# Patient Record
Sex: FEMALE | Race: BLACK OR AFRICAN AMERICAN | NOT HISPANIC OR LATINO | Employment: OTHER | URBAN - METROPOLITAN AREA
[De-identification: names, ages, dates, MRNs, and addresses within clinical notes are randomized per-mention and may not be internally consistent; named-entity substitution may affect disease eponyms.]

---

## 2017-02-23 DIAGNOSIS — M81.0 OSTEOPOROSIS: Primary | ICD-10-CM

## 2017-03-16 ENCOUNTER — HOSPITAL ENCOUNTER (OUTPATIENT)
Dept: RADIOLOGY | Facility: CLINIC | Age: 67
Discharge: HOME | End: 2017-03-16
Attending: FAMILY MEDICINE
Payer: COMMERCIAL

## 2017-03-16 ENCOUNTER — INITIAL CONSULT (OUTPATIENT)
Dept: OPHTHALMOLOGY | Facility: CLINIC | Age: 67
End: 2017-03-16
Payer: COMMERCIAL

## 2017-03-16 DIAGNOSIS — H00.16 CHALAZION, LEFT: Primary | ICD-10-CM

## 2017-03-16 DIAGNOSIS — M81.0 OSTEOPOROSIS: ICD-10-CM

## 2017-03-16 PROCEDURE — 92285 EXTERNAL OCULAR PHOTOGRAPHY: CPT | Mod: S$GLB,,, | Performed by: OPHTHALMOLOGY

## 2017-03-16 PROCEDURE — 92002 INTRM OPH EXAM NEW PATIENT: CPT | Mod: S$GLB,,, | Performed by: OPHTHALMOLOGY

## 2017-03-16 PROCEDURE — 99999 PR PBB SHADOW E&M-EST. PATIENT-LVL II: CPT | Mod: PBBFAC,,, | Performed by: OPHTHALMOLOGY

## 2017-03-16 PROCEDURE — 77080 DXA BONE DENSITY AXIAL: CPT | Mod: 26,,, | Performed by: INTERNAL MEDICINE

## 2017-03-16 NOTE — PROGRESS NOTES
HPI     66 y.o. Male with 3 month history of growing left upper eyelid cyst.  No   pain. Fluctuating in size.      Previous I and D of eyelid cyst.         Last edited by Kristel Rizo MD on 3/16/2017  2:01 PM.         Assessment /Plan     For exam results, see Encounter Report.    Chalazion, left      Plan for I and D of left upper eyelid chalazion.  Obtain consent on day of procedure along with checking IOP and dilation.        Continue warm compresses, lid hygiene       Return for surgery

## 2018-07-24 ENCOUNTER — HOSPITAL ENCOUNTER (EMERGENCY)
Facility: HOSPITAL | Age: 68
Discharge: PSYCHIATRIC OR SUBSTANCE USE FACILITY | End: 2018-07-25
Attending: EMERGENCY MEDICINE
Payer: COMMERCIAL

## 2018-07-24 DIAGNOSIS — R06.02 SOB (SHORTNESS OF BREATH): ICD-10-CM

## 2018-07-24 DIAGNOSIS — R45.851 SUICIDAL IDEATION: Primary | ICD-10-CM

## 2018-07-24 PROBLEM — F32.A DEPRESSION: Status: ACTIVE | Noted: 2018-07-24

## 2018-07-24 PROBLEM — F14.10 COCAINE ABUSE: Status: ACTIVE | Noted: 2018-07-24

## 2018-07-24 LAB
ALBUMIN SERPL BCP-MCNC: 3.5 G/DL
ALP SERPL-CCNC: 64 U/L
ALT SERPL W/O P-5'-P-CCNC: 20 U/L
AMPHET+METHAMPHET UR QL: NEGATIVE
ANION GAP SERPL CALC-SCNC: 8 MMOL/L
APAP SERPL-MCNC: <3 UG/ML
APAP SERPL-MCNC: <3 UG/ML
AST SERPL-CCNC: 26 U/L
BARBITURATES UR QL SCN>200 NG/ML: NEGATIVE
BASOPHILS # BLD AUTO: 0.02 K/UL
BASOPHILS NFR BLD: 0.3 %
BENZODIAZ UR QL SCN>200 NG/ML: NEGATIVE
BILIRUB SERPL-MCNC: 0.6 MG/DL
BILIRUB UR QL STRIP: NEGATIVE
BNP SERPL-MCNC: 14 PG/ML
BUN SERPL-MCNC: 11 MG/DL
BZE UR QL SCN: NORMAL
CALCIUM SERPL-MCNC: 9.2 MG/DL
CANNABINOIDS UR QL SCN: NEGATIVE
CHLORIDE SERPL-SCNC: 111 MMOL/L
CLARITY UR REFRACT.AUTO: ABNORMAL
CO2 SERPL-SCNC: 23 MMOL/L
COLOR UR AUTO: YELLOW
CREAT SERPL-MCNC: 1 MG/DL
CREAT UR-MCNC: 122 MG/DL
DIFFERENTIAL METHOD: ABNORMAL
EOSINOPHIL # BLD AUTO: 0.2 K/UL
EOSINOPHIL NFR BLD: 3.4 %
ERYTHROCYTE [DISTWIDTH] IN BLOOD BY AUTOMATED COUNT: 14.8 %
EST. GFR  (AFRICAN AMERICAN): >60 ML/MIN/1.73 M^2
EST. GFR  (NON AFRICAN AMERICAN): 58.4 ML/MIN/1.73 M^2
ETHANOL SERPL-MCNC: <10 MG/DL
GLUCOSE SERPL-MCNC: 95 MG/DL
GLUCOSE UR QL STRIP: NEGATIVE
HCT VFR BLD AUTO: 36 %
HGB BLD-MCNC: 11.9 G/DL
HGB UR QL STRIP: NEGATIVE
IMM GRANULOCYTES # BLD AUTO: 0.03 K/UL
IMM GRANULOCYTES NFR BLD AUTO: 0.4 %
KETONES UR QL STRIP: NEGATIVE
LEUKOCYTE ESTERASE UR QL STRIP: NEGATIVE
LYMPHOCYTES # BLD AUTO: 1.7 K/UL
LYMPHOCYTES NFR BLD: 25.8 %
MCH RBC QN AUTO: 27.9 PG
MCHC RBC AUTO-ENTMCNC: 33.1 G/DL
MCV RBC AUTO: 85 FL
METHADONE UR QL SCN>300 NG/ML: NEGATIVE
MONOCYTES # BLD AUTO: 0.6 K/UL
MONOCYTES NFR BLD: 9.4 %
NEUTROPHILS # BLD AUTO: 4.1 K/UL
NEUTROPHILS NFR BLD: 60.7 %
NITRITE UR QL STRIP: NEGATIVE
NRBC BLD-RTO: 0 /100 WBC
OPIATES UR QL SCN: NEGATIVE
PCP UR QL SCN>25 NG/ML: NEGATIVE
PH UR STRIP: 5 [PH] (ref 5–8)
PLATELET # BLD AUTO: 257 K/UL
PMV BLD AUTO: 10.5 FL
POTASSIUM SERPL-SCNC: 3.6 MMOL/L
PROT SERPL-MCNC: 7.4 G/DL
PROT UR QL STRIP: NEGATIVE
RBC # BLD AUTO: 4.26 M/UL
SODIUM SERPL-SCNC: 142 MMOL/L
SP GR UR STRIP: 1.01 (ref 1–1.03)
T4 FREE SERPL-MCNC: 0.7 NG/DL
TOXICOLOGY INFORMATION: NORMAL
TROPONIN I SERPL DL<=0.01 NG/ML-MCNC: 0.01 NG/ML
TSH SERPL DL<=0.005 MIU/L-ACNC: 7.66 UIU/ML
URN SPEC COLLECT METH UR: ABNORMAL
UROBILINOGEN UR STRIP-ACNC: NEGATIVE EU/DL
WBC # BLD AUTO: 6.71 K/UL

## 2018-07-24 PROCEDURE — 80053 COMPREHEN METABOLIC PANEL: CPT

## 2018-07-24 PROCEDURE — 25000003 PHARM REV CODE 250: Performed by: STUDENT IN AN ORGANIZED HEALTH CARE EDUCATION/TRAINING PROGRAM

## 2018-07-24 PROCEDURE — 81003 URINALYSIS AUTO W/O SCOPE: CPT | Mod: 59

## 2018-07-24 PROCEDURE — 84443 ASSAY THYROID STIM HORMONE: CPT

## 2018-07-24 PROCEDURE — 80307 DRUG TEST PRSMV CHEM ANLYZR: CPT

## 2018-07-24 PROCEDURE — 83880 ASSAY OF NATRIURETIC PEPTIDE: CPT

## 2018-07-24 PROCEDURE — 84439 ASSAY OF FREE THYROXINE: CPT

## 2018-07-24 PROCEDURE — 80320 DRUG SCREEN QUANTALCOHOLS: CPT

## 2018-07-24 PROCEDURE — 85025 COMPLETE CBC W/AUTO DIFF WBC: CPT

## 2018-07-24 PROCEDURE — 99285 EMERGENCY DEPT VISIT HI MDM: CPT

## 2018-07-24 PROCEDURE — 99285 EMERGENCY DEPT VISIT HI MDM: CPT | Mod: ,,, | Performed by: EMERGENCY MEDICINE

## 2018-07-24 PROCEDURE — 80329 ANALGESICS NON-OPIOID 1 OR 2: CPT | Mod: 59

## 2018-07-24 PROCEDURE — 84484 ASSAY OF TROPONIN QUANT: CPT

## 2018-07-24 RX ORDER — LEVOTHYROXINE SODIUM 50 UG/1
100 TABLET ORAL ONCE
Status: COMPLETED | OUTPATIENT
Start: 2018-07-24 | End: 2018-07-24

## 2018-07-24 RX ORDER — LEVOTHYROXINE SODIUM 50 UG/1
100 TABLET ORAL
Status: DISCONTINUED | OUTPATIENT
Start: 2018-07-25 | End: 2018-07-24

## 2018-07-24 RX ADMIN — LEVOTHYROXINE SODIUM 100 MCG: 50 TABLET ORAL at 02:07

## 2018-07-24 NOTE — ED NOTES
Pt arrived to unit with RN and two security guards. Pt was checked by RN to make sure nothing was on person. Pt documentation, PEC and pt belongings given to RN. Pt belongings are in BH3 bin. Pt is calm and cooperative.  called. Will continue to monitor.

## 2018-07-24 NOTE — HPI
"  Micheline Raza is a 67 y.o. female with a history of depression and medication non-compliance [place] [facility] [place] Psychiatry was consulted to address the patient's symptoms of suicidal ideation.     Per ER MD  Patient is pleasant 67 year old female with history of depression noncompliant with medications presents with suicidal ideations with plan "to get a gun from somewhere" and some reproducible sternal cp worse with inspiration.  Patient reports she just recently became homeless and slept outside yesterday as she could not afford the fees at her hotel. She has not been compliant with her medications in months including her synthroid, klonopin, and paroxetine. She doesn't remember who prescribed these medications.  Recently dc from [place] for similar scenario of suicidal ideations.   She says she has a son in the area but he's staying with friends and she has no other family members or friends she can stay with.  She says that since becoming homeless yesterday, she's been feeling more down and depressed. Hasn't eaten anything but a nutrigrain bar the last 2 days and has had little to drink.  She also reports some sternal cp that's worse with deep breath and reproducible with palpation. She also smoked cocaine yesterday morning and the day before.     Psychiatric Evaluation  Upon entering room patient is laying in bed with her eyes close rocking back and forth. When asked what brought her into the hospital she states "I already told you. I feel like dying, I can't talk right now". When asked if she has a plan she shrugged her shoulders. When asked about HI she nodded yes. Attempted to assess for AVH and psych ROS but patient would not provide answers when asked, often just stating "I don't know" to questions or requesting to be left alone- "just leave me alone please ma'am". Interview and exam are extremely limited 2/2 patient cooperation and was terminated early as patient was " non-participatory. UDS+cocaine    Collateral:   Patient unable/unwilling to provide with information    Psychiatric Review of Systems-is patient experiencing or having changes in  sleep: RUDOLPH  appetite: RUDOLPH- but per ED note she has only eaten a nutrigrain bar the last 2 days  weight: RUDOLPH  energy/anergy: RUDOLPH  interest/pleasure/anhedonia: RUDOLPH  somatic symptoms: RUDOLPH  libido: RUDOLPH  anxiety/panic: RUDOLPH  guilty/hopelessness: RUDOLPH  concentration: RUDOLPH  S.I.B.s/risky behavior: RUDOLPH  any drugs: RUDOLPH- UDS+ cocaine  alcohol: RUDOLPH -Etoh neg     Allergies:  Codeine    Past Medical/Surgical History:  Past Medical History:   Diagnosis Date    Depression     Hypercholesteremia     Pneumonia     Thyroid disease      Past Surgical History:   Procedure Laterality Date     SECTION         Past Psychiatric History:  Previous Medication Trials: per chart review klonopin, and paroxetine   Previous Psychiatric Hospitalizations: [facility]   Previous Suicide Attempts: unknown   History of Violence: unknown  Outpatient Psychiatrist: unknown    Social History:  Marital Status: [facility]  Children: [facility]   Employment Status/Info: [facility]  Education: [facility]  Special Ed: unknown  Housing Status: homeless  History of phys/sexual abuse: unknown  Access to gun: unknown    Substance Abuse History:  Recreational Drugs: cocaine  Use of Alcohol: [facility]. etoh neg  Rehab History:unknown   Tobacco Use:unknown  Use of OTC: unk    Legal History:  Past Charges/Incarcerations:unknown,    Pending charges:unknown     Family Psychiatric History:   unknown

## 2018-07-24 NOTE — ED PROVIDER NOTES
"Encounter Date: 2018    SCRIBE #1 NOTE: I, Jen Wolff, am scribing for, and in the presence of,  Dr. White. I have scribed the following portions of the note - the Resident attestation and the EKG reading.       History     Chief Complaint   Patient presents with    Suicidal     with a plan, also c/o CP when she touches her chest     Patient is pleasant 67 year old female with history of depression noncompliant with medications presents with suicidal ideations with plan "to get a gun from somewhere" and some reproducible sternal cp worse with inspiration.  Patient reports she just recently became homeless and slept outside yesterday as she could not afford the fees at her hotel. She has not been compliant with her medications in months including her synthroid, klonopin, and paroxetine. She doesn't remember who prescribed these medications.  Recently dc from [place] for similar scenario of suicidal ideations.   She says she has a son in the area but he's staying with friends and she has no other family members or friends she can stay with.  She says that since becoming homeless yesterday, she's been feeling more down and depressed. Hasn't eaten anything but a nutrigrain bar the last 2 days and has had little to drink.  She also reports some sternal cp that's worse with deep breath and reproducible with palpation. She also smoked cocaine yesterday morning and the day before.               Review of patient's allergies indicates:   Allergen Reactions    Codeine Nausea And Vomiting     Past Medical History:   Diagnosis Date    Depression     Hypercholesteremia     Pneumonia     Thyroid disease      Past Surgical History:   Procedure Laterality Date     SECTION       Family History   Problem Relation Age of Onset    Heart attack Mother     Lung cancer Mother     Liver cancer Father     Miscarriages / Stillbirths Neg Hx     Diabetes Neg Hx      Social History   Substance Use Topics    " Smoking status: Former Smoker     Packs/day: 1.00     Years: 40.00    Smokeless tobacco: Never Used    Alcohol use Yes      Comment: social     Review of Systems   Constitutional: Negative for fever.   HENT: Negative for sore throat.    Eyes: Positive for visual disturbance.   Respiratory: Positive for shortness of breath.    Cardiovascular: Positive for chest pain. Negative for leg swelling.   Gastrointestinal: Negative for abdominal pain, nausea and vomiting.   Endocrine: Negative for polyuria.   Genitourinary: Negative for difficulty urinating.   Skin: Negative for rash.   Neurological: Positive for weakness.   Hematological: Does not bruise/bleed easily.   Psychiatric/Behavioral: Positive for decreased concentration, sleep disturbance and suicidal ideas. The patient is nervous/anxious.        Physical Exam     Initial Vitals [07/24/18 1100]   BP Pulse Resp Temp SpO2   124/67 66 16 97.8 °F (36.6 °C) 97 %      MAP       --         Physical Exam    Constitutional: She appears well-developed and well-nourished.   HENT:   Head: Normocephalic and atraumatic.   Right Ear: External ear normal.   Left Ear: External ear normal.   Eyes: Conjunctivae are normal.   Neck: Neck supple.   Cardiovascular: Normal rate, regular rhythm and normal heart sounds.   Pulmonary/Chest: Breath sounds normal.   Abdominal: Soft. Bowel sounds are normal. She exhibits no mass. There is no tenderness.   Musculoskeletal: She exhibits no edema.   Chest pain with palpation over sternum and left sterno costal margins.    Lymphadenopathy:     She has no cervical adenopathy.   Neurological: She is alert and oriented to person, place, and time.   Skin: Skin is warm and dry.         ED Course   Procedures  Labs Reviewed   CBC W/ AUTO DIFFERENTIAL - Abnormal; Notable for the following:        Result Value    Hemoglobin 11.9 (*)     Hematocrit 36.0 (*)     RDW 14.8 (*)     All other components within normal limits   ACETAMINOPHEN LEVEL    ALCOHOL,MEDICAL (ETHANOL)   COMPREHENSIVE METABOLIC PANEL   TSH   TROPONIN I   TROPONIN I   B-TYPE NATRIURETIC PEPTIDE   URINALYSIS   DRUG SCREEN PANEL, URINE EMERGENCY     EKG Readings: (Independently Interpreted)   Initial Reading: No STEMI.       Imaging Results    None          Medical Decision Making:   History:   Old Medical Records: I decided to obtain old medical records.  Initial Assessment:   Patient is 67 year old female with history of anxiety and depression noncompliant with medciations presents with suicidal ideations with plan and reproducible sternal cp    Differential Diagnosis:   1. Rule out organic cause of depression. TSH as she's not been compliant on medications.  2. Patient likely here as she became homeless yesterday as her plan does not appear well founded upon explanation. Will update.   Independently Interpreted Test(s):   I have ordered and independently interpreted EKG Reading(s) - see prior notes  Clinical Tests:   Lab Tests: Ordered and Reviewed  Radiological Study: Ordered and Reviewed  Medical Tests: Ordered and Reviewed            Scribe Attestation:   Scribe #1: I performed the above scribed service and the documentation accurately describes the services I performed. I attest to the accuracy of the note.    Attending Attestation:   Physician Attestation Statement for Resident:  As the supervising MD   Physician Attestation Statement: I have personally seen and examined this patient.   I agree with the above history. -: I agree with assessment in plan by provider.   67 y.o. Female patient presents today with suicidal ideations. Patient is still actively suicidal, she is homeless. She recently did some cocaine. Recently was admitted to psychiatric facility 2 weeks ago. Patient has an act of plan to shoot herself. She also complains of pleuritic chest pain.     As the supervising MD I agree with the above PE.   -: Patient is medically  Cleared for [facility] admit.  She did have recent  use of cocaine and some vague pleuritic chest discomfort that had resolved..  CXR, EKG and Trop normal   As the supervising MD I agree with the above treatment, course, plan, and disposition.   -: EKG is nonstemi. Will do troponin.                        Clinical Impression:   The primary encounter diagnosis was Suicidal ideation. A diagnosis of SOB (shortness of breath) was also pertinent to this visit.      Disposition:   Disposition: Admitted  Condition: Stable                        Miguel White MD  07/24/18 1507       Freddie Mckeon MD  Resident  07/24/18 1532       Miguel White MD  07/24/18 1461

## 2018-07-25 VITALS
BODY MASS INDEX: 24.99 KG/M2 | WEIGHT: 150 LBS | RESPIRATION RATE: 16 BRPM | HEART RATE: 68 BPM | SYSTOLIC BLOOD PRESSURE: 116 MMHG | OXYGEN SATURATION: 97 % | HEIGHT: 65 IN | TEMPERATURE: 98 F | DIASTOLIC BLOOD PRESSURE: 71 MMHG

## 2018-07-25 NOTE — ED NOTES
Patient transferred to [facility] with cane and 1 bag of belongings by [facility]. Report was called to nurse Aragon with [facility]. Vital stable. Patient did not want contact person called and informed of transfer to [facility]. SVC maintained.

## 2018-07-25 NOTE — ASSESSMENT & PLAN NOTE
-patient presented to the ED with a h/o depression and SI. Pt minimally cooperative with interview and exam  -UDS +cocaine  -r/o SIMD

## 2018-07-25 NOTE — SUBJECTIVE & OBJECTIVE
Patient History           Medical as of 2018     Past Medical History     Diagnosis Date Comments Source    Depression -- -- Provider    Hypercholesteremia -- -- Provider    Pneumonia -- -- Provider    Thyroid disease -- -- Provider                  Surgical as of 2018     Past Surgical History     Procedure Laterality Date Comments Source     SECTION -- -- -- Provider                  Family as of 2018     Problem Relation Name Age of Onset Comments Source    Heart attack Mother -- -- -- Provider    Lung cancer Mother -- -- -- Provider    Liver cancer Father -- -- -- Provider    Miscarriages / Stillbirths Neg Hx -- -- -- Provider    Diabetes Neg Hx -- -- -- Provider            Tobacco Use as of 2018     Smoking Status Smoking Start Date Smoking Quit Date Packs/day Years Used    Former Smoker -- -- 1.00 40.00    Types Comments Smokeless Tobacco Status Smokeless Tobacco Quit Date Source    -- -- Never Used -- Provider            Alcohol Use as of 2018     Alcohol Use Drinks/Week Alcohol/Week Comments Source    Yes -- -- social Provider            Drug Use as of 2018     Drug Use Types Frequency Comments Source    No -- -- -- Provider            Sexual Activity as of 2018     Sexually Active Birth Control Partners Comments Source    Not Currently None Male -- Provider            Activities of Daily Living as of 2018    **None**           Social Documentation as of 2018    Lives with son. Retired. No longer drives.   Source: Provider           Occupational as of 2018    **None**           Socioeconomic as of 2018     Marital Status Spouse Name Number of Children Years Education Preferred Language Ethnicity Race Source    Single -- -- -- English /Black Black or  --         Pertinent History Q A Comments    as of 2018 Lives with      Place in Birth Order      Lives in      Number of Siblings      Raised by      Legal  Involvement      Childhood Trauma      Criminal History of      Financial Status      Highest Level of Education      Does patient have access to a firearm?       Service      Primary Leisure Activity      Spirituality       Past Medical History:   Diagnosis Date    Depression     Hypercholesteremia     Pneumonia     Thyroid disease      Past Surgical History:   Procedure Laterality Date     SECTION       Family History     Problem Relation (Age of Onset)    Heart attack Mother    Liver cancer Father    Lung cancer Mother        Social History Main Topics    Smoking status: Former Smoker     Packs/day: 1.00     Years: 40.00    Smokeless tobacco: Never Used    Alcohol use Yes      Comment: social    Drug use: No    Sexual activity: Not Currently     Partners: Male     Birth control/ protection: None     Review of patient's allergies indicates:   Allergen Reactions    Codeine Nausea And Vomiting       No current facility-administered medications on file prior to encounter.      Current Outpatient Prescriptions on File Prior to Encounter   Medication Sig    clonazePAM (KLONOPIN) 1 MG tablet Take 1 mg by mouth 2 (two) times daily.    levothyroxine (SYNTHROID) 100 MCG tablet Take 100 mcg by mouth once daily.    multivitamin capsule Take 1 capsule by mouth once daily.    paroxetine (PAXIL) 10 MG tablet Take 10 mg by mouth every morning. Pt unsure dose    pravastatin (PRAVACHOL) 40 MG tablet Take 1 tablet (40 mg total) by mouth once daily.    sodium,potassium,mag sulfates (SUPREP BOWEL PREP KIT) 17.5-3.13-1.6 gram SolR Take 1 each by mouth as directed.    vitamin D 1000 units Tab Take 185 mg by mouth once daily.     Psychotherapeutics     None        Review of Systems   Unable to perform ROS: Other (Patient would not provide answers when asked)     Strengths and Liabilities: unable to assess at this time as patient is minimally cooperative    Objective:     Vital Signs (Most  "Recent):  Temp: 98.6 °F (37 °C) (07/24/18 1545)  Pulse: 68 (07/24/18 1545)  Resp: 18 (07/24/18 1545)  BP: (!) 104/54 (07/24/18 1545)  SpO2: 97 % (07/24/18 1100) Vital Signs (24h Range):  Temp:  [97.8 °F (36.6 °C)-98.6 °F (37 °C)] 98.6 °F (37 °C)  Pulse:  [66-68] 68  Resp:  [16-18] 18  SpO2:  [97 %] 97 %  BP: (104-124)/(54-67) 104/54     Height: 5' 5" (165.1 cm)  Weight: 68 kg (150 lb)  Body mass index is 24.96 kg/m².    No intake or output data in the 24 hours ending 07/24/18 1905    Physical Exam   HENT:   Head: Normocephalic and atraumatic.   Eyes:   Patient would not open eyes for exam     Neck: Normal range of motion.   Cardiovascular:   Patient would not cooperate with cardiovascular exam   Pulmonary/Chest:   Patient would not cooperate with pulmonary exam   Abdominal:   Patient would not cooperate with abdominal exam     Mental Status Exam:  Appearance: unremarkable, age appropriate, well nourished, nervous appearing, rocking back in forth in bed laying down  Behavior/Cooperation: reluctant to participate, restless and fidgety , eye contact minimal  Speech: normal tone, normal rate, normal pitch, normal volume  Mood: "leave me alone"  Affect: irritable  Thought Process: normal and logical  Thought Content: +SI, +HI   Perception: unable to assess AVH. No objective evidence of hallucinations.   Orientation: unable to assess 2/2 cooperation  Memory: Unable to assess 2/2 cooperation  Attention Span/Concentration: Grossly intact  Insight: poor  Judgment: poor       Significant Labs:   Last 24 Hours:   Recent Lab Results       07/24/18  1250 07/24/18  1204 07/24/18  1153      Benzodiazepines  Negative      Methadone metabolites  Negative      Phencyclidine  Negative      Immature Granulocytes   0.4     Immature Grans (Abs)   0.03  Comment:  Mild elevation in immature granulocytes is non specific and   can be seen in a variety of conditions including stress response,   acute inflammation, trauma and pregnancy. " Correlation with other   laboratory and clinical findings is essential.       Acetaminophen (Tylenol), Serum <3.0  Comment:  Toxic Levels:  Adults (4 hr post-ingestion).........>150 ug/mL  Adults (12 hr post-ingestion)........>40 ug/mL  Peds (2 hr post-ingestion, liquid)...>225 ug/mL  (L)  <3.0  Comment:  Toxic Levels:  Adults (4 hr post-ingestion).........>150 ug/mL  Adults (12 hr post-ingestion)........>40 ug/mL  Peds (2 hr post-ingestion, liquid)...>225 ug/mL  (L)     Albumin   3.5     Alcohol, Medical, Serum <10       Alkaline Phosphatase   64     ALT   20     Amphetamine Screen, Ur  Negative      Anion Gap   8     Appearance, UA  Hazy(A)      AST   26     Barbiturate Screen, Ur  Negative      Baso #   0.02     Basophil%   0.3     Bilirubin (UA)  Negative      Total Bilirubin   0.6  Comment:  For infants and newborns, interpretation of results should be based  on gestational age, weight and in agreement with clinical  observations.  Premature Infant recommended reference ranges:  Up to 24 hours.............<8.0 mg/dL  Up to 48 hours............<12.0 mg/dL  3-5 days..................<15.0 mg/dL  6-29 days.................<15.0 mg/dL       BNP   14  Comment:  Values of less than 100 pg/ml are consistent with non-CHF populations.     BUN, Bld   11     Calcium   9.2     Chloride   111(H)     CO2   23     Cocaine (Metab.)  Presumptive Positive      Color, UA  Yellow      Creatinine   1.0     Creatinine, Random Ur  122.0  Comment:  The random urine reference ranges provided were established   for 24 hour urine collections.  No reference ranges exist for  random urine specimens.  Correlate clinically.        Differential Method   Automated     eGFR if    >60.0     eGFR if non    58.4  Comment:  Calculation used to obtain the estimated glomerular filtration  rate (eGFR) is the CKD-EPI equation.   (A)     Eos #   0.2     Eosinophil%   3.4     Free T4   0.70(L)     Glucose   95     Glucose,  UA  Negative      Gran # (ANC)   4.1     Gran%   60.7     Hematocrit   36.0(L)     Hemoglobin   11.9(L)     Ketones, UA  Negative      Leukocytes, UA  Negative      Lymph #   1.7     Lymph%   25.8     MCH   27.9     MCHC   33.1     MCV   85     Mono #   0.6     Mono%   9.4     MPV   10.5     Nitrite, UA  Negative      nRBC   0     Occult Blood UA  Negative      Opiate Scrn, Ur  Negative      pH, UA  5.0      Platelets   257     Potassium   3.6     Total Protein   7.4     Protein, UA  Negative  Comment:  Recommend a 24 hour urine protein or a urine   protein/creatinine ratio if globulin induced proteinuria is  clinically suspected.        RBC   4.26     RDW   14.8(H)     Sodium   142     Specific Gravity, UA  1.015      Specimen UA  Urine, Clean Catch      Marijuana (THC) Metabolite  Negative      Toxicology Information  SEE COMMENT  Comment:  This screen includes the following classes of drugs at the   listed cut-off:  Benzodiazepines                  200 ng/ml  Methadone                        300 ng/ml  Cocaine metabolite               300 ng/ml  Opiates                          300 ng/ml  Barbiturates                     200 ng/ml  Amphetamines                    1000 ng/ml  Marijuana metabs (THC)            50 ng/ml  Phencyclidine (PCP)               25 ng/ml  High concentrations of Diphenhydramine may cross-react with  Phencyclidine PCP screening immunoassay giving a false   positive result.  High concentrations of Methylenedioxymethamphetamine (MDMA aka  Ectasy) and other structurally similar compounds may cross-   react with the Amphetamine/Methamphetamine screening   immunoassay giving a false positive result.  A metabolite of the anti-HIV drug Sustiva () may cause  false positive results in the Marijuana metabolite (THC)   screening assay.  Note: This exception list includes only more common   interferants in toxicology screen testing.  Because of many   cross-reactantspositive results on toxicology  drug screens   should be confirmed whenever results do not correlate with   clinical presentation.  This report is intended for use in clinical monitoring and  management of patients. It is not intended for use in   employment related drug testing.  Because of any cross-reactants, positive results on toxicology  drug screens should be confirmed whenever results do not  correlate with clinical presentation.  Presumptive positive results are unconfirmed and may be used   only for medical purposes.        Troponin I   0.012  Comment:  The reference interval for Troponin I represents the 99th percentile   cutoff   for our facility and is consistent with 3rd generation assay   performance.       TSH   7.656(H)     Urobilinogen, UA  Negative      WBC   6.71           Significant Imaging: I have reviewed all pertinent imaging results/findings within the past 24 hours.

## 2018-07-25 NOTE — CONSULTS
"[facility]  Psychiatry  Consult Note    Patient Name: Micheline Raza  MRN: [id number]   Code Status: Prior  Admission Date: 7/24/2018  Hospital Length of Stay: 0 days  Attending Physician: Miguel White MD  Primary Care Provider: Tripp Jasso MD    Current Legal Status: [facility]    Patient information was obtained from patient and ER records.   Inpatient consult to Psychiatry  Consult performed by: MAYELIN OLIVEROS  Consult ordered by: IRMA DECKER        Subjective:     Principal Problem:<principal problem not specified>    Chief Complaint:  SI     HPI:     Micheline Raza is a 67 y.o. female with a history of depression and medication non-compliance who [place] [facility] [place] Psychiatry was consulted to address the patient's symptoms of suicidal ideation.     Per ER MD  Patient is pleasant 67 year old female with history of depression noncompliant with medications presents with suicidal ideations with plan "to get a gun from somewhere" and some reproducible sternal cp worse with inspiration.  Patient reports she just recently became homeless and slept outside yesterday as she could not afford the fees at her hotel. She has not been compliant with her medications in months including her synthroid, klonopin, and paroxetine. She doesn't remember who prescribed these medications.  Recently dc from [place] for similar scenario of suicidal ideations.   She says she has a son in the area but he's staying with friends and she has no other family members or friends she can stay with.  She says that since becoming homeless yesterday, she's been feeling more down and depressed. Hasn't eaten anything but a nutrigrain bar the last 2 days and has had little to drink.  She also reports some sternal cp that's worse with deep breath and reproducible with palpation. She also smoked cocaine yesterday morning and the day before.     Psychiatric Evaluation  Upon entering room patient is laying " "in bed with her eyes close rocking back and forth. When asked what brought her into the hospital she states "I already told you. I feel like dying, I can't talk right now". When asked if she has a plan she shrugged her shoulders. When asked about HI she nodded yes. Attempted to assess for AVH and psych ROS but patient would not provide answers when asked, often just stating "I don't know" to questions or requesting to be left alone- "just leave me alone please ma'am". Interview and exam are extremely limited 2/2 patient cooperation and was terminated early as patient was non-participatory. UDS+cocaine    Collateral:   Patient unable/unwilling to provide with information    Psychiatric Review of Systems-is patient experiencing or having changes in  sleep: RUDOLPH  appetite: RUDOLPH- but per ED note she has only eaten a nutrigrain bar the last 2 days  weight: RUDOLPH  energy/anergy: RUDOLPH  interest/pleasure/anhedonia: RUDOLPH  somatic symptoms: RUDOLPH  libido: RUDOLPH  anxiety/panic: RUDOLPH  guilty/hopelessness: RUDOLPH  concentration: RUDOLPH  S.I.B.s/risky behavior: RUDOLPH  any drugs: RUDOLPH- UDS+ cocaine  alcohol: RUDOLPH -Etoh neg     Allergies:  Codeine    Past Medical/Surgical History:  Past Medical History:   Diagnosis Date    Depression     Hypercholesteremia     Pneumonia     Thyroid disease      Past Surgical History:   Procedure Laterality Date     SECTION         Past Psychiatric History:  Previous Medication Trials: per chart review klonopin, and paroxetine   Previous Psychiatric Hospitalizations: [facility]   Previous Suicide Attempts: unknown   History of Violence: unknown  Outpatient Psychiatrist: unknown    Social History:  Marital Status: [facility]olph  Children: rudolph   Employment Status/Info: [facility]olph  Education: [facility]  Special Ed: unknown  Housing Status: homeless  History of phys/sexual abuse: unknown  Access to gun: unknown    Substance Abuse History:  Recreational Drugs: cocaine  Use of Alcohol: [facility]olph. etoh neg  Rehab History:unknown   Tobacco " Use:unknown  Use of OTC: unk    Legal History:  Past Charges/Incarcerations:unknown,    Pending charges:unknown     Family Psychiatric History:   unknown      Hospital Course: No notes on file         Patient History           Medical as of 2018     Past Medical History     Diagnosis Date Comments Source    Depression -- -- Provider    Hypercholesteremia -- -- Provider    Pneumonia -- -- Provider    Thyroid disease -- -- Provider                  Surgical as of 2018     Past Surgical History     Procedure Laterality Date Comments Source     SECTION -- -- -- Provider                  Family as of 2018     Problem Relation Name Age of Onset Comments Source    Heart attack Mother -- -- -- Provider    Lung cancer Mother -- -- -- Provider    Liver cancer Father -- -- -- Provider    Miscarriages / Stillbirths Neg Hx -- -- -- Provider    Diabetes Neg Hx -- -- -- Provider            Tobacco Use as of 2018     Smoking Status Smoking Start Date Smoking Quit Date Packs/day Years Used    Former Smoker -- -- 1.00 40.00    Types Comments Smokeless Tobacco Status Smokeless Tobacco Quit Date Source    -- -- Never Used -- Provider            Alcohol Use as of 2018     Alcohol Use Drinks/Week Alcohol/Week Comments Source    Yes -- -- social Provider            Drug Use as of 2018     Drug Use Types Frequency Comments Source    No -- -- -- Provider            Sexual Activity as of 2018     Sexually Active Birth Control Partners Comments Source    Not Currently None Male -- Provider            Activities of Daily Living as of 2018    **None**           Social Documentation as of 2018    Lives with son. Retired. No longer drives.   Source: Provider           Occupational as of 2018    **None**           Socioeconomic as of 2018     Marital Status Spouse Name Number of Children Years Education Preferred Language Ethnicity Race Source    Single -- -- -- English   American/Black Black or  --         Pertinent History Q A Comments    as of 2018 Lives with      Place in Birth Order      Lives in      Number of Siblings      Raised by      Legal Involvement      Childhood Trauma      Criminal History of      Financial Status      Highest Level of Education      Does patient have access to a firearm?       Service      Primary Leisure Activity      Spirituality       Past Medical History:   Diagnosis Date    Depression     Hypercholesteremia     Pneumonia     Thyroid disease      Past Surgical History:   Procedure Laterality Date     SECTION       Family History     Problem Relation (Age of Onset)    Heart attack Mother    Liver cancer Father    Lung cancer Mother        Social History Main Topics    Smoking status: Former Smoker     Packs/day: 1.00     Years: 40.00    Smokeless tobacco: Never Used    Alcohol use Yes      Comment: social    Drug use: No    Sexual activity: Not Currently     Partners: Male     Birth control/ protection: None     Review of patient's allergies indicates:   Allergen Reactions    Codeine Nausea And Vomiting       No current facility-administered medications on file prior to encounter.      Current Outpatient Prescriptions on File Prior to Encounter   Medication Sig    clonazePAM (KLONOPIN) 1 MG tablet Take 1 mg by mouth 2 (two) times daily.    levothyroxine (SYNTHROID) 100 MCG tablet Take 100 mcg by mouth once daily.    multivitamin capsule Take 1 capsule by mouth once daily.    paroxetine (PAXIL) 10 MG tablet Take 10 mg by mouth every morning. Pt unsure dose    pravastatin (PRAVACHOL) 40 MG tablet Take 1 tablet (40 mg total) by mouth once daily.    sodium,potassium,mag sulfates (SUPREP BOWEL PREP KIT) 17.5-3.13-1.6 gram SolR Take 1 each by mouth as directed.    vitamin D 1000 units Tab Take 185 mg by mouth once daily.     Psychotherapeutics     None        Review of Systems   Unable to perform  "ROS: Other (Patient would not provide answers when asked)     Strengths and Liabilities: unable to assess at this time as patient is minimally cooperative    Objective:     Vital Signs (Most Recent):  Temp: 98.6 °F (37 °C) (07/24/18 1545)  Pulse: 68 (07/24/18 1545)  Resp: 18 (07/24/18 1545)  BP: (!) 104/54 (07/24/18 1545)  SpO2: 97 % (07/24/18 1100) Vital Signs (24h Range):  Temp:  [97.8 °F (36.6 °C)-98.6 °F (37 °C)] 98.6 °F (37 °C)  Pulse:  [66-68] 68  Resp:  [16-18] 18  SpO2:  [97 %] 97 %  BP: (104-124)/(54-67) 104/54     Height: 5' 5" (165.1 cm)  Weight: 68 kg (150 lb)  Body mass index is 24.96 kg/m².    No intake or output data in the 24 hours ending 07/24/18 1905    Physical Exam   HENT:   Head: Normocephalic and atraumatic.   Eyes:   Patient would not open eyes for exam     Neck: Normal range of motion.   Cardiovascular:   Patient would not cooperate with cardiovascular exam   Pulmonary/Chest:   Patient would not cooperate with pulmonary exam   Abdominal:   Patient would not cooperate with abdominal exam     Mental Status Exam:  Appearance: unremarkable, age appropriate, well nourished, nervous appearing, rocking back in forth in bed laying down  Behavior/Cooperation: reluctant to participate, restless and fidgety , eye contact minimal  Speech: normal tone, normal rate, normal pitch, normal volume  Mood: "leave me alone"  Affect: irritable  Thought Process: normal and logical  Thought Content: +SI, +HI   Perception: unable to assess AVH. No objective evidence of hallucinations.   Orientation: unable to assess 2/2 cooperation  Memory: Unable to assess 2/2 cooperation  Attention Span/Concentration: Grossly intact  Insight: poor  Judgment: poor       Significant Labs:   Last 24 Hours:   Recent Lab Results       07/24/18  1250 07/24/18  1204 07/24/18  1153      Benzodiazepines  Negative      Methadone metabolites  Negative      Phencyclidine  Negative      Immature Granulocytes   0.4     Immature Grans (Abs)   " 0.03  Comment:  Mild elevation in immature granulocytes is non specific and   can be seen in a variety of conditions including stress response,   acute inflammation, trauma and pregnancy. Correlation with other   laboratory and clinical findings is essential.       Acetaminophen (Tylenol), Serum <3.0  Comment:  Toxic Levels:  Adults (4 hr post-ingestion).........>150 ug/mL  Adults (12 hr post-ingestion)........>40 ug/mL  Peds (2 hr post-ingestion, liquid)...>225 ug/mL  (L)  <3.0  Comment:  Toxic Levels:  Adults (4 hr post-ingestion).........>150 ug/mL  Adults (12 hr post-ingestion)........>40 ug/mL  Peds (2 hr post-ingestion, liquid)...>225 ug/mL  (L)     Albumin   3.5     Alcohol, Medical, Serum <10       Alkaline Phosphatase   64     ALT   20     Amphetamine Screen, Ur  Negative      Anion Gap   8     Appearance, UA  Hazy(A)      AST   26     Barbiturate Screen, Ur  Negative      Baso #   0.02     Basophil%   0.3     Bilirubin (UA)  Negative      Total Bilirubin   0.6  Comment:  For infants and newborns, interpretation of results should be based  on gestational age, weight and in agreement with clinical  observations.  Premature Infant recommended reference ranges:  Up to 24 hours.............<8.0 mg/dL  Up to 48 hours............<12.0 mg/dL  3-5 days..................<15.0 mg/dL  6-29 days.................<15.0 mg/dL       BNP   14  Comment:  Values of less than 100 pg/ml are consistent with non-CHF populations.     BUN, Bld   11     Calcium   9.2     Chloride   111(H)     CO2   23     Cocaine (Metab.)  Presumptive Positive      Color, UA  Yellow      Creatinine   1.0     Creatinine, Random Ur  122.0  Comment:  The random urine reference ranges provided were established   for 24 hour urine collections.  No reference ranges exist for  random urine specimens.  Correlate clinically.        Differential Method   Automated     eGFR if    >60.0     eGFR if non African American    58.4  Comment:  Calculation used to obtain the estimated glomerular filtration  rate (eGFR) is the CKD-EPI equation.   (A)     Eos #   0.2     Eosinophil%   3.4     Free T4   0.70(L)     Glucose   95     Glucose, UA  Negative      Gran # (ANC)   4.1     Gran%   60.7     Hematocrit   36.0(L)     Hemoglobin   11.9(L)     Ketones, UA  Negative      Leukocytes, UA  Negative      Lymph #   1.7     Lymph%   25.8     MCH   27.9     MCHC   33.1     MCV   85     Mono #   0.6     Mono%   9.4     MPV   10.5     Nitrite, UA  Negative      nRBC   0     Occult Blood UA  Negative      Opiate Scrn, Ur  Negative      pH, UA  5.0      Platelets   257     Potassium   3.6     Total Protein   7.4     Protein, UA  Negative  Comment:  Recommend a 24 hour urine protein or a urine   protein/creatinine ratio if globulin induced proteinuria is  clinically suspected.        RBC   4.26     RDW   14.8(H)     Sodium   142     Specific Gravity, UA  1.015      Specimen UA  Urine, Clean Catch      Marijuana (THC) Metabolite  Negative      Toxicology Information  SEE COMMENT  Comment:  This screen includes the following classes of drugs at the   listed cut-off:  Benzodiazepines                  200 ng/ml  Methadone                        300 ng/ml  Cocaine metabolite               300 ng/ml  Opiates                          300 ng/ml  Barbiturates                     200 ng/ml  Amphetamines                    1000 ng/ml  Marijuana metabs (THC)            50 ng/ml  Phencyclidine (PCP)               25 ng/ml  High concentrations of Diphenhydramine may cross-react with  Phencyclidine PCP screening immunoassay giving a false   positive result.  High concentrations of Methylenedioxymethamphetamine (MDMA aka  Ectasy) and other structurally similar compounds may cross-   react with the Amphetamine/Methamphetamine screening   immunoassay giving a false positive result.  A metabolite of the anti-HIV drug Sustiva () may cause  false positive results in  the Marijuana metabolite (THC)   screening assay.  Note: This exception list includes only more common   interferants in toxicology screen testing.  Because of many   cross-reactantspositive results on toxicology drug screens   should be confirmed whenever results do not correlate with   clinical presentation.  This report is intended for use in clinical monitoring and  management of patients. It is not intended for use in   employment related drug testing.  Because of any cross-reactants, positive results on toxicology  drug screens should be confirmed whenever results do not  correlate with clinical presentation.  Presumptive positive results are unconfirmed and may be used   only for medical purposes.        Troponin I   0.012  Comment:  The reference interval for Troponin I represents the 99th percentile   cutoff   for our facility and is consistent with 3rd generation assay   performance.       TSH   7.656(H)     Urobilinogen, UA  Negative      WBC   6.71           Significant Imaging: I have reviewed all pertinent imaging results/findings within the past 24 hours.    Assessment/Plan:     Cocaine abuse    -patient presented to the ED with a h/o depression and SI. Pt minimally cooperative with interview and exam  -UDS +cocaine  -r/o SIMD        Depression    -patient was h/o depression who presented to the ED with SI that was previously taking paroxetine per ED assessment.  -patient minimally cooperative on interview and able to provide little information at this time               1. Dispo/Legal Status:  Cont PEC at this time as the pt is currently danger to her self. Seek inpt bed for pt safety and stabilization when/if medically cleared by the ER MD.  2. Scheduled Medications: Defer psychiatric medications to providers at final destination.. Defer any non-psych meds to the ER MD. No free beds in the [facility]. Please fax out information to aid with transfer  3. PRN Medications: 10 mg zyprexa IM/PO  prn  non-redirectable agitation associated with breakthrough psychosis or derek if needed to help the pt more effectively interact with environment.   5. Case Discussed with:  Dr. Christopher        Total Time:  45 minutes     Ashley Doyle MD   Psychiatry  [facility]

## 2018-07-25 NOTE — ASSESSMENT & PLAN NOTE
-patient was h/o depression who presented to the ED with SI that was previously taking paroxetine per ED assessment.  -patient minimally cooperative on interview and able to provide little information at this time

## 2018-12-17 NOTE — ED NOTES
Patient reports she is still feeling suicidal. He plan is to use a gun. Denies HI, V/A hallucinations. SVC maintained.    Sepsis Week 1 Survey      Responses   Facility patient discharged from?  [facility]   Does the patient have one of the following disease processes/diagnoses(primary or secondary)?  Sepsis   Is there a successful TCM telephone encounter documented?  No   Week 1 attempt successful?  No   Unsuccessful attempts  Attempt 1          Radha Manjarrez RN

## 2019-12-05 ENCOUNTER — HOSPITAL ENCOUNTER (EMERGENCY)
Facility: HOSPITAL | Age: 69
Discharge: PSYCHIATRIC OR SUBSTANCE USE FACILITY | End: 2019-12-05
Attending: EMERGENCY MEDICINE
Payer: COMMERCIAL

## 2019-12-05 VITALS
HEART RATE: 62 BPM | HEIGHT: 66 IN | SYSTOLIC BLOOD PRESSURE: 128 MMHG | BODY MASS INDEX: 23.46 KG/M2 | OXYGEN SATURATION: 98 % | TEMPERATURE: 99 F | WEIGHT: 146 LBS | RESPIRATION RATE: 17 BRPM | DIASTOLIC BLOOD PRESSURE: 59 MMHG

## 2019-12-05 DIAGNOSIS — R45.851 SUICIDAL IDEATION: Primary | ICD-10-CM

## 2019-12-05 DIAGNOSIS — R45.851 DEPRESSION WITH SUICIDAL IDEATION: ICD-10-CM

## 2019-12-05 DIAGNOSIS — J98.59 MEDIASTINAL MASS: ICD-10-CM

## 2019-12-05 DIAGNOSIS — K59.00 CONSTIPATION, UNSPECIFIED CONSTIPATION TYPE: ICD-10-CM

## 2019-12-05 DIAGNOSIS — F32.A DEPRESSION WITH SUICIDAL IDEATION: ICD-10-CM

## 2019-12-05 LAB
ALBUMIN SERPL BCP-MCNC: 3.7 G/DL (ref 3.5–5.2)
ALP SERPL-CCNC: 81 U/L (ref 55–135)
ALT SERPL W/O P-5'-P-CCNC: 15 U/L (ref 10–44)
AMPHET+METHAMPHET UR QL: NEGATIVE
ANION GAP SERPL CALC-SCNC: 10 MMOL/L (ref 8–16)
AST SERPL-CCNC: 23 U/L (ref 10–40)
BACTERIA #/AREA URNS AUTO: NORMAL /HPF
BARBITURATES UR QL SCN>200 NG/ML: NEGATIVE
BASOPHILS # BLD AUTO: 0.05 K/UL (ref 0–0.2)
BASOPHILS NFR BLD: 0.5 % (ref 0–1.9)
BENZODIAZ UR QL SCN>200 NG/ML: NEGATIVE
BILIRUB SERPL-MCNC: 0.4 MG/DL (ref 0.1–1)
BILIRUB UR QL STRIP: NEGATIVE
BUN SERPL-MCNC: 18 MG/DL (ref 8–23)
BZE UR QL SCN: NORMAL
CALCIUM SERPL-MCNC: 10.1 MG/DL (ref 8.7–10.5)
CANNABINOIDS UR QL SCN: NEGATIVE
CHLORIDE SERPL-SCNC: 103 MMOL/L (ref 95–110)
CLARITY UR REFRACT.AUTO: CLEAR
CO2 SERPL-SCNC: 27 MMOL/L (ref 23–29)
COLOR UR AUTO: ABNORMAL
CREAT SERPL-MCNC: 1.2 MG/DL (ref 0.5–1.4)
CREAT UR-MCNC: 61 MG/DL (ref 15–325)
DIFFERENTIAL METHOD: NORMAL
EOSINOPHIL # BLD AUTO: 0.1 K/UL (ref 0–0.5)
EOSINOPHIL NFR BLD: 1.2 % (ref 0–8)
ERYTHROCYTE [DISTWIDTH] IN BLOOD BY AUTOMATED COUNT: 14.1 % (ref 11.5–14.5)
EST. GFR  (AFRICAN AMERICAN): 53.3 ML/MIN/1.73 M^2
EST. GFR  (NON AFRICAN AMERICAN): 46.2 ML/MIN/1.73 M^2
GLUCOSE SERPL-MCNC: 99 MG/DL (ref 70–110)
GLUCOSE UR QL STRIP: NEGATIVE
HCT VFR BLD AUTO: 43.9 % (ref 37–48.5)
HGB BLD-MCNC: 14.4 G/DL (ref 12–16)
HGB UR QL STRIP: NEGATIVE
IMM GRANULOCYTES # BLD AUTO: 0.02 K/UL (ref 0–0.04)
IMM GRANULOCYTES NFR BLD AUTO: 0.2 % (ref 0–0.5)
KETONES UR QL STRIP: NEGATIVE
LACTATE SERPL-SCNC: 1.7 MMOL/L (ref 0.5–2.2)
LEUKOCYTE ESTERASE UR QL STRIP: NEGATIVE
LIPASE SERPL-CCNC: 24 U/L (ref 4–60)
LYMPHOCYTES # BLD AUTO: 2.3 K/UL (ref 1–4.8)
LYMPHOCYTES NFR BLD: 21 % (ref 18–48)
MCH RBC QN AUTO: 28.7 PG (ref 27–31)
MCHC RBC AUTO-ENTMCNC: 32.8 G/DL (ref 32–36)
MCV RBC AUTO: 88 FL (ref 82–98)
METHADONE UR QL SCN>300 NG/ML: NEGATIVE
MICROSCOPIC COMMENT: NORMAL
MONOCYTES # BLD AUTO: 0.9 K/UL (ref 0.3–1)
MONOCYTES NFR BLD: 8.6 % (ref 4–15)
NEUTROPHILS # BLD AUTO: 7.3 K/UL (ref 1.8–7.7)
NEUTROPHILS NFR BLD: 68.5 % (ref 38–73)
NITRITE UR QL STRIP: NEGATIVE
NRBC BLD-RTO: 0 /100 WBC
OPIATES UR QL SCN: NEGATIVE
PCP UR QL SCN>25 NG/ML: NEGATIVE
PH UR STRIP: 6 [PH] (ref 5–8)
PLATELET # BLD AUTO: 260 K/UL (ref 150–350)
PMV BLD AUTO: 11.6 FL (ref 9.2–12.9)
POTASSIUM SERPL-SCNC: 3.6 MMOL/L (ref 3.5–5.1)
PROT SERPL-MCNC: 8.8 G/DL (ref 6–8.4)
PROT UR QL STRIP: NEGATIVE
RBC # BLD AUTO: 5.01 M/UL (ref 4–5.4)
RBC #/AREA URNS AUTO: 4 /HPF (ref 0–4)
SODIUM SERPL-SCNC: 140 MMOL/L (ref 136–145)
SP GR UR STRIP: >=1.03 (ref 1–1.03)
SQUAMOUS #/AREA URNS AUTO: 2 /HPF
T4 FREE SERPL-MCNC: 0.79 NG/DL (ref 0.71–1.51)
TOXICOLOGY INFORMATION: NORMAL
TSH SERPL DL<=0.005 MIU/L-ACNC: 37.73 UIU/ML (ref 0.4–4)
URN SPEC COLLECT METH UR: ABNORMAL
WBC # BLD AUTO: 10.69 K/UL (ref 3.9–12.7)
WBC #/AREA URNS AUTO: 1 /HPF (ref 0–5)

## 2019-12-05 PROCEDURE — 84443 ASSAY THYROID STIM HORMONE: CPT

## 2019-12-05 PROCEDURE — 85025 COMPLETE CBC W/AUTO DIFF WBC: CPT

## 2019-12-05 PROCEDURE — 90791 PR PSYCHIATRIC DIAGNOSTIC EVALUATION: ICD-10-PCS | Mod: ,,, | Performed by: NURSE PRACTITIONER

## 2019-12-05 PROCEDURE — 84439 ASSAY OF FREE THYROXINE: CPT

## 2019-12-05 PROCEDURE — 25500020 PHARM REV CODE 255: Performed by: EMERGENCY MEDICINE

## 2019-12-05 PROCEDURE — 81001 URINALYSIS AUTO W/SCOPE: CPT

## 2019-12-05 PROCEDURE — 80307 DRUG TEST PRSMV CHEM ANLYZR: CPT

## 2019-12-05 PROCEDURE — 83605 ASSAY OF LACTIC ACID: CPT

## 2019-12-05 PROCEDURE — 83690 ASSAY OF LIPASE: CPT

## 2019-12-05 PROCEDURE — 90791 PSYCH DIAGNOSTIC EVALUATION: CPT | Mod: ,,, | Performed by: NURSE PRACTITIONER

## 2019-12-05 PROCEDURE — 63600175 PHARM REV CODE 636 W HCPCS: Performed by: STUDENT IN AN ORGANIZED HEALTH CARE EDUCATION/TRAINING PROGRAM

## 2019-12-05 PROCEDURE — 80053 COMPREHEN METABOLIC PANEL: CPT

## 2019-12-05 PROCEDURE — 99285 EMERGENCY DEPT VISIT HI MDM: CPT | Mod: ,,, | Performed by: EMERGENCY MEDICINE

## 2019-12-05 PROCEDURE — 96360 HYDRATION IV INFUSION INIT: CPT | Mod: 59

## 2019-12-05 PROCEDURE — 63600175 PHARM REV CODE 636 W HCPCS: Performed by: EMERGENCY MEDICINE

## 2019-12-05 PROCEDURE — 99285 EMERGENCY DEPT VISIT HI MDM: CPT | Mod: 25

## 2019-12-05 PROCEDURE — 99285 PR EMERGENCY DEPT VISIT,LEVEL V: ICD-10-PCS | Mod: ,,, | Performed by: EMERGENCY MEDICINE

## 2019-12-05 PROCEDURE — 96361 HYDRATE IV INFUSION ADD-ON: CPT

## 2019-12-05 RX ORDER — ESCITALOPRAM OXALATE 20 MG/1
20 TABLET ORAL DAILY
COMMUNITY
End: 2020-05-05

## 2019-12-05 RX ORDER — PSEUDOEPHEDRINE/ACETAMINOPHEN 30MG-500MG
100 TABLET ORAL
Status: DISCONTINUED | OUTPATIENT
Start: 2019-12-05 | End: 2019-12-05 | Stop reason: HOSPADM

## 2019-12-05 RX ORDER — SYRING-NEEDL,DISP,INSUL,0.3 ML 29 G X1/2"
296 SYRINGE, EMPTY DISPOSABLE MISCELLANEOUS
Status: DISCONTINUED | OUTPATIENT
Start: 2019-12-05 | End: 2019-12-05 | Stop reason: HOSPADM

## 2019-12-05 RX ORDER — ASPIRIN 81 MG/1
81 TABLET ORAL DAILY
Status: ON HOLD | COMMUNITY
End: 2022-02-15 | Stop reason: HOSPADM

## 2019-12-05 RX ORDER — LEVOCETIRIZINE DIHYDROCHLORIDE 5 MG/1
5 TABLET, FILM COATED ORAL NIGHTLY
Status: ON HOLD | COMMUNITY
End: 2022-02-15 | Stop reason: HOSPADM

## 2019-12-05 RX ORDER — DIVALPROEX SODIUM 250 MG/1
250 TABLET, DELAYED RELEASE ORAL 3 TIMES DAILY
COMMUNITY

## 2019-12-05 RX ADMIN — SODIUM CHLORIDE 500 ML: 0.9 INJECTION, SOLUTION INTRAVENOUS at 10:12

## 2019-12-05 RX ADMIN — IOHEXOL 75 ML: 350 INJECTION, SOLUTION INTRAVENOUS at 10:12

## 2019-12-05 RX ADMIN — SODIUM CHLORIDE 1000 ML: 0.9 INJECTION, SOLUTION INTRAVENOUS at 01:12

## 2019-12-05 NOTE — ED NOTES
"Patient is tearful and states "I just want to go home to my lord" "im tired of living, I don't want to live anymore"  "

## 2019-12-05 NOTE — ED NOTES
Patient on continous BP cuff, refuses pulse oximeter and cardiac monitoring. Side rails up and bed in lowest position. Call light in reach.

## 2019-12-05 NOTE — CONSULTS
"[facility]  Psychiatry  Consult Note    Patient Name: Micheline Raza  MRN: [id number]   Code Status: Prior  Admission Date: 12/5/2019  Hospital Length of Stay: 0 days  Attending Physician: Kelley Owusu MD  Primary Care Provider: Tripp Jasso MD (Inactive)    Current Legal Status: [facility]    Patient information was obtained from patient and ER records.   Inpatient consult to Psychiatry  Consult performed by: Rosangela Baldwin NP  Consult ordered by: Darius Olsen MD  Reason for consult: suicidal ideation        Subjective:     Principal Problem:  Pt c/o generalized weakness, vomiting & constipation.     Chief Complaint:    "I am tired of being asked the same questions all the time. I am feeling sick. I have no appetite, nausea and constipation. My face is gone. I am losing weight."     HPI:   Per MD Note:  Weakness        Pt c/o generalized weakness, vomiting & constipation.  Onset several days.      Patient is 70 yo F presenting to the ED with constipation and diarrhea with N/V from her nursing home. She also has Thyroid disease, hypercholesterolemia, and depression (previously on klonopin). She states that she has been constipated to the point that she is "having diarrhea around a stool ball". Diarrhea is brown in appearance and patient denies blood but is unsure. She has cold sweats when having BMs. She is having lower abdominal cramping feeling "like period pain". Cramping comes and goes and is more noticeable with BMs. Emesis is nonbloody and to the point she is unable to keep any food or pills down. She is not sure the last time she took her pills but she has not taken them for at least a couple days. She has had a 30lb weight loss over the past 2 months. She is also very depressed and states that she "wants to go home to my lord" but does not have a plan and is not having SI. There are no guns in the home to her knowledge. Denies dysuria or any changes in urination.         On My " "Interview: 2019    Met with the patient while she was lying in her ED bed. She presented as irritable, dysphoric, and seemed uninterested in the interview process. When she was asked about the reason for ED visit, she replied angrily, "I am tired of being asked the same questions all the time. I am feeling sick. I have no appetite, nausea and constipation. My face is gone. I am losing weight." When she was asked if she was having suicidal thoughts she replied, "yes. I just want to die. There is no hope for me. I want to go home and want go to the lord" and she asked if she has a suicide plan she responded, "whatever it takes, swallow pills or anything else. I can have access to guns." She refused to let us know if she had prior suicide attempts and responded by saying, "no comments." She endorsed feeling depressed and hopeless. She reported feeling very tired and refused to answer the rest of the interview questions.       Psychiatric Review Of Systems - Is patient experiencing or having changes in:  sleep: yes. No sleep  Appetite:yes, no appetite  weight: yes, weight loss  energy/anergy: decreased   interest/pleasure/anhedonia: yes. decreased  somatic symptoms: no  anxiety/panic: unknown  Guilty/hopelessness: yes "there in no hope for me"  concentration: yes  S.I.B.s/risky behavior: unknown  Irritability: yes  Racing thoughts: yes  Impulsive behaviors: unknown  Paranoia: unknown  AVH:no  Suicidal thoughts/plan/intent: yes    Past Medical/Surgical History  Past Medical History:   Diagnosis Date    Depression     Hypercholesteremia     Pneumonia     Thyroid disease      Past Surgical History:   Procedure Laterality Date     SECTION         Past Psychiatric History:  Previous Medication Trials: yes but she was not able to name them  Previous Psychiatric Hospitalizations: yes   Previous Suicide Attempts: She stated, "no comments"   History of Violence: unknown  Outpatient Psychiatrist: no    Social " "History:  Marital Status: single  Children: 1   Employment Status/Info: retired  Education: unknown as she refused to answer this question  Special Ed: no  Housing Status:"I don't want to talk anymore"  History of phys/sexual abuse: unknown  Access to gun: yes    Substance Abuse History:  Recreational Drugs: cocaine  Use of Alcohol: unknown as she refused to answer  Tobacco Use: unknown  Rehab History: unknown     Legal History:  Past Charges/Incarcerations: unknown,    Pending charges: unknown     Family Psychiatric History:  Unable to assess as she refused to answer the interview questions        Mental Status Exam  Appearance: age appropriate, lying in bed  Behavior/Cooperation: reluctant to participate, uncooperative, eye contact minimal  Speech: normal tone, normal rate, normal pitch, normal volume  Mood:  depressed, dysphoric and irritable  Affect: normal, blunted, constricted, dysphoric and irritable  Thought Process: goal-directed, logical  Thought Content: suicidal thoughts: (active-yes) see HPI for more details  Orientation:  grossly intact, person, place, situation, time/date, day of week, month of year  Memory: Unable to assess as she refused to answer the interview questions  Attention/Concentration:  Decreased  Cognition: fund of knowledge She was able to name the current president  Insight: fair per patient understanding of illness  Judgement: poor per patient's current behavior        Hospital Course: No notes on file         Patient History           Medical as of 2019     Past Medical History     Diagnosis Date Comments Source    Depression -- -- Provider    Hypercholesteremia -- -- Provider    Pneumonia -- -- Provider    Thyroid disease -- -- Provider                  Surgical as of 2019     Past Surgical History     Procedure Laterality Date Comments Source     SECTION -- -- -- Provider                  Family as of 2019     Problem Relation Name Age of Onset Comments " Source    Heart attack Mother -- -- -- Provider    Lung cancer Mother -- -- -- Provider    Liver cancer Father -- -- -- Provider    Miscarriages / Stillbirths Neg Hx -- -- -- Provider    Diabetes Neg Hx -- -- -- Provider            Tobacco Use as of 12/5/2019     Smoking Status Smoking Start Date Smoking Quit Date Packs/Day Years Used    Former Smoker -- -- 1.00 40.00    Types Comments Smokeless Tobacco Status Smokeless Tobacco Quit Date Source    -- -- Never Used -- Provider            Alcohol Use as of 12/5/2019     Alcohol Use Drinks/Week Alcohol/Week Comments Source    Yes -- -- social Provider    Frequency Standard Drinks Binge Drinking        -- -- --              Drug Use as of 12/5/2019     Drug Use Types Frequency Comments Source    No -- -- -- Provider            Sexual Activity as of 12/5/2019     Sexually Active Birth Control Partners Comments Source    Not Currently None Male -- Provider            Activities of Daily Living as of 12/5/2019    None           Social Documentation as of 12/5/2019    Lives with son. Retired. No longer drives.   Source: Provider           Occupational as of 12/5/2019    None           Socioeconomic as of 12/5/2019     Marital Status Spouse Name Number of Children Years Education Education Level Preferred Language Ethnicity Race Source    Single -- -- -- -- English /Black Black or  --    Financial Resource Strain Food Insecurity: Worry Food Insecurity: Inability Transportation Needs: Medical Transportation Needs: Non-medical    -- -- -- -- --            Pertinent History     Question Response Comments    Lives with -- --    Place in Birth Order -- --    Lives in -- --    Number of Siblings -- --    Raised by -- --    Legal Involvement -- --    Childhood Trauma -- --    Criminal History of -- --    Financial Status -- --    Highest Level of Education -- --    Does patient have access to a firearm? -- --     Service -- --    Primary  Leisure Activity -- --    Spirituality -- --        Past Medical History:   Diagnosis Date    Depression     Hypercholesteremia     Pneumonia     Thyroid disease      Past Surgical History:   Procedure Laterality Date     SECTION       Family History     Problem Relation (Age of Onset)    Heart attack Mother    Liver cancer Father    Lung cancer Mother        Tobacco Use    Smoking status: Former Smoker     Packs/day: 1.00     Years: 40.00     Pack years: 40.00    Smokeless tobacco: Never Used   Substance and Sexual Activity    Alcohol use: Yes     Comment: social    Drug use: No    Sexual activity: Not Currently     Partners: Male     Birth control/protection: None     Review of patient's allergies indicates:   Allergen Reactions    Codeine Nausea And Vomiting       No current facility-administered medications on file prior to encounter.      Current Outpatient Medications on File Prior to Encounter   Medication Sig    levothyroxine (SYNTHROID) 100 MCG tablet Take 100 mcg by mouth once daily.    aspirin (ECOTRIN) 81 MG EC tablet Take 81 mg by mouth once daily.    clonazePAM (KLONOPIN) 1 MG tablet Take 1 mg by mouth 2 (two) times daily.    divalproex (DEPAKOTE) 250 MG EC tablet Take 250 mg by mouth 3 (three) times daily.    escitalopram oxalate (LEXAPRO) 20 MG tablet Take 20 mg by mouth once daily.    levocetirizine (XYZAL) 5 MG tablet Take 5 mg by mouth every evening.    multivitamin capsule Take 1 capsule by mouth once daily.    paroxetine (PAXIL) 10 MG tablet Take 10 mg by mouth every morning. Pt unsure dose    pravastatin (PRAVACHOL) 40 MG tablet Take 1 tablet (40 mg total) by mouth once daily.    sodium,potassium,mag sulfates (SUPREP BOWEL PREP KIT) 17.5-3.13-1.6 gram SolR Take 1 each by mouth as directed.    vitamin D 1000 units Tab Take 185 mg by mouth once daily.     Psychotherapeutics (From admission, onward)    None        Review of Systems   Psychiatric/Behavioral: Positive  "for decreased concentration, dysphoric mood, sleep disturbance and suicidal ideas.     Strengths and Liabilities: Strength: Patient is expressive/articulate., Liability: Patient has poor judgment, Liability: Patient lacks coping skills., expressed her needs    Objective:     Vital Signs (Most Recent):  Temp: 98.5 °F (36.9 °C) (12/05/19 0846)  Pulse: 62 (12/05/19 1031)  Resp: 17 (12/05/19 0915)  BP: (!) 107/59 (12/05/19 1531)  SpO2: 98 % (12/05/19 0915) Vital Signs (24h Range):  Temp:  [98.5 °F (36.9 °C)] 98.5 °F (36.9 °C)  Pulse:  [62-82] 62  Resp:  [17-20] 17  SpO2:  [98 %-100 %] 98 %  BP: ()/(51-74) 107/59     Height: 5' 6" (167.6 cm)  Weight: 66.2 kg (146 lb)  Body mass index is 23.57 kg/m².    No intake or output data in the 24 hours ending 12/05/19 1557    Physical Exam     Significant Labs: All pertinent labs within the past 24 hours have been reviewed.    Significant Imaging: I have reviewed all pertinent imaging results/findings within the past 24 hours.    Assessment/Plan:     Depression with suicidal ideation  Diagnosis:  Major depression disorder, recurrent severe  Differential diagnosis  Substance mood induced mood disorder  1. Dispo/Legal Status: Patient meets the criteria for PEC at this time as the she is currently a danger to self. She reported suicidal ideation with a plan. Seek inpt bed for patient's safety and stabilization when/if medically cleared by the ER MD.,  2. Scheduled Medications:  Defer any non-psych meds to the ER MD.  3. PRN Medications: zyprexa 5mg po/im prn non-redirectable severe agitation or violent behavior  4. Precautions/Nursing: Suicide precaution  5. To-Do: Continue to observe pt's behavior while in the ER and will reassess the pt daily until placement is found.               Total Time:  60 minutes      Rosangela Baldwin NP   Psychiatry  [facility]  "

## 2019-12-05 NOTE — ED PROVIDER NOTES
"Encounter Date: 2019       History     Chief Complaint   Patient presents with    Weakness     Pt c/o generalized weakness, vomiting & constipation.  Onset several days.     Patient is 70 yo F presenting to the ED with constipation and diarrhea with N/V from her nursing home. She also has Thyroid disease, hypercholesterolemia, and depression (previously on klonopin). She states that she has been constipated to the point that she is "having diarrhea around a stool ball". Diarrhea is brown in appearance and patient denies blood but is unsure. She has cold sweats when having BMs. She is having lower abdominal cramping feeling "like period pain". Cramping comes and goes and is more noticeable with BMs. Emesis is nonbloody and to the point she is unable to keep any food or pills down. She is not sure the last time she took her pills but she has not taken them for at least a couple days. She has had a 30lb weight loss over the past 2 months. She is also very depressed and states that she "wants to go home to my lord" but does not have a plan and is not having SI. There are no guns in the home to her knowledge. Denies dysuria or any changes in urination.     Reported to Psychiatry team that she has SI and has guns in the house and will "return to the lord if she is sent home"         Review of patient's allergies indicates:   Allergen Reactions    Codeine Nausea And Vomiting     Past Medical History:   Diagnosis Date    Depression     Hypercholesteremia     Pneumonia     Thyroid disease      Past Surgical History:   Procedure Laterality Date     SECTION       Family History   Problem Relation Age of Onset    Heart attack Mother     Lung cancer Mother     Liver cancer Father     Miscarriages / Stillbirths Neg Hx     Diabetes Neg Hx      Social History     Tobacco Use    Smoking status: Former Smoker     Packs/day: 1.00     Years: 40.00     Pack years: 40.00    Smokeless tobacco: Never Used "   Substance Use Topics    Alcohol use: Yes     Comment: social    Drug use: No     Review of Systems   Constitutional: Positive for activity change, appetite change, chills and diaphoresis. Negative for fever.   HENT: Negative for congestion and mouth sores.    Eyes: Negative for photophobia and visual disturbance.   Respiratory: Positive for cough. Negative for apnea, chest tightness and shortness of breath.    Cardiovascular: Negative for chest pain, palpitations and leg swelling.   Gastrointestinal: Positive for constipation, diarrhea, nausea and vomiting. Negative for abdominal distention and abdominal pain.   Endocrine: Negative for cold intolerance and heat intolerance.   Genitourinary: Negative for difficulty urinating, dysuria, enuresis and urgency.   Musculoskeletal: Positive for neck pain. Negative for back pain.   Skin: Negative for rash and wound.   Neurological: Positive for headaches. Negative for dizziness, facial asymmetry and weakness.   Hematological: Negative for adenopathy. Does not bruise/bleed easily.   Psychiatric/Behavioral: Positive for agitation and decreased concentration. Negative for confusion.       Physical Exam     Initial Vitals [12/05/19 0846]   BP Pulse Resp Temp SpO2   119/74 82 20 98.5 °F (36.9 °C) 100 %      MAP       --         Physical Exam    Constitutional: She appears well-developed and well-nourished. She is not diaphoretic. She appears distressed.   HENT:   Head: Normocephalic and atraumatic.   Mouth/Throat: No oropharyngeal exudate.   Eyes: EOM are normal. Right eye exhibits no discharge. Left eye exhibits no discharge. No scleral icterus.   Neck: Normal range of motion. Neck supple.   Cardiovascular: Normal rate, regular rhythm, normal heart sounds and intact distal pulses.   No murmur heard.  Pulmonary/Chest: Breath sounds normal. No respiratory distress. She has no wheezes.   Abdominal: Soft. Bowel sounds are normal. She exhibits no distension. There is tenderness  "(lower quadrant ).   Musculoskeletal: Normal range of motion. She exhibits no edema or tenderness.   Neurological: She is alert and oriented to person, place, and time.   Skin: Skin is warm and dry. No erythema.   Psychiatric:   Admits to "wanting to go home to her lord" but she denies SI or any plan of suicide. No gun in home per patient         ED Course   Procedures  Labs Reviewed   TSH - Abnormal; Notable for the following components:       Result Value    TSH 37.726 (*)     All other components within normal limits   URINALYSIS, REFLEX TO URINE CULTURE - Abnormal; Notable for the following components:    Specific Gravity, UA >=1.030 (*)     All other components within normal limits    Narrative:     Preferred Collection Type->Urine, Clean Catch   COMPREHENSIVE METABOLIC PANEL - Abnormal; Notable for the following components:    Total Protein 8.8 (*)     eGFR if  53.3 (*)     eGFR if non  46.2 (*)     All other components within normal limits   LIPASE   LACTIC ACID, PLASMA   CBC W/ AUTO DIFFERENTIAL   T4, FREE   URINALYSIS MICROSCOPIC    Narrative:     Preferred Collection Type->Urine, Clean Catch   DRUG SCREEN PANEL, URINE EMERGENCY          Imaging Results           CT Abdomen Pelvis With Contrast (Final result)  Result time 12/05/19 11:43:42    Final result by Juan Metzger MD (12/05/19 11:43:42)                 Impression:      No evidence to suggest bowel inflammation, infection, or obstruction.    New 2.8 cm soft tissue mass versus enlarged lymph node in the right anterior mediastinum.  Recommend further nonemergent evaluation with dedicated chest CT.    Scarring or subsegmental atelectasis at the lung bases with interval resolution of previously visualized pleural effusions.    Other findings as above.    This report was flagged in Epic as abnormal.    Electronically signed by resident: Saul Cloud  Date:    12/05/2019  Time:    11:06    Electronically signed " by: Juan Metzger MD  Date:    12/05/2019  Time:    11:43             Narrative:    EXAMINATION:  CT ABDOMEN PELVIS WITH CONTRAST    CLINICAL HISTORY:  Nausea, vomiting, diarrhea;    TECHNIQUE:  Low dose axial images, sagittal and coronal reformations were obtained from the lung bases to the pubic symphysis following the IV administration of 75 mL of Omnipaque 350 .  Oral contrast was not administered.    COMPARISON:  CT abdomen pelvis without contrast 06/09/2016    FINDINGS:  Abdomen:    -Lung bases: Interval resolution of effusions.  Lungs now better expanded.  Scattered bandlike opacities from scarring or subsegmental atelectasis in the lung bases.  There is a new 2.8 cm focus of tissue density in the right anterior mediastinum at the right cardiophrenic angle which may represent a mass or enlarged lymph node (axial series 2, image 2); this is partially demonstrated on abdomen CT.    - Liver: No focal mass.    - Gallbladder: No calcified gallstones.    - Bile Ducts: No evidence of intra or extra hepatic biliary ductal dilation.    - Spleen: Previously visualized area of the spleen concerning for infarct has undergone resorption with parenchymal defect in its place.    - Kidneys: No mass or hydronephrosis.    - Adrenals: Unremarkable.    - Pancreas: No mass or peripancreatic fat stranding.    - Retroperitoneum:  No significant adenopathy.    - Vascular: Calcific atherosclerosis of the abdominal aorta and extending into the iliacs.  No aneurysm.    - Abdominal wall:  Small fat containing umbilical hernia.    Pelvis:    No pelvic mass, adenopathy, or free fluid.    Bowel/Mesentery:    Small amount of stool in the right colon and the left colon and rectum are mostly collapsed without evidence of impaction or obstruction.  Few scattered colonic diverticuli.  No evidence of bowel inflammation or wall thickening.  Normal appendix visualized.  Stomach filled with fluid and ingested material.    Bones:  Age-expected  degenerative changes.  Grade 1 anterolisthesis L5 on S1.  No acute osseous abnormality and no suspicious lytic or blastic lesion.                                 Medical Decision Making:   History:   Old Medical Records: I decided to obtain old medical records.  Old Records Summarized: other records.       <> Summary of Records: Previous ED visit for depression and medication noncompliance   Initial Assessment:   70 yo F presenting with N/V and constipation with 30lb weight loss who has depression/anxiety but unable to take her medications due to emesis.   Differential Diagnosis:   Constipation   Hypothyroidism   Hyperthyroidism   UTI   Depression/Anxiety   Viral/bacterial gastritis   Colon or small bowel cancer   Lung Cancer  Mesenteric Ischemia     Clinical Tests:   Lab Tests: Ordered and Reviewed  Radiological Study: Ordered and Reviewed  Medical Tests: Ordered and Reviewed  ED Management:  TSH for thyroid function   CBC for infection   CMP for electrolyte change  CT abdomen/pelvis for intraabdominal process (cancer) concerning for mediastinal mass -> Order CT Chest   Lipase for pancreatitis  UA for urinary eval   UDS for past cocaine use   Lactate for intravascular volume depletion with end organ damage   NS bolus    3:36 PM  Discussed with psychiatry team who wants patient PEC. Admitted to team that she has guns at home and has SI     7:28 PM  Patient stable, discussed with staff physician and patient is medically cleared for transfer  Patient requesting diet, agreed to full liquid diet and to advance as she tolerates              Attending Attestation:   Physician Attestation Statement for Resident:  As the supervising MD   Physician Attestation Statement: I have personally seen and examined this patient.   I agree with the above history. -: 69-year-old female past medical history of hypothyroid, pneumonia, hyperlipidemia, remote cocaine abuse, presenting with several days of generalized weakness, vomiting,  "and constipation.  Patient has flat and sad affect, intermittently teary, stating that she is "just tired of everything "but not endorsing SI at this time.  She states that she is poorly compliant with her Lexapro and Depakote, stating that "it does not work doctor at the now the Klonopin, that worked, but they took me off that." She states that she has had 30 lb weight loss over 2 months due to poor appetite, and also notes that she is typically stooling every other day but no last several days she has not been able to stool around a large "ball "in her rectum but is having leakage of stool surrounding.  Endorses occasional cough but no chest pain or shortness of breath.   As the supervising MD I agree with the above PE.   -: NAD, NCAT, flat and sad affect, no thyromegaly, A&Ox3, PERRL and EOMI, neck supple/normal ROM, CTAB, RRR no mrg, normal extremity ROM/m/s, abd s/nd/mild TTP lower quadrants w/o rebound, no LE edema, skin dry and warm   As the supervising MD I agree with the above treatment, course, plan, and disposition.   -: Plan CT abdomen/pelvis, if no obstruction will give manual disimpaction and brown bomb enema.  Patient denies active SI at this time, primarily appears discouraged by her poor health.     1:05 PM  CT shows no abdominal pathology, however does note a "new 2.8 cm soft tissue mass versus enlarged lymph node in the right anterior mediastinum.  Recommend further nonemergent evaluation with dedicated chest CT", will obtain dedicated chest CT for further evaluation given reported weight loss.    1:37 PM  Discussed with Dr. Dietweiler (radiology attending) who reviewed CT and believes that mass is likely a lymph node, however she notes that IV contrast would be required to further differentiate mass and delay of 24 hr is recommended to avoid distortion of tissue from contrast load.  Will recommend patient follow up with her PCP for outpatient evaluation.  CT shows no significant stool burden " however she is amenable to an enema for constipation symptoms.     1:46 PM  Discussed findings with patient, she states that she does have PCP and was supposed to follow up with Gastroenterology, and can follow up with the chest CT.  However she now states that she does want to die, although she has no planned method.  Patient states she is poorly compliant with her antidepressants, no active SI and no indication for PEC at this time, however will consult with Psychiatry to evaluate severity.     Psychiatry recommends placing patient on PEC as she now endorses having guns and consistent SI.  Patient be placed on PEC, medically clear for psychiatric placement.  I have reviewed and agree with the residents interpretation of the following: lab data and CT scans.  I have reviewed the following: old records at this facility and records from a referring facility.                    ED Course as of Dec 05 1338   Thu Dec 05, 2019   1110 TSH(!): 37.726 [JR]   1110 TSH(!): 37.726 [JR]      ED Course User Index  [JR] Kelley Owusu MD                Clinical Impression:       ICD-10-CM ICD-9-CM   1. Suicidal ideation R45.851 V62.84   2. Constipation, unspecified constipation type K59.00 564.00   3. Depression with suicidal ideation F32.9 311    R45.851 V62.84   4. Mediastinal mass J98.59 786.6                             Manuela Millard MD  Resident  12/05/19 1929       Kelley Owusu MD  12/07/19 [id number]

## 2019-12-05 NOTE — SUBJECTIVE & OBJECTIVE
Patient History           Medical as of 2019     Past Medical History     Diagnosis Date Comments Source    Depression -- -- Provider    Hypercholesteremia -- -- Provider    Pneumonia -- -- Provider    Thyroid disease -- -- Provider                  Surgical as of 2019     Past Surgical History     Procedure Laterality Date Comments Source     SECTION -- -- -- Provider                  Family as of 2019     Problem Relation Name Age of Onset Comments Source    Heart attack Mother -- -- -- Provider    Lung cancer Mother -- -- -- Provider    Liver cancer Father -- -- -- Provider    Miscarriages / Stillbirths Neg Hx -- -- -- Provider    Diabetes Neg Hx -- -- -- Provider            Tobacco Use as of 2019     Smoking Status Smoking Start Date Smoking Quit Date Packs/Day Years Used    Former Smoker -- -- 1.00 40.00    Types Comments Smokeless Tobacco Status Smokeless Tobacco Quit Date Source    -- -- Never Used -- Provider            Alcohol Use as of 2019     Alcohol Use Drinks/Week Alcohol/Week Comments Source    Yes -- -- social Provider    Frequency Standard Drinks Binge Drinking        -- -- --              Drug Use as of 2019     Drug Use Types Frequency Comments Source    No -- -- -- Provider            Sexual Activity as of 2019     Sexually Active Birth Control Partners Comments Source    Not Currently None Male -- Provider            Activities of Daily Living as of 2019    None           Social Documentation as of 2019    Lives with son. Retired. No longer drives.   Source: Provider           Occupational as of 2019    None           Socioeconomic as of 2019     Marital Status Spouse Name Number of Children Years Education Education Level Preferred Language Ethnicity Race Source    Single -- -- -- -- English /Black Black or  --    Financial Resource Strain Food Insecurity: Worry Food Insecurity: Inability  Transportation Needs: Medical Transportation Needs: Non-medical    -- -- -- -- --            Pertinent History     Question Response Comments    Lives with -- --    Place in Birth Order -- --    Lives in -- --    Number of Siblings -- --    Raised by -- --    Legal Involvement -- --    Childhood Trauma -- --    Criminal History of -- --    Financial Status -- --    Highest Level of Education -- --    Does patient have access to a firearm? -- --     Service -- --    Primary Leisure Activity -- --    Spirituality -- --        Past Medical History:   Diagnosis Date    Depression     Hypercholesteremia     Pneumonia     Thyroid disease      Past Surgical History:   Procedure Laterality Date     SECTION       Family History     Problem Relation (Age of Onset)    Heart attack Mother    Liver cancer Father    Lung cancer Mother        Tobacco Use    Smoking status: Former Smoker     Packs/day: 1.00     Years: 40.00     Pack years: 40.00    Smokeless tobacco: Never Used   Substance and Sexual Activity    Alcohol use: Yes     Comment: social    Drug use: No    Sexual activity: Not Currently     Partners: Male     Birth control/protection: None     Review of patient's allergies indicates:   Allergen Reactions    Codeine Nausea And Vomiting       No current facility-administered medications on file prior to encounter.      Current Outpatient Medications on File Prior to Encounter   Medication Sig    levothyroxine (SYNTHROID) 100 MCG tablet Take 100 mcg by mouth once daily.    aspirin (ECOTRIN) 81 MG EC tablet Take 81 mg by mouth once daily.    clonazePAM (KLONOPIN) 1 MG tablet Take 1 mg by mouth 2 (two) times daily.    divalproex (DEPAKOTE) 250 MG EC tablet Take 250 mg by mouth 3 (three) times daily.    escitalopram oxalate (LEXAPRO) 20 MG tablet Take 20 mg by mouth once daily.    levocetirizine (XYZAL) 5 MG tablet Take 5 mg by mouth every evening.    multivitamin capsule Take 1 capsule by  "mouth once daily.    paroxetine (PAXIL) 10 MG tablet Take 10 mg by mouth every morning. Pt unsure dose    pravastatin (PRAVACHOL) 40 MG tablet Take 1 tablet (40 mg total) by mouth once daily.    sodium,potassium,mag sulfates (SUPREP BOWEL PREP KIT) 17.5-3.13-1.6 gram SolR Take 1 each by mouth as directed.    vitamin D 1000 units Tab Take 185 mg by mouth once daily.     Psychotherapeutics (From admission, onward)    None        Review of Systems   Psychiatric/Behavioral: Positive for decreased concentration, dysphoric mood, sleep disturbance and suicidal ideas.     Strengths and Liabilities: Strength: Patient is expressive/articulate., Liability: Patient has poor judgment, Liability: Patient lacks coping skills., expressed her needs    Objective:     Vital Signs (Most Recent):  Temp: 98.5 °F (36.9 °C) (12/05/19 0846)  Pulse: 62 (12/05/19 1031)  Resp: 17 (12/05/19 0915)  BP: (!) 107/59 (12/05/19 1531)  SpO2: 98 % (12/05/19 0915) Vital Signs (24h Range):  Temp:  [98.5 °F (36.9 °C)] 98.5 °F (36.9 °C)  Pulse:  [62-82] 62  Resp:  [17-20] 17  SpO2:  [98 %-100 %] 98 %  BP: ()/(51-74) 107/59     Height: 5' 6" (167.6 cm)  Weight: 66.2 kg (146 lb)  Body mass index is 23.57 kg/m².    No intake or output data in the 24 hours ending 12/05/19 1557    Physical Exam     Significant Labs: All pertinent labs within the past 24 hours have been reviewed.    Significant Imaging: I have reviewed all pertinent imaging results/findings within the past 24 hours.  "

## 2019-12-06 NOTE — ED NOTES
Assumed care of this patient. Patient resting comfortably in stretcher with bed locked in lowest position with side rails up x2. Respirations even and unlabored with visible chest rise noted. Pt offered bathroom assistance and denies at this time. Pt updated on POC, verbalizes understanding. Pt instructed to call for assistance, call bell within reach. Pt on continuous cardiac monitoring, BP and O2 monitoring. Will continue to monitor.    Patient Identifiers for Micheline Raza checked and correct  LOC: The patient is awake, alert and aware of environment with an appropriate affect, the patient is oriented x 3 and speaking appropriate.  APPEARANCE: Patient resting comfortably and in no acute distress, patient is clean and well groomed, patient's clothing is properly fastened.  SKIN: The skin is warm and dry, patient has normal skin turgor and moist mucus membranes,no rashes or lesions.Skin Intact , No Breakdown Noted  Musculoskeletal :  Normal range of motion noted. Moves all extremeties well, No swelling or tenderness noted  RESPIRATORY: Airway is open and patent, respirations are spontaneous, patient has a normal effort and rate.  CARDIAC: Patient has a normal rate and rhythm, no periphreal edema noted, capillary refill < 3 seconds.   ABDOMEN: Soft and non tender to palpation, no distention noted. Pt complaining of nausea and diarrhea  PULSES: 2+  And symmetrical in all extremeties  NEUROLOGIC: PERRL. facial expression is symmetrical, patient moving all extremities, normal sensation in all extremities when touched with a finger.The patient is awake, alert and cooperative with a calm affect, patient is aware of environment.    Will continue to monitor

## 2019-12-06 NOTE — ED NOTES
Pt remains in paper scrubs, resting comfortably in stretcher. No signs of distress noted, visible chest rise and fall noted. Sitter remains at bedside in direct visual contact of patient, documenting q15 min per PEC protocol. No equipment or belongings are in the room with the patient. Pt updated on POC. Will continue to monitor

## 2020-02-07 ENCOUNTER — OFFICE VISIT (OUTPATIENT)
Dept: GASTROENTEROLOGY | Facility: CLINIC | Age: 70
End: 2020-02-07
Payer: COMMERCIAL

## 2020-02-07 VITALS — BODY MASS INDEX: 24.86 KG/M2 | WEIGHT: 154 LBS

## 2020-02-07 DIAGNOSIS — R07.9 CHEST PAIN, UNSPECIFIED TYPE: Primary | ICD-10-CM

## 2020-02-07 DIAGNOSIS — K59.09 OTHER CONSTIPATION: ICD-10-CM

## 2020-02-07 DIAGNOSIS — K21.9 GASTROESOPHAGEAL REFLUX DISEASE, ESOPHAGITIS PRESENCE NOT SPECIFIED: ICD-10-CM

## 2020-02-07 DIAGNOSIS — Z12.11 SCREEN FOR COLON CANCER: ICD-10-CM

## 2020-02-07 DIAGNOSIS — E03.9 ACQUIRED HYPOTHYROIDISM: ICD-10-CM

## 2020-02-07 DIAGNOSIS — R59.0 MEDIASTINAL ADENOPATHY: ICD-10-CM

## 2020-02-07 PROCEDURE — 99999 PR PBB SHADOW E&M-EST. PATIENT-LVL III: ICD-10-PCS | Mod: PBBFAC,,, | Performed by: INTERNAL MEDICINE

## 2020-02-07 PROCEDURE — 99999 PR PBB SHADOW E&M-EST. PATIENT-LVL III: CPT | Mod: PBBFAC,,, | Performed by: INTERNAL MEDICINE

## 2020-02-07 PROCEDURE — 99204 PR OFFICE/OUTPT VISIT, NEW, LEVL IV, 45-59 MIN: ICD-10-PCS | Mod: S$GLB,,, | Performed by: INTERNAL MEDICINE

## 2020-02-07 PROCEDURE — 99204 OFFICE O/P NEW MOD 45 MIN: CPT | Mod: S$GLB,,, | Performed by: INTERNAL MEDICINE

## 2020-02-07 NOTE — PATIENT INSTRUCTIONS
Use Senna  - S once a day for regulation of bowel movements    We will get labs and your CT of the chest    Schedule endoscopy tests down the road.    Need to follow up with your primary care doctor.

## 2020-02-07 NOTE — PROGRESS NOTES
GASTROENTEROLOGY CLINIC NOTE    Reason for visit: The primary encounter diagnosis was Chest pain, unspecified type. Diagnoses of Mediastinal adenopathy, Gastroesophageal reflux disease, esophagitis presence not specified, Screen for colon cancer, Acquired hypothyroidism, and Other constipation were also pertinent to this visit.  Referring provider/PCP: Tripp Jasso MD (Inactive)    HPI:  Micheline Raza is a 69 y.o. female here today for evaluation of gerd and constipation and screen for crc. New patient. Referred by [facility].  Previously ssen at [facility] 2016 and was supposed to have EGD / colon after cardiac eval.  Seems to have had some chest pains during that time.    Patient is a little unsure as to why she is here for this visit today.  She has intermittent fluctuations in bowel habits but no longstanding constipation. She will not have a bowel movement for a couple days with than she typically has some routine bowel movements.  She does take a stool softener which overall helps her bowel movements but she is not reliable with taking this.  She does feel like she has some chest burning and epigastric discomfort but she also has chest pain which is her most troublesome symptom to her today.  She has had months of chest pain that does radiate at times to the back.  She states it is worse with deep breathing.  She has no hypoxia.  She states she was diagnosed with pneumonia couple months back but her pain still persist.  She has no fevers or chills. She has not seen a cardiologist.  She is only taking Tylenol for the pain. This is a constant type pain.    She was seen in ER early December and I have reviewed that course.    Prior Endoscopy:  EGD: / Colon: none    (Portions of this note were dictated using voice recognition software and may contain dictation related errors in spelling/grammar/syntax not found on text review)    Review of Systems   Constitutional: Negative for fever and weight loss.    HENT: Negative for nosebleeds and sore throat.    Eyes: Negative for double vision and photophobia.   Respiratory: Positive for cough. Negative for shortness of breath.    Cardiovascular: Positive for chest pain. Negative for leg swelling.   Gastrointestinal: Positive for abdominal pain and nausea. Negative for blood in stool.   Genitourinary: Negative for dysuria and hematuria.   Musculoskeletal: Negative for joint pain and neck pain.   Skin: Negative for itching and rash.   Neurological: Negative for dizziness and headaches.   Psychiatric/Behavioral: Negative for hallucinations. The patient does not have insomnia.        Past Medical History: has a past medical history of Depression, Hypercholesteremia, Pneumonia, and Thyroid disease.    Past Surgical History: has a past surgical history that includes  section.    Family History:family history includes Heart attack in her mother; Liver cancer in her father; Lung cancer in her mother.    Allergies:   Review of patient's allergies indicates:   Allergen Reactions    Codeine Nausea And Vomiting       Social History: reports that she has been smoking cigarettes. She has a 80.00 pack-year smoking history. She has never used smokeless tobacco. She reports that she drinks alcohol. She reports that she does not use drugs.    Home medications:   Current Outpatient Medications on File Prior to Visit   Medication Sig Dispense Refill    levothyroxine (SYNTHROID) 100 MCG tablet Take 100 mcg by mouth once daily.      aspirin (ECOTRIN) 81 MG EC tablet Take 81 mg by mouth once daily.      clonazePAM (KLONOPIN) 1 MG tablet Take 1 mg by mouth 2 (two) times daily.      divalproex (DEPAKOTE) 250 MG EC tablet Take 250 mg by mouth 3 (three) times daily.      escitalopram oxalate (LEXAPRO) 20 MG tablet Take 20 mg by mouth once daily.      levocetirizine (XYZAL) 5 MG tablet Take 5 mg by mouth every evening.      multivitamin capsule Take 1 capsule by mouth once daily.       paroxetine (PAXIL) 10 MG tablet Take 10 mg by mouth every morning. Pt unsure dose      pravastatin (PRAVACHOL) 40 MG tablet Take 1 tablet (40 mg total) by mouth once daily. 90 tablet 3    sodium,potassium,mag sulfates (SUPREP BOWEL PREP KIT) 17.5-3.13-1.6 gram SolR Take 1 each by mouth as directed. (Patient not taking: Reported on 2/7/2020) 1 Bottle 0    vitamin D 1000 units Tab Take 185 mg by mouth once daily.       No current facility-administered medications on file prior to visit.        Vital signs:  Wt 69.9 kg (154 lb)   BMI 24.86 kg/m²     Physical Exam   Constitutional: She is oriented to person, place, and time. No distress.   HENT:   Head: Normocephalic and atraumatic.   Eyes: Conjunctivae are normal. No scleral icterus.   Neck: Neck supple. No thyromegaly present.   Cardiovascular: Normal rate, normal heart sounds and intact distal pulses.   Pulmonary/Chest: Effort normal and breath sounds normal. No stridor. No respiratory distress.   Abdominal: Soft. She exhibits no distension and no mass. There is no tenderness. There is no rebound and no guarding.   Musculoskeletal: She exhibits no tenderness or deformity.   Neurological: She is alert and oriented to person, place, and time. Gait normal.   Skin: Skin is warm and dry. No rash noted. She is not diaphoretic.   Psychiatric: She has a normal mood and affect. Her behavior is normal.   Vitals reviewed.      Routine labs:  Lab Results   Component Value Date    WBC 10.69 12/05/2019    HGB 14.4 12/05/2019    HCT 43.9 12/05/2019    MCV 88 12/05/2019     12/05/2019     Lab Results   Component Value Date    INR 1.1 06/09/2016     No results found for: IRON, FERRITIN, TIBC, FESATURATED  Lab Results   Component Value Date     12/05/2019    K 3.6 12/05/2019     12/05/2019    CO2 27 12/05/2019    BUN 18 12/05/2019    CREATININE 1.2 12/05/2019     Lab Results   Component Value Date    ALBUMIN 3.7 12/05/2019    ALT 15 12/05/2019    AST 23  12/05/2019    ALKPHOS 81 12/05/2019    BILITOT 0.4 12/05/2019     No results found for: GLUCOSE    I have reviewed prior labs, imaging, notes from last 2 months    12/2019 ct abd / pelv  No evidence to suggest bowel inflammation, infection, or obstruction.  New 2.8 cm soft tissue mass versus enlarged lymph node in the right anterior mediastinum.  Recommend further nonemergent evaluation with dedicated chest CT.  Scarring or subsegmental atelectasis at the lung bases with interval resolution of previously visualized pleural effusions.  Other findings as above.  This report was flagged in Epic as abnormal.    reviwed prior labs, TSH was very high.    Assessment:  1. Chest pain, unspecified type    2. Mediastinal adenopathy    3. Gastroesophageal reflux disease, esophagitis presence not specified    4. Screen for colon cancer    5. Acquired hypothyroidism    6. Other constipation        Plan:  Orders Placed This Encounter    CT Chest With Contrast    TSH    Creatinine, serum    T4, free    CBC auto differential    Case request GI: EGD (ESOPHAGOGASTRODUODENOSCOPY), COLONOSCOPY     Abnormal CT of abd with mediastinal LN or mass, needs dedicated CT chest for further eval (if not already done)  Ordered pre- scan Cr   CBC given hx of anemia  TSH and T4 given significant elevation in TSH last check    Use Senna - S daily for constipation    Pending CT of chest results, will proceed with EGD / colon    - EGD for gerd, dyspepsia, vomiting; Colon for screening.      RTC pending studies.      Wolf Dumont MD  [facility] [place]

## 2020-02-07 NOTE — LETTER
February 7, 2020      Araseli Guzman MD  [address] [facility] [address]           [place] [facility]  [address] [address]  Phone: [phone]          Patient: Micheline Raza   MR Number: [id number]   YOB: 1950   Date of Visit: 2/7/2020       Dear Dr. Araseli Guzman:    Thank you for referring Micheline Raza to me for evaluation. Attached you will find relevant portions of my assessment and plan of care.    If you have questions, please do not hesitate to call me. I look forward to following Micheline Raza along with you.    Sincerely,    Wolf Dumont MD    Enclosure  CC:  No Recipients    If you would like to receive this communication electronically, please contact [email or web address] or [phone] to request more information on [employer] access.    For providers and/or their staff who would like to refer a patient to [facility], please contact us through our one-stop-shop provider referral line, [facility] [place], at [phone].    If you feel you have received this communication in error or would no longer like to receive these types of communications, please e-mail [email or web address]

## 2020-02-18 DIAGNOSIS — Z13.83 ENCOUNTER FOR SCREENING FOR PNEUMONIA: Primary | ICD-10-CM

## 2020-02-20 DIAGNOSIS — Z12.31 VISIT FOR SCREENING MAMMOGRAM: Primary | ICD-10-CM

## 2020-03-03 ENCOUNTER — HOSPITAL ENCOUNTER (OUTPATIENT)
Dept: RADIOLOGY | Facility: HOSPITAL | Age: 70
Discharge: HOME | End: 2020-03-03
Attending: NURSE PRACTITIONER
Payer: COMMERCIAL

## 2020-03-03 DIAGNOSIS — Z13.83 ENCOUNTER FOR SCREENING FOR PNEUMONIA: ICD-10-CM

## 2020-03-03 PROCEDURE — 71046 X-RAY EXAM CHEST 2 VIEWS: CPT | Mod: TC

## 2020-03-03 PROCEDURE — 71046 X-RAY EXAM CHEST 2 VIEWS: CPT | Mod: 26,,, | Performed by: RADIOLOGY

## 2020-03-03 PROCEDURE — 71046 XR CHEST PA AND LATERAL: ICD-10-PCS | Mod: 26,,, | Performed by: RADIOLOGY

## 2020-03-06 DIAGNOSIS — R93.89 ABNORMAL X-RAY: Primary | ICD-10-CM

## 2020-03-09 ENCOUNTER — TELEPHONE (OUTPATIENT)
Dept: GASTROENTEROLOGY | Facility: CLINIC | Age: 70
End: 2020-03-09

## 2020-03-09 NOTE — TELEPHONE ENCOUNTER
Referral was sent from Dr. Dumont to get pt scheduled for a Screening Colonoscopy. Unable to leave a message on VM.

## 2020-03-13 ENCOUNTER — TELEPHONE (OUTPATIENT)
Dept: GASTROENTEROLOGY | Facility: CLINIC | Age: 70
End: 2020-03-13

## 2020-03-13 NOTE — TELEPHONE ENCOUNTER
Spoke with Rica at [facility]. Patient is currently at [facility]. This is why we can not contact her. Rica stated that the PCP has gotten in contact with the patient regarding the abnormal CT scan. They have referred her to a specialist and are taking care of this.

## 2020-03-13 NOTE — TELEPHONE ENCOUNTER
----- Message from Wolf Dumont MD sent at 3/12/2020  1:06 PM CDT -----  callled and left VM for patient to call us back.. Left VM to ask patient to contact us.  Reviewed labs.  Most concerning is CT done by PCP, which shows concern for lung malignancy.    Romina, can we contact either patient or [facility], as this CT report is very important and we need to be sure she is aware. May need to contact [facility] clinic if we cant get patient.

## 2020-03-16 ENCOUNTER — TELEPHONE (OUTPATIENT)
Dept: RADIOLOGY | Facility: HOSPITAL | Age: 70
End: 2020-03-16

## 2020-03-23 ENCOUNTER — TELEPHONE (OUTPATIENT)
Dept: RADIOLOGY | Facility: HOSPITAL | Age: 70
End: 2020-03-23

## 2020-03-24 ENCOUNTER — TELEPHONE (OUTPATIENT)
Dept: PULMONOLOGY | Facility: HOSPITAL | Age: 70
End: 2020-03-24

## 2020-04-06 ENCOUNTER — TELEPHONE (OUTPATIENT)
Dept: PULMONOLOGY | Facility: CLINIC | Age: 70
End: 2020-04-06

## 2020-04-06 NOTE — TELEPHONE ENCOUNTER
Spoke with patient regarding Bronchoscopy procedure. I advised  Bronchospy appointments and also cases are being delayed for another week.Patient states she's unaware of that but she's been feeling bad and having pain since December.I advised patient she will received a call about scheduling her Bronch procedure hopefully next week if not contact our office. I also advised patient this is [facility] current policy during this time.  [facility] cares about patient and staff safety.  Patient verbalzied she understands.

## 2020-04-16 ENCOUNTER — TELEPHONE (OUTPATIENT)
Dept: PULMONOLOGY | Facility: CLINIC | Age: 70
End: 2020-04-16

## 2020-04-16 NOTE — TELEPHONE ENCOUNTER
----- Message from Allie Ledesma MA sent at 4/16/2020  3:46 PM CDT -----  Contact: Rica lance/MICHELA Mills,  This patient does not have a patient portal and is with [facility]. I know you wanted to put on EBUS schedule with covid protocol. Is this still what you want to do Dr Mills? I will call Rica with michela.   Thank you, Allie  ----- Message -----  From: Mikhail Reynolds  Sent: 4/16/2020   3:43 PM CDT  To: Lina DURAN Staff    Rica is requesting a call back at [phone] to discuss when pt can be seen in office

## 2020-04-17 ENCOUNTER — TELEPHONE (OUTPATIENT)
Dept: PULMONOLOGY | Facility: CLINIC | Age: 70
End: 2020-04-17

## 2020-04-17 DIAGNOSIS — R91.8 LUNG MASS: Primary | ICD-10-CM

## 2020-04-22 ENCOUNTER — OFFICE VISIT (OUTPATIENT)
Dept: PULMONOLOGY | Facility: CLINIC | Age: 70
End: 2020-04-22
Payer: COMMERCIAL

## 2020-04-22 VITALS
HEIGHT: 66 IN | OXYGEN SATURATION: 95 % | WEIGHT: 152.56 LBS | HEART RATE: 76 BPM | BODY MASS INDEX: 24.52 KG/M2 | DIASTOLIC BLOOD PRESSURE: 78 MMHG | SYSTOLIC BLOOD PRESSURE: 148 MMHG

## 2020-04-22 DIAGNOSIS — R91.8 LUNG MASS: Primary | ICD-10-CM

## 2020-04-22 PROCEDURE — 99999 PR PBB SHADOW E&M-EST. PATIENT-LVL III: ICD-10-PCS | Mod: PBBFAC,,, | Performed by: INTERNAL MEDICINE

## 2020-04-22 PROCEDURE — 99204 PR OFFICE/OUTPT VISIT, NEW, LEVL IV, 45-59 MIN: ICD-10-PCS | Mod: S$GLB,,, | Performed by: INTERNAL MEDICINE

## 2020-04-22 PROCEDURE — 99999 PR PBB SHADOW E&M-EST. PATIENT-LVL III: CPT | Mod: PBBFAC,,, | Performed by: INTERNAL MEDICINE

## 2020-04-22 PROCEDURE — 99204 OFFICE O/P NEW MOD 45 MIN: CPT | Mod: S$GLB,,, | Performed by: INTERNAL MEDICINE

## 2020-04-22 RX ORDER — TRAMADOL HYDROCHLORIDE 50 MG/1
50 TABLET ORAL EVERY 8 HOURS PRN
COMMUNITY
End: 2020-05-05 | Stop reason: SDUPTHER

## 2020-04-22 NOTE — PROGRESS NOTES
"Subjective:       Patient ID: Micheline Raza is a 69 y.o. female.    Chief Complaint: Ebus consult    70 yo current smoker who was treated in November/December for pneumonia.  Patient is complaining of constant chest pain on the right side and back.  Loss of appetite and weight loss.  No fevers, chills or night sweats.  History of latent TB for which did not complete prophylaxis but feels she may have taken.      Review of Systems   Constitutional: Negative for fever and night sweats.   HENT: Negative for trouble swallowing.    Eyes: Negative for itching.   Respiratory: Positive for dyspnea on extertion. Negative for cough.    Cardiovascular: Negative for leg swelling.   Genitourinary: Negative for difficulty urinating.   Endocrine: Negative for cold intolerance and heat intolerance.    Musculoskeletal: Negative for arthralgias.   Gastrointestinal: Negative for acid reflux.   Neurological: Negative for headaches.   Hematological: Negative for adenopathy.   Psychiatric/Behavioral: Negative for confusion.       Past medical and surgical history reviewed.  Social and family history reviewed.  Allergies and medications reviewed.  Exercise intolerance due to pain.  No personal or family history of anesthesia complications.  Objective:       Vitals:    04/22/20 1349   BP: (!) 148/78   BP Location: Right arm   Patient Position: Sitting   Pulse: 76   SpO2: 95%   Weight: 69.2 kg (152 lb 8.9 oz)   Height: 5' 6" (1.676 m)     Physical Exam   Constitutional: She is oriented to person, place, and time. She appears well-developed and well-nourished.   HENT:   Head: Normocephalic.   Nose: Nose normal.   Neck: Normal range of motion. Neck supple. No tracheal deviation present.   Cardiovascular: Normal rate, regular rhythm, normal heart sounds and intact distal pulses.   Pulmonary/Chest: Normal expansion, symmetric chest wall expansion, effort normal and breath sounds normal.   Abdominal: Soft. Bowel sounds are normal. There is " no hepatosplenomegaly.   Musculoskeletal: Normal range of motion. She exhibits no edema.   Lymphadenopathy: No supraclavicular adenopathy is present.     She has no cervical adenopathy.   Neurological: She is alert and oriented to person, place, and time. No cranial nerve deficit.   Skin: Skin is warm and dry.   Psychiatric: She has a normal mood and affect.   Vitals reviewed.       Personal Diagnostic Review  CT of chest performed on 3/9/2020 with contrast revealed right hilar mass with pleural effusion additional pleural based..  No flowsheet data found.      Assessment:       1. Lung mass        Outpatient Encounter Medications as of 4/22/2020   Medication Sig Dispense Refill    aspirin (ECOTRIN) 81 MG EC tablet Take 81 mg by mouth once daily.      divalproex (DEPAKOTE) 250 MG EC tablet Take 250 mg by mouth 3 (three) times daily.      escitalopram oxalate (LEXAPRO) 20 MG tablet Take 20 mg by mouth once daily.      levocetirizine (XYZAL) 5 MG tablet Take 5 mg by mouth every evening.      levothyroxine (SYNTHROID) 100 MCG tablet Take 100 mcg by mouth once daily.      multivitamin capsule Take 1 capsule by mouth once daily.      paroxetine (PAXIL) 10 MG tablet Take 10 mg by mouth every morning. Pt unsure dose      pravastatin (PRAVACHOL) 40 MG tablet Take 1 tablet (40 mg total) by mouth once daily. 90 tablet 3    traMADoL (ULTRAM) 50 mg tablet Take 50 mg by mouth every 8 (eight) hours as needed for Pain.      vitamin D 1000 units Tab Take 185 mg by mouth once daily.      clonazePAM (KLONOPIN) 1 MG tablet Take 1 mg by mouth 2 (two) times daily.      sodium,potassium,mag sulfates (SUPREP BOWEL PREP KIT) 17.5-3.13-1.6 gram SolR Take 1 each by mouth as directed. (Patient not taking: Reported on 4/22/2020) 1 Bottle 0     No facility-administered encounter medications on file as of 4/22/2020.      Orders Placed This Encounter   Procedures    COVID-19 Routine Screening     Standing Status:   Future      Standing Expiration Date:   6/21/2021     Order Specific Question:   Is the patient symptomatic?     Answer:   Yes     Order Specific Question:   What symptom criteria does the patient meet?     Answer:   Other (specify)     Order Specific Question:   Please specify:     Answer:   for a bronchoscopy scheduled 4/27/2020     Plan:     Problem List Items Addressed This Visit     Lung mass - Primary    Overview     Patient and [facility] are aware that patient is scheduled for bronchoscopy with EBUS on 4/27/2020 and will need a covid test performed 4/22/2020 prior to procedure.    Instructions marlinvien    Diagnostic and staging EBUS planned.    I have explained the risks, benefits and alternatives of the procedure in detail.  The patient voices understanding and all questions have been answered.  The patient agrees to proceed as planned.         Relevant Orders    COVID-19 Routine Screening

## 2020-04-22 NOTE — PATIENT INSTRUCTIONS
Covid test must be performed Moses morning 4/26 for bronchoscopy on Monday 4/27.  Arrive at [facility] center at 5:30.  Procedure is scheduled for 7am.    Flexible Bronchoscopy  A flexible bronchoscopy is an exam of the airways of your lungs. A thin, flexible tube called a bronchoscope is used. It has a light and small camera that allow the healthcare provider to view your airways.    Before your test  · Follow your healthcare provider's instructions carefully. If you dont, the exam may be canceled. Or you may need to take it again.  · If you are taking blood-thinning medicine, ask your healthcare provider if you should stop taking the medicine before this test.  · Have no food or drink for at least 8 hours before the test. Also, avoid smoking for 24 hours before the test.  · You will need to remove any dentures or removable devices from your mouth.  · Right before the test, you will be given sedating medicines to help you relax. The medicine may be given by an IV (intravenously) into one of your veins. In addition, your nose and throat may be numbed with a special spray to help prevent gagging and coughing.  · If you are having this test as an outpatient, make sure you have an adult friend or family member to drive you home.  During your test  Bronchoscopy takes 45 to 60 minutes and includes the following steps:  · You may be given medicine (anesthesia) so that you are unconscious or asleep during the procedure.  · The healthcare provider inserts the tube into your nose or mouth.  · If you have not been given anesthesia, you might feel a gagging sensation. To help ease this feeling, you will be told to swallow or take deep breaths. Your airway will remain open even with the tube in place. But you wont be able to talk.  · The provider checks your breathing passage. He or she may also remove tiny tissue samples for biopsy.  After your test  · You may have a mild sore throat or cough. Your voice may also be  hoarse.  · Don't eat or drink until the anesthesia wears off.  · If you had a biopsy, you might see traces of blood being coughed up.  When to call your healthcare provider  Call your healthcare provider right away if you have any of the following:  · Shortness of breath  · Chest pain  · Bleeding from your nose or throat  · Coughing up a large amount of blood  · A fever above 100.4°F (38°C) for more than 24 hours  Call 911  Call 911 if you have:  · Chest pain  · Severe shortness of breath   Date Last Reviewed: 12/1/2016  © 5429-2700 [employer]. [address] [place] [address], [place] [address]. All rights reserved. This information is not intended as a substitute for professional medical care. Always follow your healthcare professional's instructions.

## 2020-04-22 NOTE — H&P (VIEW-ONLY)
"Subjective:       Patient ID: Micheline Raza is a 69 y.o. female.    Chief Complaint: Ebus consult    68 yo current smoker who was treated in November/December for pneumonia.  Patient is complaining of constant chest pain on the right side and back.  Loss of appetite and weight loss.  No fevers, chills or night sweats.  History of latent TB for which did not complete prophylaxis but feels she may have taken.      Review of Systems   Constitutional: Negative for fever and night sweats.   HENT: Negative for trouble swallowing.    Eyes: Negative for itching.   Respiratory: Positive for dyspnea on extertion. Negative for cough.    Cardiovascular: Negative for leg swelling.   Genitourinary: Negative for difficulty urinating.   Endocrine: Negative for cold intolerance and heat intolerance.    Musculoskeletal: Negative for arthralgias.   Gastrointestinal: Negative for acid reflux.   Neurological: Negative for headaches.   Hematological: Negative for adenopathy.   Psychiatric/Behavioral: Negative for confusion.       Past medical and surgical history reviewed.  Social and family history reviewed.  Allergies and medications reviewed.  Exercise intolerance due to pain.  No personal or family history of anesthesia complications.  Objective:       Vitals:    04/22/20 1349   BP: (!) 148/78   BP Location: Right arm   Patient Position: Sitting   Pulse: 76   SpO2: 95%   Weight: 69.2 kg (152 lb 8.9 oz)   Height: 5' 6" (1.676 m)     Physical Exam   Constitutional: She is oriented to person, place, and time. She appears well-developed and well-nourished.   HENT:   Head: Normocephalic.   Nose: Nose normal.   Neck: Normal range of motion. Neck supple. No tracheal deviation present.   Cardiovascular: Normal rate, regular rhythm, normal heart sounds and intact distal pulses.   Pulmonary/Chest: Normal expansion, symmetric chest wall expansion, effort normal and breath sounds normal.   Abdominal: Soft. Bowel sounds are normal. There is " no hepatosplenomegaly.   Musculoskeletal: Normal range of motion. She exhibits no edema.   Lymphadenopathy: No supraclavicular adenopathy is present.     She has no cervical adenopathy.   Neurological: She is alert and oriented to person, place, and time. No cranial nerve deficit.   Skin: Skin is warm and dry.   Psychiatric: She has a normal mood and affect.   Vitals reviewed.       Personal Diagnostic Review  CT of chest performed on 3/9/2020 with contrast revealed right hilar mass with pleural effusion additional pleural based..  No flowsheet data found.      Assessment:       1. Lung mass        Outpatient Encounter Medications as of 4/22/2020   Medication Sig Dispense Refill    aspirin (ECOTRIN) 81 MG EC tablet Take 81 mg by mouth once daily.      divalproex (DEPAKOTE) 250 MG EC tablet Take 250 mg by mouth 3 (three) times daily.      escitalopram oxalate (LEXAPRO) 20 MG tablet Take 20 mg by mouth once daily.      levocetirizine (XYZAL) 5 MG tablet Take 5 mg by mouth every evening.      levothyroxine (SYNTHROID) 100 MCG tablet Take 100 mcg by mouth once daily.      multivitamin capsule Take 1 capsule by mouth once daily.      paroxetine (PAXIL) 10 MG tablet Take 10 mg by mouth every morning. Pt unsure dose      pravastatin (PRAVACHOL) 40 MG tablet Take 1 tablet (40 mg total) by mouth once daily. 90 tablet 3    traMADoL (ULTRAM) 50 mg tablet Take 50 mg by mouth every 8 (eight) hours as needed for Pain.      vitamin D 1000 units Tab Take 185 mg by mouth once daily.      clonazePAM (KLONOPIN) 1 MG tablet Take 1 mg by mouth 2 (two) times daily.      sodium,potassium,mag sulfates (SUPREP BOWEL PREP KIT) 17.5-3.13-1.6 gram SolR Take 1 each by mouth as directed. (Patient not taking: Reported on 4/22/2020) 1 Bottle 0     No facility-administered encounter medications on file as of 4/22/2020.      Orders Placed This Encounter   Procedures    COVID-19 Routine Screening     Standing Status:   Future      Standing Expiration Date:   6/21/2021     Order Specific Question:   Is the patient symptomatic?     Answer:   Yes     Order Specific Question:   What symptom criteria does the patient meet?     Answer:   Other (specify)     Order Specific Question:   Please specify:     Answer:   for a bronchoscopy scheduled 4/27/2020     Plan:     Problem List Items Addressed This Visit     Lung mass - Primary    Overview     Patient and [facility] are aware that patient is scheduled for bronchoscopy with EBUS on 4/27/2020 and will need a covid test performed 4/22/2020 prior to procedure.    Instructions marlinvien    Diagnostic and staging EBUS planned.    I have explained the risks, benefits and alternatives of the procedure in detail.  The patient voices understanding and all questions have been answered.  The patient agrees to proceed as planned.         Relevant Orders    COVID-19 Routine Screening

## 2020-04-22 NOTE — LETTER
April 22, 2020      DAMION Darling  [address] [place] [address]           [facility] - Pulmonary Services  [address]  [facility] [address]  Phone: [phone]          Patient: Micheline Raza   MR Number: [id number]   YOB: 1950   Date of Visit: 4/22/2020       Dear Damion Lisset:    Thank you for referring Micheline Raza to me for evaluation. Attached you will find relevant portions of my assessment and plan of care.    If you have questions, please do not hesitate to call me. I look forward to following Micheline Raza along with you.    Sincerely,    Karen Mills MD    Enclosure  CC:  No Recipients    If you would like to receive this communication electronically, please contact [email or web address] or [phone] to request more information on [email or web address] Link access.    For providers and/or their staff who would like to refer a patient to [facility], please contact us through our one-stop-shop provider referral line, [facility], at [phone].    If you feel you have received this communication in error or would no longer like to receive these types of communications, please e-mail [email or web address]

## 2020-04-26 ENCOUNTER — LAB VISIT (OUTPATIENT)
Dept: INTERNAL MEDICINE | Facility: CLINIC | Age: 70
End: 2020-04-26
Payer: COMMERCIAL

## 2020-04-26 DIAGNOSIS — R91.8 LUNG MASS: ICD-10-CM

## 2020-04-26 LAB — SARS-COV-2 RNA RESP QL NAA+PROBE: NOT DETECTED

## 2020-04-26 PROCEDURE — U0002 COVID-19 LAB TEST NON-CDC: HCPCS

## 2020-04-27 ENCOUNTER — ANESTHESIA (OUTPATIENT)
Dept: SURGERY | Facility: HOSPITAL | Age: 70
End: 2020-04-27
Payer: COMMERCIAL

## 2020-04-27 ENCOUNTER — HOSPITAL ENCOUNTER (OUTPATIENT)
Facility: HOSPITAL | Age: 70
Discharge: HOME | End: 2020-04-27
Attending: INTERNAL MEDICINE | Admitting: INTERNAL MEDICINE
Payer: COMMERCIAL

## 2020-04-27 ENCOUNTER — ANESTHESIA EVENT (OUTPATIENT)
Dept: SURGERY | Facility: HOSPITAL | Age: 70
End: 2020-04-27
Payer: COMMERCIAL

## 2020-04-27 VITALS
WEIGHT: 150 LBS | BODY MASS INDEX: 24.11 KG/M2 | HEART RATE: 66 BPM | DIASTOLIC BLOOD PRESSURE: 71 MMHG | TEMPERATURE: 98 F | OXYGEN SATURATION: 90 % | RESPIRATION RATE: 19 BRPM | HEIGHT: 66 IN | SYSTOLIC BLOOD PRESSURE: 150 MMHG

## 2020-04-27 DIAGNOSIS — R91.8 LUNG MASS: Primary | ICD-10-CM

## 2020-04-27 PROCEDURE — 88305 TISSUE EXAM BY PATHOLOGIST: ICD-10-PCS | Mod: 26,,, | Performed by: PATHOLOGY

## 2020-04-27 PROCEDURE — 63600175 PHARM REV CODE 636 W HCPCS: Performed by: NURSE ANESTHETIST, CERTIFIED REGISTERED

## 2020-04-27 PROCEDURE — D9220A PRA ANESTHESIA: Mod: ANES,,, | Performed by: ANESTHESIOLOGY

## 2020-04-27 PROCEDURE — 88341 PR IHC OR ICC EACH ADD'L SINGLE ANTIBODY  STAINPR: ICD-10-PCS | Mod: 26,,, | Performed by: PATHOLOGY

## 2020-04-27 PROCEDURE — 88360 TUMOR IMMUNOHISTOCHEM/MANUAL: CPT | Performed by: PATHOLOGY

## 2020-04-27 PROCEDURE — 88271 CYTOGENETICS DNA PROBE: CPT | Mod: 59 | Performed by: PATHOLOGY

## 2020-04-27 PROCEDURE — 37000008 HC ANESTHESIA 1ST 15 MINUTES: Performed by: INTERNAL MEDICINE

## 2020-04-27 PROCEDURE — 88172 CYTP DX EVAL FNA 1ST EA SITE: CPT | Mod: 26,,, | Performed by: PATHOLOGY

## 2020-04-27 PROCEDURE — 88173 PR  INTERPRETATION OF FNA SMEAR: ICD-10-PCS | Mod: 26,,, | Performed by: PATHOLOGY

## 2020-04-27 PROCEDURE — 94761 N-INVAS EAR/PLS OXIMETRY MLT: CPT

## 2020-04-27 PROCEDURE — 88381 MICRODISSECTION MANUAL: CPT | Performed by: PATHOLOGY

## 2020-04-27 PROCEDURE — 36000704 HC OR TIME LEV I 1ST 15 MIN: Performed by: INTERNAL MEDICINE

## 2020-04-27 PROCEDURE — 27201423 OPTIME MED/SURG SUP & DEVICES STERILE SUPPLY: Performed by: INTERNAL MEDICINE

## 2020-04-27 PROCEDURE — D9220A PRA ANESTHESIA: ICD-10-PCS | Mod: ANES,,, | Performed by: ANESTHESIOLOGY

## 2020-04-27 PROCEDURE — C1726 CATH, BAL DIL, NON-VASCULAR: HCPCS | Performed by: INTERNAL MEDICINE

## 2020-04-27 PROCEDURE — 88177 PR  EVALUATION OF FNA SMEAR TO DETERMINE ADEQUACY, EA ADD EVAL: ICD-10-PCS | Mod: 26,,, | Performed by: PATHOLOGY

## 2020-04-27 PROCEDURE — 88177 CYTP FNA EVAL EA ADDL: CPT | Performed by: PATHOLOGY

## 2020-04-27 PROCEDURE — 63600175 PHARM REV CODE 636 W HCPCS: Performed by: ANESTHESIOLOGY

## 2020-04-27 PROCEDURE — 31653 BRONCH EBUS SAMPLNG 3/> NODE: CPT | Mod: ,,, | Performed by: INTERNAL MEDICINE

## 2020-04-27 PROCEDURE — 36000705 HC OR TIME LEV I EA ADD 15 MIN: Performed by: INTERNAL MEDICINE

## 2020-04-27 PROCEDURE — 88305 TISSUE EXAM BY PATHOLOGIST: CPT | Mod: 59 | Performed by: PATHOLOGY

## 2020-04-27 PROCEDURE — 88341 IMHCHEM/IMCYTCHM EA ADD ANTB: CPT | Mod: 26,,, | Performed by: PATHOLOGY

## 2020-04-27 PROCEDURE — 25000003 PHARM REV CODE 250: Performed by: NURSE ANESTHETIST, CERTIFIED REGISTERED

## 2020-04-27 PROCEDURE — 88172 CYTP DX EVAL FNA 1ST EA SITE: CPT | Mod: 59 | Performed by: PATHOLOGY

## 2020-04-27 PROCEDURE — 88274 CYTOGENETICS 25-99: CPT | Mod: 59 | Performed by: PATHOLOGY

## 2020-04-27 PROCEDURE — 88342 IMHCHEM/IMCYTCHM 1ST ANTB: CPT | Performed by: PATHOLOGY

## 2020-04-27 PROCEDURE — D9220A PRA ANESTHESIA: Mod: CRNA,,, | Performed by: NURSE ANESTHETIST, CERTIFIED REGISTERED

## 2020-04-27 PROCEDURE — 88342 CHG IMMUNOCYTOCHEMISTRY: ICD-10-PCS | Mod: 26,,, | Performed by: PATHOLOGY

## 2020-04-27 PROCEDURE — 71000044 HC DOSC ROUTINE RECOVERY FIRST HOUR: Performed by: INTERNAL MEDICINE

## 2020-04-27 PROCEDURE — 88341 IMHCHEM/IMCYTCHM EA ADD ANTB: CPT | Performed by: PATHOLOGY

## 2020-04-27 PROCEDURE — D9220A PRA ANESTHESIA: ICD-10-PCS | Mod: CRNA,,, | Performed by: NURSE ANESTHETIST, CERTIFIED REGISTERED

## 2020-04-27 PROCEDURE — 81235 EGFR GENE COM VARIANTS: CPT | Performed by: PATHOLOGY

## 2020-04-27 PROCEDURE — 88177 CYTP FNA EVAL EA ADDL: CPT | Mod: 26,,, | Performed by: PATHOLOGY

## 2020-04-27 PROCEDURE — 88172 PR  EVALUATION OF FNA SMEAR TO DETERMINE ADEQUACY, FIRST EVAL: ICD-10-PCS | Mod: 26,,, | Performed by: PATHOLOGY

## 2020-04-27 PROCEDURE — 37000009 HC ANESTHESIA EA ADD 15 MINS: Performed by: INTERNAL MEDICINE

## 2020-04-27 PROCEDURE — 88173 CYTOPATH EVAL FNA REPORT: CPT | Performed by: PATHOLOGY

## 2020-04-27 PROCEDURE — 25000003 PHARM REV CODE 250: Performed by: INTERNAL MEDICINE

## 2020-04-27 PROCEDURE — 71000045 HC DOSC ROUTINE RECOVERY EA ADD'L HR: Performed by: INTERNAL MEDICINE

## 2020-04-27 PROCEDURE — 31653 PR BRONCH W/ EBUS, SAMPLING 3 OR MORE NODES, INCL GUIDE: ICD-10-PCS | Mod: ,,, | Performed by: INTERNAL MEDICINE

## 2020-04-27 PROCEDURE — 88342 IMHCHEM/IMCYTCHM 1ST ANTB: CPT | Mod: 26,,, | Performed by: PATHOLOGY

## 2020-04-27 PROCEDURE — 88173 CYTOPATH EVAL FNA REPORT: CPT | Mod: 26,,, | Performed by: PATHOLOGY

## 2020-04-27 PROCEDURE — 71000015 HC POSTOP RECOV 1ST HR: Performed by: INTERNAL MEDICINE

## 2020-04-27 PROCEDURE — 88305 TISSUE EXAM BY PATHOLOGIST: CPT | Mod: 26,,, | Performed by: PATHOLOGY

## 2020-04-27 RX ORDER — FENTANYL CITRATE 50 UG/ML
INJECTION, SOLUTION INTRAMUSCULAR; INTRAVENOUS
Status: DISCONTINUED | OUTPATIENT
Start: 2020-04-27 | End: 2020-04-27

## 2020-04-27 RX ORDER — SODIUM CHLORIDE 0.9 % (FLUSH) 0.9 %
3 SYRINGE (ML) INJECTION
Status: DISCONTINUED | OUTPATIENT
Start: 2020-04-27 | End: 2020-04-27 | Stop reason: HOSPADM

## 2020-04-27 RX ORDER — LIDOCAINE HYDROCHLORIDE 10 MG/ML
INJECTION, SOLUTION EPIDURAL; INFILTRATION; INTRACAUDAL; PERINEURAL
Status: DISCONTINUED | OUTPATIENT
Start: 2020-04-27 | End: 2020-04-27 | Stop reason: HOSPADM

## 2020-04-27 RX ORDER — DEXAMETHASONE SODIUM PHOSPHATE 4 MG/ML
INJECTION, SOLUTION INTRA-ARTICULAR; INTRALESIONAL; INTRAMUSCULAR; INTRAVENOUS; SOFT TISSUE
Status: DISCONTINUED | OUTPATIENT
Start: 2020-04-27 | End: 2020-04-27

## 2020-04-27 RX ORDER — CHOLECALCIFEROL (VITAMIN D3) 25 MCG
1000 TABLET ORAL DAILY
Qty: 90 TABLET | Refills: 11 | Status: ON HOLD | OUTPATIENT
Start: 2020-04-27 | End: 2022-02-09

## 2020-04-27 RX ORDER — OXYCODONE AND ACETAMINOPHEN 5; 325 MG/1; MG/1
1 TABLET ORAL ONCE AS NEEDED
Status: COMPLETED | OUTPATIENT
Start: 2020-04-27 | End: 2020-04-27

## 2020-04-27 RX ORDER — ONDANSETRON HYDROCHLORIDE 2 MG/ML
INJECTION, SOLUTION INTRAMUSCULAR; INTRAVENOUS
Status: DISCONTINUED | OUTPATIENT
Start: 2020-04-27 | End: 2020-04-27

## 2020-04-27 RX ORDER — PROPOFOL 10 MG/ML
VIAL (ML) INTRAVENOUS CONTINUOUS PRN
Status: DISCONTINUED | OUTPATIENT
Start: 2020-04-27 | End: 2020-04-27

## 2020-04-27 RX ORDER — MIDAZOLAM HYDROCHLORIDE 1 MG/ML
INJECTION INTRAMUSCULAR; INTRAVENOUS
Status: DISCONTINUED | OUTPATIENT
Start: 2020-04-27 | End: 2020-04-27

## 2020-04-27 RX ORDER — PROPOFOL 10 MG/ML
VIAL (ML) INTRAVENOUS
Status: DISCONTINUED | OUTPATIENT
Start: 2020-04-27 | End: 2020-04-27

## 2020-04-27 RX ORDER — HYDROMORPHONE HYDROCHLORIDE 1 MG/ML
0.2 INJECTION, SOLUTION INTRAMUSCULAR; INTRAVENOUS; SUBCUTANEOUS EVERY 5 MIN PRN
Status: DISCONTINUED | OUTPATIENT
Start: 2020-04-27 | End: 2020-04-27 | Stop reason: HOSPADM

## 2020-04-27 RX ORDER — SODIUM CHLORIDE 9 MG/ML
INJECTION, SOLUTION INTRAVENOUS CONTINUOUS PRN
Status: DISCONTINUED | OUTPATIENT
Start: 2020-04-27 | End: 2020-04-27

## 2020-04-27 RX ORDER — LIDOCAINE HCL/PF 100 MG/5ML
SYRINGE (ML) INTRAVENOUS
Status: DISCONTINUED | OUTPATIENT
Start: 2020-04-27 | End: 2020-04-27

## 2020-04-27 RX ORDER — OXYCODONE AND ACETAMINOPHEN 5; 325 MG/1; MG/1
TABLET ORAL
Status: DISCONTINUED
Start: 2020-04-27 | End: 2020-04-27 | Stop reason: HOSPADM

## 2020-04-27 RX ADMIN — PROPOFOL 150 MCG/KG/MIN: 10 INJECTION, EMULSION INTRAVENOUS at 07:04

## 2020-04-27 RX ADMIN — MIDAZOLAM HYDROCHLORIDE 2 MG: 1 INJECTION, SOLUTION INTRAMUSCULAR; INTRAVENOUS at 06:04

## 2020-04-27 RX ADMIN — SODIUM CHLORIDE: 0.9 INJECTION, SOLUTION INTRAVENOUS at 06:04

## 2020-04-27 RX ADMIN — PROPOFOL 50 MG: 10 INJECTION, EMULSION INTRAVENOUS at 07:04

## 2020-04-27 RX ADMIN — HYDROMORPHONE HYDROCHLORIDE 0.2 MG: 1 INJECTION, SOLUTION INTRAMUSCULAR; INTRAVENOUS; SUBCUTANEOUS at 09:04

## 2020-04-27 RX ADMIN — LIDOCAINE HYDROCHLORIDE 100 MG: 20 INJECTION, SOLUTION INTRAVENOUS at 07:04

## 2020-04-27 RX ADMIN — FENTANYL CITRATE 25 MCG: 50 INJECTION, SOLUTION INTRAMUSCULAR; INTRAVENOUS at 07:04

## 2020-04-27 RX ADMIN — PROPOFOL 150 MG: 10 INJECTION, EMULSION INTRAVENOUS at 07:04

## 2020-04-27 RX ADMIN — HYDROMORPHONE HYDROCHLORIDE 0.2 MG: 1 INJECTION, SOLUTION INTRAMUSCULAR; INTRAVENOUS; SUBCUTANEOUS at 08:04

## 2020-04-27 RX ADMIN — DEXAMETHASONE SODIUM PHOSPHATE 4 MG: 4 INJECTION, SOLUTION INTRAMUSCULAR; INTRAVENOUS at 07:04

## 2020-04-27 RX ADMIN — OXYCODONE HYDROCHLORIDE AND ACETAMINOPHEN 1 TABLET: 5; 325 TABLET ORAL at 08:04

## 2020-04-27 RX ADMIN — ONDANSETRON 4 MG: 2 INJECTION, SOLUTION INTRAMUSCULAR; INTRAVENOUS at 07:04

## 2020-04-27 NOTE — ANESTHESIA RELEASE NOTE
"Anesthesia Release from PACU Note    Patient: Micheline Raza    Procedure(s) Performed: Procedure(s) (LRB):  ENDOBRONCHIAL ULTRASOUND (EBUS) (N/A)    Anesthesia type: GEN    Post pain: Adequate analgesia reported    Post assessment: no apparent anesthetic complications, tolerated procedure well and no evidence of recall    Post vital signs: BP (!) 147/66   Pulse 69   Temp 36.5 °C (97.7 °F) (Temporal)   Resp (!) 24   Ht 5' 6" (1.676 m)   Wt 68 kg (150 lb)   SpO2 95%   Breastfeeding? No   BMI 24.21 kg/m²     Level of consciousness: awake, alert and oriented    Nausea/Vomiting: no nausea/no vomiting    Complications: none    Airway Patency: patent    Respiratory: unassisted, spontaneous ventilation, face mask    Cardiovascular: stable and blood pressure at baseline    Hydration: euvolemic    "

## 2020-04-27 NOTE — INTERVAL H&P NOTE
The patient has been examined and the H&P has been reviewed:    I concur with the findings and no changes have occurred since H&P was written.    Anesthesia/Surgery risks, benefits and alternative options discussed and understood by patient/family.          Active Hospital Problems    Diagnosis  POA    Lung mass [R91.8]  Yes     Patient and [facility] are aware that patient is scheduled for bronchoscopy with EBUS on 4/27/2020 and will need a covid test performed 4/22/2020 prior to procedure.    Instructions givien    Diagnostic and staging EBUS planned.    I have explained the risks, benefits and alternatives of the procedure in detail.  The patient voices understanding and all questions have been answered.  The patient agrees to proceed as planned.        Resolved Hospital Problems   No resolved problems to display.     This is a necessary and time sensitive procedure as this lung mass is most likely cancer.  Evaluation and treatment has been delayed and patient is not exhibiting any symptoms of Covid 19.  Patient's Covid 19 is negative less than 24 hours ago.

## 2020-04-27 NOTE — DISCHARGE SUMMARY
[facility]  Pulmonology  Discharge Summary      Patient Name: Micheline Raza  MRN: [id number]  Admission Date: 4/27/2020  Hospital Length of Stay: 0 days  Discharge Date and Time:  04/27/2020 9:12 AM  Attending Physician: Karen Mills MD   Discharging Provider: Karen Mills MD  Primary Care Provider: SARAH BETH Evangelista    HPI: 69 year old with chest pain and found to have a right upper lobe mass abutting both the chest wall and tracking to mediastinum.  Current smoker.  Concern for malignancy making this a time sensitive manner and could not be delayed further    Procedure(s) (LRB):  ENDOBRONCHIAL ULTRASOUND (EBUS) (N/A)    Indwelling Lines/Drains at Time of Discharge:   Lines/Drains/Airways     None                 Hospital Course: Bronchoscopy complete, tolerated well.  Notified [facility] provider that she may need pain medications as requested pre and post procedure but will defer to [facility] providers.  Rica at [facility] was notified.  Being discharged to home in good condition         Significant Labs:  All pertinent labs within the past 24 hours have been reviewed.    Significant Imaging:  I have reviewed all pertinent imaging results/findings within the past 24 hours.    Pending Diagnostic Studies:     Procedure Component Value Units Date/Time    Cytology- FNA Radiology Guided, Bronch/EBUS, EUS/GI [[id number]] Collected:  04/27/20 0754    Order Status:  Sent Lab Status:  In process Updated:  04/27/20 0754        Final Active Diagnoses:    Diagnosis Date Noted POA    PRINCIPAL PROBLEM:  Lung mass [R91.8] 04/22/2020 Yes      Problems Resolved During this Admission:       Discharged Condition: stable    Disposition: Home or Self Care    Follow Up:  Follow-up Information     Call in 1 week to follow up.               Patient Instructions:      Diet general     Medications:  Reconciled Home Medications:      Medication List      CHANGE how you take these medications    vitamin D 1000 units Tab  Commonly  known as:  VITAMIN D3  Take 1 tablet (1,000 Units total) by mouth once daily.  What changed:  how much to take        CONTINUE taking these medications    aspirin 81 MG EC tablet  Commonly known as:  ECOTRIN  Take 81 mg by mouth once daily.     clonazePAM 1 MG tablet  Commonly known as:  KLONOPIN  Take 1 mg by mouth 2 (two) times daily.     divalproex 250 MG EC tablet  Commonly known as:  DEPAKOTE  Take 250 mg by mouth 3 (three) times daily.     escitalopram oxalate 20 MG tablet  Commonly known as:  LEXAPRO  Take 20 mg by mouth once daily.     levocetirizine 5 MG tablet  Commonly known as:  XYZAL  Take 5 mg by mouth every evening.     levothyroxine 100 MCG tablet  Commonly known as:  SYNTHROID  Take 100 mcg by mouth once daily.     multivitamin capsule  Take 1 capsule by mouth once daily.     paroxetine 10 MG tablet  Commonly known as:  PAXIL  Take 10 mg by mouth every morning. Pt unsure dose     pravastatin 40 MG tablet  Commonly known as:  PRAVACHOL  Take 1 tablet (40 mg total) by mouth once daily.     sodium,potassium,mag sulfates 17.5-3.13-1.6 gram Solr  Commonly known as:  SUPREP BOWEL PREP KIT  Take 1 each by mouth as directed.     traMADoL 50 mg tablet  Commonly known as:  ULTRAM  Take 50 mg by mouth every 8 (eight) hours as needed for Pain.            Karen Mills MD  Pulmonology  [facility]

## 2020-04-27 NOTE — TRANSFER OF CARE
"Anesthesia Transfer of Care Note    Patient: Micheline Raza    Procedure(s) Performed: Procedure(s) (LRB):  ENDOBRONCHIAL ULTRASOUND (EBUS) (N/A)    Patient location: PACU    Anesthesia Type: general    Transport from OR: Transported from OR on 6-10 L/min O2 by face mask with adequate spontaneous ventilation    Post pain: adequate analgesia    Post assessment: no apparent anesthetic complications and tolerated procedure well    Post vital signs: stable    Level of consciousness: responds to stimulation and sedated    Nausea/Vomiting: no nausea/vomiting    Complications: none    Transfer of care protocol was followed      Last vitals:   Visit Vitals  BP (!) 177/81 (BP Location: Right arm, Patient Position: Lying)   Pulse 69   Temp 37.1 °C (98.8 °F) (Oral)   Resp 18   Ht 5' 6" (1.676 m)   Wt 68 kg (150 lb)   SpO2 99%   Breastfeeding? No   BMI 24.21 kg/m²     "

## 2020-04-27 NOTE — ANESTHESIA POSTPROCEDURE EVALUATION
Anesthesia Post Evaluation    Patient: Micheline Raza    Procedure(s) Performed: Procedure(s) (LRB):  ENDOBRONCHIAL ULTRASOUND (EBUS) (N/A)    Final Anesthesia Type: general    Patient location during evaluation: PACU  Patient participation: Yes- Able to Participate  Level of consciousness: awake and alert and oriented  Post-procedure vital signs: reviewed and stable  Pain management: adequate  Airway patency: patent    PONV status at discharge: No PONV  Anesthetic complications: no      Cardiovascular status: stable  Respiratory status: unassisted, spontaneous ventilation and face mask  Hydration status: euvolemic  Follow-up not needed.          Vitals Value Taken Time   /67 4/27/2020  8:41 AM   Temp 36.5 °C (97.7 °F) 4/27/2020  8:10 AM   Pulse 75 4/27/2020  8:51 AM   Resp 22 4/27/2020  8:51 AM   SpO2 94 % 4/27/2020  8:51 AM   Vitals shown include unvalidated device data.      No case tracking events are documented in the log.      Pain/Tonny Score: Pain Rating Prior to Med Admin: 6 (4/27/2020  8:50 AM)  Tonny Score: 5 (4/27/2020  8:07 AM)

## 2020-04-27 NOTE — ANESTHESIA PREPROCEDURE EVALUATION
04/27/2020  Micheline Raza is a 69 y.o., female.    70 yo current smoker who was treated in November/December for pneumonia.  Patient is complaining of constant chest pain on the right side and back.  Loss of appetite and weight loss.  No fevers, chills or night sweats.  History of latent TB for which did not complete prophylaxis but feels she may have taken.    Personal Diagnostic Review  CT of chest performed on 3/9/2020 with contrast revealed right hilar mass with pleural effusion additional pleural based..  No flowsheet data found.  Procedure: ENDOBRONCHIAL ULTRASOUND (EBUS) (N/A Chest)   Anesthesia type: General   Diagnosis: Lung mass [R91.8]         Pre-operative evaluation for Procedure(s) (LRB):  ENDOBRONCHIAL ULTRASOUND (EBUS) (N/A)    Encounter Diagnosis   Name Primary?    Lung mass        Review of patient's allergies indicates:   Allergen Reactions    Codeine Nausea And Vomiting       No current facility-administered medications on file prior to encounter.      Current Outpatient Medications on File Prior to Encounter   Medication Sig Dispense Refill    aspirin (ECOTRIN) 81 MG EC tablet Take 81 mg by mouth once daily.      divalproex (DEPAKOTE) 250 MG EC tablet Take 250 mg by mouth 3 (three) times daily.      escitalopram oxalate (LEXAPRO) 20 MG tablet Take 20 mg by mouth once daily.      levocetirizine (XYZAL) 5 MG tablet Take 5 mg by mouth every evening.      levothyroxine (SYNTHROID) 100 MCG tablet Take 100 mcg by mouth once daily.      multivitamin capsule Take 1 capsule by mouth once daily.      pravastatin (PRAVACHOL) 40 MG tablet Take 1 tablet (40 mg total) by mouth once daily. 90 tablet 3    vitamin D 1000 units Tab Take 185 mg by mouth once daily.      clonazePAM (KLONOPIN) 1 MG tablet Take 1 mg by mouth 2 (two) times daily.      paroxetine (PAXIL) 10 MG tablet Take 10 mg  by mouth every morning. Pt unsure dose      sodium,potassium,mag sulfates (SUPREP BOWEL PREP KIT) 17.5-3.13-1.6 gram SolR Take 1 each by mouth as directed. (Patient not taking: Reported on 2020) 1 Bottle 0       Social History     Tobacco Use   Smoking Status Current Every Day Smoker    Packs/day: 2.00    Years: 40.00    Pack years: 80.00    Types: Cigarettes   Smokeless Tobacco Never Used       Social History     Substance and Sexual Activity   Alcohol Use Yes    Comment: social       Patient Active Problem List   Diagnosis    Pneumonia of both lower lobes due to infectious organism    Acquired hypothyroidism    HLD (hyperlipidemia)    Exposure to TB    Thyroid nodule    GERD (gastroesophageal reflux disease)    Anemia    Depression    Cocaine abuse    Depression with suicidal ideation    Other constipation    Lung mass       Past Surgical History:   Procedure Laterality Date     SECTION             No results for input(s): HCT in the last 72 hours.  No results for input(s): PLT in the last 72 hours.  No results for input(s): K in the last 72 hours.  No results for input(s): CREATININE in the last 72 hours.  No results for input(s): GLU in the last 72 hours.  No results for input(s): PT in the last 72 hours.                    Anesthesia Evaluation         Review of Systems  Anesthesia Hx:  No problems with previous Anesthesia    Hematology/Oncology:  Hematology Normal      Oncology Comments: No cancer known, this is in process of workup    Cardiovascular:   Denies Hypertension.  Denies MI.    Denies Angina.    Pulmonary:   Pneumonia (4 months ago) Denies Asthma. Shortness of breath Walks very little due to pain.   Renal/:   Denies Chronic Renal Disease.     Hepatic/GI:   Denies Liver Disease.    Neurological:   Denies TIA. Denies CVA. Denies Seizures.    Endocrine:   Denies Diabetes.        Physical Exam  General:  Well nourished    Airway/Jaw/Neck:  Airway Findings: Mouth Opening:  Normal Tongue: Normal  General Airway Assessment: Adult, Average  Mallampati: II  TM Distance: Normal, at least 6 cm  Jaw/Neck Findings:  Neck ROM: Normal ROM            Mental Status:  Mental Status Findings:  Cooperative, Alert and Oriented         Anesthesia Plan  Type of Anesthesia, risks & benefits discussed:  Anesthesia Type:  general  Patient's Preference:   Intra-op Monitoring Plan:   Intra-op Monitoring Plan Comments:   Post Op Pain Control Plan:   Post Op Pain Control Plan Comments: As per surgeon's plan  Induction:   IV  Beta Blocker:  Patient is not currently on a Beta-Blocker (No further documentation required).       Informed Consent: Patient understands risks and agrees with Anesthesia plan.  Questions answered. Anesthesia consent signed with patient.  ASA Score: 2     Day of Surgery Review of History & Physical:    H&P update referred to the surgeon.         Ready For Surgery From Anesthesia Perspective.

## 2020-04-28 ENCOUNTER — TELEPHONE (OUTPATIENT)
Dept: PULMONOLOGY | Facility: CLINIC | Age: 70
End: 2020-04-28

## 2020-04-28 ENCOUNTER — TELEPHONE (OUTPATIENT)
Dept: HEMATOLOGY/ONCOLOGY | Facility: CLINIC | Age: 70
End: 2020-04-28

## 2020-04-28 DIAGNOSIS — C34.90 LUNG CANCER: Primary | ICD-10-CM

## 2020-04-28 NOTE — TELEPHONE ENCOUNTER
----- Message from Mikhail Reynolds sent at 4/28/2020  3:51 PM CDT -----  Contact: Rica Banerjee  Per Rica Carey/DO [facility] patient/69 year old with EBUS results that are positive for lung adenocarcinoma, at least Stage III.  Will need staging PET/CT and MRI of brain to complete staging.     Contact Rica Banerjee at [phone] for scheduling and additional info if needed....<<<<call this number only  due to she  Is the  coordinator

## 2020-04-28 NOTE — TELEPHONE ENCOUNTER
69 year old with EBUS results that are positive for lung adenocarcinoma, at least Stage III.  Will need staging PET/CT and MRI of brain to complete staging. Spoke to patient via telephone.  Rica Garcia is also aware and will be setting up oncology appointments.  She thanked me and asked that I not place the referral that it will go through [facility]

## 2020-04-28 NOTE — NURSING
Received referral for pt with new diagnosis of Lung Cancer-[facility]den[facility].  Called Jose ([phone] ) to schedule PET and MRI.  Obtained authorization to schedule testing here at [facility].  Pt recent Covid-19 test done 4/26 (negative).  Pt to be notified by JOSE

## 2020-04-29 ENCOUNTER — TELEPHONE (OUTPATIENT)
Dept: HEMATOLOGY/ONCOLOGY | Facility: CLINIC | Age: 70
End: 2020-04-29

## 2020-04-29 NOTE — NURSING
Called to confirm scheduling of PET/MRI appointments for 4/30 and 5/1.  Spoke with pt and confirmed that transportation will be available to bring to and from appointments.  Also confirmed scheduling with Dr. Estrada on 5/5.  Unable to schedule first available appointment due to transportation times. Called [facility] and confirmed scheduled appointments.  Oncology Navigation   Intake  Date of Diagnosis: 04/28/20  Cancer Type: Thoracic  MD Assigned: Dr. Estrada  Internal / External Referral: Internal  Initial Nurse Navigator Contact: 04/28/20  Diagnosis to Initial Contact Timeline (days): 0 days  Contact Method: Phone  Date Worked: 04/28/20  First Appointment Available: 05/01/20  Appointment Date: 05/04/20  Schedule to Appointment Timeline (days): 6  First Available Date vs. Scheduled Date (days): 3  Multiple appointments: Yes (PET and MRI)     Treatment  Current Status: Staging work-up;Active  Treatment Type(s): Chemotherapy     Acuity   Follow Up  No follow-ups on file.

## 2020-04-30 ENCOUNTER — TELEPHONE (OUTPATIENT)
Dept: PULMONOLOGY | Facility: CLINIC | Age: 70
End: 2020-04-30

## 2020-04-30 ENCOUNTER — HOSPITAL ENCOUNTER (OUTPATIENT)
Dept: RADIOLOGY | Facility: OTHER | Age: 70
Discharge: HOME | End: 2020-04-30
Attending: INTERNAL MEDICINE
Payer: COMMERCIAL

## 2020-04-30 DIAGNOSIS — C34.90 ADENOCARCINOMA OF LUNG, UNSPECIFIED LATERALITY: Primary | ICD-10-CM

## 2020-04-30 DIAGNOSIS — C34.90 LUNG CANCER: ICD-10-CM

## 2020-04-30 NOTE — TELEPHONE ENCOUNTER
Cullen Ocampo,  Ms. Raza did not feel comfortable enough to have her scans today.  Please reschedule MRI of brain and PET when she is ready  Dr. Mills

## 2020-05-01 ENCOUNTER — TELEPHONE (OUTPATIENT)
Dept: HEMATOLOGY/ONCOLOGY | Facility: CLINIC | Age: 70
End: 2020-05-01

## 2020-05-01 NOTE — TELEPHONE ENCOUNTER
----- Message from Beltran Ace RN sent at 5/1/2020 10:58 AM CDT -----  Contact: Rica with [facility]   Patient's MRI and PET moved. Do you want to do the appointment before these or push her back a few days?   ----- Message -----  From: Emelina Leal  Sent: 5/1/2020  10:54 AM CDT  To: Sean DURAN Staff    Calling to reschedule the PT's appointment on 5/5 at 11. PT won't have the MRI done until after that appointment and the pet scan is on 5/7. Please call back     Callback: [phone]

## 2020-05-01 NOTE — NURSING
Called to speak with Rica with [facility] to inform that pt will need to keep appointment with Dr. Estrada on Tuesday 5/5.

## 2020-05-01 NOTE — TELEPHONE ENCOUNTER
----- Message from Emelina Leal sent at 5/1/2020 10:54 AM CDT -----  Contact: Rica with [facility]   Calling to reschedule the PT's appointment on 5/5 at 11. PT won't have the MRI done until after that appointment and the pet scan is on 5/7. Please call back     Callback: [phone]

## 2020-05-05 ENCOUNTER — LAB VISIT (OUTPATIENT)
Dept: LAB | Facility: HOSPITAL | Age: 70
End: 2020-05-05
Attending: INTERNAL MEDICINE
Payer: COMMERCIAL

## 2020-05-05 ENCOUNTER — OFFICE VISIT (OUTPATIENT)
Dept: HEMATOLOGY/ONCOLOGY | Facility: CLINIC | Age: 70
End: 2020-05-05
Payer: COMMERCIAL

## 2020-05-05 ENCOUNTER — RESEARCH ENCOUNTER (OUTPATIENT)
Dept: RESEARCH | Facility: HOSPITAL | Age: 70
End: 2020-05-05

## 2020-05-05 ENCOUNTER — TELEPHONE (OUTPATIENT)
Dept: HEMATOLOGY/ONCOLOGY | Facility: CLINIC | Age: 70
End: 2020-05-05

## 2020-05-05 ENCOUNTER — DOCUMENTATION ONLY (OUTPATIENT)
Dept: HEMATOLOGY/ONCOLOGY | Facility: CLINIC | Age: 70
End: 2020-05-05

## 2020-05-05 VITALS
HEIGHT: 66 IN | BODY MASS INDEX: 24.21 KG/M2 | HEART RATE: 55 BPM | DIASTOLIC BLOOD PRESSURE: 82 MMHG | RESPIRATION RATE: 18 BRPM | TEMPERATURE: 99 F | SYSTOLIC BLOOD PRESSURE: 176 MMHG | OXYGEN SATURATION: 97 %

## 2020-05-05 DIAGNOSIS — F41.9 ANXIETY: ICD-10-CM

## 2020-05-05 DIAGNOSIS — F32.A DEPRESSION, UNSPECIFIED DEPRESSION TYPE: ICD-10-CM

## 2020-05-05 DIAGNOSIS — E03.9 ACQUIRED HYPOTHYROIDISM: ICD-10-CM

## 2020-05-05 DIAGNOSIS — C34.91 ADENOCARCINOMA OF RIGHT LUNG, STAGE 4: Primary | ICD-10-CM

## 2020-05-05 DIAGNOSIS — Z00.6 EXAMINATION OF PARTICIPANT IN CLINICAL TRIAL: Primary | ICD-10-CM

## 2020-05-05 DIAGNOSIS — Z00.6 EXAMINATION OF PARTICIPANT IN CLINICAL TRIAL: ICD-10-CM

## 2020-05-05 DIAGNOSIS — G89.3 CANCER RELATED PAIN: ICD-10-CM

## 2020-05-05 LAB
DRUG STUDY SPECIMEN TYPE: NORMAL
DRUG STUDY TEST NAME: NORMAL
DRUG STUDY TEST RESULT: NORMAL

## 2020-05-05 PROCEDURE — 99000 SPECIMEN HANDLING OFFICE-LAB: CPT

## 2020-05-05 PROCEDURE — 99205 PR OFFICE/OUTPT VISIT, NEW, LEVL V, 60-74 MIN: ICD-10-PCS | Mod: S$GLB,,, | Performed by: INTERNAL MEDICINE

## 2020-05-05 PROCEDURE — 99205 OFFICE O/P NEW HI 60 MIN: CPT | Mod: S$GLB,,, | Performed by: INTERNAL MEDICINE

## 2020-05-05 PROCEDURE — 99999 PR PBB SHADOW E&M-EST. PATIENT-LVL IV: ICD-10-PCS | Mod: PBBFAC,,, | Performed by: INTERNAL MEDICINE

## 2020-05-05 PROCEDURE — 36415 COLL VENOUS BLD VENIPUNCTURE: CPT

## 2020-05-05 PROCEDURE — 99999 PR PBB SHADOW E&M-EST. PATIENT-LVL IV: CPT | Mod: PBBFAC,,, | Performed by: INTERNAL MEDICINE

## 2020-05-05 RX ORDER — MORPHINE SULFATE 15 MG/1
15 TABLET, FILM COATED, EXTENDED RELEASE ORAL 2 TIMES DAILY
Qty: 60 TABLET | Refills: 0 | Status: SHIPPED | OUTPATIENT
Start: 2020-05-05 | End: 2020-05-05 | Stop reason: SDUPTHER

## 2020-05-05 RX ORDER — CLONAZEPAM 1 MG/1
1 TABLET ORAL 2 TIMES DAILY
Qty: 60 TABLET | Refills: 0 | Status: SHIPPED | OUTPATIENT
Start: 2020-05-05 | End: 2020-05-05 | Stop reason: SDUPTHER

## 2020-05-05 RX ORDER — CLONAZEPAM 1 MG/1
1 TABLET ORAL 2 TIMES DAILY
Qty: 60 TABLET | Refills: 0 | Status: ON HOLD | OUTPATIENT
Start: 2020-05-05 | End: 2021-11-15 | Stop reason: HOSPADM

## 2020-05-05 RX ORDER — TRAMADOL HYDROCHLORIDE 50 MG/1
50 TABLET ORAL EVERY 8 HOURS PRN
Qty: 60 TABLET | Refills: 0 | Status: SHIPPED | OUTPATIENT
Start: 2020-05-05 | End: 2020-05-05

## 2020-05-05 RX ORDER — MORPHINE SULFATE 15 MG/1
15 TABLET, FILM COATED, EXTENDED RELEASE ORAL 2 TIMES DAILY
Qty: 60 TABLET | Refills: 0 | Status: SHIPPED | OUTPATIENT
Start: 2020-05-05 | End: 2020-05-12

## 2020-05-05 NOTE — Clinical Note
- schedule palliative care appointment next available- RTC in person 5/15 at 1:30 PM- please schedule appointments ASAP and give patient printed schedule.  She does not have access to my Chart.- also please assist patient with where to go for prescription  at [facility] main pharmacy and MRI brain

## 2020-05-05 NOTE — PROGRESS NOTES
NEW ONCOLOGY VISIT-ESTABLISH CARE.     Reason for visit: New Medical Oncology visit-establish care for newly diagnosed stage III vs stage IV adenocarcinoma of the lung    Best Contact Phone Number(s): [phone] (home)      Cancer/Stage/TNM:   Cancer Staging  No matching staging information was found for the patient.        Adenocarcinoma of right lung, stage 4    4/27/2020 Initial Diagnosis     Treated in late 2019 for pneumonia.  Patient presented with constant chest pain on the right side and back.  Loss of appetite and weight loss.  She was found to have a R lung mass, mediastinal LAD, and R pleural effusion.  4/27 had EBUS and was found to have adenocarcinoma from 4L, station 7, 4R and 11R FNAs.          HPI:   69 y.o. female who presents for evaluation and treatment recommendations. I have reviewed the patient's chart dating back the past 6 months, and this is detailed in oncologic history as above.  Patient currently with continued constant chest pain.  She says oxycodone does not help the pain.  This pain has been constant, not progressing but also not improving for months now.  She also says she had klonopin discontinued, which has worsened pain/anxiety.  Her performance status is ECOG PS1.  Now her main limitation in PS is due to pain.    Practical Problems Physical Problems   : No Appearance: No   Housing: No Bathing / Dressing: No   Insurance / Financial: No Breathing: No    Transportation: No  Changes in Urination: No    Work / School: No  Constipation: No   Treatment Decisions: No  Diarrhea: No     Eating: No    Family Problems Fatigue: No    Dealing with Children: No Feeling Swollen: No    Dealing with Partner: No Fevers: No    Ability to Have Children: No  Getting Around: No    Family Health Issues: No  Indigestion: No     Memory / Concentration: No   Emotional Problems Mouth Sores: No    Depression: No  Nausea: No    Fears: No  Nose Dry / Congested: No    Nervousness: No  Pain: Yes     Sadness: No Sexual: No    Worry: No Skin Dry / Itchy: No    Loss of Interest in Usual Activities: No Sleep: No     Substance Abuse: No    Spiritual/Religions Concerns Tingling in Hands / Feet: No   Spritual / Methodist Concerns: No         Other Problems            ROS:   Review of Systems   Constitutional: Negative for activity change, chills, fatigue, fever and unexpected weight change.   HENT: Negative for mouth sores, nosebleeds and sore throat.    Respiratory: Negative for cough, shortness of breath and wheezing.    Cardiovascular: Positive for chest pain. Negative for palpitations and leg swelling.   Gastrointestinal: Negative for abdominal distention, abdominal pain and blood in stool.   Endocrine: Negative for cold intolerance and heat intolerance.   Genitourinary: Negative for dysuria, flank pain and frequency.   Musculoskeletal: Positive for back pain. Negative for arthralgias, joint swelling and myalgias.   Skin: Negative for color change, rash and wound.   Neurological: Negative for dizziness, light-headedness and headaches.   Hematological: Negative for adenopathy. Does not bruise/bleed easily.   Psychiatric/Behavioral: Positive for dysphoric mood. The patient is nervous/anxious.         Past Medical History:   Past Medical History:   Diagnosis Date    Depression     Hypercholesteremia     Pneumonia     Thyroid disease         Past Surgical History:   Past Surgical History:   Procedure Laterality Date     SECTION      ENDOBRONCHIAL ULTRASOUND N/A 2020    Procedure: ENDOBRONCHIAL ULTRASOUND (EBUS);  Surgeon: Karen Mills MD;  Location: [facility] OR [address] [place] [address];  Service: Endoscopy;  Laterality: N/A;        Family History:   Family History   Problem Relation Age of Onset    Heart attack Mother     Lung cancer Mother     Liver cancer Father     Miscarriages / Stillbirths Neg Hx     Diabetes Neg Hx         Social History:   Social History     Tobacco Use    Smoking status: Current Every  "Day Smoker     Packs/day: 2.00     Years: 40.00     Pack years: 80.00     Types: Cigarettes    Smokeless tobacco: Never Used   Substance Use Topics    Alcohol use: Yes     Comment: social        Allergies:   Review of patient's allergies indicates:   Allergen Reactions    Codeine Nausea And Vomiting        Medications:   Current Outpatient Medications   Medication Sig Dispense Refill    aspirin (ECOTRIN) 81 MG EC tablet Take 81 mg by mouth once daily.      divalproex (DEPAKOTE) 250 MG EC tablet Take 250 mg by mouth 3 (three) times daily.      levothyroxine (SYNTHROID) 100 MCG tablet Take 100 mcg by mouth once daily.      multivitamin capsule Take 1 capsule by mouth once daily.      pravastatin (PRAVACHOL) 40 MG tablet Take 1 tablet (40 mg total) by mouth once daily. 90 tablet 3    vitamin D (VITAMIN D3) 1000 units Tab Take 1 tablet (1,000 Units total) by mouth once daily. 90 tablet 11    clonazePAM (KLONOPIN) 1 MG tablet Take 1 tablet (1 mg total) by mouth 2 (two) times daily. 60 tablet 0    levocetirizine (XYZAL) 5 MG tablet Take 5 mg by mouth every evening.      morphine (MS CONTIN) 15 MG 12 hr tablet Take 1 tablet (15 mg total) by mouth 2 (two) times daily. 60 tablet 0    sodium,potassium,mag sulfates (SUPREP BOWEL PREP KIT) 17.5-3.13-1.6 gram SolR Take 1 each by mouth as directed. (Patient not taking: Reported on 4/22/2020) 1 Bottle 0     No current facility-administered medications for this visit.         Physical Exam:   BP (!) 176/82   Pulse (!) 55   Temp 98.5 °F (36.9 °C) (Oral)   Resp 18   Ht 5' 6" (1.676 m)   SpO2 97%   BMI 24.21 kg/m²      ECOG Performance Status: (foot note - ECOG PS provided by Eastern Cooperative Oncology Group) 1 - Symptomatic but completely ambulatory    Physical Exam   Constitutional: She is oriented to person, place, and time. She appears well-developed and well-nourished.   HENT:   Head: Normocephalic and atraumatic.   Eyes: Pupils are equal, round, and reactive " to light. Conjunctivae and EOM are normal.   Neck: Normal range of motion. Neck supple.   Cardiovascular: Normal rate and regular rhythm. Exam reveals no friction rub.   No murmur heard.  Pulmonary/Chest: Effort normal and breath sounds normal.   Abdominal: Soft. Bowel sounds are normal. There is no tenderness.   Musculoskeletal: Normal range of motion. She exhibits tenderness (R para-spinal tenderness near scapula). She exhibits no edema.   Lymphadenopathy:     She has no cervical adenopathy.     She has no axillary adenopathy.   Neurological: She is alert and oriented to person, place, and time.   Skin: Skin is warm and dry.   Psychiatric: She has a normal mood and affect. Her behavior is normal.         Labs:   No results found for this or any previous visit (from the past 48 hour(s)).     Imaging: I have personally reviewed patient's Chest CT imaging showing R lung mass, mediastinal LAD, and R pleural effusion.  X-Ray Chest PA And Lateral  Addendum: The study is just become available for review heart size is normal.   Patient has some fibrotic streak streaking and scarring in the right mid  lung field in the left lower lung field.  Pleural thickening is seen on  the right.  Follow-up films are recommended This report was flagged in  Epic as abnormal.     Electronically signed by: Migel Bush MD   Date:    03/10/2020   Time:    08:06  Narrative: EXAMINATION:  XR CHEST PA AND LATERAL    CLINICAL HISTORY:  Encounter for screening for respiratory disorder NEC    TECHNIQUE:  PA and lateral views of the chest were performed.    COMPARISON:  07/24/2018  Impression: SEE ABOVE    Electronically signed by: Migel Bush MD  Date:    03/03/2020  Time:    10:49            Diagnoses:       1. Adenocarcinoma of right lung, stage 4    2. Cancer related pain    3. Anxiety    4. Depression, unspecified depression type    5. Acquired hypothyroidism          Assessment and Plan:         1. Lung Adenocarcinoma:   Patient  has PD-L1 60%, EGFR, ALK, ROS1 pending. Pending PETCT and MRI brain for complete staging.  Will pre-screen patient for [facility] clinical trial as she appears to be stage IV with R pleural effusion.    - RTC to discuss PETCT, MRI brain results and what treatment options are    2 Cancer Related Pain  Will prescribe MS Contin 15 mg bid as patient does not have pain controlled with oxycodone prn.  Will also refer to palliative care.    3,4 Anxiety  Concerned for benzo withdrawal with discontinuation of klonopin.  Will refill until patient sees palliative care.  Patient also takes depakote, but is not taking SSRIs on medication list.    Chest pain  If no explanation from cancer for pain on PETCT, may need CTA to rule out PE.  R-sided, pleuritic so unlikely cardiac.            Greater than 50% of this time involved counseling or coordination of care. I have provided the patient with an opportunity to ask questions and have all questions answered to his satisfaction.     she will return to clinic in 1-2 weeks, but knows to call in the interim if symptoms change or should a problem arise.    Gurpreet Estrada MD  Medical Oncology [facility] [place]

## 2020-05-05 NOTE — LETTER
May 5, 2020      Karen Mills MD  [address]  [facility] [address]           [facility]  [address]  [facility] [address]  Phone: [phone]          Patient: Micheline Raza   MR Number: [id number]   YOB: 1950   Date of Visit: 5/5/2020       Dear Dr. Karen Mills:    Thank you for referring Micheline Raza to me for evaluation. Attached you will find relevant portions of my assessment and plan of care.    If you have questions, please do not hesitate to call me. I look forward to following Micheline Raza along with you.    Sincerely,    Gurpreet Estrada MD    Enclosure  CC:  No Recipients    If you would like to receive this communication electronically, please contact [email or web address] or [phone] to request more information on [employer] access.    For providers and/or their staff who would like to refer a patient to [facility], please contact us through our one-stop-shop provider referral line, [facility], at [phone].    If you feel you have received this communication in error or would no longer like to receive these types of communications, please e-mail [email or web address]

## 2020-05-05 NOTE — TELEPHONE ENCOUNTER
----- Message from Gurpreet Estrada MD sent at 5/5/2020 12:09 PM CDT -----  - schedule palliative care appointment next available  - RTC in person 5/15 at 1:30 PM  - please schedule appointments ASAP and give patient printed schedule.  She does not have access to my Chart.  - also please assist patient with where to go for prescription  at [facility] main pharmacy and MRI brain

## 2020-05-05 NOTE — PROGRESS NOTES
Received call this afternoon from Joe in [facility] re: prescription assistance for patient. They are filling a week's worth of the MS Contin and Klonopin that Dr. Estrada prescribed. Patient usually gets prescriptions through [facility] contracted pharmacy and it will take the pharmacy a couple of days at least to get the medications in. Patient's co-pay is $7.35. Prepared and faxed a cost transfer form to Joe's attention at [phone]. Called patient at [phone] and discussed with her. Patient confirmed picking up the prescriptions from [facility], and is preparing to get her scan done. Patient expressed appreciation. No other needs indicated at this time.

## 2020-05-06 LAB
FINAL PATHOLOGIC DIAGNOSIS: ABNORMAL
SUPPLEMENTAL DIAGNOSIS: ABNORMAL

## 2020-05-06 NOTE — PROGRESS NOTES
Protocol: A , OPEN-LABEL, MULTI-CENTER, MULTI-DOSE STUDY OF GRN-[id number] ADDED TO PEMBROLIZUMAB IN SUBJECTS WITH NON-SMALL CELL LUNG CANCER WITH HIGH PD-L1 EXPRESSION  Protocol#: [id number]  IRB#: [id number]  PI: Dr. Gurpreet Estrada  Screening #:         Pre-Screen Consent 5/5/2020   Met with patient on the 3rd floor of the [facility] to discuss the pre screen consent for the above named study. Pt was seen by Dr. Estrada prior.  Pt is accompanied alone due to COVID precautions and is AAO x's 3.   Went into detail about the purpose of the pre-screen consent. Explained that for this portion we are collecting a sample of her blood to see if she has a specific HLA protein named HLA-A*02. Explained that only patients who have this antigen can potentially participate in this study. Pt voiced understanding. Also explained that since the patient's tissue has already been sent off for PD-L1 testing, we will not have to send it off for this study. Pt voiced understanding. If the patient has the HLA-A*02 antigen, then the patient will be consented on the main consent. If she does not have the antigen, then she will be a screen failure. Explained to the patient that it will take about 5 days for me to receive the results. Once I have the results, either Dr. Estrada or I will notify her of these results. Pt voiced understanding. Pt was given the opportunity to ask questions and all of them were answered to her satisfaction. Pt also agreed for any leftover blood samples to be used for future research. Pt signed consent and a copy of the signed consent was given to her.        See link below for copy of signed consent

## 2020-05-10 ENCOUNTER — NURSE TRIAGE (OUTPATIENT)
Dept: ADMINISTRATIVE | Facility: CLINIC | Age: 70
End: 2020-05-10

## 2020-05-10 NOTE — TELEPHONE ENCOUNTER
Pt contacted through Post procedural syndrome tracking. States she is not having fever. States she has occasional cough and SOB accompanied by CP and back pain. States CP has been constant since procedure and that sometimes the SOB is from the pain being severe.      Rec pt go to ED to be checked. Pt does not feel she needs to be checked today at this point but states if anything worsens she will seek care. States she has a procedure scheduled for tomorrow 5/11/20 and she will get checked while she is there.

## 2020-05-10 NOTE — TELEPHONE ENCOUNTER
Reason for Disposition   SEVERE or constant chest pain (Exception: mild central chest pain, present only when coughing)    Additional Information   Negative: SEVERE difficulty breathing (e.g., struggling for each breath, speaks in single words)   Negative: Difficult to awaken or acting confused (e.g., disoriented, slurred speech)   Negative: Bluish (or gray) lips or face now   Negative: Shock suspected (e.g., cold/pale/clammy skin, too weak to stand, low BP, rapid pulse)   Negative: Sounds like a life-threatening emergency to the triager    Protocols used: CORONAVIRUS (COVID-19) DIAGNOSED OR [id number]

## 2020-05-11 ENCOUNTER — NURSE TRIAGE (OUTPATIENT)
Dept: ADMINISTRATIVE | Facility: CLINIC | Age: 70
End: 2020-05-11

## 2020-05-11 ENCOUNTER — TELEPHONE (OUTPATIENT)
Dept: HEMATOLOGY/ONCOLOGY | Facility: CLINIC | Age: 70
End: 2020-05-11

## 2020-05-11 DIAGNOSIS — C34.91 ADENOCARCINOMA OF RIGHT LUNG, STAGE 4: Primary | ICD-10-CM

## 2020-05-11 NOTE — TELEPHONE ENCOUNTER
Spoke to patient, discussed guardent testing, she will come today after her PET scan, she is aware she needs to sign the form in the guardant box as well.

## 2020-05-11 NOTE — TELEPHONE ENCOUNTER
Pt contacted yesterday for post procedural symptom tracker. Symptoms were addressed.     Reason for Disposition   Caller has already spoken with another triager and has no further questions    Additional Information   Negative: Caller has already spoken with the PCP (or office), and has no further questions    Protocols used: NO CONTACT OR DUPLICATE CONTACT CALL-A-OH

## 2020-05-11 NOTE — TELEPHONE ENCOUNTER
----- Message from Gurpreet Estrada MD sent at 5/11/2020  9:59 AM CDT -----  Regarding: Not enough tissue for EGFR  There was insufficient tissue for EGFR.  Beltran, let's get Guardant when she comes in.  Could you call her to come to the clinic for this blood draw after her PET scan today?    Krista, could you check with the sponsor whether she would still be a candidate if liquid biopsy is negative?  Would she need another biopsy?  We could discuss this with her at the visit if she does.  I am not sure she will want to do another biopsy.    Johny    ----- Message -----  From: Karen Mills MD  Sent: 5/7/2020  11:08 AM CDT  To: Gurpreet Estrada MD    PDL back     28-Jan-2020 05:58

## 2020-05-12 ENCOUNTER — TELEPHONE (OUTPATIENT)
Dept: PALLIATIVE MEDICINE | Facility: CLINIC | Age: 70
End: 2020-05-12

## 2020-05-12 ENCOUNTER — TELEPHONE (OUTPATIENT)
Dept: HEMATOLOGY/ONCOLOGY | Facility: CLINIC | Age: 70
End: 2020-05-12

## 2020-05-12 ENCOUNTER — OFFICE VISIT (OUTPATIENT)
Dept: HEMATOLOGY/ONCOLOGY | Facility: CLINIC | Age: 70
End: 2020-05-12
Payer: COMMERCIAL

## 2020-05-12 DIAGNOSIS — F41.9 ANXIETY: ICD-10-CM

## 2020-05-12 DIAGNOSIS — G89.3 CANCER RELATED PAIN: ICD-10-CM

## 2020-05-12 DIAGNOSIS — Z51.5 PALLIATIVE CARE ENCOUNTER: ICD-10-CM

## 2020-05-12 DIAGNOSIS — Z71.89 ACP (ADVANCE CARE PLANNING): Primary | ICD-10-CM

## 2020-05-12 PROCEDURE — 99205 PR OFFICE/OUTPT VISIT, NEW, LEVL V, 60-74 MIN: ICD-10-PCS | Mod: 95,,, | Performed by: EMERGENCY MEDICINE

## 2020-05-12 PROCEDURE — 99205 OFFICE O/P NEW HI 60 MIN: CPT | Mod: 95,,, | Performed by: EMERGENCY MEDICINE

## 2020-05-12 NOTE — PROGRESS NOTES
"Consult Note  Palliative Care      Consult Requested By: Dr. Gurpreet Estrada  Reason for Consult: symptom mgmt and ACP in the setting of Stage 4 NSCLC      ASSESSMENT/PLAN:     Plan/Recommendations:  Diagnoses and all orders for this visit:      Metastatic NSCLC  Cancer related pain  H/o substance abuse/Cocaine/opioids  -     Discussed with PCP through [facility] and made recommendations for pt to sign pain contract, only have controlled substance medications from a single source, and to set early boundaries to minimize risk in pt with clinical requirements but also a maladaptive history and substance abuse issues  - Recommended MS Contin 15 mg bid; oxy 5 po prn    Anxiety/Depression/Mental health  -     Managed by outpt [facility] program and multi-D approach to complex social situaiton  - Would attempt to maximize non-benzo meds     Palliative care encounter  ACP (advance care planning)  Ethical / Legal Advance Care Planning      - surrogate decision maker: Marcell Quinones     [phone]      - Code Status: FC   - discussed at length with the pt the importance of completing ACP discussions; she states she would be ok withher son making decisions for her but not ready yet to have "the talk" with him; to be continued as an outpt; discussed with PCP Dr. Montejo    Follow up in 3 months unless needed sooner         SUBJECTIVE:     History of Present Illness:  69 y.o. female who presents for evaluation and treatment recommendations. I have reviewed the patient's chart dating back the past 6 months, and this is detailed in oncologic history as above.  Patient currently with continued constant chest pain.  She says oxycodone does not help the pain.  This pain has been constant, not progressing but also not improving for months now.  She also says she had klonopin discontinued, which has worsened pain/anxiety.  Her performance status is ECOG PS1.  Now her main limitation in PS is due to pain for which she was " referred to palliative medicine.    In this setting, palliative medicine was consulted to help with medical decision making and aid in the formation of goals of care.         Cancer Staging             Adenocarcinoma of right lung, stage 4     2020 Initial Diagnosis       Treated in late  for pneumonia.  Patient presented with constant chest pain on the right side and back.  Loss of appetite and weight loss.  She was found to have a R lung mass, mediastinal LAD, and R pleural effusion.   had EBUS and was found to have adenocarcinoma from 4L, station 7, 4R and 11R FNAs.         Past Medical History:   Diagnosis Date    Depression     Hypercholesteremia     Pneumonia     Thyroid disease      Past Surgical History:   Procedure Laterality Date     SECTION      ENDOBRONCHIAL ULTRASOUND N/A 2020    Procedure: ENDOBRONCHIAL ULTRASOUND (EBUS);  Surgeon: Karen Mills MD;  Location: [facility] OR [address] [place] [address];  Service: Endoscopy;  Laterality: N/A;     Family History   Problem Relation Age of Onset    Heart attack Mother     Lung cancer Mother     Liver cancer Father     Miscarriages / Stillbirths Neg Hx     Diabetes Neg Hx      Review of patient's allergies indicates:   Allergen Reactions    Codeine Nausea And Vomiting       Medications:    Current Outpatient Medications:     aspirin (ECOTRIN) 81 MG EC tablet, Take 81 mg by mouth once daily., Disp: , Rfl:     clonazePAM (KLONOPIN) 1 MG tablet, Take 1 tablet (1 mg total) by mouth 2 (two) times daily., Disp: 60 tablet, Rfl: 0    divalproex (DEPAKOTE) 250 MG EC tablet, Take 250 mg by mouth 3 (three) times daily., Disp: , Rfl:     levocetirizine (XYZAL) 5 MG tablet, Take 5 mg by mouth every evening., Disp: , Rfl:     levothyroxine (SYNTHROID) 100 MCG tablet, Take 100 mcg by mouth once daily., Disp: , Rfl:     morphine (MS CONTIN) 15 MG 12 hr tablet, Take 1 tablet (15 mg total) by mouth 2 (two) times daily., Disp: 60 tablet, Rfl: 0     multivitamin capsule, Take 1 capsule by mouth once daily., Disp: , Rfl:     pravastatin (PRAVACHOL) 40 MG tablet, Take 1 tablet (40 mg total) by mouth once daily., Disp: 90 tablet, Rfl: 3    sodium,potassium,mag sulfates (SUPREP BOWEL PREP KIT) 17.5-3.13-1.6 gram SolR, Take 1 each by mouth as directed. (Patient not taking: Reported on 4/22/2020), Disp: 1 Bottle, Rfl: 0    vitamin D (VITAMIN D3) 1000 units Tab, Take 1 tablet (1,000 Units total) by mouth once daily., Disp: 90 tablet, Rfl: 11      24h Oral Morphine Equivalents (OME):     OBJECTIVE:     Symptom Assessment (ESAS 0-10 scale)    ESAS 0 1 2 3 4 5 6 7 8 9 10   Pain []  []  []  []  []  []  []  []  []  []  []    Dyspnea []  []  []  []  []  []  []  []  []  []  []    Anxiety []  []  []  []  []  []  []  []  []  []  []    Nausea []  []  []  []  []  []  []  []  []  []  []    Depression  []  []  []  []  []  []  []  []  []  []  []    Anorexia []  []  []  []  []  []  []  []  []  []  []    Fatigue []  []  []  []  []  []  []  []  []  []  []    Insomnia []  []  []  []  []  []  []  []  []  []  []    Restlessness  []  []  []  []  []  []  []  []  []  []  []    Agitation []  []  []  []  []  []  []  []  []  []  []        Constipation    no  Bowel Management Plan (BMP): no  Diarrhea        no  Comments:   Perfromance Status: PPS Score 80  ROS:  Review of Systems   Constitutional: Positive for fatigue and unexpected weight change. Negative for chills, diaphoresis and fever.        25 lbs weight loss over the past 3 months   HENT: Negative.    Eyes: Negative.    Respiratory: Positive for cough. Negative for shortness of breath.    Cardiovascular: Positive for chest pain.   Gastrointestinal: Negative for abdominal pain.   Endocrine: Negative.    Genitourinary: Negative.    Musculoskeletal: Negative.    Skin: Negative.    Allergic/Immunologic: Negative.    Neurological: Negative.    Hematological: Negative.    Psychiatric/Behavioral: The patient is nervous/anxious.    All other  systems reviewed and are negative.      Physical Exam:  Vitals:    Physical Exam  Unable to perform full exam secondary to Audiovisit only    Labs:  CBC:   WBC   Date Value Ref Range Status   03/09/2020 6.10 3.90 - 12.70 K/uL Final     Hemoglobin   Date Value Ref Range Status   03/09/2020 12.9 12.0 - 16.0 g/dL Final     Hematocrit   Date Value Ref Range Status   03/09/2020 37.8 37.0 - 48.5 % Final     Mean Corpuscular Volume   Date Value Ref Range Status   03/09/2020 86 82 - 98 fL Final     Platelets   Date Value Ref Range Status   03/09/2020 241 150 - 350 K/uL Final           LFT:   Lab Results   Component Value Date    AST 23 12/05/2019    ALKPHOS 81 12/05/2019    BILITOT 0.4 12/05/2019       Albumin:   Albumin   Date Value Ref Range Status   12/05/2019 3.7 3.5 - 5.2 g/dL Final     Protein:   Total Protein   Date Value Ref Range Status   12/05/2019 8.8 (H) 6.0 - 8.4 g/dL Final       Radiology:I have reviewed all pertinent imaging results/findings within the past 24 hours.  CT Chest 03/2020  Impression       1. Irregular spiculated mass along the pleural surface of the right upper lobe laterally extending nearly to the apex, and across the mid lung to the hilum with contiguous extension along the hilar structures into the mediastinum, consistent with malignancy.  2. Subcarinal lymphadenopathy.  3. Right pleural effusion.       Psychosocial/Cultural: pt with a complex history of mental illness and substance abuse issues; member of [facility] program with dr Montejo    Spiritual:  F- [place] and Belief: yes; did not explore deeply today    > 50% of 60 min visit spent in chart review, face to face discussion of goals of care,  symptom assessment, coordination of care and emotional support.      Signature: Tuan Kearney MD    *

## 2020-05-12 NOTE — LETTER
July 28, 2020      Gurpreet Estrada MD  [address]  [facility] [address]           [facility] Hematology Oncology  [address]  [facility] [address]  Phone: [phone]          Patient: Micheline Raza   MR Number: [id number]   YOB: 1950   Date of Visit: 5/12/2020       Dear Dr. Gurpreet Estrada:    Thank you for referring Micheline Raza to me for evaluation. Attached you will find relevant portions of my assessment and plan of care.    If you have questions, please do not hesitate to call me. I look forward to following Micheline Raza along with you.    Sincerely,    Tuan Kearney MD    Enclosure  CC:  No Recipients    If you would like to receive this communication electronically, please contact [email or web address] or [phone] to request more information on [employer] Link access.    For providers and/or their staff who would like to refer a patient to [facility], please contact us through our one-stop-shop provider referral line, [facility] , at [phone].    If you feel you have received this communication in error or would no longer like to receive these types of communications, please e-mail [email or web address]

## 2020-05-12 NOTE — TELEPHONE ENCOUNTER
----- Message from Gurpreet Estrada MD sent at 5/12/2020 12:58 PM CDT -----  We should schedule.  So she didn't miss the appointment, it wasn't scheduled?    ----- Message -----  From: Beltran Ace, RN  Sent: 5/7/2020  10:06 AM CDT  To: Gurpreet Estrada MD    It does not look like MRI brain was scheduled, is Dr. Mills's office scheduling or should we?   ----- Message -----  From: Gurpreet Estrada MD  Sent: 5/5/2020  12:09 PM CDT  To: Beltran Ace RN    - schedule palliative care appointment next available  - RTC in person 5/15 at 1:30 PM  - please schedule appointments ASAP and give patient printed schedule.  She does not have access to my Chart.  - also please assist patient with where to go for prescription  at [facility] main pharmacy and MRI brain

## 2020-05-12 NOTE — TELEPHONE ENCOUNTER
Called patient to remind her of audio visit today. She will be available at 1:00pm to receive call.

## 2020-05-14 ENCOUNTER — TELEPHONE (OUTPATIENT)
Dept: HEMATOLOGY/ONCOLOGY | Facility: CLINIC | Age: 70
End: 2020-05-14

## 2020-05-14 ENCOUNTER — HOSPITAL ENCOUNTER (OUTPATIENT)
Dept: RADIOLOGY | Facility: HOSPITAL | Age: 70
Discharge: HOME | End: 2020-05-14
Attending: INTERNAL MEDICINE
Payer: COMMERCIAL

## 2020-05-14 PROCEDURE — A9585 GADOBUTROL INJECTION: HCPCS | Performed by: INTERNAL MEDICINE

## 2020-05-14 PROCEDURE — 70553 MRI BRAIN STEM W/O & W/DYE: CPT | Mod: 26,,, | Performed by: RADIOLOGY

## 2020-05-14 PROCEDURE — 70553 MRI BRAIN STEM W/O & W/DYE: CPT | Mod: TC

## 2020-05-14 PROCEDURE — 25500020 PHARM REV CODE 255: Performed by: INTERNAL MEDICINE

## 2020-05-14 PROCEDURE — 70553 MRI BRAIN W WO CONTRAST: ICD-10-PCS | Mod: 26,,, | Performed by: RADIOLOGY

## 2020-05-14 RX ORDER — GADOBUTROL 604.72 MG/ML
7 INJECTION INTRAVENOUS
Status: COMPLETED | OUTPATIENT
Start: 2020-05-14 | End: 2020-05-14

## 2020-05-14 RX ADMIN — GADOBUTROL 7 ML: 604.72 INJECTION INTRAVENOUS at 11:05

## 2020-05-18 ENCOUNTER — TELEPHONE (OUTPATIENT)
Dept: HEMATOLOGY/ONCOLOGY | Facility: CLINIC | Age: 70
End: 2020-05-18

## 2020-05-18 ENCOUNTER — HOSPITAL ENCOUNTER (OUTPATIENT)
Dept: RADIOLOGY | Facility: HOSPITAL | Age: 70
Discharge: HOME | End: 2020-05-18
Attending: INTERNAL MEDICINE
Payer: COMMERCIAL

## 2020-05-18 DIAGNOSIS — C34.90 LUNG CANCER: ICD-10-CM

## 2020-05-18 LAB
CREAT SERPL-MCNC: 0.9 MG/DL (ref 0.5–1.4)
POCT GLUCOSE: 89 MG/DL (ref 70–110)
SAMPLE: NORMAL

## 2020-05-18 PROCEDURE — 78815 PET IMAGE W/CT SKULL-THIGH: CPT | Mod: TC,PI

## 2020-05-18 PROCEDURE — 78815 NM PET CT ROUTINE: ICD-10-PCS | Mod: 26,PI,, | Performed by: RADIOLOGY

## 2020-05-18 PROCEDURE — A9552 F18 FDG: HCPCS

## 2020-05-18 PROCEDURE — 78815 PET IMAGE W/CT SKULL-THIGH: CPT | Mod: 26,PI,, | Performed by: RADIOLOGY

## 2020-05-18 NOTE — TELEPHONE ENCOUNTER
----- Message from Gurpreet Estrada MD sent at 5/15/2020  4:57 PM CDT -----  Regarding: Reschedule  You can reschedule Ms. Raza for in person visit at 11:30 on Thursday with labs.  Edita Mcclain

## 2020-05-20 ENCOUNTER — RESEARCH ENCOUNTER (OUTPATIENT)
Dept: RESEARCH | Facility: HOSPITAL | Age: 70
End: 2020-05-20

## 2020-05-20 ENCOUNTER — TELEPHONE (OUTPATIENT)
Dept: HEMATOLOGY/ONCOLOGY | Facility: CLINIC | Age: 70
End: 2020-05-20

## 2020-05-20 NOTE — TELEPHONE ENCOUNTER
Attempted to call pt to confirm clinic visit with Dr. Estrada tomorrow at 1130am, no answer and could not leave voicemail, no voicemail box.

## 2020-05-21 ENCOUNTER — LAB VISIT (OUTPATIENT)
Dept: LAB | Facility: HOSPITAL | Age: 70
End: 2020-05-21
Attending: INTERNAL MEDICINE
Payer: COMMERCIAL

## 2020-05-21 ENCOUNTER — OFFICE VISIT (OUTPATIENT)
Dept: HEMATOLOGY/ONCOLOGY | Facility: CLINIC | Age: 70
End: 2020-05-21
Payer: COMMERCIAL

## 2020-05-21 VITALS
HEIGHT: 66 IN | BODY MASS INDEX: 24.11 KG/M2 | RESPIRATION RATE: 22 BRPM | OXYGEN SATURATION: 98 % | SYSTOLIC BLOOD PRESSURE: 118 MMHG | HEART RATE: 61 BPM | WEIGHT: 150 LBS | DIASTOLIC BLOOD PRESSURE: 57 MMHG

## 2020-05-21 DIAGNOSIS — C77.1 METASTASIS TO MEDIASTINAL LYMPH NODE: ICD-10-CM

## 2020-05-21 DIAGNOSIS — G89.3 CANCER RELATED PAIN: ICD-10-CM

## 2020-05-21 DIAGNOSIS — E03.9 ACQUIRED HYPOTHYROIDISM: ICD-10-CM

## 2020-05-21 DIAGNOSIS — C34.91 ADENOCARCINOMA OF RIGHT LUNG, STAGE 4: ICD-10-CM

## 2020-05-21 DIAGNOSIS — F41.9 ANXIETY: ICD-10-CM

## 2020-05-21 DIAGNOSIS — F32.A DEPRESSION, UNSPECIFIED DEPRESSION TYPE: ICD-10-CM

## 2020-05-21 DIAGNOSIS — C79.51 BONE METASTASIS: ICD-10-CM

## 2020-05-21 DIAGNOSIS — C34.91 ADENOCARCINOMA OF RIGHT LUNG, STAGE 4: Primary | ICD-10-CM

## 2020-05-21 LAB
ALBUMIN SERPL BCP-MCNC: 3.2 G/DL (ref 3.5–5.2)
ALP SERPL-CCNC: 63 U/L (ref 55–135)
ALT SERPL W/O P-5'-P-CCNC: 10 U/L (ref 10–44)
ANION GAP SERPL CALC-SCNC: 10 MMOL/L (ref 8–16)
AST SERPL-CCNC: 20 U/L (ref 10–40)
BASOPHILS # BLD AUTO: 0.04 K/UL (ref 0–0.2)
BASOPHILS NFR BLD: 0.9 % (ref 0–1.9)
BILIRUB SERPL-MCNC: 0.6 MG/DL (ref 0.1–1)
BUN SERPL-MCNC: 10 MG/DL (ref 8–23)
CALCIUM SERPL-MCNC: 9.1 MG/DL (ref 8.7–10.5)
CHLORIDE SERPL-SCNC: 104 MMOL/L (ref 95–110)
CO2 SERPL-SCNC: 27 MMOL/L (ref 23–29)
CREAT SERPL-MCNC: 1.8 MG/DL (ref 0.5–1.4)
DIFFERENTIAL METHOD: ABNORMAL
EOSINOPHIL # BLD AUTO: 0.2 K/UL (ref 0–0.5)
EOSINOPHIL NFR BLD: 4.1 % (ref 0–8)
ERYTHROCYTE [DISTWIDTH] IN BLOOD BY AUTOMATED COUNT: 13.9 % (ref 11.5–14.5)
EST. GFR  (AFRICAN AMERICAN): 32.6 ML/MIN/1.73 M^2
EST. GFR  (NON AFRICAN AMERICAN): 28.3 ML/MIN/1.73 M^2
GLUCOSE SERPL-MCNC: 88 MG/DL (ref 70–110)
HCT VFR BLD AUTO: 36.8 % (ref 37–48.5)
HGB BLD-MCNC: 11.6 G/DL (ref 12–16)
IMM GRANULOCYTES # BLD AUTO: 0.01 K/UL (ref 0–0.04)
IMM GRANULOCYTES NFR BLD AUTO: 0.2 % (ref 0–0.5)
LYMPHOCYTES # BLD AUTO: 1.5 K/UL (ref 1–4.8)
LYMPHOCYTES NFR BLD: 32.3 % (ref 18–48)
MCH RBC QN AUTO: 27.4 PG (ref 27–31)
MCHC RBC AUTO-ENTMCNC: 31.5 G/DL (ref 32–36)
MCV RBC AUTO: 87 FL (ref 82–98)
MONOCYTES # BLD AUTO: 0.6 K/UL (ref 0.3–1)
MONOCYTES NFR BLD: 13.3 % (ref 4–15)
NEUTROPHILS # BLD AUTO: 2.3 K/UL (ref 1.8–7.7)
NEUTROPHILS NFR BLD: 49.2 % (ref 38–73)
NRBC BLD-RTO: 0 /100 WBC
PLATELET # BLD AUTO: 235 K/UL (ref 150–350)
PMV BLD AUTO: 10.1 FL (ref 9.2–12.9)
POTASSIUM SERPL-SCNC: 3.1 MMOL/L (ref 3.5–5.1)
PROT SERPL-MCNC: 7.6 G/DL (ref 6–8.4)
RBC # BLD AUTO: 4.23 M/UL (ref 4–5.4)
SODIUM SERPL-SCNC: 141 MMOL/L (ref 136–145)
T4 FREE SERPL-MCNC: 0.84 NG/DL (ref 0.71–1.51)
TSH SERPL DL<=0.005 MIU/L-ACNC: 22.75 UIU/ML (ref 0.4–4)
WBC # BLD AUTO: 4.65 K/UL (ref 3.9–12.7)

## 2020-05-21 PROCEDURE — 99215 PR OFFICE/OUTPT VISIT, EST, LEVL V, 40-54 MIN: ICD-10-PCS | Mod: S$GLB,,, | Performed by: INTERNAL MEDICINE

## 2020-05-21 PROCEDURE — 84443 ASSAY THYROID STIM HORMONE: CPT

## 2020-05-21 PROCEDURE — 84439 ASSAY OF FREE THYROXINE: CPT

## 2020-05-21 PROCEDURE — 85025 COMPLETE CBC W/AUTO DIFF WBC: CPT

## 2020-05-21 PROCEDURE — 80053 COMPREHEN METABOLIC PANEL: CPT

## 2020-05-21 PROCEDURE — 99215 OFFICE O/P EST HI 40 MIN: CPT | Mod: S$GLB,,, | Performed by: INTERNAL MEDICINE

## 2020-05-21 PROCEDURE — 99999 PR PBB SHADOW E&M-EST. PATIENT-LVL III: CPT | Mod: PBBFAC,,, | Performed by: INTERNAL MEDICINE

## 2020-05-21 PROCEDURE — 99999 PR PBB SHADOW E&M-EST. PATIENT-LVL III: ICD-10-PCS | Mod: PBBFAC,,, | Performed by: INTERNAL MEDICINE

## 2020-05-21 PROCEDURE — 36415 COLL VENOUS BLD VENIPUNCTURE: CPT

## 2020-05-21 NOTE — Clinical Note
- follow up guardant results.  If no targetable mutations, will enter Keytruda treatment plan to start authorization.- RTC with labs (CBC, CMP, TSH, free T4) prior to C2.

## 2020-05-22 ENCOUNTER — TELEPHONE (OUTPATIENT)
Dept: HEMATOLOGY/ONCOLOGY | Facility: CLINIC | Age: 70
End: 2020-05-22

## 2020-05-22 NOTE — PROGRESS NOTES
ONCOLOGY FOLLOW UP VISIT.     Reason for visit: Treatment planning for stage IV lung adenocarcinoma    Best Contact Phone Number(s): [phone] (home)      Cancer/Stage/TNM:   Cancer Staging  No matching staging information was found for the patient.        Adenocarcinoma of right lung, stage 4    4/27/2020 Initial Diagnosis     Treated in late 2019 for pneumonia.  Patient presented with constant chest pain on the right side and back.  Loss of appetite and weight loss.  She was found to have a R lung mass, mediastinal LAD, and R pleural effusion.  4/27 had EBUS and was found to have adenocarcinoma from 4L, station 7, 4R and 11R FNAs.          Interval History: Patient did not have enough tissue for molecular testing.  Her pain is not controlled with the low dose morphine and percocet.  She also has lower extremity weakness that is affecting her performance status.  She is able to walk, but weakness has progressed.  ECOG PS 2.    ROS:  Review of Systems   Constitutional: Positive for activity change, fatigue and unexpected weight change. Negative for chills and fever.   HENT: Negative for mouth sores, nosebleeds and sore throat.    Respiratory: Negative for cough, shortness of breath and wheezing.    Cardiovascular: Positive for chest pain. Negative for palpitations and leg swelling.   Gastrointestinal: Negative for abdominal distention, abdominal pain and blood in stool.   Endocrine: Negative for cold intolerance and heat intolerance.   Genitourinary: Negative for dysuria, flank pain and frequency.   Musculoskeletal: Negative for arthralgias, joint swelling and myalgias.   Skin: Negative for color change, rash and wound.   Neurological: Positive for weakness. Negative for dizziness, light-headedness and headaches.   Hematological: Negative for adenopathy. Does not bruise/bleed easily.      A complete 12-point review of systems was reviewed and is negative except as mentioned above.     Past Medical History:   Past  "Medical History:   Diagnosis Date    Depression     Hypercholesteremia     Pneumonia     Thyroid disease         Allergies:   Review of patient's allergies indicates:   Allergen Reactions    Codeine Nausea And Vomiting        Medications:   Current Outpatient Medications   Medication Sig Dispense Refill    aspirin (ECOTRIN) 81 MG EC tablet Take 81 mg by mouth once daily.      clonazePAM (KLONOPIN) 1 MG tablet Take 1 tablet (1 mg total) by mouth 2 (two) times daily. 60 tablet 0    divalproex (DEPAKOTE) 250 MG EC tablet Take 250 mg by mouth 3 (three) times daily.      levocetirizine (XYZAL) 5 MG tablet Take 5 mg by mouth every evening.      levothyroxine (SYNTHROID) 100 MCG tablet Take 100 mcg by mouth once daily.      multivitamin capsule Take 1 capsule by mouth once daily.      pravastatin (PRAVACHOL) 40 MG tablet Take 1 tablet (40 mg total) by mouth once daily. 90 tablet 3    sodium,potassium,mag sulfates (SUPREP BOWEL PREP KIT) 17.5-3.13-1.6 gram SolR Take 1 each by mouth as directed. 1 Bottle 0    vitamin D (VITAMIN D3) 1000 units Tab Take 1 tablet (1,000 Units total) by mouth once daily. 90 tablet 11     No current facility-administered medications for this visit.         Physical Exam:   BP (!) 118/57   Pulse 61   Resp (!) 22   Ht 5' 6" (1.676 m)   Wt 68 kg (150 lb)   SpO2 98%   BMI 24.21 kg/m²      ECOG Performance Status: (foot note - ECOG PS provided by Eastern Cooperative Oncology Group) 2 - Symptomatic, <50% confined to bed    Physical Exam   Constitutional: She is oriented to person, place, and time. She appears well-developed and well-nourished.   HENT:   Head: Normocephalic and atraumatic.   Eyes: Pupils are equal, round, and reactive to light. Conjunctivae and EOM are normal.   Neck: Normal range of motion. Neck supple.   Cardiovascular: Regular rhythm and intact distal pulses.   Pulmonary/Chest: Effort normal. No respiratory distress.   Abdominal: Soft. There is no tenderness. "   Musculoskeletal: Normal range of motion. She exhibits no edema.   Lymphadenopathy:     She has no cervical adenopathy.     She has no axillary adenopathy.   Neurological: She is alert and oriented to person, place, and time.   Skin: Skin is warm and dry.   Psychiatric: She has a normal mood and affect. Her behavior is normal.         Labs:   Recent Results (from the past 48 hour(s))   CBC auto differential    Collection Time: 05/21/20 11:39 AM   Result Value Ref Range    WBC 4.65 3.90 - 12.70 K/uL    RBC 4.23 4.00 - 5.40 M/uL    Hemoglobin 11.6 (L) 12.0 - 16.0 g/dL    Hematocrit 36.8 (L) 37.0 - 48.5 %    Mean Corpuscular Volume 87 82 - 98 fL    Mean Corpuscular Hemoglobin 27.4 27.0 - 31.0 pg    Mean Corpuscular Hemoglobin Conc 31.5 (L) 32.0 - 36.0 g/dL    RDW 13.9 11.5 - 14.5 %    Platelets 235 150 - 350 K/uL    MPV 10.1 9.2 - 12.9 fL    Immature Granulocytes 0.2 0.0 - 0.5 %    Gran # (ANC) 2.3 1.8 - 7.7 K/uL    Immature Grans (Abs) 0.01 0.00 - 0.04 K/uL    Lymph # 1.5 1.0 - 4.8 K/uL    Mono # 0.6 0.3 - 1.0 K/uL    Eos # 0.2 0.0 - 0.5 K/uL    Baso # 0.04 0.00 - 0.20 K/uL    nRBC 0 0 /100 WBC    Gran% 49.2 38.0 - 73.0 %    Lymph% 32.3 18.0 - 48.0 %    Mono% 13.3 4.0 - 15.0 %    Eosinophil% 4.1 0.0 - 8.0 %    Basophil% 0.9 0.0 - 1.9 %    Differential Method Automated    Comprehensive metabolic panel    Collection Time: 05/21/20 11:39 AM   Result Value Ref Range    Sodium 141 136 - 145 mmol/L    Potassium 3.1 (L) 3.5 - 5.1 mmol/L    Chloride 104 95 - 110 mmol/L    CO2 27 23 - 29 mmol/L    Glucose 88 70 - 110 mg/dL    BUN, Bld 10 8 - 23 mg/dL    Creatinine 1.8 (H) 0.5 - 1.4 mg/dL    Calcium 9.1 8.7 - 10.5 mg/dL    Total Protein 7.6 6.0 - 8.4 g/dL    Albumin 3.2 (L) 3.5 - 5.2 g/dL    Total Bilirubin 0.6 0.1 - 1.0 mg/dL    Alkaline Phosphatase 63 55 - 135 U/L    AST 20 10 - 40 U/L    ALT 10 10 - 44 U/L    Anion Gap 10 8 - 16 mmol/L    eGFR if African American 32.6 (A) >60 mL/min/1.73 m^2    eGFR if non African American  28.3 (A) >60 mL/min/1.73 m^2   T4, free    Collection Time: 05/21/20 11:39 AM   Result Value Ref Range    Free T4 0.84 0.71 - 1.51 ng/dL   TSH    Collection Time: 05/21/20 11:39 AM   Result Value Ref Range    TSH 22.749 (H) 0.400 - 4.000 uIU/mL   Genetic Misc Sendout Test, Blood    Collection Time: 05/21/20 12:28 PM   Result Value Ref Range    Miscellaneous Genetic Test Name Guardant 360       Imaging: I have personally reviewed patient's PETCT imaging showing lung primary with sternal, rib, and mediastinal lymph node metastases.  NM PET CT Routine FDG  Narrative: EXAMINATION:  NM PET CT ROUTINE    CLINICAL HISTORY:  Lung Cancer; Malignant neoplasm of unspecified part of unspecified bronchus or lung    TECHNIQUE:  13.0 mCi of F18-FDG was administered intravenously in the right forearm.  After an approximately 60 min distribution time, PET/CT images were acquired from the skull base to mid thigh.  Transmission images were acquired to correct for attenuation using a whole body low-dose CT scan without contrast with the arms positioned above the head. Glycemia at the time of injection was 89 mg/dL.    COMPARISON:  MRI brain 05/14/2020    Chest radiograph 03/03/2020, 02/17/2014    CT chest 03/09/2020    CT abdomen pelvis 12/05/2019, 06/09/2016    FINDINGS:  Quality of the study: Adequate.    In the head and neck, there are no hypermetabolic lesions worrisome for malignancy. There are no hypermetabolic mucosal lesions, and there are no pathologically enlarged or hypermetabolic lymph nodes.    In the chest, there is a spiculated mass surrounding the right main pulmonary artery and right main bronchus extending superiorly toward the apex as well as medially toward the right hilar structures and into the mediastinum measuring approximately 5.0 x 4.3 cm (image 67) demonstrating SUV of 10.  This mass extends to the visceral pleura posterolaterally wherein there is a 2nd large soft tissue density mass measuring 3.8 x 4.1 cm in  demonstrating SUV of 13.  There are at least 4 paratracheal hypermetabolic lymph nodes, index lesions as below.  Hypermetabolic lymph nodes involving level 7, ipsilateral level 10, contralateral level 1.  There are 2 additional hypermetabolic soft tissue density masses in the retrosternal space with the more inferior lesion involving the/eroding into the sternum and involving the right 4th and 5th costosternal junction.    Index lesions:    1. Spiculated mass surrounding right main pulmonary artery: 5.4 x 4.3 cm (image 67), SUV 10.  2. Lymph node immediately inferior to candy measuring 3.2 x 1.9 cm, SUV 7.6.  3. Sternal mass measuring 3.7 x 3.9 cm, SUV 6.  In the abdomen and pelvis, there is physiologic tracer distribution within the abdominal organs and excretion into the genitourinary system.    In the bones, presternal soft tissue density hypermetabolic structure eroding into the sternum in 4th and 5th costosternal junctions as detailed above.  Left lateral 6th rib hypermetabolic focus.  Degenerative uptake at the right 5th costo vertebral junction and spinous process and right lamina of L4 in between the spinous processes of L4 and L5.    CT findings: Right-sided pleural effusion, stable grade 1 anterolisthesis of L5 on S1.  Impression: 1. Right hemithorax hypermetabolic spiculated mass concerning for primary pulmonary malignancy with metastatic spread to numerous mediastinal lymph nodes including level 7, level 10 (ipsilateral) and the level 1 (contralateral) as well as osseous structures involving the sternum, left lateral 6th rib.  This report was flagged in Epic as abnormal.    I, Gurpreet Davenport MD, attest that I reviewed and interpreted the images.    Electronically signed by resident: Narciso Farah  Date:    05/18/2020  Time:    15:43    Electronically signed by: Gurpreet Davenport  Date:    05/19/2020  Time:    10:06            Diagnoses:       1. Adenocarcinoma of right lung, stage 4    2. Bone metastasis    3.  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 Cancer related pain    5. Anxiety    6. Depression, unspecified depression type          Assessment and Plan:         1,2,3. Stage IV PD-L1 60% Adenocarcinoma of lung:   -Not enough tissue for molecular testing, will send for Guardant 360.  Staging has been completed.  Discussed with patient and son in detail diagnosis, prognosis, and treatment options.  Discussed that if patient did not have targetable mutations on liquid biopsy, best treatment option with her performance status would be immunotherapy alone with Keytruda.  -Patient consented for immunotherapy 05/21/2020 . An extensive discussion was had which included a thorough discussion of the risk and benefits of treatment and alternatives.  Risks, including but not limited to, fatigue, diarrhea, nausea/vomiting, loss of appetite, skin reactions and rashes, flu-like symptoms, fever, infusion-related reactions including allergic reactions, inflammation of stomach or intestines, inflammation of liver, inflammation of brain or nervous system, inflammation of lungs, inflammation of pancreas, inflammation of kidneys, inflammation of the eyes, inflammation of hormone producing glands, inflammation of the joints including activation of arthritis, impaired kidney function, impaired liver function, problems with sleep, unstable blood sugars, blood pressure changes, infertility, bone marrow damage (anemia, thrombocytopenia, immune suppression, neutropenia), sterility, local damage at possible injection sites, and rarely death were all discussed.  The patient agrees with the plan, and all questions have been answered to their satisfaction.  Consent was signed the patient, provider, and a third party witness.      2 Cancer Related Pain  Patient was referred to palliative care and is followed by Dr. Kearney.  All prescriptions need to be prescribed by patient's [facility] physician.  Currently on MS Contin 15 mg bid, pain is not controlled  per patient. Also receiving oxycodone prn.      3,4 Anxiety  All prescriptions through [facility]          45 minutes were spent face to face with the patient and her family to discuss the disease, natural history, treatment options and survival statistics. Greater than 50% of this time involved counseling or coordination of care. I have provided the patient with an opportunity to ask questions and have all questions answered to his satisfaction.     she will return to clinic prior to C2, but knows to call in the interim if symptoms change or should a problem arise.    Gurpreet Estrada MD  Medical Oncology [facility]

## 2020-05-22 NOTE — TELEPHONE ENCOUNTER
----- Message from Gurpreet Estrada MD sent at 5/21/2020  9:24 PM CDT -----  - follow up guardant results.  If no targetable mutations, will enter Keytruda treatment plan to start authorization.  - RTC with labs (CBC, CMP, TSH, free T4) prior to C2.

## 2020-05-25 ENCOUNTER — TELEPHONE (OUTPATIENT)
Dept: HEMATOLOGY/ONCOLOGY | Facility: CLINIC | Age: 70
End: 2020-05-25

## 2020-05-25 NOTE — TELEPHONE ENCOUNTER
----- Message from Grecia Norton sent at 5/25/2020  3:26 PM CDT -----  Contact: PT  PT called in requesting to speak with nurse  Reason: regarding medication advice - lost pills, possibly refill needed      Callback: [phone]

## 2020-05-25 NOTE — TELEPHONE ENCOUNTER
----- Message from Linda Browne sent at 5/25/2020 11:31 AM CDT -----  Contact: [phone]  Pt called in hospitals would like to speak with Dr. Estrada's nurse. Pt did not want to tell me what the call was regarding.

## 2020-05-27 ENCOUNTER — TELEPHONE (OUTPATIENT)
Dept: HEMATOLOGY/ONCOLOGY | Facility: CLINIC | Age: 70
End: 2020-05-27

## 2020-05-27 LAB
GENETIC COUNSELING?: NO
GENSO SPECIMEN TYPE: NORMAL
MISCELLANEOUS GENETIC TEST NAME: NORMAL
PARTENTAL OR SIBLING TESTING?: NO
REFERENCE LAB: NORMAL
TEST RESULT: NORMAL

## 2020-05-27 NOTE — TELEPHONE ENCOUNTER
----- Message from Mikhail Reynolds sent at 5/27/2020  4:38 PM CDT -----  Contact: Rica lance/MICHELA  Per Rica lance/MICHELA        PCP:Butch Montejo DO is requesting a call back at [phone] to discuss continued care for the above patient

## 2020-05-27 NOTE — TELEPHONE ENCOUNTER
"----- Message from Sienna Cortes sent at 5/27/2020  3:50 PM CDT -----  Patient Assist    Name of caller:  Micheline   Provider name:   Contact Preference:  [phone]  Is this regarding current patient or new patient?: current   What is the nature of the call?    - pt only received some of the medications that she needs for pain.   Please call on the listed phone number. Its for her Son,   Additional Notes:   "Thank you for all that you do for our patients'"     "

## 2020-05-27 NOTE — PLAN OF CARE
START ON PATHWAY REGIMEN - Non-Small Cell Lung    [id number]        Pembrolizumab (Keytruda)           Additional Orders: Severe immune-mediated reactions can occur. See   prescribing information for more details and required immediate management with   steroids. Monitor thyroid, renal, liver function tests, glucose, and sodium at   baseline and before each dose of pembrolizumab. Ref: Keytruda (pembrolizumab)   prescribing information, 2016.    **Always confirm dose/schedule in your pharmacy ordering system**    Patient Characteristics:  Stage IV Metastatic, Nonsquamous, Initial Chemotherapy/Immunotherapy, PS = 0, 1,   ALK Rearrangement Negative and EGFR Mutation Negative/Non-Sensitizing, PD-L1   Expression Positive  ? 50% (TPS) and Immunotherapy Candidate  AJCC T Category: T3  Current Disease Status: Distant Metastases  AJCC N Category: N2  AJCC M Category: M1c  AJCC 8 Stage Grouping: IVB  Histology: Nonsquamous Cell  ROS1 Rearrangement Status: Negative  T790M Mutation Status: Not Applicable - EGFR Mutation Negative/Unknown  Other Mutations/Biomarkers: No Other Actionable Mutations  NTRK Gene Fusion Status: Negative  PD-L1 Expression Status: PD-L1 Positive ? 50% (TPS)  Chemotherapy/Immunotherapy LOT: Initial Chemotherapy/Immunotherapy  Molecular Targeted Therapy: Not Appropriate  ALK Rearrangement Status: Negative  EGFR Mutation Status: Negative/Wild Type  BRAF V600E Mutation Status: Negative  ECOG Performance Status: 1  Immunotherapy Candidate Status: Candidate for Immunotherapy  Intent of Therapy:  Non-Curative / Palliative Intent, Discussed with Patient

## 2020-05-27 NOTE — TELEPHONE ENCOUNTER
Spoke with patient. She is calling to state she lost her oxycodone prescription---she doesn't know where it is at. Nurse explained after speaking with dr nelson, her MICHELA md is the MD who prescribes for her. She agrees this is correct. She will contact her MICHELA matthews--and will ask him to contact dr nelson if necessary.  She thanked nurse.

## 2020-05-28 ENCOUNTER — TELEPHONE (OUTPATIENT)
Dept: HEMATOLOGY/ONCOLOGY | Facility: CLINIC | Age: 70
End: 2020-05-28

## 2020-05-28 NOTE — NURSING
Barrera ABERNATHY ([phone]) spoke with Rica chamberlain's .  Informed that chemo has been authorized and attempts have been made by  and staff to schedule pt for chemotherapy treatment.  Informed by  that pt's  and PCP have attempted to reach pt to get a commitment on recommendations for cancer care.  They have been unable to reach pt.  Message left with son who is waiting for pt to return.

## 2020-05-29 ENCOUNTER — TELEPHONE (OUTPATIENT)
Dept: HEMATOLOGY/ONCOLOGY | Facility: CLINIC | Age: 70
End: 2020-05-29

## 2020-05-29 NOTE — TELEPHONE ENCOUNTER
----- Message from Cindy Garcia sent at 5/29/2020 11:42 AM CDT -----  Pt is requesting a call back from the office    Reason Unknown     Please call and advise          Thank you

## 2020-06-01 ENCOUNTER — TELEPHONE (OUTPATIENT)
Dept: HEMATOLOGY/ONCOLOGY | Facility: CLINIC | Age: 70
End: 2020-06-01

## 2020-06-01 ENCOUNTER — DOCUMENTATION ONLY (OUTPATIENT)
Dept: PALLIATIVE MEDICINE | Facility: CLINIC | Age: 70
End: 2020-06-01

## 2020-06-01 DIAGNOSIS — C34.91 ADENOCARCINOMA OF RIGHT LUNG, STAGE 4: Primary | ICD-10-CM

## 2020-06-01 NOTE — TELEPHONE ENCOUNTER
----- Message from Beltran Ace RN sent at 6/1/2020 10:15 AM CDT -----  Contact: nurse      ----- Message -----  From: Meron Da Silva  Sent: 6/1/2020   9:59 AM CDT  To: Nahun FINNEGAN Staff    Reason: Calling to Reschedule Covid-19 test to tomorrow pt is at a appt and will not make it today    Communication: [phone]

## 2020-06-01 NOTE — PROGRESS NOTES
Received a call from Dr. Palmer, medical director for [facility].  She is aware that this patient continues to call for refill of her medications.  Dr. Palmer is working on a pain contract for her and will be responsible for writing her pain and anxiety medications.  She will continue to work with Dr. Estrada and Dr. Kearney to provide the best care possible for this patient who has a history substance abuse.

## 2020-06-01 NOTE — TELEPHONE ENCOUNTER
Received a call from Dr. Palmer, medical director for [facility].  She is aware that this patient continues to call for refill of her medications.  Dr. Palmer is working on a pain contract for her and will be responsible for writing her pain and anxiety medications.  She will continue to work with Dr. Estrada and Dr. Kearney to provide the best care possible for this patient who has a history substance abuse.

## 2020-06-01 NOTE — PROGRESS NOTES
Protocol: A , OPEN-LABEL, MULTI-CENTER, MULTI-DOSE STUDY OF GRN-1201 ADDED TO PEMBROLIZUMAB IN SUBJECTS WITH NON-SMALL CELL LUNG CANCER WITH HIGH PD-L1 EXPRESSION  Protocol#: [id number]  IRB#: 2019.068  PI: Dr. Gurpreet Estrada  Screening #:         Screen Fail 5/20/2020  Pt deemed a screen fail for the above mentioned protocol due to non-compliance with appt times. This was discussed with Dr. Estrada who is in agreement with this.

## 2020-06-03 ENCOUNTER — TELEPHONE (OUTPATIENT)
Dept: HEMATOLOGY/ONCOLOGY | Facility: CLINIC | Age: 70
End: 2020-06-03

## 2020-06-04 ENCOUNTER — OFFICE VISIT (OUTPATIENT)
Dept: HEMATOLOGY/ONCOLOGY | Facility: CLINIC | Age: 70
End: 2020-06-04
Payer: COMMERCIAL

## 2020-06-04 ENCOUNTER — DOCUMENTATION ONLY (OUTPATIENT)
Dept: HEMATOLOGY/ONCOLOGY | Facility: CLINIC | Age: 70
End: 2020-06-04

## 2020-06-04 ENCOUNTER — INFUSION (OUTPATIENT)
Dept: INFUSION THERAPY | Facility: HOSPITAL | Age: 70
End: 2020-06-04
Attending: NURSE PRACTITIONER
Payer: COMMERCIAL

## 2020-06-04 ENCOUNTER — TELEPHONE (OUTPATIENT)
Dept: HEMATOLOGY/ONCOLOGY | Facility: CLINIC | Age: 70
End: 2020-06-04

## 2020-06-04 ENCOUNTER — CLINICAL SUPPORT (OUTPATIENT)
Dept: HEMATOLOGY/ONCOLOGY | Facility: CLINIC | Age: 70
End: 2020-06-04
Payer: COMMERCIAL

## 2020-06-04 VITALS
RESPIRATION RATE: 18 BRPM | BODY MASS INDEX: 24.21 KG/M2 | DIASTOLIC BLOOD PRESSURE: 92 MMHG | HEIGHT: 66 IN | HEART RATE: 68 BPM | SYSTOLIC BLOOD PRESSURE: 137 MMHG | OXYGEN SATURATION: 96 %

## 2020-06-04 VITALS
HEART RATE: 56 BPM | WEIGHT: 151 LBS | SYSTOLIC BLOOD PRESSURE: 169 MMHG | RESPIRATION RATE: 20 BRPM | TEMPERATURE: 98 F | HEIGHT: 66 IN | BODY MASS INDEX: 24.27 KG/M2 | DIASTOLIC BLOOD PRESSURE: 75 MMHG

## 2020-06-04 DIAGNOSIS — C79.51 BONE METASTASIS: ICD-10-CM

## 2020-06-04 DIAGNOSIS — C77.1 METASTASIS TO MEDIASTINAL LYMPH NODE: ICD-10-CM

## 2020-06-04 DIAGNOSIS — C34.91 ADENOCARCINOMA OF RIGHT LUNG, STAGE 4: Primary | ICD-10-CM

## 2020-06-04 DIAGNOSIS — F41.9 ANXIETY: ICD-10-CM

## 2020-06-04 DIAGNOSIS — N28.9 RENAL INSUFFICIENCY: ICD-10-CM

## 2020-06-04 DIAGNOSIS — E03.9 ACQUIRED HYPOTHYROIDISM: ICD-10-CM

## 2020-06-04 DIAGNOSIS — E87.6 HYPOKALEMIA: ICD-10-CM

## 2020-06-04 DIAGNOSIS — F32.A DEPRESSION, UNSPECIFIED DEPRESSION TYPE: ICD-10-CM

## 2020-06-04 DIAGNOSIS — Z13.9 ENCOUNTER FOR SCREENING: Primary | ICD-10-CM

## 2020-06-04 DIAGNOSIS — Z51.11 ENCOUNTER FOR CHEMOTHERAPY MANAGEMENT: ICD-10-CM

## 2020-06-04 DIAGNOSIS — G89.3 CANCER RELATED PAIN: ICD-10-CM

## 2020-06-04 LAB — SARS-COV-2 RDRP RESP QL NAA+PROBE: NEGATIVE

## 2020-06-04 PROCEDURE — 99214 OFFICE O/P EST MOD 30 MIN: CPT | Mod: S$GLB,,, | Performed by: NURSE PRACTITIONER

## 2020-06-04 PROCEDURE — 63600175 PHARM REV CODE 636 W HCPCS: Mod: JG | Performed by: INTERNAL MEDICINE

## 2020-06-04 PROCEDURE — U0002 COVID-19 LAB TEST NON-CDC: HCPCS

## 2020-06-04 PROCEDURE — 99999 PR PBB SHADOW E&M-EST. PATIENT-LVL III: CPT | Mod: PBBFAC,,, | Performed by: NURSE PRACTITIONER

## 2020-06-04 PROCEDURE — 99999 PR PBB SHADOW E&M-EST. PATIENT-LVL III: ICD-10-PCS | Mod: PBBFAC,,, | Performed by: NURSE PRACTITIONER

## 2020-06-04 PROCEDURE — 25000003 PHARM REV CODE 250: Performed by: INTERNAL MEDICINE

## 2020-06-04 PROCEDURE — 99214 PR OFFICE/OUTPT VISIT, EST, LEVL IV, 30-39 MIN: ICD-10-PCS | Mod: S$GLB,,, | Performed by: NURSE PRACTITIONER

## 2020-06-04 PROCEDURE — 96413 CHEMO IV INFUSION 1 HR: CPT

## 2020-06-04 RX ORDER — OXYCODONE AND ACETAMINOPHEN 5; 325 MG/1; MG/1
1 TABLET ORAL ONCE
Status: COMPLETED | OUTPATIENT
Start: 2020-06-04 | End: 2020-06-04

## 2020-06-04 RX ORDER — POTASSIUM CHLORIDE 20 MEQ/1
20 TABLET, EXTENDED RELEASE ORAL DAILY
Qty: 30 TABLET | Refills: 1 | Status: SHIPPED | OUTPATIENT
Start: 2020-06-04 | End: 2020-06-04

## 2020-06-04 RX ORDER — DULOXETIN HYDROCHLORIDE 60 MG/1
60 CAPSULE, DELAYED RELEASE ORAL DAILY
COMMUNITY

## 2020-06-04 RX ORDER — HEPARIN 100 UNIT/ML
500 SYRINGE INTRAVENOUS
Status: CANCELLED | OUTPATIENT
Start: 2020-06-04

## 2020-06-04 RX ORDER — SODIUM CHLORIDE 0.9 % (FLUSH) 0.9 %
10 SYRINGE (ML) INJECTION
Status: CANCELLED | OUTPATIENT
Start: 2020-06-04

## 2020-06-04 RX ORDER — OXYCODONE AND ACETAMINOPHEN 5; 325 MG/1; MG/1
1 TABLET ORAL ONCE
Status: CANCELLED
Start: 2020-06-04

## 2020-06-04 RX ORDER — VENLAFAXINE HYDROCHLORIDE 150 MG/1
150 CAPSULE, EXTENDED RELEASE ORAL DAILY
COMMUNITY

## 2020-06-04 RX ORDER — MORPHINE SULFATE 15 MG/1
15 TABLET, FILM COATED, EXTENDED RELEASE ORAL 2 TIMES DAILY
Status: ON HOLD | COMMUNITY
End: 2021-11-06

## 2020-06-04 RX ORDER — HEPARIN 100 UNIT/ML
500 SYRINGE INTRAVENOUS
Status: DISCONTINUED | OUTPATIENT
Start: 2020-06-04 | End: 2020-06-04 | Stop reason: HOSPADM

## 2020-06-04 RX ORDER — LEVOTHYROXINE SODIUM 112 UG/1
112 TABLET ORAL DAILY
Qty: 30 TABLET | Refills: 1 | Status: ON HOLD | OUTPATIENT
Start: 2020-06-04 | End: 2022-02-09

## 2020-06-04 RX ORDER — SODIUM CHLORIDE 0.9 % (FLUSH) 0.9 %
10 SYRINGE (ML) INJECTION
Status: DISCONTINUED | OUTPATIENT
Start: 2020-06-04 | End: 2020-06-04 | Stop reason: HOSPADM

## 2020-06-04 RX ORDER — LEVOTHYROXINE SODIUM 112 UG/1
112 TABLET ORAL DAILY
Qty: 30 TABLET | Refills: 1 | Status: SHIPPED | OUTPATIENT
Start: 2020-06-04 | End: 2020-06-04

## 2020-06-04 RX ORDER — POTASSIUM CHLORIDE 20 MEQ/1
20 TABLET, EXTENDED RELEASE ORAL DAILY
Qty: 30 TABLET | Refills: 1 | Status: SHIPPED | OUTPATIENT
Start: 2020-06-04 | End: 2021-06-04

## 2020-06-04 RX ORDER — RISPERIDONE 0.5 MG/1
TABLET ORAL
Status: ON HOLD | COMMUNITY
End: 2022-02-15 | Stop reason: HOSPADM

## 2020-06-04 RX ADMIN — OXYCODONE HYDROCHLORIDE AND ACETAMINOPHEN 1 TABLET: 5; 325 TABLET ORAL at 11:06

## 2020-06-04 RX ADMIN — SODIUM CHLORIDE 200 MG: 9 INJECTION, SOLUTION INTRAVENOUS at 11:06

## 2020-06-04 RX ADMIN — SODIUM CHLORIDE: 0.9 INJECTION, SOLUTION INTRAVENOUS at 11:06

## 2020-06-04 NOTE — PROGRESS NOTES
Subjective:       Patient ID: Micheline Raza    Chief Complaint: Adenocarcinoma of right lung, stage 4    HPI  Micheline Raza is a 69 y.o. female, patient of Dr. Estrada, to clinic for follow up and to begin cycle #1 of Keytruda for stage IV lung cancer.  Patient to clinic today with multiple complaints with her main complaint back and chest pain 10/10. Patient reports her doctor has not prescribed her short acting pain medication and she does not know why. She is taking MS Contin 15mg BID but states this does not help.     Presents to clinic alone and in a wheelchair.    Oncologic History:  Adenocarcinoma of right lung, stage 4     4/27/2020 Initial Diagnosis       Treated in late 2019 for pneumonia.  Patient presented with constant chest pain on the right side and back.  Loss of appetite and weight loss.  She was found to have a R lung mass, mediastinal LAD, and R pleural effusion.  4/27 had EBUS and was found to have adenocarcinoma from 4L, station 7, 4R and 11R FNAs.           Review of Systems   Constitutional: Positive for activity change, appetite change and fatigue. Negative for diaphoresis and fever.   HENT: Negative for mouth sores, sore throat and trouble swallowing.    Eyes: Negative for photophobia and visual disturbance.   Respiratory: Negative for cough, chest tightness and shortness of breath.    Cardiovascular: Positive for chest pain. Negative for leg swelling.   Gastrointestinal: Positive for nausea. Negative for abdominal distention, abdominal pain, constipation, diarrhea and vomiting.   Genitourinary: Negative for dysuria, frequency and hematuria.   Musculoskeletal: Positive for back pain.   Skin: Negative for rash.   Neurological: Positive for weakness. Negative for dizziness and headaches.   Hematological: Negative for adenopathy. Does not bruise/bleed easily.   Psychiatric/Behavioral: Negative for sleep disturbance. The patient is not nervous/anxious.    All other systems reviewed and  are negative.      Allergies:  Review of patient's allergies indicates:   Allergen Reactions    Codeine Nausea And Vomiting       Medications:  Current Outpatient Medications   Medication Sig Dispense Refill    aspirin (ECOTRIN) 81 MG EC tablet Take 81 mg by mouth once daily.      clonazePAM (KLONOPIN) 1 MG tablet Take 1 tablet (1 mg total) by mouth 2 (two) times daily. 60 tablet 0    divalproex (DEPAKOTE) 250 MG EC tablet Take 250 mg by mouth 3 (three) times daily.      DULoxetine (CYMBALTA) 20 MG capsule Take 20 mg by mouth once daily.      levocetirizine (XYZAL) 5 MG tablet Take 5 mg by mouth every evening.      levothyroxine (SYNTHROID) 100 MCG tablet Take 100 mcg by mouth once daily.      morphine (MS CONTIN) 15 MG 12 hr tablet Take 15 mg by mouth 2 (two) times daily.      multivitamin capsule Take 1 capsule by mouth once daily.      risperiDONE (RISPERDAL) 0.5 MG Tab Take by mouth.      venlafaxine (EFFEXOR-XR) 150 MG Cp24 Take 150 mg by mouth once daily.      vitamin D (VITAMIN D3) 1000 units Tab Take 1 tablet (1,000 Units total) by mouth once daily. 90 tablet 11    pravastatin (PRAVACHOL) 40 MG tablet Take 1 tablet (40 mg total) by mouth once daily. 90 tablet 3    sodium,potassium,mag sulfates (SUPREP BOWEL PREP KIT) 17.5-3.13-1.6 gram SolR Take 1 each by mouth as directed. (Patient not taking: Reported on 2020) 1 Bottle 0     No current facility-administered medications for this visit.        PMH:  Past Medical History:   Diagnosis Date    Depression     Hypercholesteremia     Pneumonia     Thyroid disease        PSH:  Past Surgical History:   Procedure Laterality Date     SECTION      ENDOBRONCHIAL ULTRASOUND N/A 2020    Procedure: ENDOBRONCHIAL ULTRASOUND (EBUS);  Surgeon: Karen Mills MD;  Location: [facility] OR [address] [place] [address];  Service: Endoscopy;  Laterality: N/A;       FamHx:  Family History   Problem Relation Age of Onset    Heart attack Mother     Lung cancer Mother      Liver cancer Father     Miscarriages / Stillbirths Neg Hx     Diabetes Neg Hx        SocHx:  Social History     Socioeconomic History    Marital status: Single     Spouse name: Not on file    Number of children: Not on file    Years of education: Not on file    Highest education level: Not on file   Occupational History    Not on file   Social Needs    Financial resource strain: Not on file    Food insecurity:     Worry: Not on file     Inability: Not on file    Transportation needs:     Medical: Not on file     Non-medical: Not on file   Tobacco Use    Smoking status: Current Every Day Smoker     Packs/day: 2.00     Years: 40.00     Pack years: 80.00     Types: Cigarettes    Smokeless tobacco: Never Used   Substance and Sexual Activity    Alcohol use: Yes     Comment: social    Drug use: No    Sexual activity: Not Currently     Partners: Male     Birth control/protection: None   Lifestyle    Physical activity:     Days per week: Not on file     Minutes per session: Not on file    Stress: Not on file   Relationships    Social connections:     Talks on phone: Not on file     Gets together: Not on file     Attends [employer] service: Not on file     Active member of club or organization: Not on file     Attends meetings of clubs or organizations: Not on file     Relationship status: Not on file   Other Topics Concern    Not on file   Social History Narrative    Lives with son. Retired. No longer drives.        Distress Score             Practical Problems Physical Problems                                                   Family Problems                                         Emotional Problems                                                         Spiritual/Religions Concerns               Other Problems                Objective:       Vitals:    06/04/20 0917   BP: (!) 137/92   Pulse: 68   Resp: 18       Physical Exam   Constitutional: She is oriented to person, place, and time. She appears  well-developed and well-nourished.   Cardiovascular: Normal rate, regular rhythm and normal heart sounds.   Musculoskeletal:   In wheelchair   Neurological: She is alert and oriented to person, place, and time.   Skin: Skin is warm and dry.   Nursing note and vitals reviewed.        LABS:  WBC   Date Value Ref Range Status   05/21/2020 4.65 3.90 - 12.70 K/uL Final     Hemoglobin   Date Value Ref Range Status   05/21/2020 11.6 (L) 12.0 - 16.0 g/dL Final     Hematocrit   Date Value Ref Range Status   05/21/2020 36.8 (L) 37.0 - 48.5 % Final     Platelets   Date Value Ref Range Status   05/21/2020 235 150 - 350 K/uL Final       Chemistry        Component Value Date/Time     05/21/2020 1139    K 3.1 (L) 05/21/2020 1139     05/21/2020 1139    CO2 27 05/21/2020 1139    BUN 10 05/21/2020 1139    CREATININE 1.8 (H) 05/21/2020 1139    GLU 88 05/21/2020 1139        Component Value Date/Time    CALCIUM 9.1 05/21/2020 1139    ALKPHOS 63 05/21/2020 1139    AST 20 05/21/2020 1139    ALT 10 05/21/2020 1139    BILITOT 0.6 05/21/2020 1139    ESTGFRAFRICA 32.6 (A) 05/21/2020 1139    EGFRNONAA 28.3 (A) 05/21/2020 1139          Assessment:       1. Adenocarcinoma of right lung, stage 4    2. 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 Acquired hypothyroidism    5. Cancer related pain    6. Hypokalemia    7. Anxiety    8. Depression, unspecified depression type    9. Renal insufficiency        Plan:       1,2,3. Stage IV PD-L1 60% Adenocarcinoma of lung:   -Not enough tissue for molecular testing, will send for Guardant 360.  Staging has been completed.  Discussed with patient and son in detail diagnosis, prognosis, and treatment options.  Discussed that if patient did not have targetable mutations on liquid biopsy, best treatment option with her performance status would be immunotherapy alone with Keytruda.    Labs acceptable to proceed with cycle #1 of Keytruda.    RTC 3 weeks with labs  (CBC,CMP,TSH,free T4), to see Dr. Estrada and keytruda cycle #2.     4- Increase synthroid. Rx sent.    5-Patient was referred to palliative care and is followed by Dr. Kearney.  All prescriptions need to be prescribed by patient's [facility] physician.  Currently on MS Contin 15 mg bid, [facility] no longer filling oxycodone. I have informed her that I cannot fill this medication but will give her 1 Norco upstairs with treatment since her pain is a 10/10.     6- Start oral K. Rx sent.    7,8-All prescriptions through [facility].    9- Elevated creatinine on 5/21. Will repeat CMP today.    Patient is in agreement with the proposed treatment plan. All questions were answered to the patient's satisfaction. Pt knows to call clinic if anything is needed before the next clinic visit.    Ludy Garnica, MSN, APRN, FNP-C  Hematology and Medical Oncology  Nurse Practitioner to Dr. Chance Knox  Nurse Practitioner, [facility]

## 2020-06-04 NOTE — PROGRESS NOTES
Received call back from patient this afternoon. She reported that she can't find her debit card but it's somewhere in her house, she hasn't called the bank yet. She was crying in pain, complaining of her chest and back hurting, but hadn't taken medications since she's been home. She said that her physician with [facility] doesn't want to give her pain medications. Advised that she speak with the staff at the [facility]. She said she can't take it anymore, she can't eat, can't clean her house, it's dirty, smells of pee, she was sitting in the bathroom and gnats were flying around her. She asked for her help but declined suggestions of home health, someone to help her clean, nursing facility, contacting the staff at [facility]. She said that her son lives with her, but he's often not there, doesn't help her. She doesn't want anyone to come into her house, to see how she lives. She doesn't want to contact EMS, doesn't want EMTs to go into her house. If called and report made and anyone goes to her house she commented that she'll kill herself. She doesn't have transportation (she [facility] [employer]). She walks with a cane and gets herself in and out of the house for curbside transport /drop off. This is how she came to and from clinic earlier today. Informed her that I would speak with Ludy Ganrica NP and Beltran Ace RN re: her complaints of pain, and next steps for evaluation, and call her back, which I did. Ludy Garnica, JENY, asked that I contact the [facility] staff because the physician there is managing pain control and prescriptions for pain and anxiety medications and wanted to do a pain contract with patient who has substance abuse problems, both past and current. Spoke with patient and informed her that I had spoken with Ludy and Beltran, and that the recommendation is for me to contact the staff with [facility]. Patient stated understanding and agreement. Called the [facility], [phone], and  spoke with ONEAL Montano, who is on call this PM; she works in the office as well and is very familiar with patient. Charmaine said that she will relate the concerns and what patient has reported to her provider and her social  worker, and she will call patient. She said that patient is non-compliant, manipulative, with substance abuse and psych issues; her son had called last week to speak with her  and reported that she's up to her old habits and drug seeking. She and staff do understand that she does have pain from the cancer, but also have concerns about her overdosing herself. Called patient and informed her that I had spoken with Charmaine and that Charmaine will contact her physician,  Lenora, and will call her. Patient stated understanding and thanked me. Received call from patient's  Lenora Oneill ([phone]) who related same. Lenora said that patient does have supports through the [facility] in place such as daily delivered meals, nutritionist, home health nursing visits as needed, very frequent  contacts. Patient doesn't always accept assistance, and she is often non-compliant. She doesn't always use her income for what she needs and chooses to buy drugs instead, and then asks others for financial help. She has been evicted due to not paying the minimal amount monthly towards her rent (the rest being covered with a Section [employer] voucher). There have been incidences of her reporting she has lost her prescription medications, someone has taken them, etc. Patient has had psychiatric care and hospitalizations with [facility]. Lenora last spoke with patient this morning when she called her to remind her about her appointments in clinic today, to let her know that it was time to get up and get ready for when her transportation would arrive to pick her up. Lenora said that patient's son is supportive and has tried long term to get her to stop using drugs.  Lenora described patient as knowing how to play the system and play on the sympathy of new staff who don't know her yet to try and get what she wants. Lenora will follow up and contact patient as well.

## 2020-06-04 NOTE — PLAN OF CARE
1245   Pt completed C1D1 Keytruda well without issues. Pain Minimally resolved with percocet, though still having some pain. Will review with team what pain meds she is to take at home. PIV removed, catheter tip intact. Contacted [employer] for transportation back home. Pt was requesting to speak with SW prior to discharge (due to misplacing debit card at home) and unable to look for it at this time (lives alone and has trouble maneuvering around home). Will request SW phones pt to discuss available resources for her. Pt escorted to first floor via wheelchair and assisted on to bus for transportation back home. Appointments to be mailed to home address.

## 2020-06-04 NOTE — PROGRESS NOTES
Received message in Microsoft Teams from Ludy Garnica RN, and answered her questions about [facility] contracted pharmacy (as listed in patient's chart) for her to send prescriptions to vs. print the prescriptions and  give them to patient.   Also received Secure Chat message from nursing staff, RN Krista Ace RN, in [facility]c [facility] and Krista Angel RN, in [facility]. Patient was requesting to speak with  about misplacing her debit card and asking for money today. Krista had advised her to contact her bank about the debit card. I did not see the message until after patient had left from the clinic. Called the numbers listed in patient's chart: [phone] and patient's son answered as this is his number; he confirmed that [phone] is patient's number. Called patient's number and I left a detailed voice mail message for her including my direct number. Will ask her what specifically she needs and see how best to help her, refer her for assistance programs, etc.

## 2020-06-04 NOTE — TELEPHONE ENCOUNTER
----- Message from Ludy Garnica NP sent at 6/4/2020  4:01 PM CDT -----  RTC 3 weeks with labs (CBC,CMP,TSH,free T4), to see Dr. Estrada and kannan cycle #2.

## 2020-06-04 NOTE — PLAN OF CARE
1030  Pt arrived for C1D1 Keytruda. Pt reporting severe pain to right chest region/back side. Pt states that she has pain medication at home, morphine, that has not been helping. Reviewed treatment plan. Pain medication added to regimen. Will give and reassess. Discussed with her the therapy she is initiating. Discussed symptoms and changes to discuss with provider. Pt VSS. PIV initiated to RFA tolerated well. Pt reclined to chair. WCTM pt closely.

## 2020-06-05 ENCOUNTER — TELEPHONE (OUTPATIENT)
Dept: HEMATOLOGY/ONCOLOGY | Facility: CLINIC | Age: 70
End: 2020-06-05

## 2020-06-05 NOTE — TELEPHONE ENCOUNTER
"Spoke with patient. She is calling to state she lost/misplaced her pain medication. In "my junky house." she is requesting pain prescriptions to be written by dr nelson. Nurse redirected her to contact [facility]. Patient is crying and stating she is in pain. Patient is agreeable to contacting [facility].  "

## 2020-06-05 NOTE — TELEPHONE ENCOUNTER
----- Message from Yocasta Harris sent at 6/5/2020  2:05 PM CDT -----  Contact: Self [phone]  Patient would like to speak with you about a personal matter. Please advise

## 2020-06-08 ENCOUNTER — TELEPHONE (OUTPATIENT)
Dept: HEMATOLOGY/ONCOLOGY | Facility: CLINIC | Age: 70
End: 2020-06-08

## 2020-06-08 NOTE — NURSING
"Called to check on pt following her first dose of PEMBROLIZUMAB 6/4.  Spoke with pt and still complains of pain to right side of chest, back and generalized pain.  Requesting that her PCP at [facility] be changed, due to feeling that "they don't see eye to eye" (Dr. Shabazz).  Complains of a little nausea this morning has not taken any nausea medication.  Pt has requested that her PCP be changed before next scheduled appointment.  Message left at [facility] for .   "

## 2020-06-09 ENCOUNTER — TELEPHONE (OUTPATIENT)
Dept: HEMATOLOGY/ONCOLOGY | Facility: CLINIC | Age: 70
End: 2020-06-09

## 2020-06-09 ENCOUNTER — HOSPITAL ENCOUNTER (EMERGENCY)
Facility: HOSPITAL | Age: 70
Discharge: HOME | End: 2020-06-09
Attending: EMERGENCY MEDICINE
Payer: COMMERCIAL

## 2020-06-09 VITALS
DIASTOLIC BLOOD PRESSURE: 65 MMHG | TEMPERATURE: 98 F | SYSTOLIC BLOOD PRESSURE: 155 MMHG | OXYGEN SATURATION: 97 % | RESPIRATION RATE: 18 BRPM | WEIGHT: 151 LBS | BODY MASS INDEX: 24.27 KG/M2 | HEIGHT: 66 IN | HEART RATE: 60 BPM

## 2020-06-09 DIAGNOSIS — R06.02 SHORTNESS OF BREATH: ICD-10-CM

## 2020-06-09 DIAGNOSIS — R07.9 CHEST PAIN: Primary | ICD-10-CM

## 2020-06-09 LAB
ALBUMIN SERPL BCP-MCNC: 3.2 G/DL (ref 3.5–5.2)
ALP SERPL-CCNC: 56 U/L (ref 55–135)
ALT SERPL W/O P-5'-P-CCNC: 10 U/L (ref 10–44)
ANION GAP SERPL CALC-SCNC: 10 MMOL/L (ref 8–16)
AST SERPL-CCNC: 17 U/L (ref 10–40)
BASOPHILS # BLD AUTO: 0.02 K/UL (ref 0–0.2)
BASOPHILS NFR BLD: 0.4 % (ref 0–1.9)
BILIRUB SERPL-MCNC: 0.4 MG/DL (ref 0.1–1)
BNP SERPL-MCNC: 29 PG/ML (ref 0–99)
BUN SERPL-MCNC: 6 MG/DL (ref 8–23)
CALCIUM SERPL-MCNC: 9.4 MG/DL (ref 8.7–10.5)
CHLORIDE SERPL-SCNC: 105 MMOL/L (ref 95–110)
CO2 SERPL-SCNC: 26 MMOL/L (ref 23–29)
CREAT SERPL-MCNC: 0.8 MG/DL (ref 0.5–1.4)
DIFFERENTIAL METHOD: ABNORMAL
EOSINOPHIL # BLD AUTO: 0 K/UL (ref 0–0.5)
EOSINOPHIL NFR BLD: 0.8 % (ref 0–8)
ERYTHROCYTE [DISTWIDTH] IN BLOOD BY AUTOMATED COUNT: 13.7 % (ref 11.5–14.5)
EST. GFR  (AFRICAN AMERICAN): >60 ML/MIN/1.73 M^2
EST. GFR  (NON AFRICAN AMERICAN): >60 ML/MIN/1.73 M^2
GLUCOSE SERPL-MCNC: 96 MG/DL (ref 70–110)
HCT VFR BLD AUTO: 36.3 % (ref 37–48.5)
HGB BLD-MCNC: 11.3 G/DL (ref 12–16)
IMM GRANULOCYTES # BLD AUTO: 0.01 K/UL (ref 0–0.04)
IMM GRANULOCYTES NFR BLD AUTO: 0.2 % (ref 0–0.5)
INR PPP: 1 (ref 0.8–1.2)
LYMPHOCYTES # BLD AUTO: 1.7 K/UL (ref 1–4.8)
LYMPHOCYTES NFR BLD: 32.9 % (ref 18–48)
MCH RBC QN AUTO: 26.8 PG (ref 27–31)
MCHC RBC AUTO-ENTMCNC: 31.1 G/DL (ref 32–36)
MCV RBC AUTO: 86 FL (ref 82–98)
MONOCYTES # BLD AUTO: 0.5 K/UL (ref 0.3–1)
MONOCYTES NFR BLD: 9.3 % (ref 4–15)
NEUTROPHILS # BLD AUTO: 2.9 K/UL (ref 1.8–7.7)
NEUTROPHILS NFR BLD: 56.4 % (ref 38–73)
NRBC BLD-RTO: 0 /100 WBC
PLATELET # BLD AUTO: 263 K/UL (ref 150–350)
PMV BLD AUTO: 10.8 FL (ref 9.2–12.9)
POTASSIUM SERPL-SCNC: 3.4 MMOL/L (ref 3.5–5.1)
PROT SERPL-MCNC: 7.7 G/DL (ref 6–8.4)
PROTHROMBIN TIME: 10.6 SEC (ref 9–12.5)
RBC # BLD AUTO: 4.21 M/UL (ref 4–5.4)
SARS-COV-2 RDRP RESP QL NAA+PROBE: NEGATIVE
SODIUM SERPL-SCNC: 141 MMOL/L (ref 136–145)
TROPONIN I SERPL DL<=0.01 NG/ML-MCNC: 0.01 NG/ML (ref 0–0.03)
TROPONIN I SERPL DL<=0.01 NG/ML-MCNC: 0.01 NG/ML (ref 0–0.03)
WBC # BLD AUTO: 5.14 K/UL (ref 3.9–12.7)

## 2020-06-09 PROCEDURE — 99285 EMERGENCY DEPT VISIT HI MDM: CPT | Mod: 25

## 2020-06-09 PROCEDURE — 85025 COMPLETE CBC W/AUTO DIFF WBC: CPT

## 2020-06-09 PROCEDURE — 96376 TX/PRO/DX INJ SAME DRUG ADON: CPT

## 2020-06-09 PROCEDURE — U0002 COVID-19 LAB TEST NON-CDC: HCPCS

## 2020-06-09 PROCEDURE — 93005 ELECTROCARDIOGRAM TRACING: CPT

## 2020-06-09 PROCEDURE — 96374 THER/PROPH/DIAG INJ IV PUSH: CPT

## 2020-06-09 PROCEDURE — 99285 PR EMERGENCY DEPT VISIT,LEVEL V: ICD-10-PCS | Mod: ,,, | Performed by: EMERGENCY MEDICINE

## 2020-06-09 PROCEDURE — 84484 ASSAY OF TROPONIN QUANT: CPT | Mod: 91

## 2020-06-09 PROCEDURE — 63600175 PHARM REV CODE 636 W HCPCS: Performed by: PHYSICIAN ASSISTANT

## 2020-06-09 PROCEDURE — 99285 EMERGENCY DEPT VISIT HI MDM: CPT | Mod: ,,, | Performed by: EMERGENCY MEDICINE

## 2020-06-09 PROCEDURE — 85610 PROTHROMBIN TIME: CPT

## 2020-06-09 PROCEDURE — 93010 ELECTROCARDIOGRAM REPORT: CPT | Mod: ,,, | Performed by: INTERNAL MEDICINE

## 2020-06-09 PROCEDURE — 83880 ASSAY OF NATRIURETIC PEPTIDE: CPT

## 2020-06-09 PROCEDURE — 93010 EKG 12-LEAD: ICD-10-PCS | Mod: ,,, | Performed by: INTERNAL MEDICINE

## 2020-06-09 PROCEDURE — 80053 COMPREHEN METABOLIC PANEL: CPT

## 2020-06-09 RX ORDER — MORPHINE SULFATE 2 MG/ML
6 INJECTION, SOLUTION INTRAMUSCULAR; INTRAVENOUS
Status: COMPLETED | OUTPATIENT
Start: 2020-06-09 | End: 2020-06-09

## 2020-06-09 RX ORDER — ASPIRIN 325 MG
325 TABLET ORAL
Status: DISCONTINUED | OUTPATIENT
Start: 2020-06-09 | End: 2020-06-09

## 2020-06-09 RX ADMIN — MORPHINE SULFATE 6 MG: 2 INJECTION, SOLUTION INTRAMUSCULAR; INTRAVENOUS at 04:06

## 2020-06-09 RX ADMIN — MORPHINE SULFATE 6 MG: 2 INJECTION, SOLUTION INTRAMUSCULAR; INTRAVENOUS at 06:06

## 2020-06-09 NOTE — TELEPHONE ENCOUNTER
----- Message from Juan Sylvester sent at 6/9/2020  4:00 PM CDT -----  Contact: Pt    The Pt wanted you to know that she is the ED at the [facility] and is hurting so bad.      The Pt would like for you to stop by and visit her if you get a chance.    Phone in ED 15   # [phone]

## 2020-06-09 NOTE — ED NOTES
"ED  (SW) entered pt's room with her permission. Pt confirmed identifiers. SW offered to assist pt with finding a new PCP as she reported to the ED physician that she was seeking a new physician. Pt reports that sh has spoken with the [facility] today and was able to make that change. Pt report that "I handled it." Pt declined any further assistance at this time due to "I don't want any confusion."    SW informed ED Physician. SW will follow up as needed.  - Toshia Mckeon, CHRISTOPHER   [phone]    "

## 2020-06-09 NOTE — ED NOTES
Micheline Raza, a 69 y.o. female presents to the ED w/ complaint of chest pain that radiates to her back x 7 months, denies SOB, NV, diarrhea.    Triage note:  Chief Complaint   Patient presents with    Chest Pain     by NO EMS from clinic, hx liver CA, angina for the past 7 months     Review of patient's allergies indicates:   Allergen Reactions    Codeine Nausea And Vomiting     Past Medical History:   Diagnosis Date    Depression     Hypercholesteremia     Pneumonia     Thyroid disease      LOC: Patient name and date of birth verified. The patient is awake, alert and aware of environment with an appropriate affect, the patient is oriented x 3 and speaking appropriately.   APPEARANCE: Patient resting comfortably, patient is clean and well groomed, patient's clothing is properly fastened.  SKIN: The skin is warm and dry, color consistent with ethnicity, patient has normal skin turgor and moist mucus membranes, skin intact, no breakdown or bruising noted.  MUSCULOSKELETAL: Patient moving all extremities well, no obvious swelling or deformities noted.   RESPIRATORY: Respirations are spontaneous, patient has a normal effort and rate, no accessory muscle use noted.  CARDIAC: Patient has a normal rate and rhythm, no periphreal edema noted, capillary refill < 3 seconds. Chest pain at this time. 5/10  ABDOMEN: Soft and non tender to palpation, no distention noted. Bowel sounds present in all four quadrants.  NEUROLOGIC: Eyes open spontaneously, behavior appropriate to situation, follows commands, facial expression symmetrical, bilateral hand grasp equal and even, purposeful motor response noted, normal sensation in all extremities when touched with a finger.

## 2020-06-09 NOTE — ED PROVIDER NOTES
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 She attributes this chest pain to diagnosis of pneumonia from 2019, not recent diagnosis of cancer. Also endorses shortness of breath, worse with exertion, and dry cough that has increased over the last few weeks. Denies other worsening or alleviating factors. She reports poor pain control by her PCP as she is prescribed MS contin 15 mg BID. She states that she would like a new PCP as they do not agree on her treatment.  She reports that the pain interferes with daily activities and she is unable to clean her house properly. She is followed by Heme/Onc social work however has denied their suggestions of home health. She denies fevers, chills, abdominal pain, nausea, vomiting, diarrhea, dysuria, lower extremity edema.  Patient appears to have poor insight to her medical history and is unable to provide detailed history.         Review of patient's allergies indicates:   Allergen Reactions    Codeine Nausea And Vomiting     Past Medical History:   Diagnosis Date    Depression     Hypercholesteremia     Pneumonia     Thyroid disease      Past Surgical History:   Procedure Laterality Date     SECTION      ENDOBRONCHIAL ULTRASOUND N/A 2020    Procedure: ENDOBRONCHIAL ULTRASOUND (EBUS);  Surgeon: Karen Mills MD;  Location: [facility] OR [address] [place] [address];  Service: Endoscopy;  Laterality: N/A;     Family History   Problem Relation Age of Onset    Heart attack  Mother     Lung cancer Mother     Liver cancer Father     Miscarriages / Stillbirths Neg Hx     Diabetes Neg Hx      Social History     Tobacco Use    Smoking status: Current Every Day Smoker     Packs/day: 2.00     Years: 40.00     Pack years: 80.00     Types: Cigarettes    Smokeless tobacco: Never Used   Substance Use Topics    Alcohol use: Yes     Comment: social    Drug use: No     Review of Systems   Constitutional: Negative for chills and fever.   HENT: Negative for congestion and sore throat.    Eyes: Negative for visual disturbance.   Respiratory: Positive for cough and shortness of breath.    Cardiovascular: Positive for chest pain.   Gastrointestinal: Negative for diarrhea, nausea and vomiting.   Genitourinary: Negative for dysuria.   Musculoskeletal: Negative for back pain.   Skin: Negative for rash.   Neurological: Negative for weakness and headaches.   Hematological: Does not bruise/bleed easily.       Physical Exam     Initial Vitals   BP Pulse Resp Temp SpO2   06/09/20 1303 06/09/20 1303 06/09/20 1303 06/09/20 1303 06/09/20 1402   (!) 147/81 (!) 59 20 98.7 °F (37.1 °C) 96 %      MAP       --                Physical Exam    Nursing note and vitals reviewed.  Constitutional: She is not diaphoretic. No distress.   Disheveled appearing black female    HENT:   Head: Normocephalic and atraumatic.   Mouth/Throat: Oropharynx is clear and moist.   Eyes: Conjunctivae and EOM are normal. Pupils are equal, round, and reactive to light.   Neck: Normal range of motion. Neck supple. No JVD present.   Cardiovascular: Normal rate, regular rhythm, normal heart sounds and intact distal pulses. Exam reveals no gallop and no friction rub.    No murmur heard.  Pulmonary/Chest: Breath sounds normal. She has no wheezes. She has no rhonchi. She has no rales.   Abdominal: Soft. Bowel sounds are normal. There is no tenderness.   Musculoskeletal: Normal range of motion. She exhibits no edema.   Neurological: She is alert  and oriented to person, place, and time. She has normal strength. No cranial nerve deficit or sensory deficit. GCS score is 15. GCS eye subscore is 4. GCS verbal subscore is 5. GCS motor subscore is 6.   Skin: Skin is warm and dry. Capillary refill takes less than 2 seconds.   Psychiatric: She has a normal mood and affect. Her behavior is normal. Judgment and thought content normal.         ED Course   Procedures  Labs Reviewed   CBC W/ AUTO DIFFERENTIAL - Abnormal; Notable for the following components:       Result Value    Hemoglobin 11.3 (*)     Hematocrit 36.3 (*)     Mean Corpuscular Hemoglobin 26.8 (*)     Mean Corpuscular Hemoglobin Conc 31.1 (*)     All other components within normal limits   COMPREHENSIVE METABOLIC PANEL - Abnormal; Notable for the following components:    Potassium 3.4 (*)     BUN, Bld 6 (*)     Albumin 3.2 (*)     All other components within normal limits   TROPONIN I   B-TYPE NATRIURETIC PEPTIDE   PROTIME-INR   SARS-COV-2 RNA AMPLIFICATION, QUAL   TROPONIN I     EKG Readings: (Independently Interpreted)   Initial Reading: No STEMI. Rhythm: Sinus Bradycardia. Heart Rate: 55.     ECG Results          EKG 12-lead (Final result)  Result time 06/09/20 21:07:46    Final result by Interface, Lab In [facility] (06/09/20 21:07:46)                 Narrative:    Test Reason : R07.9,    Vent. Rate : 055 BPM     Atrial Rate : 055 BPM     P-R Int : 122 ms          QRS Dur : 092 ms      QT Int : 444 ms       P-R-T Axes : 059 033 064 degrees     QTc Int : 424 ms    Sinus bradycardia  Nonspecific T wave abnormality  Abnormal ECG  When compared with ECG of 24-JUL-2018 11:26,  No significant change was found  Confirmed by RINKU TAVERA MD ([id number]) on 6/9/2020 9:07:36 PM    Referred By: System System           Confirmed By:RINKU TAVERA MD                            Imaging Results          X-Ray Chest PA And Lateral (Final result)  Result time 06/09/20 15:12:47    Final result by Cheo Saba MD (06/09/20 15:12:47)                  Impression:      1. A nodular pleural based density along the lateral aspect of the right midlung zone common known to be a mass as per the CT of 03/03/2020.  2. Lobulated soft tissue density along the pleural surface of the right hemithorax posteriorly likely representing pleural effusion.  3. Left lung is clear.      Electronically signed by: Cheo Saba MD  Date:    06/09/2020  Time:    15:12             Narrative:    EXAMINATION:  XR CHEST PA AND LATERAL    CLINICAL HISTORY:  Chest pain, unspecified    TECHNIQUE:  PA and lateral views of the chest were performed.    COMPARISON:  03/03/2020    FINDINGS:  Heart size is within normal limits.  There is no tracheal abnormalities.  Mediastinum is unremarkable.  Slight tortuosity of the descending thoracic aorta.    There is a lobulated density along the lateral aspect of the right midlung zone seen on the previous examination.  Increased pleural thickening along the lateral aspect of the right chest wall also without change from the previous study.  CT scan performed on this patient on 03/09/2020 had suggested a pulmonary mass in this region.    Seen best on the lateral radiograph there is a pleural based density along the posterior chest, suspicious for a pleural effusion.    Left lung is clear.  Mediastinum is unremarkable.    There are cardiac monitoring leads over the chest.                              X-Rays:   Independently Interpreted Readings:   Chest X-Ray: Normal heart size. Right pleural effusion present. Right lung mass      Medical Decision Making:   History:   Old Medical Records: I decided to obtain old medical records.  Initial Assessment:   Emergent evaluation of a 69 year female who presents to the emergency department with complaints of right-sided chest pain that radiates to the back that has been intermittent for about 1 year now.  Reports worsening symptoms over the last few weeks.  Patient diagnoses with stage 4 adenocarcinoma  of right lung April 2020. Reports poor pain control by PCP. Endorses shortness of breath, worse with exertion, and cough. Patient is afebrile, hemodynamically stable, and non-toxic appearing. Will order labs, imaging, EKG, and continue to monitor.   Differential Diagnosis:   DDx includes ACS, pneumonia, pleural effusion, malignancy, costochondritis, covid-19.   Independently Interpreted Test(s):   I have ordered and independently interpreted X-rays - see prior notes.  I have ordered and independently interpreted EKG Reading(s) - see prior notes  Clinical Tests:   Lab Tests: Ordered and Reviewed  Radiological Study: Ordered and Reviewed  Medical Tests: Ordered and Reviewed  ED Management:  Patient given nitroglycerin and aspirin with some improvement of chest pain.   Treated with morphine in the ER which also improved patient's pain.   Lab workup unremarkable.   Troponin negative x2.  Patient presentation today most consistent with chronic pain secondary to malignancy.  Low suspicion for ACS based on patient's history and negative troponin x2.  Chest xray appears stable from previous imaging with mass to midlung zone and right sided pleural effusion.   Extensively discussed the need for outpatient follow-up with her PCP.  Social work has attempted to talk to patient in order to arrange follow-up however patient declines and reports that she will take care of it.  She requests prescription for pain medication however I explained to her that I will not give her pain medication from the emergency department in this needs to come from her PCP/oncologist.  She voices understanding.  All questions answered. The patient was instructed to follow up with a primary care provider/Heme onc in 2 days or to return to the emergency department for worsening symptoms. The treatment plan was discussed with the patient who demonstrated understanding and comfort with plan. The patient's history, physical exam, and plan of care was  discussed with and agreed upon with my supervising physician.           Additional MDM:   Heart Score:    History:          Slightly suspicious.  ECG:             Normal  Age:               >65 years  Risk factors: 1-2 risk factors  Troponin:       Less than or equal to normal limit  Final Score: 3               Attending Attestation:     Physician Attestation Statement for NP/PA:   I have conducted a face to face encounter with this patient in addition to the NP/PA, due to Medical Complexity          Attending ED Notes:   69 y.o. F with stage 4 cancer presenting with pain consistent with her chronic pain. I have a low suspicion for ACS, PE. CXR consistent with prior. Serial troponins negative. Patient has history of poor pain management but doesn't seem interested in participating in the things that might be helpful.           ED Course as of Odell 10 1715   Tue Jun 09, 2020   1442 WBC: 5.14 [JM]   1442 Hemoglobin(!): 11.3 [JM]   1442 Hematocrit(!): 36.3 [JM]   1442 Platelets: 263 [JM]   1442 Sodium: 141 [JM]   1442 Potassium(!): 3.4 [JM]   1442 BUN, Bld(!): 6 [JM]   1442 Creatinine: 0.8 [JM]   1442 Albumin(!): 3.2 [JM]   1442 Protime: 10.6 [JM]   1442 INR: 1.0 [JM]   1442 BNP: 29 [JM]   1442 Troponin I: 0.009 [JM]   1510 SARS-CoV-2 RNA, Amplification, Qual: Negative [JM]   1550 BNP: 29 [JM]   1738 Troponin I: 0.007 [JM]      ED Course User Index  [JM] Hazel Mason PA-C                Clinical Impression:     1. Chest pain    2. Shortness of breath                ED Disposition Condition    Discharge Stable        ED Prescriptions     None        Follow-up Information     Follow up With Specialties Details Why Contact Info    Butch Montejo DO Family Medicine Schedule an appointment as soon as possible for a visit in 3 days  [address] [place] [id number]  [phone]      [facility] Emergency Medicine Go to  If symptoms worsen [id number]  [facility] [address]  [phone]                                     Hazel Mason PA-C  06/09/20 1949       Shirley Weinberg MD  06/10/20 [id number]

## 2020-06-09 NOTE — DISCHARGE INSTRUCTIONS
Please continue all medications as prescribed. Follow up with your PCP or return to the emergency department for new or worsening symptoms.

## 2020-06-10 ENCOUNTER — TELEPHONE (OUTPATIENT)
Dept: GASTROENTEROLOGY | Facility: CLINIC | Age: 70
End: 2020-06-10

## 2020-06-10 NOTE — TELEPHONE ENCOUNTER
Attempted to contact patient about scheduling her procedures with Dr. Dumont. Left patient a message to give office a call back.

## 2020-06-16 ENCOUNTER — TELEPHONE (OUTPATIENT)
Dept: HEMATOLOGY/ONCOLOGY | Facility: CLINIC | Age: 70
End: 2020-06-16

## 2020-06-16 NOTE — TELEPHONE ENCOUNTER
----- Message from Ramila Bermeo sent at 6/16/2020 11:31 AM CDT -----  Contact: Pt  Type:  Needs Medical Advice    Who Called: Micheline Ry  Symptoms (please be specific): Liver Cancer says she was recently diagnose and is still in a lot of pain  How long has patient had these symptoms:  3 months ago  Pharmacy name and phone #:  Says the pharmacy is in [place] does not know the name  Would the patient rather a call back or a response via [facility]? Callback   Best Call Back Number: [phone]  Additional Information: Pt asked if she can get a return call from office regarding her medication that her PCP stopped

## 2020-06-24 ENCOUNTER — DOCUMENTATION ONLY (OUTPATIENT)
Dept: HEMATOLOGY/ONCOLOGY | Facility: CLINIC | Age: 70
End: 2020-06-24

## 2020-06-24 ENCOUNTER — LAB VISIT (OUTPATIENT)
Dept: LAB | Facility: HOSPITAL | Age: 70
End: 2020-06-24
Payer: COMMERCIAL

## 2020-06-24 DIAGNOSIS — E03.9 ACQUIRED HYPOTHYROIDISM: ICD-10-CM

## 2020-06-24 DIAGNOSIS — C34.91 ADENOCARCINOMA OF RIGHT LUNG, STAGE 4: ICD-10-CM

## 2020-06-24 LAB
ALBUMIN SERPL BCP-MCNC: 2.9 G/DL (ref 3.5–5.2)
ALP SERPL-CCNC: 66 U/L (ref 55–135)
ALT SERPL W/O P-5'-P-CCNC: 10 U/L (ref 10–44)
ANION GAP SERPL CALC-SCNC: 12 MMOL/L (ref 8–16)
AST SERPL-CCNC: 19 U/L (ref 10–40)
BASOPHILS # BLD AUTO: 0.03 K/UL (ref 0–0.2)
BASOPHILS NFR BLD: 0.8 % (ref 0–1.9)
BILIRUB SERPL-MCNC: 0.3 MG/DL (ref 0.1–1)
BUN SERPL-MCNC: 4 MG/DL (ref 8–23)
CALCIUM SERPL-MCNC: 8.8 MG/DL (ref 8.7–10.5)
CHLORIDE SERPL-SCNC: 107 MMOL/L (ref 95–110)
CO2 SERPL-SCNC: 24 MMOL/L (ref 23–29)
CREAT SERPL-MCNC: 0.7 MG/DL (ref 0.5–1.4)
DIFFERENTIAL METHOD: ABNORMAL
EOSINOPHIL # BLD AUTO: 0 K/UL (ref 0–0.5)
EOSINOPHIL NFR BLD: 0.6 % (ref 0–8)
ERYTHROCYTE [DISTWIDTH] IN BLOOD BY AUTOMATED COUNT: 13.5 % (ref 11.5–14.5)
EST. GFR  (AFRICAN AMERICAN): >60 ML/MIN/1.73 M^2
EST. GFR  (NON AFRICAN AMERICAN): >60 ML/MIN/1.73 M^2
GLUCOSE SERPL-MCNC: 95 MG/DL (ref 70–110)
HCT VFR BLD AUTO: 33.7 % (ref 37–48.5)
HGB BLD-MCNC: 11.1 G/DL (ref 12–16)
IMM GRANULOCYTES # BLD AUTO: 0.01 K/UL (ref 0–0.04)
IMM GRANULOCYTES NFR BLD AUTO: 0.3 % (ref 0–0.5)
LYMPHOCYTES # BLD AUTO: 1.1 K/UL (ref 1–4.8)
LYMPHOCYTES NFR BLD: 29.5 % (ref 18–48)
MCH RBC QN AUTO: 27 PG (ref 27–31)
MCHC RBC AUTO-ENTMCNC: 32.9 G/DL (ref 32–36)
MCV RBC AUTO: 82 FL (ref 82–98)
MONOCYTES # BLD AUTO: 0.4 K/UL (ref 0.3–1)
MONOCYTES NFR BLD: 9.6 % (ref 4–15)
NEUTROPHILS # BLD AUTO: 2.2 K/UL (ref 1.8–7.7)
NEUTROPHILS NFR BLD: 59.2 % (ref 38–73)
NRBC BLD-RTO: 0 /100 WBC
PLATELET # BLD AUTO: 261 K/UL (ref 150–350)
PMV BLD AUTO: 10.4 FL (ref 9.2–12.9)
POTASSIUM SERPL-SCNC: 3.3 MMOL/L (ref 3.5–5.1)
PROT SERPL-MCNC: 7.4 G/DL (ref 6–8.4)
RBC # BLD AUTO: 4.11 M/UL (ref 4–5.4)
SODIUM SERPL-SCNC: 143 MMOL/L (ref 136–145)
T4 FREE SERPL-MCNC: 1 NG/DL (ref 0.71–1.51)
TSH SERPL DL<=0.005 MIU/L-ACNC: 0.76 UIU/ML (ref 0.4–4)
WBC # BLD AUTO: 3.63 K/UL (ref 3.9–12.7)

## 2020-06-24 PROCEDURE — 85025 COMPLETE CBC W/AUTO DIFF WBC: CPT

## 2020-06-24 PROCEDURE — 36415 COLL VENOUS BLD VENIPUNCTURE: CPT

## 2020-06-24 PROCEDURE — 84439 ASSAY OF FREE THYROXINE: CPT

## 2020-06-24 PROCEDURE — 80053 COMPREHEN METABOLIC PANEL: CPT

## 2020-06-24 PROCEDURE — 84443 ASSAY THYROID STIM HORMONE: CPT

## 2020-06-24 NOTE — PROGRESS NOTES
Received a call this afternoon from patient's PACE  Lenora Oneill ([phone]) who said that patient's PACE Physician Dr. Ocampo reached out to her.  Dr. Ocampo is trying to contact Dr. Estrada with an update on patient and to discuss situation. Patient refusing to sign the pain medication contract. They did a drug screen and patient tested positive for cocaine and morphine, and negative for benzos. Since Lenora is working from home, she asked to email the information to me, and I will print it and bring it to Dr. Estrada's nurse in clinic. Informed Lenora that Dr. Estrada is on vacation this week, but nursing staff and covering physician/NP are available, and I will notify them. Sent a message via Epic to Dr. Bunch, Staff and his nurse Beltran Ace RN. Will continue to follow and assist as needs are identified.

## 2020-06-25 ENCOUNTER — OFFICE VISIT (OUTPATIENT)
Dept: HEMATOLOGY/ONCOLOGY | Facility: CLINIC | Age: 70
End: 2020-06-25
Payer: COMMERCIAL

## 2020-06-25 ENCOUNTER — INFUSION (OUTPATIENT)
Dept: INFUSION THERAPY | Facility: HOSPITAL | Age: 70
End: 2020-06-25
Attending: NURSE PRACTITIONER
Payer: COMMERCIAL

## 2020-06-25 ENCOUNTER — DOCUMENTATION ONLY (OUTPATIENT)
Dept: HEMATOLOGY/ONCOLOGY | Facility: CLINIC | Age: 70
End: 2020-06-25

## 2020-06-25 VITALS
WEIGHT: 147.69 LBS | SYSTOLIC BLOOD PRESSURE: 123 MMHG | HEART RATE: 68 BPM | OXYGEN SATURATION: 97 % | HEIGHT: 66 IN | RESPIRATION RATE: 18 BRPM | BODY MASS INDEX: 23.74 KG/M2 | TEMPERATURE: 99 F | DIASTOLIC BLOOD PRESSURE: 58 MMHG

## 2020-06-25 VITALS
RESPIRATION RATE: 18 BRPM | OXYGEN SATURATION: 98 % | HEART RATE: 65 BPM | DIASTOLIC BLOOD PRESSURE: 60 MMHG | TEMPERATURE: 98 F | SYSTOLIC BLOOD PRESSURE: 118 MMHG

## 2020-06-25 DIAGNOSIS — C34.91 ADENOCARCINOMA OF RIGHT LUNG, STAGE 4: Primary | ICD-10-CM

## 2020-06-25 DIAGNOSIS — C79.51 BONE METASTASIS: ICD-10-CM

## 2020-06-25 DIAGNOSIS — F41.9 ANXIETY: ICD-10-CM

## 2020-06-25 DIAGNOSIS — E03.9 ACQUIRED HYPOTHYROIDISM: ICD-10-CM

## 2020-06-25 DIAGNOSIS — F32.A DEPRESSION, UNSPECIFIED DEPRESSION TYPE: ICD-10-CM

## 2020-06-25 DIAGNOSIS — C77.1 METASTASIS TO MEDIASTINAL LYMPH NODE: ICD-10-CM

## 2020-06-25 DIAGNOSIS — G89.3 CANCER RELATED PAIN: ICD-10-CM

## 2020-06-25 DIAGNOSIS — E87.6 HYPOKALEMIA: ICD-10-CM

## 2020-06-25 PROCEDURE — 99214 OFFICE O/P EST MOD 30 MIN: CPT | Mod: S$GLB,,, | Performed by: NURSE PRACTITIONER

## 2020-06-25 PROCEDURE — 96413 CHEMO IV INFUSION 1 HR: CPT

## 2020-06-25 PROCEDURE — 99999 PR PBB SHADOW E&M-EST. PATIENT-LVL IV: ICD-10-PCS | Mod: PBBFAC,,, | Performed by: NURSE PRACTITIONER

## 2020-06-25 PROCEDURE — 25000003 PHARM REV CODE 250: Performed by: INTERNAL MEDICINE

## 2020-06-25 PROCEDURE — 63600175 PHARM REV CODE 636 W HCPCS: Mod: JG | Performed by: INTERNAL MEDICINE

## 2020-06-25 PROCEDURE — 99214 PR OFFICE/OUTPT VISIT, EST, LEVL IV, 30-39 MIN: ICD-10-PCS | Mod: S$GLB,,, | Performed by: NURSE PRACTITIONER

## 2020-06-25 PROCEDURE — 99999 PR PBB SHADOW E&M-EST. PATIENT-LVL IV: CPT | Mod: PBBFAC,,, | Performed by: NURSE PRACTITIONER

## 2020-06-25 RX ORDER — HEPARIN 100 UNIT/ML
500 SYRINGE INTRAVENOUS
Status: DISCONTINUED | OUTPATIENT
Start: 2020-06-25 | End: 2020-06-25 | Stop reason: HOSPADM

## 2020-06-25 RX ORDER — SODIUM CHLORIDE 0.9 % (FLUSH) 0.9 %
10 SYRINGE (ML) INJECTION
Status: DISCONTINUED | OUTPATIENT
Start: 2020-06-25 | End: 2020-06-25 | Stop reason: HOSPADM

## 2020-06-25 RX ORDER — SODIUM CHLORIDE 0.9 % (FLUSH) 0.9 %
10 SYRINGE (ML) INJECTION
Status: CANCELLED | OUTPATIENT
Start: 2020-06-25

## 2020-06-25 RX ORDER — HEPARIN 100 UNIT/ML
500 SYRINGE INTRAVENOUS
Status: CANCELLED | OUTPATIENT
Start: 2020-06-25

## 2020-06-25 RX ADMIN — SODIUM CHLORIDE: 9 INJECTION, SOLUTION INTRAVENOUS at 10:06

## 2020-06-25 RX ADMIN — SODIUM CHLORIDE 200 MG: 9 INJECTION, SOLUTION INTRAVENOUS at 10:06

## 2020-06-25 NOTE — PLAN OF CARE
Pt admitted for C2 Keytruda. Labs reviewed and side effects and self care tips discussed, occasional nausea addressed. Also, consult sent to Nutritionist, Pt food intake decreased over last week, weight loss noted. Pt tolerated treatment well. Plan of care reviewed and Pt given AVS and discharged in W/C @11:30

## 2020-06-25 NOTE — PROGRESS NOTES
Subjective:       Patient ID: Micheline Raza    Chief Complaint: Adenocarcinoma of right lung, stage 4    HPI  Micheline Raza is a 69 y.o. female, patient of Dr. Estrada, to clinic for follow up and to begin cycle #2 of Keytruda for stage IV lung cancer. Patient tolerated first cycle of Keytruda well. No issues to report. Patient's major complaint today is her pain not being addressed by PACE. She does not want them prescribing her pain medication and she does not understand why she needs to sign a pain contract. She states she only has 1 MS Contin pill left and no short acting.     Presents to clinic alone and in a wheelchair.    Oncologic History:  Adenocarcinoma of right lung, stage 4     4/27/2020 Initial Diagnosis       Treated in late 2019 for pneumonia.  Patient presented with constant chest pain on the right side and back.  Loss of appetite and weight loss.  She was found to have a R lung mass, mediastinal LAD, and R pleural effusion.  4/27 had EBUS and was found to have adenocarcinoma from 4L, station 7, 4R and 11R FNAs.           Review of Systems   Constitutional: Positive for activity change, appetite change and fatigue. Negative for diaphoresis and fever.   HENT: Negative for mouth sores, sore throat and trouble swallowing.    Eyes: Negative for photophobia and visual disturbance.   Respiratory: Negative for cough, chest tightness and shortness of breath.    Cardiovascular: Positive for chest pain. Negative for leg swelling.   Gastrointestinal: Positive for nausea. Negative for abdominal distention, abdominal pain, constipation, diarrhea and vomiting.   Genitourinary: Negative for dysuria, frequency and hematuria.   Musculoskeletal: Positive for back pain.   Skin: Negative for rash.   Neurological: Negative for dizziness, weakness and headaches.   Hematological: Negative for adenopathy. Does not bruise/bleed easily.   Psychiatric/Behavioral: Negative for sleep disturbance. The patient is not  nervous/anxious.    All other systems reviewed and are negative.      Allergies:  Review of patient's allergies indicates:   Allergen Reactions    Codeine Nausea And Vomiting       Medications:  Current Outpatient Medications   Medication Sig Dispense Refill    aspirin (ECOTRIN) 81 MG EC tablet Take 81 mg by mouth once daily.      clonazePAM (KLONOPIN) 1 MG tablet Take 1 tablet (1 mg total) by mouth 2 (two) times daily. 60 tablet 0    divalproex (DEPAKOTE) 250 MG EC tablet Take 250 mg by mouth 3 (three) times daily.      DULoxetine (CYMBALTA) 20 MG capsule Take 20 mg by mouth once daily.      levocetirizine (XYZAL) 5 MG tablet Take 5 mg by mouth every evening.      levothyroxine (SYNTHROID) 112 MCG tablet Take 1 tablet (112 mcg total) by mouth once daily. 30 tablet 1    morphine (MS CONTIN) 15 MG 12 hr tablet Take 15 mg by mouth 2 (two) times daily.      multivitamin capsule Take 1 capsule by mouth once daily.      pravastatin (PRAVACHOL) 40 MG tablet Take 1 tablet (40 mg total) by mouth once daily. 90 tablet 3    risperiDONE (RISPERDAL) 0.5 MG Tab Take by mouth.      vitamin D (VITAMIN D3) 1000 units Tab Take 1 tablet (1,000 Units total) by mouth once daily. 90 tablet 11    potassium chloride SA (K-DUR,KLOR-CON) 20 MEQ tablet Take 1 tablet (20 mEq total) by mouth once daily. (Patient not taking: Reported on 2020) 30 tablet 1    sodium,potassium,mag sulfates (SUPREP BOWEL PREP KIT) 17.5-3.13-1.6 gram SolR Take 1 each by mouth as directed. (Patient not taking: Reported on 2020) 1 Bottle 0    venlafaxine (EFFEXOR-XR) 150 MG Cp24 Take 150 mg by mouth once daily.       No current facility-administered medications for this visit.        PMH:  Past Medical History:   Diagnosis Date    Depression     Hypercholesteremia     Pneumonia     Thyroid disease        PSH:  Past Surgical History:   Procedure Laterality Date     SECTION      ENDOBRONCHIAL ULTRASOUND N/A 2020    Procedure:  ENDOBRONCHIAL ULTRASOUND (EBUS);  Surgeon: Karen Mills MD;  Location: [facility] OR [address] [place] [address];  Service: Endoscopy;  Laterality: N/A;       FamHx:  Family History   Problem Relation Age of Onset    Heart attack Mother     Lung cancer Mother     Liver cancer Father     Miscarriages / Stillbirths Neg Hx     Diabetes Neg Hx        SocHx:  Social History     Socioeconomic History    Marital status: Single     Spouse name: Not on file    Number of children: Not on file    Years of education: Not on file    Highest education level: Not on file   Occupational History    Not on file   Social Needs    Financial resource strain: Not on file    Food insecurity     Worry: Not on file     Inability: Not on file    Transportation needs     Medical: Not on file     Non-medical: Not on file   Tobacco Use    Smoking status: Current Every Day Smoker     Packs/day: 2.00     Years: 40.00     Pack years: 80.00     Types: Cigarettes    Smokeless tobacco: Never Used   Substance and Sexual Activity    Alcohol use: Yes     Comment: social    Drug use: No    Sexual activity: Not Currently     Partners: Male     Birth control/protection: None   Lifestyle    Physical activity     Days per week: Not on file     Minutes per session: Not on file    Stress: Not on file   Relationships    Social connections     Talks on phone: Not on file     Gets together: Not on file     Attends Yazdanism service: Not on file     Active member of club or organization: Not on file     Attends meetings of clubs or organizations: Not on file     Relationship status: Not on file   Other Topics Concern    Not on file   Social History Narrative    Lives with son. Retired. No longer drives.        Distress Score    Distress Score: 9        Practical Problems Physical Problems                                                   Family Problems                                         Emotional Problems                                                          Spiritual/Religions Concerns               Other Problems                Objective:       Vitals:    06/25/20 0924   BP: (!) 123/58   Pulse: 68   Resp: 18   Temp: 99.2 °F (37.3 °C)       Physical Exam  Vitals signs and nursing note reviewed.   Constitutional:       Appearance: She is well-developed.   HENT:      Head: Normocephalic and atraumatic.   Cardiovascular:      Rate and Rhythm: Normal rate and regular rhythm.      Heart sounds: Normal heart sounds.   Pulmonary:      Effort: Pulmonary effort is normal.      Breath sounds: Normal breath sounds.   Abdominal:      General: Abdomen is flat.      Palpations: Abdomen is soft.   Musculoskeletal:      Comments: In wheelchair   Skin:     General: Skin is warm and dry.   Neurological:      Mental Status: She is alert and oriented to person, place, and time.           LABS:  WBC   Date Value Ref Range Status   06/24/2020 3.63 (L) 3.90 - 12.70 K/uL Final     Hemoglobin   Date Value Ref Range Status   06/24/2020 11.1 (L) 12.0 - 16.0 g/dL Final     Hematocrit   Date Value Ref Range Status   06/24/2020 33.7 (L) 37.0 - 48.5 % Final     Platelets   Date Value Ref Range Status   06/24/2020 261 150 - 350 K/uL Final       Chemistry        Component Value Date/Time     06/24/2020 1058    K 3.3 (L) 06/24/2020 1058     06/24/2020 1058    CO2 24 06/24/2020 1058    BUN 4 (L) 06/24/2020 1058    CREATININE 0.7 06/24/2020 1058    GLU 95 06/24/2020 1058        Component Value Date/Time    CALCIUM 8.8 06/24/2020 1058    ALKPHOS 66 06/24/2020 1058    AST 19 06/24/2020 1058    ALT 10 06/24/2020 1058    BILITOT 0.3 06/24/2020 1058    ESTGFRAFRICA >60.0 06/24/2020 1058    EGFRNONAA >60.0 06/24/2020 1058          Assessment:       1. Adenocarcinoma of right lung, stage 4    2. 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 Acquired hypothyroidism    5. Cancer related pain    6. Hypokalemia    7. Anxiety    8. Depression, unspecified depression type        Plan:        1,2,3. Stage IV PD-L1 60% Adenocarcinoma of lung:   -Not enough tissue for molecular testing, will send for Guardant 360.  Staging has been completed.  Discussed with patient and son in detail diagnosis, prognosis, and treatment options.  Discussed that if patient did not have targetable mutations on liquid biopsy, best treatment option with her performance status would be immunotherapy alone with Keytruda.    Labs acceptable to proceed with cycle #2 of Keytruda.    RTC 3 weeks with labs (CBC,CMP,TSH,free T4), to see Dr. Estrada and keytruda cycle #3.     4- Improved on increased dose of synthroid.    5-Patient was referred to palliative care and is followed by Dr. Kearney.  All prescriptions need to be prescribed by patient's PACE physician.  Currently has only 1 MS Contin 15 mg, PACE no longer filling oxycodone. Explained to her that she needs to contact PACE to sign her pain contract and that due to her past history of substance abuse and recent positive drug screen, Dr. Estrada will not feel comfortable filling any narcotics for her. She verbalized understanding.     6- Discussed continuing oral K.    7,8-All prescriptions through PACE.    Patient is in agreement with the proposed treatment plan. All questions were answered to the patient's satisfaction. Pt knows to call clinic if anything is needed before the next clinic visit.    Ludy Garnica, MSN, APRN, FNP-C  Hematology and Medical Oncology  Nurse Practitioner to Dr. Chance Knox  Nurse Practitioner, [facility]

## 2020-06-25 NOTE — PROGRESS NOTES
Received a reply message via Epic from Ludy Garnica NP, that she saw patient in clinic this morning, and patient is unhappy about [facility] managing her pain medications. Ludy told her that due to her past drug abuse Dr. Estrada would not feel comfortable managing her narcotics and she had to go through [facility], and she instructed patient to call [facility] today. Replied to Ludy that I would notify patient's [facility]  Lenora Oneill. Called Lenora ([phone]) and informed her, and as she requested that I send her a notation of same I sent a reply to her email from yesterday. She will notify patient's [facility] physician Dr. Ocampo. Will continue to follow and assist as needs are identified.

## 2020-06-29 ENCOUNTER — TELEPHONE (OUTPATIENT)
Dept: NEUROLOGY | Facility: HOSPITAL | Age: 70
End: 2020-06-29

## 2020-06-29 DIAGNOSIS — Z01.812 PRE-PROCEDURE LAB EXAM: ICD-10-CM

## 2020-06-29 NOTE — TELEPHONE ENCOUNTER
Spoked with pt schedule EGD with Dr. Wolf Dumont 07/22/2020 covid testing will be done [facility] 07/20/2020 [address] [place] [id number].

## 2020-07-15 ENCOUNTER — TELEPHONE (OUTPATIENT)
Dept: GASTROENTEROLOGY | Facility: CLINIC | Age: 70
End: 2020-07-15

## 2020-07-15 NOTE — LETTER
[facility]  [address], [address]  [place] [address]  Phone: [phone]       Micheline Jaquezpaconazia Colin testing should be done on 7/20/20 at the [facility] ([facility]) any time Between 9am-12noon        SUPREP Instructions (Arrive at 1:30pm)    You are scheduled for a colonoscopy with Dr. Dumont on 7/22/20 at [facility] located at [address].  Check in at the admit desk, first floor of the hospital (which is the building on the left).     You will receive a call 2-3 days before your colonoscopy to tell you the time to arrive.  If you have not received a call by the day before your procedure, call the Endoscopy Lab at [phone].    To ensure that your test is accurate and complete, you MUST follow these instructions listed below.  If you have any questions, please call our office at [phone].  Plan on being at the hospital for your procedure for 3-4 hours.    1.  Follow a CLEAR LIQUID DIET for the entire day before your scheduled colonoscopy.  This means no solid food the entire day starting when you wake.  You may have as much of the clear liquids as you want throughout the day.   CLEAR LIQUID DIET:   - Avoid Red, Orange, Purple, and/or Blue food coloring   - NO DAIRY   - You can have:  Coffee with sugar (no creamer), tea, water, soda, apple or white grape juice, chicken or beef broth/bouillon (no meat, noodles, or veggies), green/yellow popsicles, green/yellow Jell-O, lemonade.    2.  AT 5 pm the evening before your colonoscopy, POUR ONE (1) BOTTLE OF SUPREP INTO THE MIXING CONTAINER, PROVIDED INSIDE THE BOX.  ADD WATER TO THE LINE ON THE CONTAINER AND MIX IT WELL.  DRINK THE ENTIRE CONTAINER AND THEN DRINK TWO (2) MORE CONTAINERS OF WATER OVER THE NEXT 1 HOUR.  This is sometimes easier to drink if this solution is cold, so you can mix the solution a few hours ahead of time and place in the refrigerator prior to drinking.  You have  to drink the solution within 24 hours of mixing it.  Do NOT put this solution over ice.  It IS ok to drink with a straw.    3.  The endoscopy department will call you 2 days before your colonoscopy to tell you the exact time to arrive, AND to tell you the exact time to drink the 2nd portion of your prep (which will be FIVE HOURS BEFORE YOUR ARRIVAL TIME).  At this time given to you, POUR ONE (1) BOTTLE OF SUPREP INTO THE MIXING CONTAINER, PROVIDED INSIDE THE BOX.  ADD WATER TO THE LINE ON THE CONTAINER AND MIX IT WELL.  DRINK THE ENTIRE CONTAINER AND THEN DRINK TWO (2) MORE CONTAINERS OF WATER OVER THE NEXT 1 HOUR.  This is sometimes easier to drink if this solution is cold, so you can mix the solution a few hours ahead of time and place in the refrigerator prior to drinking.  You have to drink the solution within 24 hours of mixing it.  Do NOT put this solution over ice.  It IS ok to drink with a straw. Once this is complete, you may not have ANYTHING else by mouth!    4.  You must have someone with you to DRIVE YOU HOME since you will be receiving IV sedation for the colonoscopy.    5.  It is ok to take your heart, blood pressure, and seizure medications in the morning of your test with a SIP of water.  Hold other medications until after your procedure.  Do NOT have anything else to eat or drink the morning of your colonoscopy.  It is ok to brush your teeth.    6.  If you are on blood thinners THAT YOU HAVE BEEN INSTRUCTED TO HOLD BY YOUR DOCTOR FOR THIS PROCEDURE, then do NOT take this the morning of your colonoscopy.  Do NOT stop these medications on your own, they must be approved to be held by your doctor.  Your colonoscopy can NOT be done if you are on these medications.  Examples of blood thinners include: Coumadin, Aggrenox, Plavix, Pradaxa, Reapro, Pletal, Xarelto, Ticagrelor, Brilinta, Eliquis, and high dose aspirin (325 mg).  You do not have to stop baby aspirin 81 mg.    7.  IF YOU ARE DIABETIC:  NO  INSULIN OR ORAL MEDICATIONS THE MORNING OF THE COLONOSCOPY.  TAKE ONLY HALF THE DOSE OF YOUR INSULIN THE DAY BEFORE THE COLONOSCOPY.  DO NOT TAKE ANY ORAL DIABETIC MEDICATIONS THE DAY BEFORE THE COLONOSCOPY.  IF YOU ARE AN INSULIN DEPENDENT DIABETIC WITH UNSTABLE BLOOD SUGARS, NOTIFY YOUR PRIMARY CARE PHYSICIAN FOR INSTRUCTIONS.

## 2020-07-15 NOTE — TELEPHONE ENCOUNTER
----- Message from Toshia Owens sent at 7/15/2020  8:47 AM CDT -----  Contact: Dr Ocampo Hlplon-[phone]  Type:  Needs Medical Advice    Who Called:  [facility] Dr Ocampo Office  Reason for call: regarding the pt's Procedure time on 7/22 , due to Transportation reason and if the is any Instruction for the Pt the office goes over it with them  Would the patient rather a call back or a response via [facility]? Call back  Best Call Back Number: [phone]

## 2020-07-16 ENCOUNTER — LAB VISIT (OUTPATIENT)
Dept: LAB | Facility: HOSPITAL | Age: 70
End: 2020-07-16
Attending: INTERNAL MEDICINE
Payer: COMMERCIAL

## 2020-07-16 DIAGNOSIS — E03.9 ACQUIRED HYPOTHYROIDISM: ICD-10-CM

## 2020-07-16 DIAGNOSIS — C34.91 ADENOCARCINOMA OF RIGHT LUNG, STAGE 4: ICD-10-CM

## 2020-07-16 LAB
ALBUMIN SERPL BCP-MCNC: 3.2 G/DL (ref 3.5–5.2)
ALP SERPL-CCNC: 56 U/L (ref 55–135)
ALT SERPL W/O P-5'-P-CCNC: 10 U/L (ref 10–44)
ANION GAP SERPL CALC-SCNC: 9 MMOL/L (ref 8–16)
AST SERPL-CCNC: 17 U/L (ref 10–40)
BASOPHILS # BLD AUTO: 0.03 K/UL (ref 0–0.2)
BASOPHILS NFR BLD: 0.8 % (ref 0–1.9)
BILIRUB SERPL-MCNC: 0.4 MG/DL (ref 0.1–1)
BUN SERPL-MCNC: 7 MG/DL (ref 8–23)
CALCIUM SERPL-MCNC: 8.8 MG/DL (ref 8.7–10.5)
CHLORIDE SERPL-SCNC: 109 MMOL/L (ref 95–110)
CO2 SERPL-SCNC: 25 MMOL/L (ref 23–29)
CREAT SERPL-MCNC: 0.9 MG/DL (ref 0.5–1.4)
DIFFERENTIAL METHOD: ABNORMAL
EOSINOPHIL # BLD AUTO: 0.1 K/UL (ref 0–0.5)
EOSINOPHIL NFR BLD: 2.2 % (ref 0–8)
ERYTHROCYTE [DISTWIDTH] IN BLOOD BY AUTOMATED COUNT: 14.9 % (ref 11.5–14.5)
EST. GFR  (AFRICAN AMERICAN): >60 ML/MIN/1.73 M^2
EST. GFR  (NON AFRICAN AMERICAN): >60 ML/MIN/1.73 M^2
GLUCOSE SERPL-MCNC: 80 MG/DL (ref 70–110)
HCT VFR BLD AUTO: 35 % (ref 37–48.5)
HGB BLD-MCNC: 11.1 G/DL (ref 12–16)
IMM GRANULOCYTES # BLD AUTO: 0 K/UL (ref 0–0.04)
IMM GRANULOCYTES NFR BLD AUTO: 0 % (ref 0–0.5)
LYMPHOCYTES # BLD AUTO: 1.5 K/UL (ref 1–4.8)
LYMPHOCYTES NFR BLD: 40.6 % (ref 18–48)
MCH RBC QN AUTO: 27 PG (ref 27–31)
MCHC RBC AUTO-ENTMCNC: 31.7 G/DL (ref 32–36)
MCV RBC AUTO: 85 FL (ref 82–98)
MONOCYTES # BLD AUTO: 0.5 K/UL (ref 0.3–1)
MONOCYTES NFR BLD: 13.6 % (ref 4–15)
NEUTROPHILS # BLD AUTO: 1.6 K/UL (ref 1.8–7.7)
NEUTROPHILS NFR BLD: 42.8 % (ref 38–73)
NRBC BLD-RTO: 0 /100 WBC
PLATELET # BLD AUTO: 192 K/UL (ref 150–350)
PMV BLD AUTO: 10.6 FL (ref 9.2–12.9)
POTASSIUM SERPL-SCNC: 3.5 MMOL/L (ref 3.5–5.1)
PROT SERPL-MCNC: 7.2 G/DL (ref 6–8.4)
RBC # BLD AUTO: 4.11 M/UL (ref 4–5.4)
SODIUM SERPL-SCNC: 143 MMOL/L (ref 136–145)
T4 FREE SERPL-MCNC: 1.68 NG/DL (ref 0.71–1.51)
TSH SERPL DL<=0.005 MIU/L-ACNC: 0.03 UIU/ML (ref 0.4–4)
WBC # BLD AUTO: 3.67 K/UL (ref 3.9–12.7)

## 2020-07-16 PROCEDURE — 85025 COMPLETE CBC W/AUTO DIFF WBC: CPT

## 2020-07-16 PROCEDURE — 84439 ASSAY OF FREE THYROXINE: CPT

## 2020-07-16 PROCEDURE — 36415 COLL VENOUS BLD VENIPUNCTURE: CPT

## 2020-07-16 PROCEDURE — 84443 ASSAY THYROID STIM HORMONE: CPT

## 2020-07-16 PROCEDURE — 80053 COMPREHEN METABOLIC PANEL: CPT

## 2020-07-17 ENCOUNTER — INFUSION (OUTPATIENT)
Dept: INFUSION THERAPY | Facility: HOSPITAL | Age: 70
End: 2020-07-17
Attending: INTERNAL MEDICINE
Payer: COMMERCIAL

## 2020-07-17 ENCOUNTER — OFFICE VISIT (OUTPATIENT)
Dept: HEMATOLOGY/ONCOLOGY | Facility: CLINIC | Age: 70
End: 2020-07-17
Payer: COMMERCIAL

## 2020-07-17 VITALS
SYSTOLIC BLOOD PRESSURE: 138 MMHG | TEMPERATURE: 99 F | RESPIRATION RATE: 20 BRPM | DIASTOLIC BLOOD PRESSURE: 64 MMHG | HEART RATE: 67 BPM

## 2020-07-17 VITALS
SYSTOLIC BLOOD PRESSURE: 111 MMHG | DIASTOLIC BLOOD PRESSURE: 66 MMHG | HEIGHT: 66 IN | WEIGHT: 140.63 LBS | RESPIRATION RATE: 20 BRPM | OXYGEN SATURATION: 98 % | BODY MASS INDEX: 22.6 KG/M2 | HEART RATE: 75 BPM

## 2020-07-17 DIAGNOSIS — C77.1 METASTASIS TO MEDIASTINAL LYMPH NODE: ICD-10-CM

## 2020-07-17 DIAGNOSIS — F32.A DEPRESSION, UNSPECIFIED DEPRESSION TYPE: ICD-10-CM

## 2020-07-17 DIAGNOSIS — G89.3 CANCER RELATED PAIN: ICD-10-CM

## 2020-07-17 DIAGNOSIS — F41.9 ANXIETY: ICD-10-CM

## 2020-07-17 DIAGNOSIS — C34.91 ADENOCARCINOMA OF RIGHT LUNG, STAGE 4: Primary | ICD-10-CM

## 2020-07-17 DIAGNOSIS — E03.9 ACQUIRED HYPOTHYROIDISM: ICD-10-CM

## 2020-07-17 DIAGNOSIS — C79.51 BONE METASTASIS: ICD-10-CM

## 2020-07-17 PROCEDURE — 99999 PR PBB SHADOW E&M-EST. PATIENT-LVL IV: ICD-10-PCS | Mod: PBBFAC,,, | Performed by: INTERNAL MEDICINE

## 2020-07-17 PROCEDURE — 99215 OFFICE O/P EST HI 40 MIN: CPT | Mod: S$GLB,,, | Performed by: INTERNAL MEDICINE

## 2020-07-17 PROCEDURE — 99999 PR PBB SHADOW E&M-EST. PATIENT-LVL IV: CPT | Mod: PBBFAC,,, | Performed by: INTERNAL MEDICINE

## 2020-07-17 PROCEDURE — 99215 PR OFFICE/OUTPT VISIT, EST, LEVL V, 40-54 MIN: ICD-10-PCS | Mod: S$GLB,,, | Performed by: INTERNAL MEDICINE

## 2020-07-17 PROCEDURE — 25000003 PHARM REV CODE 250: Performed by: INTERNAL MEDICINE

## 2020-07-17 PROCEDURE — 63600175 PHARM REV CODE 636 W HCPCS: Mod: JG | Performed by: INTERNAL MEDICINE

## 2020-07-17 PROCEDURE — 96413 CHEMO IV INFUSION 1 HR: CPT

## 2020-07-17 RX ORDER — SODIUM CHLORIDE 0.9 % (FLUSH) 0.9 %
10 SYRINGE (ML) INJECTION
Status: CANCELLED | OUTPATIENT
Start: 2020-07-17

## 2020-07-17 RX ORDER — SODIUM CHLORIDE 0.9 % (FLUSH) 0.9 %
10 SYRINGE (ML) INJECTION
Status: DISCONTINUED | OUTPATIENT
Start: 2020-07-17 | End: 2020-07-17 | Stop reason: HOSPADM

## 2020-07-17 RX ORDER — HEPARIN 100 UNIT/ML
500 SYRINGE INTRAVENOUS
Status: DISCONTINUED | OUTPATIENT
Start: 2020-07-17 | End: 2020-07-17 | Stop reason: HOSPADM

## 2020-07-17 RX ORDER — HEPARIN 100 UNIT/ML
500 SYRINGE INTRAVENOUS
Status: CANCELLED | OUTPATIENT
Start: 2020-07-17

## 2020-07-17 RX ADMIN — SODIUM CHLORIDE: 9 INJECTION, SOLUTION INTRAVENOUS at 02:07

## 2020-07-17 RX ADMIN — SODIUM CHLORIDE 200 MG: 9 INJECTION, SOLUTION INTRAVENOUS at 02:07

## 2020-07-17 NOTE — PROGRESS NOTES
ONCOLOGY FOLLOW UP VISIT.     Reason for visit: Toxicity check on Keytruda for stage IV NSCLC    Best Contact Phone Number(s): [phone] (home)      Cancer/Stage/TNM:   Cancer Staging  No matching staging information was found for the patient.     Oncology History   Adenocarcinoma of right lung, stage 4   4/27/2020 Initial Diagnosis    Treated in late 2019 for pneumonia.  Patient presented with constant chest pain on the right side and back.  Loss of appetite and weight loss.  She was found to have a R lung mass, mediastinal LAD, and R pleural effusion.  4/27 had EBUS and was found to have adenocarcinoma from 4L, station 7, 4R and 11R FNAs.     6/4/2020 -  Chemotherapy    Treatment Summary   Plan Name: OP PEMBROLIZUMAB 200MG Q3W  Treatment Goal: Control  Status: Active  Start Date: 6/4/2020  End Date: 9/18/2020 (Planned)  Provider: Gurpreet Estrada MD  Chemotherapy: pembrolizumab (KEYTRUDA) 200 mg in sodium chloride 0.9% 100 mL chemo infusion, 200 mg, Intravenous, Clinic/HOD 1 time, 3 of 6 cycles  Administration: 200 mg (6/4/2020), 200 mg (6/25/2020)          Interval History:   Today patient reports pain from cancer has improved greatly and she is not on pain medications anymore.  She has some knee pain, but denies other side effects.  She has been more anxious and fidgety over the last few weeks.       ROS:  Review of Systems   Constitutional: Negative for activity change, chills, fatigue, fever and unexpected weight change.   HENT: Negative for mouth sores, nosebleeds and sore throat.    Respiratory: Negative for cough, shortness of breath and wheezing.    Cardiovascular: Negative for chest pain, palpitations and leg swelling.   Gastrointestinal: Negative for abdominal distention, abdominal pain and blood in stool.   Endocrine: Negative for cold intolerance and heat intolerance.   Genitourinary: Negative for dysuria, flank pain and frequency.   Musculoskeletal: Positive for arthralgias. Negative for joint  swelling and myalgias.   Skin: Negative for color change, rash and wound.   Neurological: Negative for dizziness, light-headedness and headaches.   Hematological: Negative for adenopathy. Does not bruise/bleed easily.   Psychiatric/Behavioral: Positive for agitation. The patient is nervous/anxious.       A complete 12-point review of systems was reviewed and is negative except as mentioned above.     Past Medical History:   Past Medical History:   Diagnosis Date    Depression     Hypercholesteremia     Pneumonia     Thyroid disease         Allergies:   Review of patient's allergies indicates:   Allergen Reactions    Codeine Nausea And Vomiting        Medications:   Current Outpatient Medications   Medication Sig Dispense Refill    aspirin (ECOTRIN) 81 MG EC tablet Take 81 mg by mouth once daily.      divalproex (DEPAKOTE) 250 MG EC tablet Take 250 mg by mouth 3 (three) times daily.      DULoxetine (CYMBALTA) 20 MG capsule Take 20 mg by mouth once daily.      levocetirizine (XYZAL) 5 MG tablet Take 5 mg by mouth every evening.      levothyroxine (SYNTHROID) 112 MCG tablet Take 1 tablet (112 mcg total) by mouth once daily. 30 tablet 1    multivitamin capsule Take 1 capsule by mouth once daily.      pravastatin (PRAVACHOL) 40 MG tablet Take 1 tablet (40 mg total) by mouth once daily. 90 tablet 3    risperiDONE (RISPERDAL) 0.5 MG Tab Take by mouth.      venlafaxine (EFFEXOR-XR) 150 MG Cp24 Take 150 mg by mouth once daily.      vitamin D (VITAMIN D3) 1000 units Tab Take 1 tablet (1,000 Units total) by mouth once daily. 90 tablet 11    clonazePAM (KLONOPIN) 1 MG tablet Take 1 tablet (1 mg total) by mouth 2 (two) times daily. (Patient not taking: Reported on 7/17/2020) 60 tablet 0    morphine (MS CONTIN) 15 MG 12 hr tablet Take 15 mg by mouth 2 (two) times daily.      potassium chloride SA (K-DUR,KLOR-CON) 20 MEQ tablet Take 1 tablet (20 mEq total) by mouth once daily. (Patient not taking: Reported on  "6/25/2020) 30 tablet 1    sodium,potassium,mag sulfates (SUPREP BOWEL PREP KIT) 17.5-3.13-1.6 gram SolR Take 1 each by mouth as directed. (Patient not taking: Reported on 6/4/2020) 1 Bottle 0     No current facility-administered medications for this visit.      Facility-Administered Medications Ordered in Other Visits   Medication Dose Route Frequency Provider Last Rate Last Dose    alteplase injection 2 mg  2 mg Intra-Catheter PRN Gurpreet Estrada MD        heparin, porcine (PF) 100 unit/mL injection flush 500 Units  500 Units Intravenous PRN Gurpreet Estrada MD        pembrolizumab (KEYTRUDA) 200 mg in sodium chloride 0.9% 100 mL chemo infusion  200 mg Intravenous 1 time in Clinic/HOD Gurpreet Estrada  mL/hr at 07/17/20 1451 200 mg at 07/17/20 1451    sodium chloride 0.9% flush 10 mL  10 mL Intravenous PRN Gurpreet Estrada MD            Physical Exam:   /66 (BP Location: Left arm, Patient Position: Sitting, BP Method: Medium (Automatic))   Pulse 75   Resp 20   Ht 5' 6" (1.676 m)   Wt 63.8 kg (140 lb 10.5 oz)   SpO2 98%   BMI 22.70 kg/m²      ECOG Performance Status: (foot note - ECOG PS provided by Eastern Cooperative Oncology Group) 1 - Symptomatic but completely ambulatory    Physical Exam  Constitutional:       Appearance: She is well-developed.   HENT:      Head: Normocephalic and atraumatic.   Eyes:      Conjunctiva/sclera: Conjunctivae normal.      Pupils: Pupils are equal, round, and reactive to light.   Neck:      Musculoskeletal: Normal range of motion and neck supple.   Cardiovascular:      Rate and Rhythm: Normal rate and regular rhythm.      Heart sounds: No murmur. No friction rub.   Pulmonary:      Effort: Pulmonary effort is normal.      Breath sounds: Normal breath sounds.   Abdominal:      General: Bowel sounds are normal.      Palpations: Abdomen is soft.      Tenderness: There is no abdominal tenderness.   Musculoskeletal: Normal range of motion.   Lymphadenopathy: "      Cervical: No cervical adenopathy.   Skin:     General: Skin is warm and dry.   Neurological:      Mental Status: She is alert and oriented to person, place, and time.   Psychiatric:         Behavior: Behavior normal.           Labs:   Recent Results (from the past 48 hour(s))   CBC auto differential    Collection Time: 07/16/20 11:47 AM   Result Value Ref Range    WBC 3.67 (L) 3.90 - 12.70 K/uL    RBC 4.11 4.00 - 5.40 M/uL    Hemoglobin 11.1 (L) 12.0 - 16.0 g/dL    Hematocrit 35.0 (L) 37.0 - 48.5 %    Mean Corpuscular Volume 85 82 - 98 fL    Mean Corpuscular Hemoglobin 27.0 27.0 - 31.0 pg    Mean Corpuscular Hemoglobin Conc 31.7 (L) 32.0 - 36.0 g/dL    RDW 14.9 (H) 11.5 - 14.5 %    Platelets 192 150 - 350 K/uL    MPV 10.6 9.2 - 12.9 fL    Immature Granulocytes 0.0 0.0 - 0.5 %    Gran # (ANC) 1.6 (L) 1.8 - 7.7 K/uL    Immature Grans (Abs) 0.00 0.00 - 0.04 K/uL    Lymph # 1.5 1.0 - 4.8 K/uL    Mono # 0.5 0.3 - 1.0 K/uL    Eos # 0.1 0.0 - 0.5 K/uL    Baso # 0.03 0.00 - 0.20 K/uL    nRBC 0 0 /100 WBC    Gran% 42.8 38.0 - 73.0 %    Lymph% 40.6 18.0 - 48.0 %    Mono% 13.6 4.0 - 15.0 %    Eosinophil% 2.2 0.0 - 8.0 %    Basophil% 0.8 0.0 - 1.9 %    Differential Method Automated    Comprehensive metabolic panel    Collection Time: 07/16/20 11:47 AM   Result Value Ref Range    Sodium 143 136 - 145 mmol/L    Potassium 3.5 3.5 - 5.1 mmol/L    Chloride 109 95 - 110 mmol/L    CO2 25 23 - 29 mmol/L    Glucose 80 70 - 110 mg/dL    BUN, Bld 7 (L) 8 - 23 mg/dL    Creatinine 0.9 0.5 - 1.4 mg/dL    Calcium 8.8 8.7 - 10.5 mg/dL    Total Protein 7.2 6.0 - 8.4 g/dL    Albumin 3.2 (L) 3.5 - 5.2 g/dL    Total Bilirubin 0.4 0.1 - 1.0 mg/dL    Alkaline Phosphatase 56 55 - 135 U/L    AST 17 10 - 40 U/L    ALT 10 10 - 44 U/L    Anion Gap 9 8 - 16 mmol/L    eGFR if African American >60.0 >60 mL/min/1.73 m^2    eGFR if non African American >60.0 >60 mL/min/1.73 m^2   T4, free    Collection Time: 07/16/20 11:47 AM   Result Value Ref Range     Free T4 1.68 (H) 0.71 - 1.51 ng/dL   TSH    Collection Time: 07/16/20 11:47 AM   Result Value Ref Range    TSH 0.034 (L) 0.400 - 4.000 uIU/mL      Imaging:   X-Ray Chest PA And Lateral  Narrative: EXAMINATION:  XR CHEST PA AND LATERAL    CLINICAL HISTORY:  Chest pain, unspecified    TECHNIQUE:  PA and lateral views of the chest were performed.    COMPARISON:  03/03/2020    FINDINGS:  Heart size is within normal limits.  There is no tracheal abnormalities.  Mediastinum is unremarkable.  Slight tortuosity of the descending thoracic aorta.    There is a lobulated density along the lateral aspect of the right midlung zone seen on the previous examination.  Increased pleural thickening along the lateral aspect of the right chest wall also without change from the previous study.  CT scan performed on this patient on 03/09/2020 had suggested a pulmonary mass in this region.    Seen best on the lateral radiograph there is a pleural based density along the posterior chest, suspicious for a pleural effusion.    Left lung is clear.  Mediastinum is unremarkable.    There are cardiac monitoring leads over the chest.  Impression: 1. A nodular pleural based density along the lateral aspect of the right midlung zone common known to be a mass as per the CT of 03/03/2020.  2. Lobulated soft tissue density along the pleural surface of the right hemithorax posteriorly likely representing pleural effusion.  3. Left lung is clear.    Electronically signed by: Cheo Saba MD  Date:    06/09/2020  Time:    15:12            Diagnoses:       1. Adenocarcinoma of right lung, stage 4    2. 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 Acquired hypothyroidism    5. Cancer related pain    6. Anxiety    7. Depression, unspecified depression type          Assessment and Plan:         1,2,3. Stage IV PD-L1 60% Adenocarcinoma of lung:   -Not enough tissue for molecular testing, will send for Guardant 360.  Staging has been  completed.  Discussed with patient and son in detail diagnosis, prognosis, and treatment options.  Discussed that if patient did not have targetable mutations on liquid biopsy, best treatment option with her performance status would be immunotherapy alone with Keytruda.     Labs acceptable to proceed with cycle #2 of Keytruda.     RTC 3 weeks with labs (CBC,CMP,TSH,free T4), to see Dr. Estrada and keytruda cycle #3.      4- Patient is now taking 150 mg through [facility] prescription.  TSH low and free T4 high with symptoms.  Discussed with patient that cocaine can interact with synthroid and cause hyperthyroidism.  Will call [facility] physician to titrate synthroid down.     5-Patient was referred to palliative care and is followed by Dr. Kearney.  All prescriptions need to be prescribed by patient's [facility] physician.  Patient says her chest wall pain has improved and she is now not requiring any pain medications.     6,7-All prescriptions through [facility].            Greater than 50% of this time involved counseling or coordination of care. I have provided the patient with an opportunity to ask questions and have all questions answered to his satisfaction.     she will return to clinic in 3 weeks, but knows to call in the interim if symptoms change or should a problem arise.    Gurpreet Estrada MD  Medical Oncology [facility]

## 2020-07-17 NOTE — PLAN OF CARE
Tolerated Keytruda well.  No reactions noted.  No questions or concerns at this time.  Left unit in NAD.

## 2020-07-20 ENCOUNTER — TELEPHONE (OUTPATIENT)
Dept: GASTROENTEROLOGY | Facility: CLINIC | Age: 70
End: 2020-07-20

## 2020-07-20 ENCOUNTER — LAB VISIT (OUTPATIENT)
Dept: URGENT CARE | Facility: CLINIC | Age: 70
End: 2020-07-20
Payer: COMMERCIAL

## 2020-07-20 ENCOUNTER — TELEPHONE (OUTPATIENT)
Dept: ENDOSCOPY | Facility: HOSPITAL | Age: 70
End: 2020-07-20

## 2020-07-20 VITALS — RESPIRATION RATE: 18 BRPM | OXYGEN SATURATION: 97 % | HEART RATE: 88 BPM | TEMPERATURE: 98 F

## 2020-07-20 DIAGNOSIS — Z01.812 PRE-PROCEDURE LAB EXAM: ICD-10-CM

## 2020-07-20 PROCEDURE — U0003 INFECTIOUS AGENT DETECTION BY NUCLEIC ACID (DNA OR RNA); SEVERE ACUTE RESPIRATORY SYNDROME CORONAVIRUS 2 (SARS-COV-2) (CORONAVIRUS DISEASE [COVID-19]), AMPLIFIED PROBE TECHNIQUE, MAKING USE OF HIGH THROUGHPUT TECHNOLOGIES AS DESCRIBED BY CMS-2020-01-R: HCPCS

## 2020-07-20 NOTE — TELEPHONE ENCOUNTER
Spoke with [facility] and patient is 100% covered for both EGD and Colonoscopy procedures. Patient is enrolled in the [facility] program.     Patient would also like to know if she would still need to proceed with the EGD/Colonoscopy due to her having metastatic cancer. Should she proceed?

## 2020-07-20 NOTE — TELEPHONE ENCOUNTER
----- Message from Gurpreet Estrada MD sent at 7/20/2020  3:52 PM CDT -----  Regarding: RE: shared patient.  If there are no other indications for scopes, then yes I agree.. If the EGD was to evaluate chest pain, the pain is from her cancer, so no need for scope.  I also do not think screening colonoscopy is appropriate.    Thanks  Johny  ----- Message -----  From: Wolf Dumont MD  Sent: 7/20/2020  11:23 AM CDT  To: Gurpreet Estrada MD  Subject: shared patient.                                  Dr. Estrada,  This patient saw me back in February for some chest pain and I recommended EGD / colonoscopy as well as further evaluation of these lung findings...    Since that time, she has seemingly been diagnosed with stage 4 lung Ca.    In the midst of all this, she has since been scheduled for EGD / colonoscopy with me for Wednesday...  My impression is that these procedures should be cancelled as she has metastatic uncurable cancer.    I am writing to inform you as well to see if you agree? Unless I am missing something, there should be no reason to proceed with endoscopy now given her current diagnoses?    Thanks for your response.  Wolf Dumont MD  [facility]

## 2020-07-20 NOTE — TELEPHONE ENCOUNTER
egd /  Colon procedures cancelled.  I called and discussed and informed patient.    No indication in setting of metastatic cancer to perform these procedures.

## 2020-07-21 LAB — SARS-COV-2 RNA RESP QL NAA+PROBE: NOT DETECTED

## 2020-07-21 NOTE — TELEPHONE ENCOUNTER
Left a message on patient VM to inform her that procedures were canceled.       Called [facility] with [facility]. Left a message on VM.

## 2020-07-29 ENCOUNTER — TELEPHONE (OUTPATIENT)
Dept: HEMATOLOGY/ONCOLOGY | Facility: CLINIC | Age: 70
End: 2020-07-29

## 2020-07-29 NOTE — PROGRESS NOTES
Established Patient - Audio Only Telehealth Visit     The patient location is: [place]/home  The chief complaint leading to consultation is: 60 minutes  Visit type: Virtual visit with audio only (telephone)         The reason for the audio only service rather than synchronous audio and video virtual visit was related to technical difficulties or patient preference/necessity.     Each patient to whom I provide medical services by telemedicine is:  (1) informed of the relationship between the physician and patient and the respective role of any other health care provider with respect to management of the patient; and (2) notified that they may decline to receive medical services by telemedicine and may withdraw from such care at any time. Patient verbally consented to receive this service via voice-only telephone call.       This service was not originating from a related E/M service provided within the previous 7 days nor will  to an E/M service or procedure within the next 24 hours or my soonest available appointment.  Prevailing standard of care was able to be met in this audio-only visit.      Consult Note  Palliative Care        Consult Requested By: Dr. Gurpreet Estrada  Reason for Consult: symptom mgmt and ACP in the setting of Stage 4 NSCLC        ASSESSMENT/PLAN:      Plan/Recommendations:  Diagnoses and all orders for this visit:        Metastatic NSCLC  Cancer related pain  H/o substance abuse/Cocaine/opioids  -     Discussed with PCP through [facility] and made recommendations for pt to sign pain contract, only have controlled substance medications from a single source, and to set early boundaries to minimize risk in pt with clinical requirements but also a maladaptive history and substance abuse issues  -     Recommended MS Contin 15 mg bid; oxy 5 po prn     Anxiety/Depression/Mental health  -     Managed by outpt [facility] and multi-D approach to complex social situaiton  -     Would attempt to maximize  "non-benzo meds      Palliative care encounter  ACP (advance care planning)  Ethical / Legal Advance Care Planning                  - surrogate decision maker: Marcell Quinones Son     [phone]                  - Code Status: FC              - discussed at length with the pt the importance of completing ACP discussions; she states she would be ok withher son making decisions for her but not ready yet to have "the talk" with him; to be continued as an outpt; discussed with PCP Dr. Montejo     Follow up in 3 months unless needed sooner           SUBJECTIVE:      History of Present Illness:  69 y.o. female who presents for evaluation and treatment recommendations. I have reviewed the patient's chart dating back the past 6 months, and this is detailed in oncologic history as above.  Patient currently with continued constant chest pain.  She says oxycodone does not help the pain.  This pain has been constant, not progressing but also not improving for months now.  She also says she had klonopin discontinued, which has worsened pain/anxiety.  Her performance status is ECOG PS1.  Now her main limitation in PS is due to pain for which she was referred to palliative medicine.     In this setting, palliative medicine was consulted to help with medical decision making and aid in the formation of goals of care.            Cancer Staging                 Adenocarcinoma of right lung, stage 4     4/27/2020 Initial Diagnosis       Treated in late 2019 for pneumonia.  Patient presented with constant chest pain on the right side and back.  Loss of appetite and weight loss.  She was found to have a R lung mass, mediastinal LAD, and R pleural effusion.  4/27 had EBUS and was found to have adenocarcinoma from 4L, station 7, 4R and 11R FNAs.                 Past Medical History:   Diagnosis Date    Depression      Hypercholesteremia      Pneumonia      Thyroid disease             Past Surgical History:   Procedure " Laterality Date     SECTION        ENDOBRONCHIAL ULTRASOUND N/A 2020     Procedure: ENDOBRONCHIAL ULTRASOUND (EBUS);  Surgeon: Karen Mills MD;  Location: [facility] OR [address] [place] [address];  Service: Endoscopy;  Laterality: N/A;           Family History   Problem Relation Age of Onset    Heart attack Mother      Lung cancer Mother      Liver cancer Father      Miscarriages / Stillbirths Neg Hx      Diabetes Neg Hx            Review of patient's allergies indicates:   Allergen Reactions    Codeine Nausea And Vomiting        Medications:    Current Outpatient Medications:     aspirin (ECOTRIN) 81 MG EC tablet, Take 81 mg by mouth once daily., Disp: , Rfl:     clonazePAM (KLONOPIN) 1 MG tablet, Take 1 tablet (1 mg total) by mouth 2 (two) times daily., Disp: 60 tablet, Rfl: 0    divalproex (DEPAKOTE) 250 MG EC tablet, Take 250 mg by mouth 3 (three) times daily., Disp: , Rfl:     levocetirizine (XYZAL) 5 MG tablet, Take 5 mg by mouth every evening., Disp: , Rfl:     levothyroxine (SYNTHROID) 100 MCG tablet, Take 100 mcg by mouth once daily., Disp: , Rfl:     morphine (MS CONTIN) 15 MG 12 hr tablet, Take 1 tablet (15 mg total) by mouth 2 (two) times daily., Disp: 60 tablet, Rfl: 0    multivitamin capsule, Take 1 capsule by mouth once daily., Disp: , Rfl:     pravastatin (PRAVACHOL) 40 MG tablet, Take 1 tablet (40 mg total) by mouth once daily., Disp: 90 tablet, Rfl: 3    sodium,potassium,mag sulfates (SUPREP BOWEL PREP KIT) 17.5-3.13-1.6 gram SolR, Take 1 each by mouth as directed. (Patient not taking: Reported on 2020), Disp: 1 Bottle, Rfl: 0    vitamin D (VITAMIN D3) 1000 units Tab, Take 1 tablet (1,000 Units total) by mouth once daily., Disp: 90 tablet, Rfl: 11        24h Oral Morphine Equivalents (OME):      OBJECTIVE:      Symptom Assessment (ESAS 0-10 scale)    ESAS 0 1 2 3 4 5 6 7 8 9 10   Pain []?  []?  []?  []?  []?  []?  []?  []?  []?  []?  []?    Dyspnea []?  []?  []?  []?  []?  []?  []?   []?  []?  []?  []?    Anxiety []?  []?  []?  []?  []?  []?  []?  []?  []?  []?  []?    Nausea []?  []?  []?  []?  []?  []?  []?  []?  []?  []?  []?    Depression  []?  []?  []?  []?  []?  []?  []?  []?  []?  []?  []?    Anorexia []?  []?  []?  []?  []?  []?  []?  []?  []?  []?  []?    Fatigue []?  []?  []?  []?  []?  []?  []?  []?  []?  []?  []?    Insomnia []?  []?  []?  []?  []?  []?  []?  []?  []?  []?  []?    Restlessness  []?  []?  []?  []?  []?  []?  []?  []?  []?  []?  []?    Agitation []?  []?  []?  []?  []?  []?  []?  []?  []?  []?  []?          Constipation    no  Bowel Management Plan (BMP): no  Diarrhea        no  Comments:   Perfromance Status: PPS Score 80  ROS:  Review of Systems   Constitutional: Positive for fatigue and unexpected weight change. Negative for chills, diaphoresis and fever.        25 lbs weight loss over the past 3 months   HENT: Negative.    Eyes: Negative.    Respiratory: Positive for cough. Negative for shortness of breath.    Cardiovascular: Positive for chest pain.   Gastrointestinal: Negative for abdominal pain.   Endocrine: Negative.    Genitourinary: Negative.    Musculoskeletal: Negative.    Skin: Negative.    Allergic/Immunologic: Negative.    Neurological: Negative.    Hematological: Negative.    Psychiatric/Behavioral: The patient is nervous/anxious.    All other systems reviewed and are negative.        Physical Exam:  Vitals:    Physical Exam  Unable to perform full exam secondary to Audiovisit only     Labs:  CBC:         WBC   Date Value Ref Range Status   03/09/2020 6.10 3.90 - 12.70 K/uL Final           Hemoglobin   Date Value Ref Range Status   03/09/2020 12.9 12.0 - 16.0 g/dL Final           Hematocrit   Date Value Ref Range Status   03/09/2020 37.8 37.0 - 48.5 % Final           Mean Corpuscular Volume   Date Value Ref Range Status   03/09/2020 86 82 - 98 fL Final           Platelets   Date Value Ref Range Status   03/09/2020 241 150 - 350 K/uL Final               LFT:         Lab Results   Component Value Date     AST 23 12/05/2019     ALKPHOS 81 12/05/2019     BILITOT 0.4 12/05/2019        Albumin:         Albumin   Date Value Ref Range Status   12/05/2019 3.7 3.5 - 5.2 g/dL Final     Protein:         Total Protein   Date Value Ref Range Status   12/05/2019 8.8 (H) 6.0 - 8.4 g/dL Final        Radiology:I have reviewed all pertinent imaging results/findings within the past 24 hours.  CT Chest 03/2020  Impression         1. Irregular spiculated mass along the pleural surface of the right upper lobe laterally extending nearly to the apex, and across the mid lung to the hilum with contiguous extension along the hilar structures into the mediastinum, consistent with malignancy.  2. Subcarinal lymphadenopathy.  3. Right pleural effusion.         Psychosocial/Cultural: pt with a complex history of mental illness and substance abuse issues; member of [facility] with dr Montejo     Spiritual:  F- [place] and Belief: yes; did not explore deeply today     > 50% of 60 min visit spent in chart review, face to face discussion of goals of care,  symptom assessment, coordination of care and emotional support.        Signature: Tuan Kearney MD

## 2020-07-29 NOTE — TELEPHONE ENCOUNTER
----- Message from Roseann Mayfield sent at 7/29/2020 11:17 AM CDT -----  Regarding: call back  Pt requesting a call back    Please call     Phone [phone]

## 2020-07-29 NOTE — TELEPHONE ENCOUNTER
Patient received synthroid 175, not 112. [facility]. [facility] may have sent the old dosing automatically. Will reach out to [facility] to see what needs to be done.

## 2020-07-30 ENCOUNTER — TELEPHONE (OUTPATIENT)
Dept: HEMATOLOGY/ONCOLOGY | Facility: CLINIC | Age: 70
End: 2020-07-30

## 2020-07-30 NOTE — TELEPHONE ENCOUNTER
----- Message from Ramila Bermeo sent at 7/30/2020  9:01 AM CDT -----  Regarding: Requesting a return call regarding medication  Contact: Micheline  Type:  Needs Medical Advice    Who Called: Micheline  Would the patient rather a call back or a response via [facility]? callback  Best Call Back Number: [phone]  Additional Information: Says her  levothyroxine (SYNTHROID) 112 MCG tablet  medication dosage it wrong and need to speak with someone in office regarding this

## 2020-08-06 ENCOUNTER — HOSPITAL ENCOUNTER (OUTPATIENT)
Dept: RADIOLOGY | Facility: HOSPITAL | Age: 70
Discharge: HOME | End: 2020-08-06
Attending: INTERNAL MEDICINE
Payer: COMMERCIAL

## 2020-08-06 DIAGNOSIS — C77.1 METASTASIS TO MEDIASTINAL LYMPH NODE: ICD-10-CM

## 2020-08-06 DIAGNOSIS — C79.51 BONE METASTASIS: ICD-10-CM

## 2020-08-06 DIAGNOSIS — C34.91 ADENOCARCINOMA OF RIGHT LUNG, STAGE 4: ICD-10-CM

## 2020-08-06 PROCEDURE — 71260 CT CHEST ABDOMEN PELVIS WITH CONTRAST (XPD): ICD-10-PCS | Mod: 26,,, | Performed by: RADIOLOGY

## 2020-08-06 PROCEDURE — 71260 CT THORAX DX C+: CPT | Mod: 26,,, | Performed by: RADIOLOGY

## 2020-08-06 PROCEDURE — 74177 CT ABD & PELVIS W/CONTRAST: CPT | Mod: 26,,, | Performed by: RADIOLOGY

## 2020-08-06 PROCEDURE — 74177 CT CHEST ABDOMEN PELVIS WITH CONTRAST (XPD): ICD-10-PCS | Mod: 26,,, | Performed by: RADIOLOGY

## 2020-08-06 PROCEDURE — 25500020 PHARM REV CODE 255: Performed by: INTERNAL MEDICINE

## 2020-08-06 PROCEDURE — 74177 CT ABD & PELVIS W/CONTRAST: CPT | Mod: TC

## 2020-08-06 RX ADMIN — IOHEXOL 15 ML: 350 INJECTION, SOLUTION INTRAVENOUS at 01:08

## 2020-08-06 RX ADMIN — IOHEXOL 15 ML: 350 INJECTION, SOLUTION INTRAVENOUS at 12:08

## 2020-08-06 RX ADMIN — IOHEXOL 75 ML: 350 INJECTION, SOLUTION INTRAVENOUS at 02:08

## 2020-08-07 ENCOUNTER — OFFICE VISIT (OUTPATIENT)
Dept: HEMATOLOGY/ONCOLOGY | Facility: CLINIC | Age: 70
End: 2020-08-07
Payer: COMMERCIAL

## 2020-08-07 ENCOUNTER — CLINICAL SUPPORT (OUTPATIENT)
Dept: HEMATOLOGY/ONCOLOGY | Facility: CLINIC | Age: 70
End: 2020-08-07
Payer: COMMERCIAL

## 2020-08-07 VITALS — BODY MASS INDEX: 22.82 KG/M2 | HEIGHT: 66 IN | WEIGHT: 142 LBS

## 2020-08-07 VITALS
TEMPERATURE: 99 F | HEIGHT: 66 IN | DIASTOLIC BLOOD PRESSURE: 68 MMHG | WEIGHT: 142 LBS | HEART RATE: 75 BPM | OXYGEN SATURATION: 98 % | BODY MASS INDEX: 22.82 KG/M2 | RESPIRATION RATE: 16 BRPM | SYSTOLIC BLOOD PRESSURE: 125 MMHG

## 2020-08-07 DIAGNOSIS — Z71.3 NUTRITIONAL COUNSELING: ICD-10-CM

## 2020-08-07 DIAGNOSIS — C34.91 ADENOCARCINOMA OF RIGHT LUNG, STAGE 4: ICD-10-CM

## 2020-08-07 DIAGNOSIS — F41.9 ANXIETY: ICD-10-CM

## 2020-08-07 DIAGNOSIS — G89.3 CANCER RELATED PAIN: ICD-10-CM

## 2020-08-07 DIAGNOSIS — E03.9 ACQUIRED HYPOTHYROIDISM: ICD-10-CM

## 2020-08-07 DIAGNOSIS — R63.4 WEIGHT LOSS, UNINTENTIONAL: ICD-10-CM

## 2020-08-07 DIAGNOSIS — C79.51 BONE METASTASIS: ICD-10-CM

## 2020-08-07 DIAGNOSIS — C34.91 ADENOCARCINOMA OF RIGHT LUNG, STAGE 4: Primary | ICD-10-CM

## 2020-08-07 DIAGNOSIS — C77.1 METASTASIS TO MEDIASTINAL LYMPH NODE: ICD-10-CM

## 2020-08-07 DIAGNOSIS — F32.A DEPRESSION, UNSPECIFIED DEPRESSION TYPE: ICD-10-CM

## 2020-08-07 PROCEDURE — 99214 OFFICE O/P EST MOD 30 MIN: CPT | Mod: S$GLB,,, | Performed by: INTERNAL MEDICINE

## 2020-08-07 PROCEDURE — 99999 PR PBB SHADOW E&M-EST. PATIENT-LVL II: ICD-10-PCS | Mod: PBBFAC,,, | Performed by: DIETITIAN, REGISTERED

## 2020-08-07 PROCEDURE — 97802 MEDICAL NUTRITION INDIV IN: CPT | Mod: S$GLB,,, | Performed by: DIETITIAN, REGISTERED

## 2020-08-07 PROCEDURE — 99999 PR PBB SHADOW E&M-EST. PATIENT-LVL IV: ICD-10-PCS | Mod: PBBFAC,,, | Performed by: INTERNAL MEDICINE

## 2020-08-07 PROCEDURE — 99999 PR PBB SHADOW E&M-EST. PATIENT-LVL II: CPT | Mod: PBBFAC,,, | Performed by: DIETITIAN, REGISTERED

## 2020-08-07 PROCEDURE — 97802 PR MED NUTR THER, 1ST, INDIV, EA 15 MIN: ICD-10-PCS | Mod: S$GLB,,, | Performed by: DIETITIAN, REGISTERED

## 2020-08-07 PROCEDURE — 99214 PR OFFICE/OUTPT VISIT, EST, LEVL IV, 30-39 MIN: ICD-10-PCS | Mod: S$GLB,,, | Performed by: INTERNAL MEDICINE

## 2020-08-07 PROCEDURE — 99999 PR PBB SHADOW E&M-EST. PATIENT-LVL IV: CPT | Mod: PBBFAC,,, | Performed by: INTERNAL MEDICINE

## 2020-08-07 RX ORDER — SODIUM CHLORIDE 0.9 % (FLUSH) 0.9 %
10 SYRINGE (ML) INJECTION
Status: CANCELLED | OUTPATIENT
Start: 2020-08-07

## 2020-08-07 RX ORDER — HEPARIN 100 UNIT/ML
500 SYRINGE INTRAVENOUS
Status: CANCELLED | OUTPATIENT
Start: 2020-08-07

## 2020-08-07 NOTE — PROGRESS NOTES
"Oncology Nutrition Assessment for Medical Nutrition Therapy  Initial Visit    Micheline Raza   1950    Referring Provider:  Gurpreet Estrada MD      Reason for Visit: Pt in for education and nutrition counseling     PMHx:   Past Medical History:   Diagnosis Date    Depression     Hypercholesteremia     Pneumonia     Thyroid disease        Nutrition Assessment    This is a 69 y.o.female with a medical diagnosis of stage IV NSCLC. She has been on treatment with Keytruda with good response. Referred to nutrition for appetite loss and weight loss.   Patient reports her appetite loss started prior to her diagnosis. She has lost about 10lb. She is receiving shipments of Boost every month which were ordered by her PCP. She is also receiving meal deliveries "for low income seniors" which she actually really enjoys. She gets one meal per day. She snacks frequently but mostly on foods that are low nutritional quality like sweets and chips. However she does like a wide variety of foods.     Weight:64.4 kg (141 lb 15.6 oz)  Height:5' 6" (1.676 m)  BMI:Body mass index is 22.92 kg/m².   IBW: Ideal body weight: 59.3 kg (130 lb 11.7 oz)  Adjusted ideal body weight: 61.3 kg (135 lb 3.7 oz)    Usual BW: 150lb  Weight Change:[id number] loss    Nutrition focused physical findings: moderate fat and muscle losses     Allergies: Codeine    Current Medications:    Current Outpatient Medications:     aspirin (ECOTRIN) 81 MG EC tablet, Take 81 mg by mouth once daily., Disp: , Rfl:     clonazePAM (KLONOPIN) 1 MG tablet, Take 1 tablet (1 mg total) by mouth 2 (two) times daily. (Patient not taking: Reported on 7/17/2020), Disp: 60 tablet, Rfl: 0    divalproex (DEPAKOTE) 250 MG EC tablet, Take 250 mg by mouth 3 (three) times daily., Disp: , Rfl:     DULoxetine (CYMBALTA) 20 MG capsule, Take 20 mg by mouth once daily., Disp: , Rfl:     levocetirizine (XYZAL) 5 MG tablet, Take 5 mg by mouth every evening., Disp: , Rfl:     " levothyroxine (SYNTHROID) 112 MCG tablet, Take 1 tablet (112 mcg total) by mouth once daily., Disp: 30 tablet, Rfl: 1    morphine (MS CONTIN) 15 MG 12 hr tablet, Take 15 mg by mouth 2 (two) times daily., Disp: , Rfl:     multivitamin capsule, Take 1 capsule by mouth once daily., Disp: , Rfl:     potassium chloride SA (K-DUR,KLOR-CON) 20 MEQ tablet, Take 1 tablet (20 mEq total) by mouth once daily. (Patient not taking: Reported on 6/25/2020), Disp: 30 tablet, Rfl: 1    pravastatin (PRAVACHOL) 40 MG tablet, Take 1 tablet (40 mg total) by mouth once daily., Disp: 90 tablet, Rfl: 3    risperiDONE (RISPERDAL) 0.5 MG Tab, Take by mouth., Disp: , Rfl:     sodium,potassium,mag sulfates (SUPREP BOWEL PREP KIT) 17.5-3.13-1.6 gram SolR, Take 1 each by mouth as directed. (Patient not taking: Reported on 6/4/2020), Disp: 1 Bottle, Rfl: 0    venlafaxine (EFFEXOR-XR) 150 MG Cp24, Take 150 mg by mouth once daily., Disp: , Rfl:     vitamin D (VITAMIN D3) 1000 units Tab, Take 1 tablet (1,000 Units total) by mouth once daily., Disp: 90 tablet, Rfl: 11    Food/medication interactions noted: avoid grapefruit; avoid vitamins/minerals within 4 hours of synthroid     Vitamins/Supplements: Boost     Labs: Reviewed -GFR 53.3    Nutrition Diagnosis    Problem: inadequate protein/energy intake  Etiology (related to): appetite loss   Signs/Symptoms (as evidenced by): weight loss    Nutrition Intervention    Nutrition Prescription   1920 Kcals (30kcal/kg)  77 g protein (1.2g/kg)   1920 mL fluid (30mL/kg)    Recommendations:  Continue Boost supplements at least three times daily   High protein, high calorie food choices in place of snack/junk foods     Materials Provided/Reviewed reviewed best options for quick meals and snacks such as yogurt, peanut butter, avocados, eggs, etc.    Nutrition Monitoring and Evaluation    Monitor: diet education needs     Goals: maintain weight or gain     Motivation to change/anticipated barriers: none      Follow up PRN    Communication to referring provider/care team: note available in chart     Counseling time: 15 Minutes    Chloe Montgomery, MPH, RD, , LDN, FAND   [phone]

## 2020-08-07 NOTE — PROGRESS NOTES
ONCOLOGY FOLLOW UP VISIT.     Reason for visit: Toxicity check on Keytruda for stage IV NSCLC    Best Contact Phone Number(s): [phone] (home)      Cancer/Stage/TNM:   Cancer Staging  No matching staging information was found for the patient.     Oncology History   Adenocarcinoma of right lung, stage 4   4/27/2020 Initial Diagnosis    Treated in late 2019 for pneumonia.  Patient presented with constant chest pain on the right side and back.  Loss of appetite and weight loss.  She was found to have a R lung mass, mediastinal LAD, and R pleural effusion.  4/27 had EBUS and was found to have adenocarcinoma from 4L, station 7, 4R and 11R FNAs.     6/4/2020 -  Chemotherapy    Treatment Summary   Plan Name: OP PEMBROLIZUMAB 200MG Q3W  Treatment Goal: Control  Status: Active  Start Date: 6/4/2020  End Date: 9/18/2020 (Planned)  Provider: Gurpreet Estrada MD  Chemotherapy: pembrolizumab (KEYTRUDA) 200 mg in sodium chloride 0.9% 100 mL chemo infusion, 200 mg, Intravenous, Clinic/HOD 1 time, 3 of 6 cycles  Administration: 200 mg (6/4/2020), 200 mg (6/25/2020), 200 mg (7/17/2020)          Interval History:   Today patient reports pain from cancer has improved greatly and she is not on pain medications anymore. She does experience some fatigue but able to perform ADLs independently. Reports a decrease in anxiety.    ROS:  Review of Systems   Constitutional: Positive for fatigue. Negative for activity change, chills, fever and unexpected weight change.   HENT: Negative for mouth sores, nosebleeds and sore throat.    Respiratory: Negative for cough, shortness of breath and wheezing.    Cardiovascular: Negative for chest pain, palpitations and leg swelling.   Gastrointestinal: Negative for abdominal distention, abdominal pain and blood in stool.   Endocrine: Negative for cold intolerance and heat intolerance.   Genitourinary: Negative for dysuria, flank pain and frequency.   Musculoskeletal: Positive for arthralgias.  Negative for joint swelling and myalgias.   Skin: Negative for color change, rash and wound.   Neurological: Negative for dizziness, light-headedness and headaches.   Hematological: Negative for adenopathy. Does not bruise/bleed easily.   Psychiatric/Behavioral: Negative for agitation. The patient is nervous/anxious (improved).       A complete 12-point review of systems was reviewed and is negative except as mentioned above.     Past Medical History:   Past Medical History:   Diagnosis Date    Depression     Hypercholesteremia     Pneumonia     Thyroid disease         Allergies:   Review of patient's allergies indicates:   Allergen Reactions    Codeine Nausea And Vomiting        Medications:   Current Outpatient Medications   Medication Sig Dispense Refill    aspirin (ECOTRIN) 81 MG EC tablet Take 81 mg by mouth once daily.      divalproex (DEPAKOTE) 250 MG EC tablet Take 250 mg by mouth 3 (three) times daily.      levocetirizine (XYZAL) 5 MG tablet Take 5 mg by mouth every evening.      levothyroxine (SYNTHROID) 112 MCG tablet Take 1 tablet (112 mcg total) by mouth once daily. 30 tablet 1    multivitamin capsule Take 1 capsule by mouth once daily.      pravastatin (PRAVACHOL) 40 MG tablet Take 1 tablet (40 mg total) by mouth once daily. 90 tablet 3    risperiDONE (RISPERDAL) 0.5 MG Tab Take by mouth.      vitamin D (VITAMIN D3) 1000 units Tab Take 1 tablet (1,000 Units total) by mouth once daily. 90 tablet 11    clonazePAM (KLONOPIN) 1 MG tablet Take 1 tablet (1 mg total) by mouth 2 (two) times daily. (Patient not taking: Reported on 7/17/2020) 60 tablet 0    DULoxetine (CYMBALTA) 20 MG capsule Take 20 mg by mouth once daily.      morphine (MS CONTIN) 15 MG 12 hr tablet Take 15 mg by mouth 2 (two) times daily.      potassium chloride SA (K-DUR,KLOR-CON) 20 MEQ tablet Take 1 tablet (20 mEq total) by mouth once daily. (Patient not taking: Reported on 6/25/2020) 30 tablet 1    sodium,potassium,mag  "sulfates (SUPREP BOWEL PREP KIT) 17.5-3.13-1.6 gram SolR Take 1 each by mouth as directed. (Patient not taking: Reported on 6/4/2020) 1 Bottle 0    venlafaxine (EFFEXOR-XR) 150 MG Cp24 Take 150 mg by mouth once daily.       No current facility-administered medications for this visit.         Physical Exam:   /68   Pulse 75   Temp 99 °F (37.2 °C) (Oral)   Resp 16   Ht 5' 6" (1.676 m)   Wt 64.4 kg (141 lb 15.6 oz)   SpO2 98%   BMI 22.92 kg/m²      ECOG Performance Status: (foot note - ECOG PS provided by Eastern Co[employer] Oncology Group) 1 - Symptomatic but completely ambulatory    Physical Exam  Constitutional:       Appearance: She is well-developed.   HENT:      Head: Normocephalic and atraumatic.   Eyes:      Conjunctiva/sclera: Conjunctivae normal.      Pupils: Pupils are equal, round, and reactive to light.   Neck:      Musculoskeletal: Normal range of motion and neck supple.   Cardiovascular:      Rate and Rhythm: Normal rate and regular rhythm.      Heart sounds: No murmur. No friction rub.   Pulmonary:      Effort: Pulmonary effort is normal.      Breath sounds: Normal breath sounds.   Abdominal:      General: Bowel sounds are normal.      Palpations: Abdomen is soft.      Tenderness: There is no abdominal tenderness.   Musculoskeletal: Normal range of motion.   Lymphadenopathy:      Cervical: No cervical adenopathy.   Skin:     General: Skin is warm and dry.   Neurological:      Mental Status: She is alert and oriented to person, place, and time.   Psychiatric:         Behavior: Behavior normal.           Labs:   Recent Results (from the past 48 hour(s))   CBC auto differential    Collection Time: 08/06/20 11:53 AM   Result Value Ref Range    WBC 8.18 3.90 - 12.70 K/uL    RBC 4.40 4.00 - 5.40 M/uL    Hemoglobin 12.0 12.0 - 16.0 g/dL    Hematocrit 37.2 37.0 - 48.5 %    Mean Corpuscular Volume 85 82 - 98 fL    Mean Corpuscular Hemoglobin 27.3 27.0 - 31.0 pg    Mean Corpuscular Hemoglobin Conc " 32.3 32.0 - 36.0 g/dL    RDW 15.9 (H) 11.5 - 14.5 %    Platelets 266 150 - 350 K/uL    MPV 11.3 9.2 - 12.9 fL    Immature Granulocytes 0.5 0.0 - 0.5 %    Gran # (ANC) 5.4 1.8 - 7.7 K/uL    Immature Grans (Abs) 0.04 0.00 - 0.04 K/uL    Lymph # 1.9 1.0 - 4.8 K/uL    Mono # 0.8 0.3 - 1.0 K/uL    Eos # 0.1 0.0 - 0.5 K/uL    Baso # 0.03 0.00 - 0.20 K/uL    nRBC 0 0 /100 WBC    Gran% 65.4 38.0 - 73.0 %    Lymph% 22.7 18.0 - 48.0 %    Mono% 10.1 4.0 - 15.0 %    Eosinophil% 0.9 0.0 - 8.0 %    Basophil% 0.4 0.0 - 1.9 %    Differential Method Automated    Comprehensive metabolic panel    Collection Time: 08/06/20 11:53 AM   Result Value Ref Range    Sodium 139 136 - 145 mmol/L    Potassium 3.7 3.5 - 5.1 mmol/L    Chloride 105 95 - 110 mmol/L    CO2 27 23 - 29 mmol/L    Glucose 77 70 - 110 mg/dL    BUN, Bld 20 8 - 23 mg/dL    Creatinine 1.2 0.5 - 1.4 mg/dL    Calcium 9.5 8.7 - 10.5 mg/dL    Total Protein 7.7 6.0 - 8.4 g/dL    Albumin 3.5 3.5 - 5.2 g/dL    Total Bilirubin 0.2 0.1 - 1.0 mg/dL    Alkaline Phosphatase 76 55 - 135 U/L    AST 20 10 - 40 U/L    ALT 19 10 - 44 U/L    Anion Gap 7 (L) 8 - 16 mmol/L    eGFR if African American 53.3 (A) >60 mL/min/1.73 m^2    eGFR if non  46.2 (A) >60 mL/min/1.73 m^2   T4, free    Collection Time: 08/06/20 11:53 AM   Result Value Ref Range    Free T4 0.93 0.71 - 1.51 ng/dL   TSH    Collection Time: 08/06/20 11:53 AM   Result Value Ref Range    TSH 1.126 0.400 - 4.000 uIU/mL      Imaging:   CT Chest Abdomen Pelvis With Contrast  Narrative: EXAMINATION:  CT CHEST ABDOMEN PELVIS WITH CONTRAST (XPD)    CLINICAL HISTORY:  Re-staging stage IV NSCLC on immunotherapy; Malignant neoplasm of unspecified part of right bronchus or lung    TECHNIQUE:  Axial images of the chest, abdomen, and pelvis were acquired  after the use of 75 cc [id number] IV contrast and 30 cc oral contrast.   Coronal and sagittal reconstructions were also obtained    COMPARISON:  PET-CT 05/18/2020.  CT chest  03/09/2020, CT abdomen pelvis 12/05/2019.    FINDINGS:  Thoracic soft tissues: Normal thyroid.    Aorta: Thoracic aorta is normal in caliber and contour with no significant calcific atherosclerosis.    Heart: Normal in size. No pericardial effusion. No significant calcific coronary atherosclerosis.    Myranda/Mediastinum: Previously enlarged subcarinal lymph node is not well visualized this exam.  In the anterior mediastinum there is a soft tissue density lesion (index lesion 3) likely representing lymph node measuring 0.5 cm in short axis (series 2, image 36), previously 2.1 cm on PET-CT 05/18/2020.  There is another anterior mediastinal soft tissue density lesion inferiorly likely lymph node measuring 1.2 cm (index lesion 4) in short axis (series 2 image 49) previously 2.6 cm.  These were hypermetabolic.    Lungs: Trachea and bronchi are patent.  Lungs are satisfactorily expanded.    There is significant decrease in size of previously identified right lower lobe to hilar mass (index lesion 1) measuring 0.9 cm (series 2, image 47), previously 3.3 cm (remeasured).  There is a right pleural nodular mass (index lesion 2) with significant interval decrease in size measuring 1.0 cm (series 2, image 61), previously 4.0 cm (remeasured).  These were hypermetabolic on PET.   Bibasilar bandlike opacities and likely representing scarring or atelectasis.  Resolved right pleural effusion.  Mild emphysematous changes.    Liver: Hepatomegaly.  No focal hepatic lesion.    Gallbladder: No calcified gallstones.    Bile Ducts: No evidence of dilated ducts.    Pancreas: No mass or peripancreatic fat stranding.    Spleen: Unremarkable.    Stomach and duodenum: Unremarkable.    Adrenals: Unremarkable.    Kidneys/ Ureters: Normal in size and location. Normal enhancement. No hydronephrosis or nephrolithiasis. No ureteral dilatation.    Bladder: Limited evaluation due to collapse.    Reproductive organs: Unremarkable.    Bowel/Mesentery:  Small bowel is normal in caliber with no evidence of obstruction.  No evidence of inflammation or wall thickening.  Colon demonstrates no focal wall thickening.  Few scattered diverticula without diverticulitis.    Lymph nodes: No lymphadenapathy.    Abdominal wall:  Small containing umbilical hernia.    Vasculature: No aneurysm. No significant calcific atherosclerosis.    Bones: Grade 1 anterolisthesis of L5 on S1.  No acute fracture. Blastic changes within ribs right 4, 5, and 6 and left rib 6.  There are blastic changes within the sternum.  Impression: This patient history of metastatic right lung cancer , there is interval significant improvement in size of lesions.  No pathological fracture.  Interval resolution of right pleural effusion.    Other findings as above.    RECIST SUMMARY:    Date of prior examination for comparison: PET-CT 05/18/2020    Lesion 1: Right lung mass.  0.9 cm.  Series 2, image 47.  Prior measurement 3.3 cm.    Lesion 2: Right pleural mass.  1.0 cm.  Series 2, image 61.  Prior measurement 4.0 cm.    Lesion 3: Anterior mediastinal node.  0.5 cm. Series 2, image 36.  Prior measurement 2.1 cm.    Lesion 4: Inferior anterior mediastinal node.  1.2 cm.  Series 2, image 49.  Prior measurement 2.6 cm.    Electronically signed by resident: Elijah Turcios  Date:    08/06/2020  Time:    14:34    Electronically signed by: Juan Metzger MD  Date:    08/06/2020  Time:    16:33            Diagnoses:       1. Adenocarcinoma of right lung, stage 4    2. 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 Acquired hypothyroidism    5. Cancer related pain    6. Anxiety    7. Depression, unspecified depression type          Assessment and Plan:         1,2,3. Stage IV PD-L1 60% Adenocarcinoma of lung:   -Not enough tissue for molecular testing, will send for Guardant 360.  Staging has been completed.  Discussed with patient and son in detail diagnosis, prognosis, and treatment options.   Discussed that if patient did not have targetable mutations on liquid biopsy, best treatment option with her performance status would be immunotherapy alone with Keytruda.   - CT from 8/6 shows interval significant improvement in size of lesions.  No pathological fracture.  Interval resolution of right pleural effusion.    Labs acceptable to proceed with cycle #4 of Keytruda.     RTC 3 weeks with labs (CBC,CMP,TSH,free T4), to see Dr. Estrada and keytruda cycle #5.      4- Patient is now taking 112 mcg through [facility] prescription.  TSH and free T4 now normalized.  Discussed with patient that cocaine can interact with synthroid and cause hyperthyroidism.       5-Patient was referred to palliative care and is followed by Dr. Kearney.  All prescriptions need to be prescribed by patient's [facility] physician.  Patient says her chest wall pain has improved and she is now not requiring any pain medications.     6,7-All prescriptions through [facility].          she will return to clinic in 3 weeks, but knows to call in the interim if symptoms change or should a problem arise.    Patient was also seen and examined by Dr. Estrada. Patient is in agreement with the proposed treatment plan. All questions were answered to the patient's satisfaction. Pt knows to call clinic if anything is needed before the next clinic visit.    Gretchen Avalos, MSN, APRN, FNP-C  Hematology and Medical Oncology  Nurse Practitioner to Dr. Gurpreet Estrada  Nurse Practitioner, [facility]        I have reviewed the notes, assessments, and/or procedures performed by Gretchen BURLESON, as above.  I have personally interviewed and examined the patient at the beside, and rounded with Gretchen. I concur with her assessment and plan and the documentation of Micheline Raza.    Gurpreet Estrada M.D.  Hematology/Oncology Attending  [facility] [place] [facility]  [facility]

## 2020-08-08 ENCOUNTER — INFUSION (OUTPATIENT)
Dept: INFUSION THERAPY | Facility: HOSPITAL | Age: 70
End: 2020-08-08
Attending: INTERNAL MEDICINE
Payer: COMMERCIAL

## 2020-08-08 VITALS — HEIGHT: 66 IN | BODY MASS INDEX: 22.82 KG/M2 | WEIGHT: 142 LBS

## 2020-08-08 DIAGNOSIS — C34.91 ADENOCARCINOMA OF RIGHT LUNG, STAGE 4: Primary | ICD-10-CM

## 2020-08-08 PROCEDURE — 96413 CHEMO IV INFUSION 1 HR: CPT

## 2020-08-08 PROCEDURE — 63600175 PHARM REV CODE 636 W HCPCS: Mod: JG | Performed by: INTERNAL MEDICINE

## 2020-08-08 PROCEDURE — 25000003 PHARM REV CODE 250: Performed by: INTERNAL MEDICINE

## 2020-08-08 RX ORDER — SODIUM CHLORIDE 0.9 % (FLUSH) 0.9 %
10 SYRINGE (ML) INJECTION
Status: DISCONTINUED | OUTPATIENT
Start: 2020-08-08 | End: 2020-08-08 | Stop reason: HOSPADM

## 2020-08-08 RX ORDER — HEPARIN 100 UNIT/ML
500 SYRINGE INTRAVENOUS
Status: DISCONTINUED | OUTPATIENT
Start: 2020-08-08 | End: 2020-08-08 | Stop reason: HOSPADM

## 2020-08-08 RX ADMIN — SODIUM CHLORIDE: 9 INJECTION, SOLUTION INTRAVENOUS at 10:08

## 2020-08-08 RX ADMIN — SODIUM CHLORIDE 200 MG: 9 INJECTION, SOLUTION INTRAVENOUS at 10:08

## 2020-08-08 NOTE — PLAN OF CARE
Pt ambulatory to clinic by self for Keytruda infusion. Pt states is very happy because of good CT results yesterday. PIV started without difficulty. Pt tolerated well. Pt had multiple snacks and drinks while here in clinic without any difficulty. Keytruda infused without difficuly. Pt tolerated well. PIV d/c'd with catheter intact. Pt ambulatory from clinic in NAD.

## 2020-08-24 ENCOUNTER — TELEPHONE (OUTPATIENT)
Dept: HEMATOLOGY/ONCOLOGY | Facility: CLINIC | Age: 70
End: 2020-08-24

## 2020-08-24 NOTE — TELEPHONE ENCOUNTER
----- Message from Darline Garcia sent at 8/24/2020 11:13 AM CDT -----  Contact: Rica ARMSTRONG)  Reschedule existing appt      Appt date rescheduling:: 08/28-08/29    Visit type :: lab, f/u, and infusion    Provider:: Sean    Do you feel you need to be seen today:: No    Communication preference:: [phone]    Addition info::

## 2020-08-31 ENCOUNTER — OFFICE VISIT (OUTPATIENT)
Dept: HEMATOLOGY/ONCOLOGY | Facility: CLINIC | Age: 70
End: 2020-08-31
Payer: COMMERCIAL

## 2020-08-31 ENCOUNTER — LAB VISIT (OUTPATIENT)
Dept: LAB | Facility: HOSPITAL | Age: 70
End: 2020-08-31
Attending: INTERNAL MEDICINE
Payer: COMMERCIAL

## 2020-08-31 VITALS
HEART RATE: 66 BPM | TEMPERATURE: 98 F | HEIGHT: 66 IN | BODY MASS INDEX: 24.24 KG/M2 | WEIGHT: 150.81 LBS | OXYGEN SATURATION: 98 % | RESPIRATION RATE: 18 BRPM | DIASTOLIC BLOOD PRESSURE: 58 MMHG | SYSTOLIC BLOOD PRESSURE: 122 MMHG

## 2020-08-31 DIAGNOSIS — E03.9 ACQUIRED HYPOTHYROIDISM: ICD-10-CM

## 2020-08-31 DIAGNOSIS — F41.9 ANXIETY: ICD-10-CM

## 2020-08-31 DIAGNOSIS — F32.A DEPRESSION, UNSPECIFIED DEPRESSION TYPE: ICD-10-CM

## 2020-08-31 DIAGNOSIS — C34.91 ADENOCARCINOMA OF RIGHT LUNG, STAGE 4: Primary | ICD-10-CM

## 2020-08-31 DIAGNOSIS — G89.3 CANCER RELATED PAIN: ICD-10-CM

## 2020-08-31 DIAGNOSIS — C79.51 BONE METASTASIS: ICD-10-CM

## 2020-08-31 DIAGNOSIS — C77.1 METASTASIS TO MEDIASTINAL LYMPH NODE: ICD-10-CM

## 2020-08-31 DIAGNOSIS — C34.91 ADENOCARCINOMA OF RIGHT LUNG, STAGE 4: ICD-10-CM

## 2020-08-31 LAB
ALBUMIN SERPL BCP-MCNC: 3.4 G/DL (ref 3.5–5.2)
ALP SERPL-CCNC: 86 U/L (ref 55–135)
ALT SERPL W/O P-5'-P-CCNC: 25 U/L (ref 10–44)
ANION GAP SERPL CALC-SCNC: 7 MMOL/L (ref 8–16)
AST SERPL-CCNC: 28 U/L (ref 10–40)
BASOPHILS # BLD AUTO: 0.02 K/UL (ref 0–0.2)
BASOPHILS NFR BLD: 0.4 % (ref 0–1.9)
BILIRUB SERPL-MCNC: 0.2 MG/DL (ref 0.1–1)
BUN SERPL-MCNC: 11 MG/DL (ref 8–23)
CALCIUM SERPL-MCNC: 9 MG/DL (ref 8.7–10.5)
CHLORIDE SERPL-SCNC: 105 MMOL/L (ref 95–110)
CO2 SERPL-SCNC: 30 MMOL/L (ref 23–29)
CREAT SERPL-MCNC: 1.1 MG/DL (ref 0.5–1.4)
DIFFERENTIAL METHOD: ABNORMAL
EOSINOPHIL # BLD AUTO: 0.1 K/UL (ref 0–0.5)
EOSINOPHIL NFR BLD: 1.9 % (ref 0–8)
ERYTHROCYTE [DISTWIDTH] IN BLOOD BY AUTOMATED COUNT: 16.3 % (ref 11.5–14.5)
EST. GFR  (AFRICAN AMERICAN): 59.2 ML/MIN/1.73 M^2
EST. GFR  (NON AFRICAN AMERICAN): 51.3 ML/MIN/1.73 M^2
GLUCOSE SERPL-MCNC: 75 MG/DL (ref 70–110)
HCT VFR BLD AUTO: 37.6 % (ref 37–48.5)
HGB BLD-MCNC: 12 G/DL (ref 12–16)
IMM GRANULOCYTES # BLD AUTO: 0.02 K/UL (ref 0–0.04)
IMM GRANULOCYTES NFR BLD AUTO: 0.4 % (ref 0–0.5)
LYMPHOCYTES # BLD AUTO: 1.9 K/UL (ref 1–4.8)
LYMPHOCYTES NFR BLD: 34.5 % (ref 18–48)
MCH RBC QN AUTO: 27.5 PG (ref 27–31)
MCHC RBC AUTO-ENTMCNC: 31.9 G/DL (ref 32–36)
MCV RBC AUTO: 86 FL (ref 82–98)
MONOCYTES # BLD AUTO: 0.7 K/UL (ref 0.3–1)
MONOCYTES NFR BLD: 12.5 % (ref 4–15)
NEUTROPHILS # BLD AUTO: 2.7 K/UL (ref 1.8–7.7)
NEUTROPHILS NFR BLD: 50.3 % (ref 38–73)
NRBC BLD-RTO: 0 /100 WBC
PLATELET # BLD AUTO: 216 K/UL (ref 150–350)
PMV BLD AUTO: 10.7 FL (ref 9.2–12.9)
POTASSIUM SERPL-SCNC: 3.7 MMOL/L (ref 3.5–5.1)
PROT SERPL-MCNC: 7.3 G/DL (ref 6–8.4)
RBC # BLD AUTO: 4.36 M/UL (ref 4–5.4)
SODIUM SERPL-SCNC: 142 MMOL/L (ref 136–145)
T4 FREE SERPL-MCNC: 1.01 NG/DL (ref 0.71–1.51)
TSH SERPL DL<=0.005 MIU/L-ACNC: 3.93 UIU/ML (ref 0.4–4)
WBC # BLD AUTO: 5.37 K/UL (ref 3.9–12.7)

## 2020-08-31 PROCEDURE — 80053 COMPREHEN METABOLIC PANEL: CPT

## 2020-08-31 PROCEDURE — 85025 COMPLETE CBC W/AUTO DIFF WBC: CPT

## 2020-08-31 PROCEDURE — 99999 PR PBB SHADOW E&M-EST. PATIENT-LVL IV: ICD-10-PCS | Mod: PBBFAC,,, | Performed by: INTERNAL MEDICINE

## 2020-08-31 PROCEDURE — 99999 PR PBB SHADOW E&M-EST. PATIENT-LVL IV: CPT | Mod: PBBFAC,,, | Performed by: INTERNAL MEDICINE

## 2020-08-31 PROCEDURE — 84443 ASSAY THYROID STIM HORMONE: CPT

## 2020-08-31 PROCEDURE — 99215 PR OFFICE/OUTPT VISIT, EST, LEVL V, 40-54 MIN: ICD-10-PCS | Mod: S$GLB,,, | Performed by: INTERNAL MEDICINE

## 2020-08-31 PROCEDURE — 99215 OFFICE O/P EST HI 40 MIN: CPT | Mod: S$GLB,,, | Performed by: INTERNAL MEDICINE

## 2020-08-31 PROCEDURE — 84439 ASSAY OF FREE THYROXINE: CPT

## 2020-08-31 PROCEDURE — 36415 COLL VENOUS BLD VENIPUNCTURE: CPT

## 2020-08-31 RX ORDER — SODIUM CHLORIDE 0.9 % (FLUSH) 0.9 %
10 SYRINGE (ML) INJECTION
Status: CANCELLED | OUTPATIENT
Start: 2020-09-01

## 2020-08-31 RX ORDER — HEPARIN 100 UNIT/ML
500 SYRINGE INTRAVENOUS
Status: CANCELLED | OUTPATIENT
Start: 2020-09-01

## 2020-09-01 ENCOUNTER — TELEPHONE (OUTPATIENT)
Dept: HEMATOLOGY/ONCOLOGY | Facility: CLINIC | Age: 70
End: 2020-09-01

## 2020-09-01 NOTE — TELEPHONE ENCOUNTER
"Returned call patient wanted to reschedule infusion to Friday.      ----- Message from Sierra Estrada sent at 9/1/2020  8:02 AM CDT -----  Reschedule Existing Appointment    Appt Date: 09/01/20  Type of appt : INFUSION 150 MIN [6059]  Physician: Dr Estrada  Reason for rescheduling? Family emergency, please contact to discuss availability   Caller: Micheline  Contact Preference: [phone]    Additional Information:  "Thank you for all that you do for our patients'"         "

## 2020-09-03 DIAGNOSIS — R01.1 MURMUR: Primary | ICD-10-CM

## 2020-09-04 ENCOUNTER — TELEPHONE (OUTPATIENT)
Dept: HEMATOLOGY/ONCOLOGY | Facility: CLINIC | Age: 70
End: 2020-09-04

## 2020-09-04 NOTE — TELEPHONE ENCOUNTER
"----- Message from Sierra Estrada sent at 2020 11:24 AM CDT -----  Reschedule Existing Appointment    Appt Date: 20  Type of appt : INFUSION 090 MIN [8819]  Physician: Dr Estrada   Reason for rescheduling? Due to , please contact to discuss availability   Caller: Micheline   Contact Preference: [phone]    Additional Information:  "Thank you for all that you do for our patients'"       "

## 2020-09-08 ENCOUNTER — TELEPHONE (OUTPATIENT)
Dept: HEMATOLOGY/ONCOLOGY | Facility: CLINIC | Age: 70
End: 2020-09-08

## 2020-09-08 NOTE — TELEPHONE ENCOUNTER
"----- Message from Christianne Porter sent at 9/8/2020  2:06 PM CDT -----  Regarding: Keytruda  Patient did not receive the Keytruda on 9/4. Looks like it was canceled by Analilia saying provider canceled.    She is scheduled to see Gretchen on 9/23 and infusion on 9/25 Can she wait till then? Or if we schedule this week is she due in 3 weeks?    Please advise  Thanks  ----- Message -----  From: Sienna Cortes  Sent: 9/8/2020   1:34 PM CDT  To: [facility]    Patient Assist    Name of caller:  [facility]   Provider name:  Sean Vázquez MD  Contact Preference:  [phone]  Current patient or new patient?: current   Does Patient feel the need to see the MD today? No   What is the nature of the call?    - will like to r/s missed appts which includes a infusion.     Additional Notes:   "Thank you for all that you do for our patients'"            "

## 2020-09-11 ENCOUNTER — INFUSION (OUTPATIENT)
Dept: INFUSION THERAPY | Facility: HOSPITAL | Age: 70
End: 2020-09-11
Attending: INTERNAL MEDICINE
Payer: COMMERCIAL

## 2020-09-11 VITALS
WEIGHT: 144.06 LBS | HEIGHT: 66 IN | TEMPERATURE: 99 F | RESPIRATION RATE: 18 BRPM | SYSTOLIC BLOOD PRESSURE: 143 MMHG | DIASTOLIC BLOOD PRESSURE: 68 MMHG | OXYGEN SATURATION: 98 % | HEART RATE: 55 BPM | BODY MASS INDEX: 23.15 KG/M2

## 2020-09-11 DIAGNOSIS — C34.91 ADENOCARCINOMA OF RIGHT LUNG, STAGE 4: Primary | ICD-10-CM

## 2020-09-11 PROCEDURE — 63600175 PHARM REV CODE 636 W HCPCS: Mod: JG | Performed by: INTERNAL MEDICINE

## 2020-09-11 PROCEDURE — 96413 CHEMO IV INFUSION 1 HR: CPT

## 2020-09-11 PROCEDURE — 25000003 PHARM REV CODE 250: Performed by: INTERNAL MEDICINE

## 2020-09-11 RX ORDER — SODIUM CHLORIDE 0.9 % (FLUSH) 0.9 %
10 SYRINGE (ML) INJECTION
Status: DISCONTINUED | OUTPATIENT
Start: 2020-09-11 | End: 2020-09-11 | Stop reason: HOSPADM

## 2020-09-11 RX ORDER — HEPARIN 100 UNIT/ML
500 SYRINGE INTRAVENOUS
Status: DISCONTINUED | OUTPATIENT
Start: 2020-09-11 | End: 2020-09-11 | Stop reason: HOSPADM

## 2020-09-11 RX ADMIN — SODIUM CHLORIDE 200 MG: 9 INJECTION, SOLUTION INTRAVENOUS at 11:09

## 2020-09-11 RX ADMIN — SODIUM CHLORIDE: 9 INJECTION, SOLUTION INTRAVENOUS at 11:09

## 2020-09-11 NOTE — PLAN OF CARE
1100 Pt arrived for C5 keytruda, treatment delayed due to weather/transporation and patient cancellations. Pt doing well this morning. VSS. Pt reports back pain (seems chronic, though reports she feels like she pulled something.) Pt taking ibuprofen and heat application. Pt noted to have 6Lb wt loss. She reports that her appetite still declining, waiting on more boost to be delivered through [facility]. Also reports occasional SOB on exertion and waiting on Nebulizer? Device/inhaler from [facility] as well. Pt resting in chair. Meds, hx, axs, labs, tx plan reviewed in detail. PIV initiated, flushing easily, blood return noted. Blankets/snacks provided. WCTM pt closely.

## 2020-09-11 NOTE — PLAN OF CARE
1230 pt completed and tolerated C5 Keytruda without issues. PIV removed, catheter tip intact, pressure dressing applied. Avs provided and discussed, pt to return in 3 weeks for labs, provider visit and next infusion. Pt aware of when to contact provider. [facility] and escorted via Mr. Hodge to [facility] for transport.

## 2020-09-28 ENCOUNTER — HOSPITAL ENCOUNTER (OUTPATIENT)
Dept: CARDIOLOGY | Facility: HOSPITAL | Age: 70
Discharge: HOME | End: 2020-09-28
Attending: NURSE PRACTITIONER
Payer: COMMERCIAL

## 2020-09-28 VITALS
DIASTOLIC BLOOD PRESSURE: 68 MMHG | HEART RATE: 51 BPM | WEIGHT: 144 LBS | SYSTOLIC BLOOD PRESSURE: 140 MMHG | BODY MASS INDEX: 23.14 KG/M2 | HEIGHT: 66 IN

## 2020-09-28 DIAGNOSIS — R01.1 MURMUR: ICD-10-CM

## 2020-09-28 LAB
ASCENDING AORTA: 2.87 CM
AV INDEX (PROSTH): 0.92
AV MEAN GRADIENT: 3 MMHG
AV PEAK GRADIENT: 7 MMHG
AV VALVE AREA: 3.18 CM2
AV VELOCITY RATIO: 0.86
BSA FOR ECHO PROCEDURE: 1.74 M2
CV ECHO LV RWT: 0.69 CM
DOP CALC AO PEAK VEL: 1.31 M/S
DOP CALC AO VTI: 27.64 CM
DOP CALC LVOT AREA: 3.5 CM2
DOP CALC LVOT DIAMETER: 2.1 CM
DOP CALC LVOT PEAK VEL: 1.13 M/S
DOP CALC LVOT STROKE VOLUME: 87.86 CM3
DOP CALCLVOT PEAK VEL VTI: 25.38 CM
E WAVE DECELERATION TIME: 291.04 MSEC
E/A RATIO: 0.65
E/E' RATIO: 8.62 M/S
ECHO LV POSTERIOR WALL: 0.99 CM (ref 0.6–1.1)
FRACTIONAL SHORTENING: 30 % (ref 28–44)
INTERVENTRICULAR SEPTUM: 0.98 CM (ref 0.6–1.1)
IVRT: 85.63 MSEC
LA MAJOR: 5.26 CM
LA MINOR: 4.78 CM
LA WIDTH: 3.63 CM
LEFT ATRIUM SIZE: 2.95 CM
LEFT ATRIUM VOLUME INDEX: 26.2 ML/M2
LEFT ATRIUM VOLUME: 45.59 CM3
LEFT INTERNAL DIMENSION IN SYSTOLE: 2 CM (ref 2.1–4)
LEFT VENTRICLE DIASTOLIC VOLUME INDEX: 17.77 ML/M2
LEFT VENTRICLE DIASTOLIC VOLUME: 30.91 ML
LEFT VENTRICLE MASS INDEX: 43 G/M2
LEFT VENTRICLE SYSTOLIC VOLUME INDEX: 7.3 ML/M2
LEFT VENTRICLE SYSTOLIC VOLUME: 12.72 ML
LEFT VENTRICULAR INTERNAL DIMENSION IN DIASTOLE: 2.85 CM (ref 3.5–6)
LEFT VENTRICULAR MASS: 74.51 G
LV LATERAL E/E' RATIO: 7 M/S
LV SEPTAL E/E' RATIO: 11.2 M/S
MV PEAK A VEL: 0.86 M/S
MV PEAK E VEL: 0.56 M/S
MV STENOSIS PRESSURE HALF TIME: 84.4 MS
MV VALVE AREA P 1/2 METHOD: 2.61 CM2
PISA TR MAX VEL: 2.09 M/S
PULM VEIN S/D RATIO: 1.57
PV PEAK D VEL: 0.42 M/S
PV PEAK S VEL: 0.66 M/S
RA MAJOR: 4.73 CM
RA PRESSURE: 3 MMHG
RA WIDTH: 3.57 CM
RIGHT VENTRICULAR END-DIASTOLIC DIMENSION: 3.5 CM
RV TISSUE DOPPLER FREE WALL SYSTOLIC VELOCITY 1 (APICAL 4 CHAMBER VIEW): 9.44 CM/S
SINUS: 2.75 CM
STJ: 2.52 CM
TDI LATERAL: 0.08 M/S
TDI SEPTAL: 0.05 M/S
TDI: 0.07 M/S
TR MAX PG: 17 MMHG
TRICUSPID ANNULAR PLANE SYSTOLIC EXCURSION: 2.08 CM
TV REST PULMONARY ARTERY PRESSURE: 20 MMHG

## 2020-09-28 PROCEDURE — 93306 TTE W/DOPPLER COMPLETE: CPT

## 2020-09-28 PROCEDURE — 93306 TTE W/DOPPLER COMPLETE: CPT | Mod: 26,,, | Performed by: INTERNAL MEDICINE

## 2020-09-28 PROCEDURE — 93306 ECHO (CUPID ONLY): ICD-10-PCS | Mod: 26,,, | Performed by: INTERNAL MEDICINE

## 2020-09-30 NOTE — ED TRIAGE NOTES
"Patient is a 69 year old female that presents to ED with c/o N/V =, constipation with intermittent diarrhea, decreased appetite for couple days now. Patient states has had the same diaper on for 2 days because she doesn't have any more at home. Pt states lost 30 pounds in 2 months and feels like she lost more since 2 weeks ago. Patient states lives at home with son but "its like he's not even there, I do everything and he's verbally abusive." patient has psych hx depression and anxiety, unsure of when taking last meds.   " Cephalexin Pregnancy And Lactation Text: This medication is Pregnancy Category B and considered safe during pregnancy.  It is also excreted in breast milk but can be used safely for shorter doses.

## 2020-10-02 ENCOUNTER — TELEPHONE (OUTPATIENT)
Dept: HEMATOLOGY/ONCOLOGY | Facility: CLINIC | Age: 70
End: 2020-10-02

## 2020-10-02 NOTE — TELEPHONE ENCOUNTER
----- Message from Beltran Ace RN sent at 10/2/2020  1:45 PM CDT -----  Patient missed appointments today. Please reschedule. Let me know if you have any issues getting in touch with her and I will reach out to her [facility]

## 2020-10-06 ENCOUNTER — PATIENT MESSAGE (OUTPATIENT)
Dept: HEMATOLOGY/ONCOLOGY | Facility: CLINIC | Age: 70
End: 2020-10-06

## 2020-10-08 ENCOUNTER — TELEPHONE (OUTPATIENT)
Dept: HEMATOLOGY/ONCOLOGY | Facility: CLINIC | Age: 70
End: 2020-10-08

## 2020-10-08 NOTE — TELEPHONE ENCOUNTER
----- Message from Sonal Reid sent at 10/8/2020  3:25 PM CDT -----  Regarding: Appt Scheduled  Contact: Rica Strauss is requesting a call back regarding appts scheduled for patient on 10/09/2020   Rica stated appts are too late in the evening for patient to have transportation to and from appts   Rica would like to know if patient could be scheduled for an earlier appt time     Rica can be reached at [phone]

## 2020-10-09 ENCOUNTER — PATIENT MESSAGE (OUTPATIENT)
Dept: HEMATOLOGY/ONCOLOGY | Facility: CLINIC | Age: 70
End: 2020-10-09

## 2020-10-09 ENCOUNTER — TELEPHONE (OUTPATIENT)
Dept: HEMATOLOGY/ONCOLOGY | Facility: CLINIC | Age: 70
End: 2020-10-09

## 2020-10-09 NOTE — TELEPHONE ENCOUNTER
Attempted to reach  at [facility], patient and portal message all sent prior to lunch to check to see if patient would still be coming for appointments today. No answer. Voicemail left with Rica meadows's [facility]

## 2020-10-12 ENCOUNTER — TELEPHONE (OUTPATIENT)
Dept: HEMATOLOGY/ONCOLOGY | Facility: CLINIC | Age: 70
End: 2020-10-12

## 2020-10-13 ENCOUNTER — TELEPHONE (OUTPATIENT)
Dept: HEMATOLOGY/ONCOLOGY | Facility: CLINIC | Age: 70
End: 2020-10-13

## 2020-10-13 NOTE — TELEPHONE ENCOUNTER
----- Message from Mali Epps sent at 10/9/2020  2:07 PM CDT -----  Regarding: Received Call  Ms. Strauss is returning a call to Ms. Beltran Ace regarding the pt and requests a returned call to [phone].    Thank you.

## 2020-10-14 NOTE — PROGRESS NOTES
ONCOLOGY FOLLOW UP VISIT.     Reason for visit: NSCLC on Keytruda follow up for Cycle #5    Best Contact Phone Number(s): [phone] (home)      Cancer/Stage/TNM:   Cancer Staging  No matching staging information was found for the patient.     Oncology History   Adenocarcinoma of right lung, stage 4   4/27/2020 Initial Diagnosis    Treated in late 2019 for pneumonia.  Patient presented with constant chest pain on the right side and back.  Loss of appetite and weight loss.  She was found to have a R lung mass, mediastinal LAD, and R pleural effusion.  4/27 had EBUS and was found to have adenocarcinoma from 4L, station 7, 4R and 11R FNAs.     6/4/2020 -  Chemotherapy    Treatment Summary   Plan Name: OP PEMBROLIZUMAB 200MG Q3W  Treatment Goal: Control  Status: Active  Start Date: 6/4/2020  End Date: 9/22/2020 (Planned)  Provider: Gurpreet Estrada MD  Chemotherapy: pembrolizumab (KEYTRUDA) 200 mg in sodium chloride 0.9% 100 mL chemo infusion, 200 mg, Intravenous, Clinic/HOD 1 time, 5 of 6 cycles  Administration: 200 mg (6/4/2020), 200 mg (6/25/2020), 200 mg (7/17/2020), 200 mg (8/8/2020), 200 mg (9/11/2020)          Interval History:   Today patient reports ***     Practical Problems Physical Problems                                                   Family Problems                                         Emotional Problems                                                         Spiritual/Religions Concerns               Other Problems            ROS:  Review of Systems   A complete 12-point review of systems was reviewed and is negative except as mentioned above.     Past Medical History:   Past Medical History:   Diagnosis Date    Depression     Hypercholesteremia     Pneumonia     Thyroid disease         Allergies:   Review of patient's allergies indicates:   Allergen Reactions    Codeine Nausea And Vomiting        Medications:   Current Outpatient Medications   Medication Sig Dispense Refill    aspirin  (ECOTRIN) 81 MG EC tablet Take 81 mg by mouth once daily.      clonazePAM (KLONOPIN) 1 MG tablet Take 1 tablet (1 mg total) by mouth 2 (two) times daily. (Patient not taking: Reported on 7/17/2020) 60 tablet 0    divalproex (DEPAKOTE) 250 MG EC tablet Take 250 mg by mouth 3 (three) times daily.      DULoxetine (CYMBALTA) 20 MG capsule Take 20 mg by mouth once daily.      levocetirizine (XYZAL) 5 MG tablet Take 5 mg by mouth every evening.      levothyroxine (SYNTHROID) 112 MCG tablet Take 1 tablet (112 mcg total) by mouth once daily. 30 tablet 1    morphine (MS CONTIN) 15 MG 12 hr tablet Take 15 mg by mouth 2 (two) times daily.      multivitamin capsule Take 1 capsule by mouth once daily.      potassium chloride SA (K-DUR,KLOR-CON) 20 MEQ tablet Take 1 tablet (20 mEq total) by mouth once daily. (Patient not taking: Reported on 6/25/2020) 30 tablet 1    pravastatin (PRAVACHOL) 40 MG tablet Take 1 tablet (40 mg total) by mouth once daily. 90 tablet 3    risperiDONE (RISPERDAL) 0.5 MG Tab Take by mouth.      sodium,potassium,mag sulfates (SUPREP BOWEL PREP KIT) 17.5-3.13-1.6 gram SolR Take 1 each by mouth as directed. (Patient not taking: Reported on 6/4/2020) 1 Bottle 0    venlafaxine (EFFEXOR-XR) 150 MG Cp24 Take 150 mg by mouth once daily.      vitamin D (VITAMIN D3) 1000 units Tab Take 1 tablet (1,000 Units total) by mouth once daily. 90 tablet 11     No current facility-administered medications for this visit.         Physical Exam:   There were no vitals taken for this visit.     ECOG Performance Status: (foot note - ECOG PS provided by [employer]cology [employer]) {performance status:14826}    Physical Exam      Labs:   No results found for this or any previous visit (from the past 48 hour(s)).       Imaging: I have personally reviewed patient's Echo imaging showing normal function.  Echo Color Flow Doppler? Yes  · The left ventricle is normal in size with normal systolic function. The    estimated ejection fraction is 65%.  · There is left ventricular concentric remodeling.  · Normal left ventricular diastolic function.  · Normal right ventricular systolic function.  · Mild mitral regurgitation.  · Mild tricuspid regurgitation.  · Mild pulmonic regurgitation.  · Normal central venous pressure (3 mmHg).  · The estimated PA systolic pressure is 20 mmHg.               Diagnoses:       No diagnosis found.      Assessment and Plan:             *** minutes were spent face to face with the patient and her family to discuss the disease, natural history, treatment options and survival statistics. Greater than 50% of this time involved counseling or coordination of care. I have provided the patient with an opportunity to ask questions and have all questions answered to his satisfaction.     she will return to clinic in ***, but knows to call in the interim if symptoms change or should a problem arise.        Trinh Guerrero MD  Internal Medicine Resident Physician, PGY-2

## 2020-10-15 ENCOUNTER — TELEPHONE (OUTPATIENT)
Dept: HEMATOLOGY/ONCOLOGY | Facility: CLINIC | Age: 70
End: 2020-10-15

## 2020-10-15 ENCOUNTER — LAB VISIT (OUTPATIENT)
Dept: LAB | Facility: HOSPITAL | Age: 70
End: 2020-10-15
Attending: INTERNAL MEDICINE
Payer: COMMERCIAL

## 2020-10-15 ENCOUNTER — OFFICE VISIT (OUTPATIENT)
Dept: HEMATOLOGY/ONCOLOGY | Facility: CLINIC | Age: 70
End: 2020-10-15
Payer: COMMERCIAL

## 2020-10-15 VITALS
OXYGEN SATURATION: 99 % | HEART RATE: 60 BPM | WEIGHT: 150.13 LBS | HEIGHT: 66 IN | TEMPERATURE: 98 F | RESPIRATION RATE: 18 BRPM | DIASTOLIC BLOOD PRESSURE: 65 MMHG | SYSTOLIC BLOOD PRESSURE: 145 MMHG | BODY MASS INDEX: 24.13 KG/M2

## 2020-10-15 DIAGNOSIS — G89.3 CANCER RELATED PAIN: ICD-10-CM

## 2020-10-15 DIAGNOSIS — F41.9 ANXIETY: ICD-10-CM

## 2020-10-15 DIAGNOSIS — C34.91 ADENOCARCINOMA OF RIGHT LUNG, STAGE 4: Primary | ICD-10-CM

## 2020-10-15 DIAGNOSIS — C77.1 METASTASIS TO MEDIASTINAL LYMPH NODE: ICD-10-CM

## 2020-10-15 DIAGNOSIS — E03.9 ACQUIRED HYPOTHYROIDISM: ICD-10-CM

## 2020-10-15 DIAGNOSIS — C79.51 BONE METASTASIS: ICD-10-CM

## 2020-10-15 DIAGNOSIS — C34.91 ADENOCARCINOMA OF RIGHT LUNG, STAGE 4: ICD-10-CM

## 2020-10-15 DIAGNOSIS — F32.A DEPRESSION, UNSPECIFIED DEPRESSION TYPE: ICD-10-CM

## 2020-10-15 LAB
ALBUMIN SERPL BCP-MCNC: 3.7 G/DL (ref 3.5–5.2)
ALP SERPL-CCNC: 76 U/L (ref 55–135)
ALT SERPL W/O P-5'-P-CCNC: 28 U/L (ref 10–44)
ANION GAP SERPL CALC-SCNC: 9 MMOL/L (ref 8–16)
AST SERPL-CCNC: 29 U/L (ref 10–40)
BASOPHILS # BLD AUTO: 0.05 K/UL (ref 0–0.2)
BASOPHILS NFR BLD: 0.7 % (ref 0–1.9)
BILIRUB SERPL-MCNC: 0.5 MG/DL (ref 0.1–1)
BUN SERPL-MCNC: 10 MG/DL (ref 8–23)
CALCIUM SERPL-MCNC: 9.5 MG/DL (ref 8.7–10.5)
CHLORIDE SERPL-SCNC: 106 MMOL/L (ref 95–110)
CO2 SERPL-SCNC: 26 MMOL/L (ref 23–29)
CREAT SERPL-MCNC: 1.1 MG/DL (ref 0.5–1.4)
DIFFERENTIAL METHOD: ABNORMAL
EOSINOPHIL # BLD AUTO: 0.1 K/UL (ref 0–0.5)
EOSINOPHIL NFR BLD: 0.7 % (ref 0–8)
ERYTHROCYTE [DISTWIDTH] IN BLOOD BY AUTOMATED COUNT: 16.5 % (ref 11.5–14.5)
EST. GFR  (AFRICAN AMERICAN): 59.2 ML/MIN/1.73 M^2
EST. GFR  (NON AFRICAN AMERICAN): 51.3 ML/MIN/1.73 M^2
GLUCOSE SERPL-MCNC: 107 MG/DL (ref 70–110)
HCT VFR BLD AUTO: 42.6 % (ref 37–48.5)
HGB BLD-MCNC: 13.6 G/DL (ref 12–16)
IMM GRANULOCYTES # BLD AUTO: 0.02 K/UL (ref 0–0.04)
IMM GRANULOCYTES NFR BLD AUTO: 0.3 % (ref 0–0.5)
LYMPHOCYTES # BLD AUTO: 2.3 K/UL (ref 1–4.8)
LYMPHOCYTES NFR BLD: 30.7 % (ref 18–48)
MCH RBC QN AUTO: 27.4 PG (ref 27–31)
MCHC RBC AUTO-ENTMCNC: 31.9 G/DL (ref 32–36)
MCV RBC AUTO: 86 FL (ref 82–98)
MONOCYTES # BLD AUTO: 0.7 K/UL (ref 0.3–1)
MONOCYTES NFR BLD: 9.6 % (ref 4–15)
NEUTROPHILS # BLD AUTO: 4.3 K/UL (ref 1.8–7.7)
NEUTROPHILS NFR BLD: 58 % (ref 38–73)
NRBC BLD-RTO: 0 /100 WBC
PLATELET # BLD AUTO: 277 K/UL (ref 150–350)
PMV BLD AUTO: 10.7 FL (ref 9.2–12.9)
POTASSIUM SERPL-SCNC: 4.1 MMOL/L (ref 3.5–5.1)
PROT SERPL-MCNC: 7.9 G/DL (ref 6–8.4)
RBC # BLD AUTO: 4.97 M/UL (ref 4–5.4)
SODIUM SERPL-SCNC: 141 MMOL/L (ref 136–145)
T4 FREE SERPL-MCNC: 1.03 NG/DL (ref 0.71–1.51)
TSH SERPL DL<=0.005 MIU/L-ACNC: 41.81 UIU/ML (ref 0.4–4)
WBC # BLD AUTO: 7.42 K/UL (ref 3.9–12.7)

## 2020-10-15 PROCEDURE — 99999 PR PBB SHADOW E&M-EST. PATIENT-LVL V: CPT | Mod: PBBFAC,,, | Performed by: INTERNAL MEDICINE

## 2020-10-15 PROCEDURE — 99999 PR PBB SHADOW E&M-EST. PATIENT-LVL V: ICD-10-PCS | Mod: PBBFAC,,, | Performed by: INTERNAL MEDICINE

## 2020-10-15 PROCEDURE — 84439 ASSAY OF FREE THYROXINE: CPT

## 2020-10-15 PROCEDURE — 85025 COMPLETE CBC W/AUTO DIFF WBC: CPT

## 2020-10-15 PROCEDURE — 99214 OFFICE O/P EST MOD 30 MIN: CPT | Mod: S$GLB,,, | Performed by: INTERNAL MEDICINE

## 2020-10-15 PROCEDURE — 80053 COMPREHEN METABOLIC PANEL: CPT

## 2020-10-15 PROCEDURE — 36415 COLL VENOUS BLD VENIPUNCTURE: CPT

## 2020-10-15 PROCEDURE — 84443 ASSAY THYROID STIM HORMONE: CPT

## 2020-10-15 PROCEDURE — 99214 PR OFFICE/OUTPT VISIT, EST, LEVL IV, 30-39 MIN: ICD-10-PCS | Mod: S$GLB,,, | Performed by: INTERNAL MEDICINE

## 2020-10-15 NOTE — PROGRESS NOTES
ONCOLOGY FOLLOW UP VISIT.     Reason for visit: Toxicity check on Keytruda for stage IV NSCLC    Best Contact Phone Number(s): [phone] (home)      Cancer/Stage/TNM:   Cancer Staging  No matching staging information was found for the patient.     Oncology History   Adenocarcinoma of right lung, stage 4   4/27/2020 Initial Diagnosis    Treated in late 2019 for pneumonia.  Patient presented with constant chest pain on the right side and back.  Loss of appetite and weight loss.  She was found to have a R lung mass, mediastinal LAD, and R pleural effusion.  4/27 had EBUS and was found to have adenocarcinoma from 4L, station 7, 4R and 11R FNAs.     6/4/2020 -  Chemotherapy    Treatment Summary   Plan Name: OP PEMBROLIZUMAB 200MG Q3W  Treatment Goal: Control  Status: Active  Start Date: 6/4/2020  End Date: 10/16/2020 (Planned)  Provider: Gurpreet Estrada MD  Chemotherapy: pembrolizumab (KEYTRUDA) 200 mg in sodium chloride 0.9% 100 mL chemo infusion, 200 mg, Intravenous, Clinic/HOD 1 time, 5 of 6 cycles  Administration: 200 mg (6/4/2020), 200 mg (6/25/2020), 200 mg (7/17/2020), 200 mg (8/8/2020), 200 mg (9/11/2020)          Interval History:   Patient returns to clinic prior to cycle 5 of Keytruda. Patient failed to come to clinic appointments for the last 2 weeks. Patient reports worsening social issues involving her son. Feels her anxiety and depression has been much worse. She is unable to say exactly what medications she is on for this. Patient has not reached out to [facility] physician d/t poor relationship with provider. Medication compliance also being affected. Missing synthroid about 2x per week. SOB, fatigue, and constipation at baseline. Takes colace for intermittent constipation.    She denies any nausea, vomiting, diarrhea, abdominal pain, weight loss, loss of appetite, chest pain, leg swelling, pain, headaches, or dizziness.    ROS:  Review of Systems   Constitutional: Positive for fatigue. Negative for  activity change, chills, fever and unexpected weight change.   HENT: Negative for mouth sores, nosebleeds and sore throat.    Respiratory: Positive for shortness of breath. Negative for cough and wheezing.    Cardiovascular: Negative for chest pain, palpitations and leg swelling.   Gastrointestinal: Positive for constipation (intermittent). Negative for abdominal distention, abdominal pain and blood in stool.   Endocrine: Negative for cold intolerance and heat intolerance.   Genitourinary: Negative for dysuria, flank pain and frequency.   Musculoskeletal: Negative for arthralgias, back pain, joint swelling and myalgias.   Skin: Negative for color change, rash and wound.   Neurological: Negative for dizziness, light-headedness and headaches.   Hematological: Negative for adenopathy. Does not bruise/bleed easily.   Psychiatric/Behavioral: Positive for dysphoric mood. Negative for agitation. The patient is not nervous/anxious (improved).       A complete 12-point review of systems was reviewed and is negative except as mentioned above.     Past Medical History:   Past Medical History:   Diagnosis Date    Depression     Hypercholesteremia     Pneumonia     Thyroid disease         Allergies:   Review of patient's allergies indicates:   Allergen Reactions    Codeine Nausea And Vomiting        Medications:   Current Outpatient Medications   Medication Sig Dispense Refill    aspirin (ECOTRIN) 81 MG EC tablet Take 81 mg by mouth once daily.      divalproex (DEPAKOTE) 250 MG EC tablet Take 250 mg by mouth 3 (three) times daily.      DULoxetine (CYMBALTA) 20 MG capsule Take 20 mg by mouth once daily.      levocetirizine (XYZAL) 5 MG tablet Take 5 mg by mouth every evening.      levothyroxine (SYNTHROID) 112 MCG tablet Take 1 tablet (112 mcg total) by mouth once daily. 30 tablet 1    morphine (MS CONTIN) 15 MG 12 hr tablet Take 15 mg by mouth 2 (two) times daily.      multivitamin capsule Take 1 capsule by mouth  "once daily.      pravastatin (PRAVACHOL) 40 MG tablet Take 1 tablet (40 mg total) by mouth once daily. 90 tablet 3    risperiDONE (RISPERDAL) 0.5 MG Tab Take by mouth.      venlafaxine (EFFEXOR-XR) 150 MG Cp24 Take 150 mg by mouth once daily.      vitamin D (VITAMIN D3) 1000 units Tab Take 1 tablet (1,000 Units total) by mouth once daily. 90 tablet 11    clonazePAM (KLONOPIN) 1 MG tablet Take 1 tablet (1 mg total) by mouth 2 (two) times daily. (Patient not taking: Reported on 7/17/2020) 60 tablet 0    potassium chloride SA (K-DUR,KLOR-CON) 20 MEQ tablet Take 1 tablet (20 mEq total) by mouth once daily. (Patient not taking: Reported on 6/25/2020) 30 tablet 1    sodium,potassium,mag sulfates (SUPREP BOWEL PREP KIT) 17.5-3.13-1.6 gram SolR Take 1 each by mouth as directed. (Patient not taking: Reported on 6/4/2020) 1 Bottle 0     No current facility-administered medications for this visit.         Physical Exam:   BP (!) 145/65 (BP Location: Right arm, Patient Position: Sitting, BP Method: Medium (Automatic))   Pulse 60   Temp 98.3 °F (36.8 °C) (Oral)   Resp 18   Ht 5' 6" (1.676 m)   Wt 68.1 kg (150 lb 2.1 oz)   SpO2 99%   BMI 24.23 kg/m²      ECOG Performance Status: (foot note - ECOG PS provided by [employer]cology [employer]) 1 - Symptomatic but completely ambulatory    Physical Exam  Constitutional:       Appearance: She is well-developed.   HENT:      Head: Normocephalic and atraumatic.   Eyes:      Conjunctiva/sclera: Conjunctivae normal.      Pupils: Pupils are equal, round, and reactive to light.   Neck:      Musculoskeletal: Normal range of motion and neck supple.   Cardiovascular:      Rate and Rhythm: Normal rate and regular rhythm.      Heart sounds: No murmur. No friction rub.   Pulmonary:      Effort: Pulmonary effort is normal.      Breath sounds: Normal breath sounds.   Abdominal:      General: Bowel sounds are decreased.      Palpations: Abdomen is soft.      Tenderness: There is " no abdominal tenderness.   Musculoskeletal: Normal range of motion.   Lymphadenopathy:      Cervical: No cervical adenopathy.   Skin:     General: Skin is warm and dry.   Neurological:      Mental Status: She is alert and oriented to person, place, and time.   Psychiatric:         Behavior: Behavior normal.           Labs:   Recent Results (from the past 48 hour(s))   CBC auto differential    Collection Time: 10/15/20 12:31 PM   Result Value Ref Range    WBC 7.42 3.90 - 12.70 K/uL    RBC 4.97 4.00 - 5.40 M/uL    Hemoglobin 13.6 12.0 - 16.0 g/dL    Hematocrit 42.6 37.0 - 48.5 %    Mean Corpuscular Volume 86 82 - 98 fL    Mean Corpuscular Hemoglobin 27.4 27.0 - 31.0 pg    Mean Corpuscular Hemoglobin Conc 31.9 (L) 32.0 - 36.0 g/dL    RDW 16.5 (H) 11.5 - 14.5 %    Platelets 277 150 - 350 K/uL    MPV 10.7 9.2 - 12.9 fL    Immature Granulocytes 0.3 0.0 - 0.5 %    Gran # (ANC) 4.3 1.8 - 7.7 K/uL    Immature Grans (Abs) 0.02 0.00 - 0.04 K/uL    Lymph # 2.3 1.0 - 4.8 K/uL    Mono # 0.7 0.3 - 1.0 K/uL    Eos # 0.1 0.0 - 0.5 K/uL    Baso # 0.05 0.00 - 0.20 K/uL    nRBC 0 0 /100 WBC    Gran% 58.0 38.0 - 73.0 %    Lymph% 30.7 18.0 - 48.0 %    Mono% 9.6 4.0 - 15.0 %    Eosinophil% 0.7 0.0 - 8.0 %    Basophil% 0.7 0.0 - 1.9 %    Differential Method Automated    Comprehensive metabolic panel    Collection Time: 10/15/20 12:31 PM   Result Value Ref Range    Sodium 141 136 - 145 mmol/L    Potassium 4.1 3.5 - 5.1 mmol/L    Chloride 106 95 - 110 mmol/L    CO2 26 23 - 29 mmol/L    Glucose 107 70 - 110 mg/dL    BUN, Bld 10 8 - 23 mg/dL    Creatinine 1.1 0.5 - 1.4 mg/dL    Calcium 9.5 8.7 - 10.5 mg/dL    Total Protein 7.9 6.0 - 8.4 g/dL    Albumin 3.7 3.5 - 5.2 g/dL    Total Bilirubin 0.5 0.1 - 1.0 mg/dL    Alkaline Phosphatase 76 55 - 135 U/L    AST 29 10 - 40 U/L    ALT 28 10 - 44 U/L    Anion Gap 9 8 - 16 mmol/L    eGFR if African American 59.2 (A) >60 mL/min/1.73 m^2    eGFR if non  51.3 (A) >60 mL/min/1.73 m^2   T4,  free    Collection Time: 10/15/20 12:31 PM   Result Value Ref Range    Free T4 1.03 0.71 - 1.51 ng/dL   TSH    Collection Time: 10/15/20 12:31 PM   Result Value Ref Range    TSH 41.808 (H) 0.400 - 4.000 uIU/mL      Imaging:   Echo Color Flow Doppler? Yes  · The left ventricle is normal in size with normal systolic function. The   estimated ejection fraction is 65%.  · There is left ventricular concentric remodeling.  · Normal left ventricular diastolic function.  · Normal right ventricular systolic function.  · Mild mitral regurgitation.  · Mild tricuspid regurgitation.  · Mild pulmonic regurgitation.  · Normal central venous pressure (3 mmHg).  · The estimated PA systolic pressure is 20 mmHg.               Diagnoses:       1. Adenocarcinoma of right lung, stage 4    2. 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 Acquired hypothyroidism    5. Cancer related pain    6. Anxiety    7. Depression, unspecified depression type          Assessment and Plan:         1,2,3. Stage IV PD-L1 60% Adenocarcinoma of lung:   -Not enough tissue for molecular testing, will send for [facility].  Staging has been completed.  Discussed with patient and son in detail diagnosis, prognosis, and treatment options.  Discussed that if patient did not have targetable mutations on liquid biopsy, best treatment option with her performance status would be immunotherapy alone with Keytruda.  - CT from 8/6 shows interval significant improvement in size of lesions.  No pathological fracture.  Interval resolution of right pleural effusion.  - Labs acceptable to proceed with cycle #5 of Keytruda. Re-staging in November.     RTC 3 weeks with CT CAP, MRI brain, labs (CBC,CMP,TSH,free T4), to see Dr. Estrada and keytruda cycle #6.      4- TSH elevated today. Patient reports poor compliance with synthroid. Patient currently taking 112 mcg through [facility] prescription. Discussed with patient that cocaine can interact with synthroid and  cause hyperthyroidism.       5- Denies any pain at today's visit. Patient was referred to palliative care and is followed by Dr. Kearney.  All prescriptions need to be prescribed by patient's [facility] physician.       6,7-Patient reporting increased anxiety and worsening depression. This has caused her to miss appointments for 2 weeks and also decreased compliance with synthroid. States she is unsure what she is taking for her mood. All prescriptions through [facility]. Patient agreeable to see Hem/Onc psych at [facility] as she does not get along with current psychiatrist.     she will return to clinic in 3 weeks, but knows to call in the interim if symptoms change or should a problem arise.    Case discussed with Dr. Estrada. Patient is in agreement with the proposed treatment plan. All questions were answered to the patient's satisfaction. Pt knows to call clinic if anything is needed before the next clinic visit.     Gretchen Avalos, MSN, APRN, FNP-C  Hematology and Medical Oncology  Nurse Practitioner to Dr. Gurpreet Estrada  Nurse Practitioner, [facility]           I have reviewed the notes, assessments, and/or procedures performed by Gretchen BURLESON, as above.  I have personally interviewed and examined the patient at the beside, and rounded with Gretchen. I concur with her assessment and plan and the documentation of Micheline Raza.    Gurpreet Estrada M.D.  Hematology/Oncology Attending  [facility]  [facility]

## 2020-10-15 NOTE — TELEPHONE ENCOUNTER
"----- Message from Sierra Estrada sent at 10/15/2020  8:23 AM CDT -----  Reschedule Existing Appointment    Appt Date: 10/15/20  Type of appt : NON FASTING LAB [[id number]] & ESTABLISHED PATIENT [[id number]]  Physician: Dr Estrada    Reason for rescheduling? Please contact [facility] assist patient with Transportation. They were unaware of appointment until this morning and would like to know if appointment could be change to this afternoon or to another date.    Caller: Rica   Contact Preference:[phone]    Additional Information:  "Thank you for all that you do for our patients'"       "

## 2020-10-15 NOTE — Clinical Note
I have a patient who I am referring for hem/onc psych services. Patient reports significant hx of anxiety and depression. She is a [facility] patient - she states that she does not get along with her [facility] physician and has not been reaching out for help. It has taken 2 weeks to get her back in clinic for her Keytruda infusions and she states it is bc of significant social issues at home with her son along with worsening depression & anxiety. Patient has her infusion tomorrow morning. Not sure if it would be possible to have her seen tomorrow as she will already be coming and will have transportation with [facility]. Also, when she comes to clinic we receive multiple complaints from patients and staff that she is begging for money. Today has been worse than usual.    Melanie - this may also be a social work issue as well if you don't mind following.

## 2020-10-16 ENCOUNTER — DOCUMENTATION ONLY (OUTPATIENT)
Dept: HEMATOLOGY/ONCOLOGY | Facility: CLINIC | Age: 70
End: 2020-10-16

## 2020-10-16 ENCOUNTER — INFUSION (OUTPATIENT)
Dept: INFUSION THERAPY | Facility: HOSPITAL | Age: 70
End: 2020-10-16
Attending: INTERNAL MEDICINE
Payer: COMMERCIAL

## 2020-10-16 VITALS
SYSTOLIC BLOOD PRESSURE: 146 MMHG | RESPIRATION RATE: 17 BRPM | HEART RATE: 67 BPM | OXYGEN SATURATION: 99 % | DIASTOLIC BLOOD PRESSURE: 65 MMHG | TEMPERATURE: 98 F

## 2020-10-16 DIAGNOSIS — C34.91 ADENOCARCINOMA OF RIGHT LUNG, STAGE 4: Primary | ICD-10-CM

## 2020-10-16 PROCEDURE — 63600175 PHARM REV CODE 636 W HCPCS: Mod: JG | Performed by: INTERNAL MEDICINE

## 2020-10-16 PROCEDURE — 25000003 PHARM REV CODE 250: Performed by: INTERNAL MEDICINE

## 2020-10-16 PROCEDURE — 96413 CHEMO IV INFUSION 1 HR: CPT

## 2020-10-16 RX ORDER — SODIUM CHLORIDE 0.9 % (FLUSH) 0.9 %
10 SYRINGE (ML) INJECTION
Status: CANCELLED | OUTPATIENT
Start: 2020-10-16

## 2020-10-16 RX ORDER — HEPARIN 100 UNIT/ML
500 SYRINGE INTRAVENOUS
Status: DISCONTINUED | OUTPATIENT
Start: 2020-10-16 | End: 2020-10-16 | Stop reason: HOSPADM

## 2020-10-16 RX ORDER — SODIUM CHLORIDE 0.9 % (FLUSH) 0.9 %
10 SYRINGE (ML) INJECTION
Status: DISCONTINUED | OUTPATIENT
Start: 2020-10-16 | End: 2020-10-16 | Stop reason: HOSPADM

## 2020-10-16 RX ORDER — HEPARIN 100 UNIT/ML
500 SYRINGE INTRAVENOUS
Status: CANCELLED | OUTPATIENT
Start: 2020-10-16

## 2020-10-16 RX ADMIN — SODIUM CHLORIDE 200 MG: 9 INJECTION, SOLUTION INTRAVENOUS at 08:10

## 2020-10-16 RX ADMIN — SODIUM CHLORIDE: 9 INJECTION, SOLUTION INTRAVENOUS at 08:10

## 2020-10-16 NOTE — PLAN OF CARE
Pt to clinic this morning alone for Keytruda infusion. Pt denies any sig complaints. PIV started without difficulty. Pt requesting chips X 2 with apple juice and sprite zero. Drank and ate all without difficulty. Awaiting infusion.

## 2020-10-16 NOTE — PLAN OF CARE
Infusion complete. Pt provided with another sprite zero upon request. PIV removed with catheter intact. Pt ambulatory from clinic in [facility].

## 2020-10-16 NOTE — PROGRESS NOTES
"Received the following message yesterday afternoon from Gretchen Avalos NP:    "I have a patient who I am referring for hem/onc psych services. Patient reports significant hx of anxiety and depression. She is a [facility] patient - she states that she does not get along with her [facility] physician and has not been reaching out for help. It has taken 2 weeks to get her back in clinic for her Keytruda infusions and she states it is bc of significant social issues at home with her son along with worsening depression & anxiety. Patient has her infusion tomorrow morning. Not sure if it would be possible to have her seen tomorrow as she will already be coming and will have transportation with [facility]. Also, when she comes to clinic we receive multiple complaints from patients and staff that she is begging for money. Today has been worse than usual.     Melanie - this may also be a social work issue as well if you don't mind following."      Sent reply message to Gretchen informing her that I'm aware of this patient. There were similar issues in June.  everything has to go through the [facility] and that I would contact patient's [facility]  Lenora Oneill who is very familiar with patient. Will need to see if [facility] will cover Onc Psych visits here or if they have there own Psych staff that sees patients.     Called the [facility], [phone], and spoke with the  who transferred me to Lenora Oneill's extension and I left a voice mail message for her.       Will continue to follow and assist as needs are identified.          "

## 2020-10-16 NOTE — PROGRESS NOTES
I received a call from Tito Diggs RN, with the [facility] who called me on request from patient's  Lenora Oneill after receiving my voice mail message. I read to Tito the message that Gretchen sent me re: patient's status and behavior as of her appointment yesterday. She said that patient is under the care of a psychiatrist, Dr. Persaud, with [facility] and she will inform Dr. Persaud/Staff of this. Asked about the option of patient being seen by Hem Onc Psychologist here and if not, then are there options for Psychologist or  counseling services through [facility] in addition to the Psychiatrist appointments. Tito said that she will bring this up at their IDT meeting on Monday and she will call me afterwards.  Sent above in its entirety as a message via Epic to Gretchen Avalos NP, and Whit Hoyt,  for Hem Onc Psychology.   Will continue to follow.

## 2020-10-16 NOTE — PROGRESS NOTES
Good morning Gretchen. I called Ms Raza. She is willing to come in on Monday 10/19 @ 9:30am (scheduled with Dr. Rodriguez). But she says she has to get her transportation set up with [facility]. She asked me to contact them, I explained to her that I do not make those arrangements. I wanted to inform you of this before I scheduled the appt & she can not get here because of transportation. I will place a hold on it until the end of the day. We have pts waiting for spots.  I do not want to leave an open slot if I can schedule someone else.

## 2020-10-19 ENCOUNTER — DOCUMENTATION ONLY (OUTPATIENT)
Dept: HEMATOLOGY/ONCOLOGY | Facility: CLINIC | Age: 70
End: 2020-10-19

## 2020-10-19 NOTE — PROGRESS NOTES
"I received a call this morning from Lenora Oneill, patient's [employer] . She has attempted to contact patient twice this morning and will continue to try to reach patient to offer counseling services to her. She said that patient will usually call her back. She said that she had a "come to Js" meeting with patient last week about her missed medical appointments. She received a  report from a [employer] employee that patient was witnessed coming out of a club over the weekend. She said that patient's behavior cycles and she is again making the choices to go out clubbing, drinking and doing drugs, not spending her money wisely and then asking others for money. Lenora last spoke with patient's son a few weeks ago, and he told her that he has asked, begged, his mother to stop; he doesn't condone what she is doing and he will not give patient money for drinking and drugs, and because of this patient then speaks bad of him. Lenora said that patient has to make the choice to stop these behaviors and agree to and accept help. She will address with patient her maladaptive behaviors including begging others for money. She will follow up with Dr. Persaud (patient's Psychiatrist).  Notifying Gretchen Avalos NP, Dr. Estrada and his nursing staff of above via Epic message.         "

## 2020-10-19 NOTE — PROGRESS NOTES
I received a call this morning from Tito Diggs RN, with the [facility] informing that patient's case was discussed in their IDT meeting this morning and decision made that patient's [facility]  Lenora Oneill will do counseling with patient along with continued care by her Psychiatrist (Dr. Persaud, with [facility]), and they will not cover Oncology Psychology at [facility] at this time.   Notified Gretchen Avalos NP, Dr. Estrada and his nursing staff, and Whit Hoyt,  for Oncology Psychology, of above via Epic message. Will continue to follow and assist as needs are identified.

## 2020-11-05 ENCOUNTER — TELEPHONE (OUTPATIENT)
Dept: HEMATOLOGY/ONCOLOGY | Facility: CLINIC | Age: 70
End: 2020-11-05

## 2020-11-05 ENCOUNTER — LAB VISIT (OUTPATIENT)
Dept: LAB | Facility: HOSPITAL | Age: 70
End: 2020-11-05
Attending: INTERNAL MEDICINE
Payer: COMMERCIAL

## 2020-11-05 ENCOUNTER — OFFICE VISIT (OUTPATIENT)
Dept: HEMATOLOGY/ONCOLOGY | Facility: CLINIC | Age: 70
End: 2020-11-05
Payer: COMMERCIAL

## 2020-11-05 VITALS
OXYGEN SATURATION: 97 % | DIASTOLIC BLOOD PRESSURE: 62 MMHG | HEIGHT: 66 IN | SYSTOLIC BLOOD PRESSURE: 120 MMHG | TEMPERATURE: 98 F | HEART RATE: 57 BPM | WEIGHT: 152.13 LBS | RESPIRATION RATE: 18 BRPM | BODY MASS INDEX: 24.45 KG/M2

## 2020-11-05 DIAGNOSIS — F41.9 ANXIETY: ICD-10-CM

## 2020-11-05 DIAGNOSIS — C79.51 BONE METASTASIS: ICD-10-CM

## 2020-11-05 DIAGNOSIS — C77.1 METASTASIS TO MEDIASTINAL LYMPH NODE: ICD-10-CM

## 2020-11-05 DIAGNOSIS — E03.9 ACQUIRED HYPOTHYROIDISM: ICD-10-CM

## 2020-11-05 DIAGNOSIS — C34.91 ADENOCARCINOMA OF RIGHT LUNG, STAGE 4: ICD-10-CM

## 2020-11-05 DIAGNOSIS — G89.3 CANCER RELATED PAIN: ICD-10-CM

## 2020-11-05 DIAGNOSIS — C34.91 ADENOCARCINOMA OF RIGHT LUNG, STAGE 4: Primary | ICD-10-CM

## 2020-11-05 DIAGNOSIS — F32.A DEPRESSION, UNSPECIFIED DEPRESSION TYPE: ICD-10-CM

## 2020-11-05 LAB
ALBUMIN SERPL BCP-MCNC: 3.3 G/DL (ref 3.5–5.2)
ALP SERPL-CCNC: 57 U/L (ref 55–135)
ALT SERPL W/O P-5'-P-CCNC: 11 U/L (ref 10–44)
ANION GAP SERPL CALC-SCNC: 7 MMOL/L (ref 8–16)
AST SERPL-CCNC: 18 U/L (ref 10–40)
BASOPHILS # BLD AUTO: 0.04 K/UL (ref 0–0.2)
BASOPHILS NFR BLD: 0.8 % (ref 0–1.9)
BILIRUB SERPL-MCNC: 0.4 MG/DL (ref 0.1–1)
BUN SERPL-MCNC: 8 MG/DL (ref 8–23)
CALCIUM SERPL-MCNC: 8.6 MG/DL (ref 8.7–10.5)
CHLORIDE SERPL-SCNC: 107 MMOL/L (ref 95–110)
CO2 SERPL-SCNC: 28 MMOL/L (ref 23–29)
CREAT SERPL-MCNC: 1 MG/DL (ref 0.5–1.4)
DIFFERENTIAL METHOD: ABNORMAL
EOSINOPHIL # BLD AUTO: 0.1 K/UL (ref 0–0.5)
EOSINOPHIL NFR BLD: 1.1 % (ref 0–8)
ERYTHROCYTE [DISTWIDTH] IN BLOOD BY AUTOMATED COUNT: 15.8 % (ref 11.5–14.5)
EST. GFR  (AFRICAN AMERICAN): >60 ML/MIN/1.73 M^2
EST. GFR  (NON AFRICAN AMERICAN): 57.2 ML/MIN/1.73 M^2
GLUCOSE SERPL-MCNC: 79 MG/DL (ref 70–110)
HCT VFR BLD AUTO: 40.6 % (ref 37–48.5)
HGB BLD-MCNC: 12.9 G/DL (ref 12–16)
IMM GRANULOCYTES # BLD AUTO: 0.02 K/UL (ref 0–0.04)
IMM GRANULOCYTES NFR BLD AUTO: 0.4 % (ref 0–0.5)
LYMPHOCYTES # BLD AUTO: 1.6 K/UL (ref 1–4.8)
LYMPHOCYTES NFR BLD: 30.8 % (ref 18–48)
MCH RBC QN AUTO: 27.9 PG (ref 27–31)
MCHC RBC AUTO-ENTMCNC: 31.8 G/DL (ref 32–36)
MCV RBC AUTO: 88 FL (ref 82–98)
MONOCYTES # BLD AUTO: 0.4 K/UL (ref 0.3–1)
MONOCYTES NFR BLD: 8.2 % (ref 4–15)
NEUTROPHILS # BLD AUTO: 3.1 K/UL (ref 1.8–7.7)
NEUTROPHILS NFR BLD: 58.7 % (ref 38–73)
NRBC BLD-RTO: 0 /100 WBC
PLATELET # BLD AUTO: 228 K/UL (ref 150–350)
PMV BLD AUTO: 10.5 FL (ref 9.2–12.9)
POTASSIUM SERPL-SCNC: 3.6 MMOL/L (ref 3.5–5.1)
PROT SERPL-MCNC: 7.2 G/DL (ref 6–8.4)
RBC # BLD AUTO: 4.62 M/UL (ref 4–5.4)
SODIUM SERPL-SCNC: 142 MMOL/L (ref 136–145)
T4 FREE SERPL-MCNC: 0.71 NG/DL (ref 0.71–1.51)
TSH SERPL DL<=0.005 MIU/L-ACNC: 52.35 UIU/ML (ref 0.4–4)
WBC # BLD AUTO: 5.23 K/UL (ref 3.9–12.7)

## 2020-11-05 PROCEDURE — 99999 PR PBB SHADOW E&M-EST. PATIENT-LVL IV: CPT | Mod: PBBFAC,,, | Performed by: INTERNAL MEDICINE

## 2020-11-05 PROCEDURE — 36415 COLL VENOUS BLD VENIPUNCTURE: CPT

## 2020-11-05 PROCEDURE — 85025 COMPLETE CBC W/AUTO DIFF WBC: CPT

## 2020-11-05 PROCEDURE — 84439 ASSAY OF FREE THYROXINE: CPT

## 2020-11-05 PROCEDURE — 84443 ASSAY THYROID STIM HORMONE: CPT

## 2020-11-05 PROCEDURE — 99215 OFFICE O/P EST HI 40 MIN: CPT | Mod: S$GLB,,, | Performed by: INTERNAL MEDICINE

## 2020-11-05 PROCEDURE — 99999 PR PBB SHADOW E&M-EST. PATIENT-LVL IV: ICD-10-PCS | Mod: PBBFAC,,, | Performed by: INTERNAL MEDICINE

## 2020-11-05 PROCEDURE — 80053 COMPREHEN METABOLIC PANEL: CPT

## 2020-11-05 PROCEDURE — 99215 PR OFFICE/OUTPT VISIT, EST, LEVL V, 40-54 MIN: ICD-10-PCS | Mod: S$GLB,,, | Performed by: INTERNAL MEDICINE

## 2020-11-05 RX ORDER — HEPARIN 100 UNIT/ML
500 SYRINGE INTRAVENOUS
Status: CANCELLED | OUTPATIENT
Start: 2020-11-06

## 2020-11-05 RX ORDER — SODIUM CHLORIDE 0.9 % (FLUSH) 0.9 %
10 SYRINGE (ML) INJECTION
Status: CANCELLED | OUTPATIENT
Start: 2020-11-06

## 2020-11-05 NOTE — PROGRESS NOTES
ONCOLOGY FOLLOW UP VISIT.     Reason for visit: Toxicity check on Keytruda for stage IV NSCLC    Best Contact Phone Number(s): [phone] (home)      Cancer/Stage/TNM:   Cancer Staging  No matching staging information was found for the patient.     Oncology History   Adenocarcinoma of right lung, stage 4   4/27/2020 Initial Diagnosis    Treated in late 2019 for pneumonia.  Patient presented with constant chest pain on the right side and back.  Loss of appetite and weight loss.  She was found to have a R lung mass, mediastinal LAD, and R pleural effusion.  4/27 had EBUS and was found to have adenocarcinoma from 4L, station 7, 4R and 11R FNAs.     6/4/2020 -  Chemotherapy    Treatment Summary   Plan Name: OP PEMBROLIZUMAB 200MG Q3W  Treatment Goal: Control  Status: Active  Start Date: 6/4/2020  End Date: 12/18/2020 (Planned)  Provider: Gurpreet Estrada MD  Chemotherapy: pembrolizumab (KEYTRUDA) 200 mg in sodium chloride 0.9% 100 mL chemo infusion, 200 mg, Intravenous, Clinic/HOD 1 time, 6 of 9 cycles  Administration: 200 mg (6/4/2020), 200 mg (6/25/2020), 200 mg (7/17/2020), 200 mg (8/8/2020), 200 mg (9/11/2020), 200 mg (10/16/2020)          Interval History:   Patient returns to clinic prior to cycle 5 of Keytruda. Patient failed to come to clinic appointments for the last 2 weeks. Patient reports worsening social issues involving her son. Feels her anxiety and depression has been much worse. She is unable to say exactly what medications she is on for this. Patient has not reached out to [facility] physician d/t poor relationship with provider. Medication compliance also being affected. Missing synthroid about 2x per week prior to last visit, but says she has been compliant more over the last 3 weeks. SOB, fatigue, and constipation at baseline. Takes colace for intermittent constipation.    She denies any nausea, vomiting, diarrhea, abdominal pain, weight loss, loss of appetite, chest pain, leg swelling, pain,  headaches, or dizziness.    ROS:  Review of Systems   Constitutional: Positive for fatigue. Negative for activity change, chills, fever and unexpected weight change.   HENT: Negative for mouth sores, nosebleeds and sore throat.    Respiratory: Positive for shortness of breath. Negative for cough and wheezing.    Cardiovascular: Negative for chest pain, palpitations and leg swelling.   Gastrointestinal: Positive for constipation (intermittent). Negative for abdominal distention, abdominal pain and blood in stool.   Endocrine: Negative for cold intolerance and heat intolerance.   Genitourinary: Negative for dysuria, flank pain and frequency.   Musculoskeletal: Negative for arthralgias, back pain, joint swelling and myalgias.   Skin: Negative for color change, rash and wound.   Neurological: Negative for dizziness, light-headedness and headaches.   Hematological: Negative for adenopathy. Does not bruise/bleed easily.   Psychiatric/Behavioral: Positive for dysphoric mood. Negative for agitation. The patient is not nervous/anxious (improved).       A complete 12-point review of systems was reviewed and is negative except as mentioned above.     Past Medical History:   Past Medical History:   Diagnosis Date    Depression     Hypercholesteremia     Pneumonia     Thyroid disease         Allergies:   Review of patient's allergies indicates:   Allergen Reactions    Codeine Nausea And Vomiting        Medications:   Current Outpatient Medications   Medication Sig Dispense Refill    aspirin (ECOTRIN) 81 MG EC tablet Take 81 mg by mouth once daily.      divalproex (DEPAKOTE) 250 MG EC tablet Take 250 mg by mouth 3 (three) times daily.      DULoxetine (CYMBALTA) 20 MG capsule Take 20 mg by mouth once daily.      levocetirizine (XYZAL) 5 MG tablet Take 5 mg by mouth every evening.      levothyroxine (SYNTHROID) 112 MCG tablet Take 1 tablet (112 mcg total) by mouth once daily. 30 tablet 1    morphine (MS CONTIN) 15 MG 12  "hr tablet Take 15 mg by mouth 2 (two) times daily.      multivitamin capsule Take 1 capsule by mouth once daily.      pravastatin (PRAVACHOL) 40 MG tablet Take 1 tablet (40 mg total) by mouth once daily. 90 tablet 3    risperiDONE (RISPERDAL) 0.5 MG Tab Take by mouth.      venlafaxine (EFFEXOR-XR) 150 MG Cp24 Take 150 mg by mouth once daily.      vitamin D (VITAMIN D3) 1000 units Tab Take 1 tablet (1,000 Units total) by mouth once daily. 90 tablet 11    clonazePAM (KLONOPIN) 1 MG tablet Take 1 tablet (1 mg total) by mouth 2 (two) times daily. (Patient not taking: Reported on 7/17/2020) 60 tablet 0    potassium chloride SA (K-DUR,KLOR-CON) 20 MEQ tablet Take 1 tablet (20 mEq total) by mouth once daily. (Patient not taking: Reported on 6/25/2020) 30 tablet 1    sodium,potassium,mag sulfates (SUPREP BOWEL PREP KIT) 17.5-3.13-1.6 gram SolR Take 1 each by mouth as directed. (Patient not taking: Reported on 6/4/2020) 1 Bottle 0     No current facility-administered medications for this visit.         Physical Exam:   /62 (BP Location: Left arm, Patient Position: Sitting, BP Method: Medium (Automatic))   Pulse (!) 57   Temp 98 °F (36.7 °C) (Oral)   Resp 18   Ht 5' 6" (1.676 m)   Wt 69 kg (152 lb 1.9 oz)   SpO2 97%   BMI 24.55 kg/m²      ECOG Performance Status: (foot note - ECOG PS provided by [employer] Co[employer]cology Group) 1 - Symptomatic but completely ambulatory    Physical Exam  Constitutional:       Appearance: She is well-developed.   HENT:      Head: Normocephalic and atraumatic.   Eyes:      Conjunctiva/sclera: Conjunctivae normal.      Pupils: Pupils are equal, round, and reactive to light.   Neck:      Musculoskeletal: Normal range of motion and neck supple.   Pulmonary:      Effort: Pulmonary effort is normal. No respiratory distress.   Abdominal:      General: There is no distension.      Palpations: Abdomen is soft.   Musculoskeletal: Normal range of motion.         General: No " swelling.   Lymphadenopathy:      Cervical: No cervical adenopathy.   Skin:     General: Skin is warm and dry.   Neurological:      General: No focal deficit present.      Mental Status: She is alert and oriented to person, place, and time.   Psychiatric:         Mood and Affect: Mood normal.         Behavior: Behavior normal.           Labs:   Recent Results (from the past 48 hour(s))   CBC auto differential    Collection Time: 11/05/20  8:00 AM   Result Value Ref Range    WBC 5.23 3.90 - 12.70 K/uL    RBC 4.62 4.00 - 5.40 M/uL    Hemoglobin 12.9 12.0 - 16.0 g/dL    Hematocrit 40.6 37.0 - 48.5 %    MCV 88 82 - 98 fL    MCH 27.9 27.0 - 31.0 pg    MCHC 31.8 (L) 32.0 - 36.0 g/dL    RDW 15.8 (H) 11.5 - 14.5 %    Platelets 228 150 - 350 K/uL    MPV 10.5 9.2 - 12.9 fL    Immature Granulocytes 0.4 0.0 - 0.5 %    Gran # (ANC) 3.1 1.8 - 7.7 K/uL    Immature Grans (Abs) 0.02 0.00 - 0.04 K/uL    Lymph # 1.6 1.0 - 4.8 K/uL    Mono # 0.4 0.3 - 1.0 K/uL    Eos # 0.1 0.0 - 0.5 K/uL    Baso # 0.04 0.00 - 0.20 K/uL    nRBC 0 0 /100 WBC    Gran % 58.7 38.0 - 73.0 %    Lymph % 30.8 18.0 - 48.0 %    Mono % 8.2 4.0 - 15.0 %    Eosinophil % 1.1 0.0 - 8.0 %    Basophil % 0.8 0.0 - 1.9 %    Differential Method Automated    Comprehensive metabolic panel    Collection Time: 11/05/20  8:00 AM   Result Value Ref Range    Sodium 142 136 - 145 mmol/L    Potassium 3.6 3.5 - 5.1 mmol/L    Chloride 107 95 - 110 mmol/L    CO2 28 23 - 29 mmol/L    Glucose 79 70 - 110 mg/dL    BUN 8 8 - 23 mg/dL    Creatinine 1.0 0.5 - 1.4 mg/dL    Calcium 8.6 (L) 8.7 - 10.5 mg/dL    Total Protein 7.2 6.0 - 8.4 g/dL    Albumin 3.3 (L) 3.5 - 5.2 g/dL    Total Bilirubin 0.4 0.1 - 1.0 mg/dL    Alkaline Phosphatase 57 55 - 135 U/L    AST 18 10 - 40 U/L    ALT 11 10 - 44 U/L    Anion Gap 7 (L) 8 - 16 mmol/L    eGFR if African American >60.0 >60 mL/min/1.73 m^2    eGFR if non  57.2 (A) >60 mL/min/1.73 m^2   T4, free    Collection Time: 11/05/20  8:00 AM    Result Value Ref Range    Free T4 0.71 0.71 - 1.51 ng/dL   TSH    Collection Time: 11/05/20  8:00 AM   Result Value Ref Range    TSH 52.351 (H) 0.400 - 4.000 uIU/mL      Imaging:   Echo Color Flow Doppler? Yes  · The left ventricle is normal in size with normal systolic function. The   estimated ejection fraction is 65%.  · There is left ventricular concentric remodeling.  · Normal left ventricular diastolic function.  · Normal right ventricular systolic function.  · Mild mitral regurgitation.  · Mild tricuspid regurgitation.  · Mild pulmonic regurgitation.  · Normal central venous pressure (3 mmHg).  · The estimated PA systolic pressure is 20 mmHg.               Diagnoses:       1. Adenocarcinoma of right lung, stage 4    2. 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 Acquired hypothyroidism    5. Cancer related pain    6. Anxiety    7. Depression, unspecified depression type          Assessment and Plan:         1,2,3. Stage IV PD-L1 60% Adenocarcinoma of lung:   -Not enough tissue for molecular testing, will send for Guardant 360.  Staging has been completed.  Discussed with patient and son in detail diagnosis, prognosis, and treatment options.  Discussed that if patient did not have targetable mutations on liquid biopsy, best treatment option with her performance status would be immunotherapy alone with Keytruda.  - CT from 8/6 shows interval significant improvement in size of lesions.  No pathological fracture.  Interval resolution of right pleural effusion.  - Labs acceptable to proceed with cycle #6 of Keytruda. Re-staging in November.     RTC 3 weeks with CT CAP, MRI brain, labs (CBC,CMP,TSH,free T4), to see Dr. Estrada and keytruda cycle #7.      4- TSH pending today. Patient reports poor compliance with synthroid. Patient currently taking 112 mcg through [facility] prescription.       5- Denies any pain at today's visit. Patient was referred to palliative care and is followed by Dr. Kearney.   All prescriptions need to be prescribed by patient's [facility] physician.       6,7-Patient reporting increased anxiety and worsening depression. This has caused her to miss appointments for 2 weeks and also decreased compliance with synthroid. States she is unsure what she is taking for her mood. All prescriptions through [facility].    she will return to clinic in 3 weeks, but knows to call in the interim if symptoms change or should a problem arise.      Gurpreet Estrada M.D.  Hematology/Oncology Attending  [facility] [facility]  [facility]

## 2020-11-05 NOTE — TELEPHONE ENCOUNTER
Left message with patient's [facility]  asking for her or the nurse to call me back to discuss patient's thyroid level and if she is being compliant with her synthroid.

## 2020-11-05 NOTE — Clinical Note
- reschedule imaging closer to next appointment in 3 weeks.  - RTC in ~ 3 weeks +/- a few days to work around Thanksgiving with CT CAP, MRI brain, labs (CBC,CMP,TSH,free T4) and keytruda cycle #8.

## 2020-11-05 NOTE — TELEPHONE ENCOUNTER
Spoke to [facility] nurse, discussed synthroid compliance. She will present to the team to see if patient needs to come in for education or if she needs someone to go out to help with medication.

## 2020-11-06 ENCOUNTER — INFUSION (OUTPATIENT)
Dept: INFUSION THERAPY | Facility: HOSPITAL | Age: 70
End: 2020-11-06
Payer: COMMERCIAL

## 2020-11-06 VITALS
HEART RATE: 65 BPM | RESPIRATION RATE: 18 BRPM | TEMPERATURE: 99 F | SYSTOLIC BLOOD PRESSURE: 126 MMHG | DIASTOLIC BLOOD PRESSURE: 74 MMHG

## 2020-11-06 DIAGNOSIS — C34.91 ADENOCARCINOMA OF RIGHT LUNG, STAGE 4: Primary | ICD-10-CM

## 2020-11-06 PROCEDURE — 25000003 PHARM REV CODE 250: Performed by: INTERNAL MEDICINE

## 2020-11-06 PROCEDURE — 63600175 PHARM REV CODE 636 W HCPCS: Mod: JG | Performed by: INTERNAL MEDICINE

## 2020-11-06 PROCEDURE — 96413 CHEMO IV INFUSION 1 HR: CPT

## 2020-11-06 RX ORDER — SODIUM CHLORIDE 0.9 % (FLUSH) 0.9 %
10 SYRINGE (ML) INJECTION
Status: DISCONTINUED | OUTPATIENT
Start: 2020-11-06 | End: 2020-11-06 | Stop reason: HOSPADM

## 2020-11-06 RX ORDER — HEPARIN 100 UNIT/ML
500 SYRINGE INTRAVENOUS
Status: DISCONTINUED | OUTPATIENT
Start: 2020-11-06 | End: 2020-11-06 | Stop reason: HOSPADM

## 2020-11-06 RX ADMIN — SODIUM CHLORIDE: 9 INJECTION, SOLUTION INTRAVENOUS at 03:11

## 2020-11-06 RX ADMIN — SODIUM CHLORIDE 200 MG: 9 INJECTION, SOLUTION INTRAVENOUS at 03:11

## 2020-11-06 NOTE — PLAN OF CARE
Problem: Adult Inpatient Plan of Care  Goal: Optimal Comfort and Wellbeing  Intervention: Provide Person-Centered Care  Flowsheets (Taken 11/6/2020 1531)  Trust Relationship/Rapport:   care explained   empathic listening provided   reassurance provided   choices provided   questions answered   thoughts/feelings acknowledged   emotional support provided   questions encouraged

## 2020-11-06 NOTE — PLAN OF CARE
Pt tolerated Keytruda today. NAD. PIV flushed and removed. AVS given to pt. Discharged home. Ambulated independently to w/c, RN escorted pt to parking lot to ride.

## 2020-11-09 ENCOUNTER — TELEPHONE (OUTPATIENT)
Dept: HEMATOLOGY/ONCOLOGY | Facility: CLINIC | Age: 70
End: 2020-11-09

## 2020-11-11 ENCOUNTER — TELEPHONE (OUTPATIENT)
Dept: HEMATOLOGY/ONCOLOGY | Facility: CLINIC | Age: 70
End: 2020-11-11

## 2020-11-11 NOTE — TELEPHONE ENCOUNTER
"----- Message from Jatin Oliveira sent at 11/11/2020  8:51 AM CST -----  Pt returning a call.  ----- Message -----  From: Sierra Estrada  Sent: 11/11/2020   7:55 AM CST  To: [facility]  Pool      Returning a Missed Call    Contact Preference: [phone] Rica (Jose)    Patient returning phone call to: Shyla Mittal    Provider:Gurpreet Estrada MD    Does patient feel the need to be seen today? No     Additional Notes:  "Thank you for all that you do for our patients'"               "

## 2020-11-25 ENCOUNTER — LAB VISIT (OUTPATIENT)
Dept: LAB | Facility: HOSPITAL | Age: 70
End: 2020-11-25
Attending: INTERNAL MEDICINE
Payer: COMMERCIAL

## 2020-11-25 ENCOUNTER — HOSPITAL ENCOUNTER (OUTPATIENT)
Dept: RADIOLOGY | Facility: HOSPITAL | Age: 70
Discharge: HOME | End: 2020-11-25
Attending: NURSE PRACTITIONER
Payer: COMMERCIAL

## 2020-11-25 DIAGNOSIS — C34.91 ADENOCARCINOMA OF RIGHT LUNG, STAGE 4: ICD-10-CM

## 2020-11-25 DIAGNOSIS — E03.9 ACQUIRED HYPOTHYROIDISM: ICD-10-CM

## 2020-11-25 LAB
ALBUMIN SERPL BCP-MCNC: 3.4 G/DL (ref 3.5–5.2)
ALP SERPL-CCNC: 77 U/L (ref 55–135)
ALT SERPL W/O P-5'-P-CCNC: 26 U/L (ref 10–44)
ANION GAP SERPL CALC-SCNC: 11 MMOL/L (ref 8–16)
AST SERPL-CCNC: 28 U/L (ref 10–40)
BASOPHILS # BLD AUTO: 0.04 K/UL (ref 0–0.2)
BASOPHILS NFR BLD: 0.8 % (ref 0–1.9)
BILIRUB SERPL-MCNC: 0.3 MG/DL (ref 0.1–1)
BUN SERPL-MCNC: 15 MG/DL (ref 8–23)
CALCIUM SERPL-MCNC: 9 MG/DL (ref 8.7–10.5)
CHLORIDE SERPL-SCNC: 109 MMOL/L (ref 95–110)
CO2 SERPL-SCNC: 24 MMOL/L (ref 23–29)
CREAT SERPL-MCNC: 1.1 MG/DL (ref 0.5–1.4)
DIFFERENTIAL METHOD: ABNORMAL
EOSINOPHIL # BLD AUTO: 0.1 K/UL (ref 0–0.5)
EOSINOPHIL NFR BLD: 1.1 % (ref 0–8)
ERYTHROCYTE [DISTWIDTH] IN BLOOD BY AUTOMATED COUNT: 15.1 % (ref 11.5–14.5)
EST. GFR  (AFRICAN AMERICAN): 58.8 ML/MIN/1.73 M^2
EST. GFR  (NON AFRICAN AMERICAN): 51 ML/MIN/1.73 M^2
GLUCOSE SERPL-MCNC: 77 MG/DL (ref 70–110)
HCT VFR BLD AUTO: 39.9 % (ref 37–48.5)
HGB BLD-MCNC: 12.7 G/DL (ref 12–16)
IMM GRANULOCYTES # BLD AUTO: 0.03 K/UL (ref 0–0.04)
IMM GRANULOCYTES NFR BLD AUTO: 0.6 % (ref 0–0.5)
LYMPHOCYTES # BLD AUTO: 1.6 K/UL (ref 1–4.8)
LYMPHOCYTES NFR BLD: 33.8 % (ref 18–48)
MCH RBC QN AUTO: 28.4 PG (ref 27–31)
MCHC RBC AUTO-ENTMCNC: 31.8 G/DL (ref 32–36)
MCV RBC AUTO: 89 FL (ref 82–98)
MONOCYTES # BLD AUTO: 0.5 K/UL (ref 0.3–1)
MONOCYTES NFR BLD: 9.5 % (ref 4–15)
NEUTROPHILS # BLD AUTO: 2.6 K/UL (ref 1.8–7.7)
NEUTROPHILS NFR BLD: 54.2 % (ref 38–73)
NRBC BLD-RTO: 0 /100 WBC
PLATELET # BLD AUTO: 248 K/UL (ref 150–350)
PMV BLD AUTO: 10.9 FL (ref 9.2–12.9)
POTASSIUM SERPL-SCNC: 3.8 MMOL/L (ref 3.5–5.1)
PROT SERPL-MCNC: 7.5 G/DL (ref 6–8.4)
RBC # BLD AUTO: 4.47 M/UL (ref 4–5.4)
SODIUM SERPL-SCNC: 144 MMOL/L (ref 136–145)
T4 FREE SERPL-MCNC: 1.01 NG/DL (ref 0.71–1.51)
TSH SERPL DL<=0.005 MIU/L-ACNC: 38.69 UIU/ML (ref 0.4–4)
WBC # BLD AUTO: 4.76 K/UL (ref 3.9–12.7)

## 2020-11-25 PROCEDURE — 84443 ASSAY THYROID STIM HORMONE: CPT

## 2020-11-25 PROCEDURE — 25500020 PHARM REV CODE 255: Performed by: NURSE PRACTITIONER

## 2020-11-25 PROCEDURE — 74177 CT ABD & PELVIS W/CONTRAST: CPT | Mod: TC

## 2020-11-25 PROCEDURE — 80053 COMPREHEN METABOLIC PANEL: CPT

## 2020-11-25 PROCEDURE — 85025 COMPLETE CBC W/AUTO DIFF WBC: CPT

## 2020-11-25 PROCEDURE — 36415 COLL VENOUS BLD VENIPUNCTURE: CPT

## 2020-11-25 PROCEDURE — 84439 ASSAY OF FREE THYROXINE: CPT

## 2020-11-25 PROCEDURE — 71260 CT CHEST ABDOMEN PELVIS WITH CONTRAST (XPD): ICD-10-PCS | Mod: 26,,, | Performed by: RADIOLOGY

## 2020-11-25 PROCEDURE — 71260 CT THORAX DX C+: CPT | Mod: 26,,, | Performed by: RADIOLOGY

## 2020-11-25 PROCEDURE — 74177 CT CHEST ABDOMEN PELVIS WITH CONTRAST (XPD): ICD-10-PCS | Mod: 26,,, | Performed by: RADIOLOGY

## 2020-11-25 PROCEDURE — 74177 CT ABD & PELVIS W/CONTRAST: CPT | Mod: 26,,, | Performed by: RADIOLOGY

## 2020-11-25 RX ADMIN — IOHEXOL 75 ML: 350 INJECTION, SOLUTION INTRAVENOUS at 09:11

## 2020-11-25 RX ADMIN — IOHEXOL 15 ML: 350 INJECTION, SOLUTION INTRAVENOUS at 09:11

## 2020-11-25 RX ADMIN — IOHEXOL 15 ML: 350 INJECTION, SOLUTION INTRAVENOUS at 08:11

## 2020-12-01 ENCOUNTER — TELEPHONE (OUTPATIENT)
Dept: HEMATOLOGY/ONCOLOGY | Facility: CLINIC | Age: 70
End: 2020-12-01

## 2020-12-01 NOTE — TELEPHONE ENCOUNTER
"----- Message from Sierra Estrada sent at 12/1/2020  8:03 AM CST -----  Reschedule Existing Appointment    Appt Date: 12/1/20  Type of appt : ESTABLISHED PATIENT [[id number]]  Physician: Dr Estrada  Reason for rescheduling? Pt not feeling well and request to reschedule appointment. Please contact to discuss availability   Caller: Rica lance/Jose  Contact Preference: [phone]    Additional Information:  "Thank you for all that you do for our patients'"       "

## 2020-12-02 ENCOUNTER — DOCUMENTATION ONLY (OUTPATIENT)
Dept: CARDIOTHORACIC SURGERY | Facility: CLINIC | Age: 70
End: 2020-12-02

## 2020-12-02 NOTE — PLAN OF CARE
DISCONTINUE ON PATHWAY REGIMEN - Non-Small Cell Lung    [id number]        Pembrolizumab (Keytruda)           Additional Orders: Severe immune-mediated reactions can occur. See   prescribing information for more details and required immediate management with   steroids. Monitor thyroid, renal, liver function tests, glucose, and sodium at   baseline and before each dose of pembrolizumab. Ref: Keytruda (pembrolizumab)   prescribing information, 2016.    **Always confirm dose/schedule in your pharmacy ordering system**    REASON: Disease Progression  PRIOR TREATMENT: [id number]: Pembrolizumab 200 mg q21 Days Until Disease   Progression, Unacceptable Toxicity, or up to 24 Months  TREATMENT RESPONSE: Partial Response (CA)    START ON PATHWAY REGIMEN - Non-Small Cell Lung    [id number]        Pembrolizumab (Keytruda)       Pemetrexed (Alimta)       Carboplatin (Paraplatin)           Additional Orders: Begin folic acid and vitamin B12 supplementation, and   dexamethasone prior to initiation of pemetrexed - see PI for details. In   studies, patients received up to 4 cycles of pembrolizumab + pemetrexed +   carboplatin followed by pembrolizumab up to a total of 35 cycles and pemetrexed   indefinitely until disease progression or toxicity. Serious immune-mediated   adverse events can occur with pembrolizumab. Please monitor your patient and   refer to the linked immune-mediated adverse reaction management materials for   more information.    **Always confirm dose/schedule in your pharmacy ordering system**    Patient Characteristics:  Stage IV Metastatic, Nonsquamous, Initial Chemotherapy/Immunotherapy, PS = 0, 1,   ALK Rearrangement Negative and EGFR Mutation Negative/Non-Sensitizing, PD-L1   Expression Positive  ? 50% (TPS) and Immunotherapy Candidate  AJCC T Category: T3  Current Disease Status: Distant Metastases  AJCC N Category: N2  AJCC M Category: M1c  AJCC 8 Stage Grouping: IVB  Histology: Nonsquamous Cell  ROS1 Rearrangement  Status: Negative  T790M Mutation Status: Not Applicable - EGFR Mutation Negative/Unknown  Other Mutations/Biomarkers: No Other Actionable Mutations  Chemotherapy/Immunotherapy LOT: Initial Chemotherapy/Immunotherapy  Molecular Targeted Therapy: Not Appropriate  MET Exon 14 Mutation Status: Negative  RET Gene Fusion Status: Negative  EGFR Mutation Status: Negative/Wild Type  NTRK Gene Fusion Status: Negative  PD-L1 Expression Status: PD-L1 Positive ? 50% (TPS)  ALK Rearrangement Status: Negative  BRAF V600E Mutation Status: Negative  ECOG Performance Status: 1  Immunotherapy Candidate Status: Candidate for Immunotherapy  Intent of Therapy:  Non-Curative / Palliative Intent, Discussed with Patient

## 2020-12-02 NOTE — PROGRESS NOTES
Patient's case was discussed at thoracic tumor board.  Patient has had a dramatic response to immunotherapy with slight oligo-metastatic progression.  Due to location of disease SBRT to these lesions is not possible.  Decision was to add chemotherapy to immunotherapy.  Patient is clearly having clinical benefit from the immunotherapy, but may need chemotherapy to deepen response in lesions that are progressing slightly.

## 2020-12-02 NOTE — PATIENT CARE CONFERENCE
[facility]      THORACIC MULTIDISCIPLINARY TUMOR BOARD  PATIENT REVIEW FORM  ________________________________________________________________________    CLINIC #: [id number]  DATE: 12/02/2020    DIANGOSIS:   NSCLC    PRESENTER:   Dr. Estrada     PATIENT SUMMARY:   70 year old with stage IV NSCLC diagnosed early 2020. Treated for PNA. CXR and subsequent chest CT in March 2020 showed multiple areas of concern in right lung. Brain MRI negative. EBUS on 4/27/20 with 4L, 4R, 7, and 11R positive for adenocarcinoma. PET with hilar avidity, avid peripheral lung mass, mediastinal lymphadenopathy and right pleural effusion. No actionable mutation. Started on Keytruda. Completed 6 cycles. CT scan in Aug 2020 showed interval decrease in size of right lung lesion. Restaging scans in Nov 2020 showed interval increase in size of multiple pulmonary nodules.     BOARD RECOMMENDATIONS:   Progression on most recent scans. Agree with plan to continue Keytruda and add platinum based chemotherapy. Restage after 2-3 cycles.   She is not a candidate for radiation unless becomes symptomatic.     CONSULT NEEDED:     [] Surgery    [] Hem/Onc    [] Rad/Onc    [] Dietary                 [] Social Service    [] Psychology       [] Pulmonology    Clinical Stage: Tumor 4 Node(s) 2 Metastasis 1  Pathologic Stage: Tumor  Node(s)  Metastasis     GROUP STAGE:     [] O    [] 1A    [] IB    [] IIA    [] IIB     [] IIIA     [] IIIB     [] IIIC    [x] IV                               [] Local recurrence     [] Regional recurrence     [] Distant recurrence                   [x] NSCLC     [] SCLC     Tumor type    Unstageable:      [] Yes     [x] No  Metastatic site(s): contralateral rib, sternum          [x] [facility] Treatment Guidelines reviewed and care planned is consistent with guidelines.         (i.e., NCCN, NCI, PD, ACO, AUA, etc.)    PRESENTATION AT CANCER CONFERENCE:         [] Prospective    [] Retrospective     [] Follow-Up          []  Eligible for clinical trial

## 2020-12-03 ENCOUNTER — PATIENT MESSAGE (OUTPATIENT)
Dept: ADMINISTRATIVE | Facility: OTHER | Age: 70
End: 2020-12-03

## 2020-12-08 ENCOUNTER — PATIENT MESSAGE (OUTPATIENT)
Dept: HEMATOLOGY/ONCOLOGY | Facility: CLINIC | Age: 70
End: 2020-12-08

## 2020-12-09 ENCOUNTER — TELEPHONE (OUTPATIENT)
Dept: HEMATOLOGY/ONCOLOGY | Facility: CLINIC | Age: 70
End: 2020-12-09

## 2020-12-09 NOTE — TELEPHONE ENCOUNTER
----- Message from Jada Santos sent at 12/9/2020  4:01 PM CST -----  Regarding: Requesting a call back from the nurse  Pt can be reached at [phone] pt wants a nurse to call back please to much to explain pt says.    Thank you!

## 2020-12-21 ENCOUNTER — LAB VISIT (OUTPATIENT)
Dept: LAB | Facility: HOSPITAL | Age: 70
End: 2020-12-21
Attending: INTERNAL MEDICINE
Payer: COMMERCIAL

## 2020-12-21 ENCOUNTER — OFFICE VISIT (OUTPATIENT)
Dept: HEMATOLOGY/ONCOLOGY | Facility: CLINIC | Age: 70
End: 2020-12-21
Payer: COMMERCIAL

## 2020-12-21 ENCOUNTER — CLINICAL SUPPORT (OUTPATIENT)
Dept: HEMATOLOGY/ONCOLOGY | Facility: CLINIC | Age: 70
End: 2020-12-21
Payer: COMMERCIAL

## 2020-12-21 ENCOUNTER — INFUSION (OUTPATIENT)
Dept: INFUSION THERAPY | Facility: HOSPITAL | Age: 70
End: 2020-12-21
Payer: COMMERCIAL

## 2020-12-21 VITALS
OXYGEN SATURATION: 97 % | BODY MASS INDEX: 25.86 KG/M2 | RESPIRATION RATE: 18 BRPM | HEART RATE: 63 BPM | HEIGHT: 66 IN | WEIGHT: 160.94 LBS | TEMPERATURE: 98 F | DIASTOLIC BLOOD PRESSURE: 74 MMHG | SYSTOLIC BLOOD PRESSURE: 132 MMHG

## 2020-12-21 VITALS
TEMPERATURE: 98 F | HEIGHT: 66 IN | RESPIRATION RATE: 16 BRPM | BODY MASS INDEX: 25.86 KG/M2 | WEIGHT: 160.94 LBS | HEART RATE: 77 BPM | OXYGEN SATURATION: 96 % | SYSTOLIC BLOOD PRESSURE: 123 MMHG | DIASTOLIC BLOOD PRESSURE: 56 MMHG

## 2020-12-21 DIAGNOSIS — F41.9 ANXIETY: ICD-10-CM

## 2020-12-21 DIAGNOSIS — C34.91 ADENOCARCINOMA OF RIGHT LUNG, STAGE 4: ICD-10-CM

## 2020-12-21 DIAGNOSIS — C34.91 ADENOCARCINOMA OF RIGHT LUNG, STAGE 4: Primary | ICD-10-CM

## 2020-12-21 DIAGNOSIS — E03.9 ACQUIRED HYPOTHYROIDISM: ICD-10-CM

## 2020-12-21 DIAGNOSIS — G89.3 CANCER RELATED PAIN: ICD-10-CM

## 2020-12-21 DIAGNOSIS — C79.51 BONE METASTASIS: ICD-10-CM

## 2020-12-21 DIAGNOSIS — Z13.9 ENCOUNTER FOR SCREENING: Primary | ICD-10-CM

## 2020-12-21 DIAGNOSIS — R11.0 NAUSEA: ICD-10-CM

## 2020-12-21 DIAGNOSIS — Z51.11 ENCOUNTER FOR ANTINEOPLASTIC CHEMOTHERAPY: ICD-10-CM

## 2020-12-21 DIAGNOSIS — F32.A DEPRESSION, UNSPECIFIED DEPRESSION TYPE: ICD-10-CM

## 2020-12-21 DIAGNOSIS — Z51.11 ENCOUNTER FOR CHEMOTHERAPY MANAGEMENT: ICD-10-CM

## 2020-12-21 DIAGNOSIS — C77.1 METASTASIS TO MEDIASTINAL LYMPH NODE: ICD-10-CM

## 2020-12-21 LAB
ALBUMIN SERPL BCP-MCNC: 3.4 G/DL (ref 3.5–5.2)
ALP SERPL-CCNC: 84 U/L (ref 55–135)
ALT SERPL W/O P-5'-P-CCNC: 16 U/L (ref 10–44)
ANION GAP SERPL CALC-SCNC: 9 MMOL/L (ref 8–16)
AST SERPL-CCNC: 24 U/L (ref 10–40)
BASOPHILS # BLD AUTO: 0.04 K/UL (ref 0–0.2)
BASOPHILS NFR BLD: 0.8 % (ref 0–1.9)
BILIRUB SERPL-MCNC: 0.4 MG/DL (ref 0.1–1)
BUN SERPL-MCNC: 13 MG/DL (ref 8–23)
CALCIUM SERPL-MCNC: 8.8 MG/DL (ref 8.7–10.5)
CHLORIDE SERPL-SCNC: 106 MMOL/L (ref 95–110)
CO2 SERPL-SCNC: 26 MMOL/L (ref 23–29)
CREAT SERPL-MCNC: 1 MG/DL (ref 0.5–1.4)
DIFFERENTIAL METHOD: ABNORMAL
EOSINOPHIL # BLD AUTO: 0.1 K/UL (ref 0–0.5)
EOSINOPHIL NFR BLD: 2.5 % (ref 0–8)
ERYTHROCYTE [DISTWIDTH] IN BLOOD BY AUTOMATED COUNT: 14.9 % (ref 11.5–14.5)
EST. GFR  (AFRICAN AMERICAN): >60 ML/MIN/1.73 M^2
EST. GFR  (NON AFRICAN AMERICAN): 57.2 ML/MIN/1.73 M^2
GLUCOSE SERPL-MCNC: 99 MG/DL (ref 70–110)
HCT VFR BLD AUTO: 38 % (ref 37–48.5)
HGB BLD-MCNC: 12.2 G/DL (ref 12–16)
IMM GRANULOCYTES # BLD AUTO: 0.02 K/UL (ref 0–0.04)
IMM GRANULOCYTES NFR BLD AUTO: 0.4 % (ref 0–0.5)
LYMPHOCYTES # BLD AUTO: 1.7 K/UL (ref 1–4.8)
LYMPHOCYTES NFR BLD: 32.4 % (ref 18–48)
MCH RBC QN AUTO: 28.3 PG (ref 27–31)
MCHC RBC AUTO-ENTMCNC: 32.1 G/DL (ref 32–36)
MCV RBC AUTO: 88 FL (ref 82–98)
MONOCYTES # BLD AUTO: 0.7 K/UL (ref 0.3–1)
MONOCYTES NFR BLD: 12.3 % (ref 4–15)
NEUTROPHILS # BLD AUTO: 2.7 K/UL (ref 1.8–7.7)
NEUTROPHILS NFR BLD: 51.6 % (ref 38–73)
NRBC BLD-RTO: 0 /100 WBC
PLATELET # BLD AUTO: 262 K/UL (ref 150–350)
PMV BLD AUTO: 11 FL (ref 9.2–12.9)
POTASSIUM SERPL-SCNC: 3.9 MMOL/L (ref 3.5–5.1)
PROT SERPL-MCNC: 7.3 G/DL (ref 6–8.4)
RBC # BLD AUTO: 4.31 M/UL (ref 4–5.4)
SARS-COV-2 RDRP RESP QL NAA+PROBE: NEGATIVE
SODIUM SERPL-SCNC: 141 MMOL/L (ref 136–145)
T4 FREE SERPL-MCNC: 0.92 NG/DL (ref 0.71–1.51)
TSH SERPL DL<=0.005 MIU/L-ACNC: 57.34 UIU/ML (ref 0.4–4)
WBC # BLD AUTO: 5.27 K/UL (ref 3.9–12.7)

## 2020-12-21 PROCEDURE — 63600175 PHARM REV CODE 636 W HCPCS: Mod: JG | Performed by: INTERNAL MEDICINE

## 2020-12-21 PROCEDURE — 99215 OFFICE O/P EST HI 40 MIN: CPT | Mod: S$GLB,,, | Performed by: INTERNAL MEDICINE

## 2020-12-21 PROCEDURE — U0002 COVID-19 LAB TEST NON-CDC: HCPCS

## 2020-12-21 PROCEDURE — 84443 ASSAY THYROID STIM HORMONE: CPT

## 2020-12-21 PROCEDURE — 80053 COMPREHEN METABOLIC PANEL: CPT

## 2020-12-21 PROCEDURE — 99999 PR PBB SHADOW E&M-EST. PATIENT-LVL IV: ICD-10-PCS | Mod: PBBFAC,,, | Performed by: INTERNAL MEDICINE

## 2020-12-21 PROCEDURE — 96413 CHEMO IV INFUSION 1 HR: CPT

## 2020-12-21 PROCEDURE — 99215 PR OFFICE/OUTPT VISIT, EST, LEVL V, 40-54 MIN: ICD-10-PCS | Mod: S$GLB,,, | Performed by: INTERNAL MEDICINE

## 2020-12-21 PROCEDURE — 84439 ASSAY OF FREE THYROXINE: CPT

## 2020-12-21 PROCEDURE — 99999 PR PBB SHADOW E&M-EST. PATIENT-LVL IV: CPT | Mod: PBBFAC,,, | Performed by: INTERNAL MEDICINE

## 2020-12-21 PROCEDURE — 25000003 PHARM REV CODE 250: Performed by: INTERNAL MEDICINE

## 2020-12-21 PROCEDURE — 85025 COMPLETE CBC W/AUTO DIFF WBC: CPT

## 2020-12-21 PROCEDURE — 36415 COLL VENOUS BLD VENIPUNCTURE: CPT

## 2020-12-21 RX ORDER — SODIUM CHLORIDE 0.9 % (FLUSH) 0.9 %
10 SYRINGE (ML) INJECTION
Status: CANCELLED | OUTPATIENT
Start: 2020-12-21

## 2020-12-21 RX ORDER — FOLIC ACID 1 MG/1
1 TABLET ORAL DAILY
Qty: 30 TABLET | Refills: 11 | Status: SHIPPED | OUTPATIENT
Start: 2020-12-21 | End: 2020-12-21

## 2020-12-21 RX ORDER — HEPARIN 100 UNIT/ML
500 SYRINGE INTRAVENOUS
Status: DISCONTINUED | OUTPATIENT
Start: 2020-12-21 | End: 2020-12-21 | Stop reason: HOSPADM

## 2020-12-21 RX ORDER — HEPARIN 100 UNIT/ML
500 SYRINGE INTRAVENOUS
Status: CANCELLED | OUTPATIENT
Start: 2020-12-21

## 2020-12-21 RX ORDER — DEXAMETHASONE 4 MG/1
8 TABLET ORAL DAILY
Qty: 24 TABLET | Refills: 5 | Status: SHIPPED | OUTPATIENT
Start: 2020-12-21 | End: 2020-12-21

## 2020-12-21 RX ORDER — SODIUM CHLORIDE 0.9 % (FLUSH) 0.9 %
10 SYRINGE (ML) INJECTION
Status: DISCONTINUED | OUTPATIENT
Start: 2020-12-21 | End: 2020-12-21 | Stop reason: HOSPADM

## 2020-12-21 RX ADMIN — SODIUM CHLORIDE: 9 INJECTION, SOLUTION INTRAVENOUS at 02:12

## 2020-12-21 RX ADMIN — SODIUM CHLORIDE 200 MG: 9 INJECTION, SOLUTION INTRAVENOUS at 02:12

## 2020-12-21 NOTE — Clinical Note
- patient is requesting a new prescription for nausea and an increase in pain regimen.  All prescriptions are to go through [facility] doctor.  Let's request that they send a prescription for Zofran 8 mg ODT q 8 hours prn nausea.  I will defer to the [facility] doctor for pain medication adjustments.  - patient does not want chemotherapy this cycle, but wants to continue on Keytruda.  She will consider the addition of chemotherapy next cycle.  - RTC in 3 weeks prior to cycle 2 of carbo/alimta/keytruda with CBC, CMP, TSH, free T4 prior.

## 2020-12-21 NOTE — PROGRESS NOTES
ONCOLOGY FOLLOW UP VISIT.     Reason for visit: Toxicity check and review of re-staging scans on Keytruda for stage IV NSCLC    Best Contact Phone Number(s): [phone] (home)      Cancer/Stage/TNM:   Cancer Staging  No matching staging information was found for the patient.     Oncology History   Adenocarcinoma of right lung, stage 4   4/27/2020 Initial Diagnosis    Treated in late 2019 for pneumonia.  Patient presented with constant chest pain on the right side and back.  Loss of appetite and weight loss.  She was found to have a R lung mass, mediastinal LAD, and R pleural effusion.  4/27 had EBUS and was found to have adenocarcinoma from 4L, station 7, 4R and 11R FNAs.     6/4/2020 - 11/26/2020 Chemotherapy    Treatment Summary   Plan Name: OP PEMBROLIZUMAB 200MG Q3W  Treatment Goal: Control  Status: Inactive  Start Date: 6/4/2020  End Date: 11/6/2020  Provider: Gurpreet Estrada MD  Chemotherapy: pembrolizumab (KEYTRUDA) 200 mg in sodium chloride 0.9% 100 mL chemo infusion, 200 mg, Intravenous, Clinic/HOD 1 time, 7 of 9 cycles  Administration: 200 mg (6/4/2020), 200 mg (6/25/2020), 200 mg (7/17/2020), 200 mg (8/8/2020), 200 mg (9/11/2020), 200 mg (10/16/2020), 200 mg (11/6/2020)     12/21/2020 -  Chemotherapy    Treatment Summary   Plan Name: OP NSCLC PEMBROLIZUMAB PEMETREXED CARBOPLATIN Q3W  Treatment Goal: Control  Status: Active  Start Date: 12/21/2020  End Date: 12/5/2022 (Planned)  Provider: Gurpreet Estrada MD  Chemotherapy: CARBOplatin (PARAPLATIN) in sodium chloride 0.9% 250 mL chemo infusion, , Intravenous, Clinic/HOD 1 time, 0 of 3 cycles  PEMEtrexed (ALIMTA) 900 mg in sodium chloride 0.9% 100 mL chemo infusion, 500 mg/m2 = 900 mg, Intravenous, Clinic/HOD 1 time, 0 of 34 cycles  pembrolizumab (KEYTRUDA) 200 mg in sodium chloride 0.9% 100 mL chemo infusion, 200 mg, Intravenous, Clinic/HOD 1 time, 1 of 35 cycles          Interval History:   Patient has had slight worsening of R back pain.  Denies  other symptoms.    She denies any nausea, vomiting, diarrhea, abdominal pain, weight loss, loss of appetite, chest pain, leg swelling, pain, headaches, or dizziness.    ROS:  Review of Systems   Constitutional: Positive for fatigue. Negative for activity change, chills, fever and unexpected weight change.   HENT: Negative for mouth sores, nosebleeds and sore throat.    Respiratory: Positive for shortness of breath. Negative for cough and wheezing.    Cardiovascular: Negative for chest pain, palpitations and leg swelling.   Gastrointestinal: Positive for constipation (intermittent). Negative for abdominal distention, abdominal pain and blood in stool.   Endocrine: Negative for cold intolerance and heat intolerance.   Genitourinary: Negative for dysuria, flank pain and frequency.   Musculoskeletal: Negative for arthralgias, back pain, joint swelling and myalgias.   Skin: Negative for color change, rash and wound.   Neurological: Negative for dizziness, light-headedness and headaches.   Hematological: Negative for adenopathy. Does not bruise/bleed easily.   Psychiatric/Behavioral: Positive for dysphoric mood. Negative for agitation. The patient is not nervous/anxious (improved).       A complete 12-point review of systems was reviewed and is negative except as mentioned above.     Past Medical History:   Past Medical History:   Diagnosis Date    Depression     Hypercholesteremia     Pneumonia     Thyroid disease         Allergies:   Review of patient's allergies indicates:   Allergen Reactions    Codeine Nausea And Vomiting        Medications:   Current Outpatient Medications   Medication Sig Dispense Refill    aspirin (ECOTRIN) 81 MG EC tablet Take 81 mg by mouth once daily.      divalproex (DEPAKOTE) 250 MG EC tablet Take 250 mg by mouth 3 (three) times daily.      DULoxetine (CYMBALTA) 20 MG capsule Take 20 mg by mouth once daily.      levocetirizine (XYZAL) 5 MG tablet Take 5 mg by mouth every evening.    "   levothyroxine (SYNTHROID) 112 MCG tablet Take 1 tablet (112 mcg total) by mouth once daily. 30 tablet 1    morphine (MS CONTIN) 15 MG 12 hr tablet Take 15 mg by mouth 2 (two) times daily.      multivitamin capsule Take 1 capsule by mouth once daily.      pravastatin (PRAVACHOL) 40 MG tablet Take 1 tablet (40 mg total) by mouth once daily. 90 tablet 3    risperiDONE (RISPERDAL) 0.5 MG Tab Take by mouth.      venlafaxine (EFFEXOR-XR) 150 MG Cp24 Take 150 mg by mouth once daily.      vitamin D (VITAMIN D3) 1000 units Tab Take 1 tablet (1,000 Units total) by mouth once daily. 90 tablet 11    clonazePAM (KLONOPIN) 1 MG tablet Take 1 tablet (1 mg total) by mouth 2 (two) times daily. (Patient not taking: Reported on 7/17/2020) 60 tablet 0    potassium chloride SA (K-DUR,KLOR-CON) 20 MEQ tablet Take 1 tablet (20 mEq total) by mouth once daily. (Patient not taking: Reported on 6/25/2020) 30 tablet 1    sodium,potassium,mag sulfates (SUPREP BOWEL PREP KIT) 17.5-3.13-1.6 gram SolR Take 1 each by mouth as directed. (Patient not taking: Reported on 6/4/2020) 1 Bottle 0     No current facility-administered medications for this visit.      Facility-Administered Medications Ordered in Other Visits   Medication Dose Route Frequency Provider Last Rate Last Dose    heparin, porcine (PF) 100 unit/mL injection flush 500 Units  500 Units Intravenous PRN Gurpreet Estrada MD        pembrolizumab (KEYTRUDA) 200 mg in sodium chloride 0.9% 108 mL chemo infusion  200 mg Intravenous 1 time in Clinic/HOD Gurpreet Estrada MD        sodium chloride 0.9% 250 mL flush bag   Intravenous 1 time in Clinic/HOD Gurpreet Estrada MD        sodium chloride 0.9% flush 10 mL  10 mL Intravenous PRN Gurpreet Estrada MD            Physical Exam:   BP (!) 123/56 (BP Location: Right arm, Patient Position: Sitting, BP Method: Medium (Automatic))   Pulse 77   Temp 98.4 °F (36.9 °C) (Oral)   Resp 16   Ht 5' 6" (1.676 m)   Wt 73 kg (160 " lb 15 oz)   SpO2 96%   BMI 25.98 kg/m²      ECOG Performance Status: (foot note - ECOG PS provided by [employer]cology [employer]) 1 - Symptomatic but completely ambulatory    Physical Exam  Constitutional:       Appearance: She is well-developed.   HENT:      Head: Normocephalic and atraumatic.   Eyes:      Conjunctiva/sclera: Conjunctivae normal.      Pupils: Pupils are equal, round, and reactive to light.   Neck:      Musculoskeletal: Normal range of motion and neck supple.   Pulmonary:      Effort: Pulmonary effort is normal. No respiratory distress.   Abdominal:      General: There is no distension.      Palpations: Abdomen is soft.   Musculoskeletal: Normal range of motion.         General: No swelling.   Lymphadenopathy:      Cervical: No cervical adenopathy.   Skin:     General: Skin is warm and dry.   Neurological:      General: No focal deficit present.      Mental Status: She is alert and oriented to person, place, and time.   Psychiatric:         Mood and Affect: Mood normal.         Behavior: Behavior normal.           Labs:   Recent Results (from the past 48 hour(s))   CBC auto differential    Collection Time: 12/21/20 12:50 PM   Result Value Ref Range    WBC 5.27 3.90 - 12.70 K/uL    RBC 4.31 4.00 - 5.40 M/uL    Hemoglobin 12.2 12.0 - 16.0 g/dL    Hematocrit 38.0 37.0 - 48.5 %    MCV 88 82 - 98 fL    MCH 28.3 27.0 - 31.0 pg    MCHC 32.1 32.0 - 36.0 g/dL    RDW 14.9 (H) 11.5 - 14.5 %    Platelets 262 150 - 350 K/uL    MPV 11.0 9.2 - 12.9 fL    Immature Granulocytes 0.4 0.0 - 0.5 %    Gran # (ANC) 2.7 1.8 - 7.7 K/uL    Immature Grans (Abs) 0.02 0.00 - 0.04 K/uL    Lymph # 1.7 1.0 - 4.8 K/uL    Mono # 0.7 0.3 - 1.0 K/uL    Eos # 0.1 0.0 - 0.5 K/uL    Baso # 0.04 0.00 - 0.20 K/uL    nRBC 0 0 /100 WBC    Gran % 51.6 38.0 - 73.0 %    Lymph % 32.4 18.0 - 48.0 %    Mono % 12.3 4.0 - 15.0 %    Eosinophil % 2.5 0.0 - 8.0 %    Basophil % 0.8 0.0 - 1.9 %    Differential Method Automated    Comprehensive  metabolic panel    Collection Time: 12/21/20 12:50 PM   Result Value Ref Range    Sodium 141 136 - 145 mmol/L    Potassium 3.9 3.5 - 5.1 mmol/L    Chloride 106 95 - 110 mmol/L    CO2 26 23 - 29 mmol/L    Glucose 99 70 - 110 mg/dL    BUN 13 8 - 23 mg/dL    Creatinine 1.0 0.5 - 1.4 mg/dL    Calcium 8.8 8.7 - 10.5 mg/dL    Total Protein 7.3 6.0 - 8.4 g/dL    Albumin 3.4 (L) 3.5 - 5.2 g/dL    Total Bilirubin 0.4 0.1 - 1.0 mg/dL    Alkaline Phosphatase 84 55 - 135 U/L    AST 24 10 - 40 U/L    ALT 16 10 - 44 U/L    Anion Gap 9 8 - 16 mmol/L    eGFR if African American >60.0 >60 mL/min/1.73 m^2    eGFR if non  57.2 (A) >60 mL/min/1.73 m^2      Imaging:   CT Chest Abdomen Pelvis With Contrast  Narrative: EXAMINATION:  CT CHEST ABDOMEN PELVIS WITH CONTRAST (XPD)    CLINICAL HISTORY:  re-staging Stage IV adenocarcinoma of right lung;Malignant neoplasm of unspecified part of right bronchus or lung    TECHNIQUE:  The patient was surveyed from the thoracic inlet through the pelvis after the administration of 75 cc Omni 350 IV contrast as well as 30 cc Omnipaque 350 oral contrast and data was reconstructed for coronal, sagittal, and axial images.    COMPARISON:  CT chest abdomen pelvis 08/06/2020    FINDINGS:  Soft tissue and vascular structures the base the neck are unremarkable.    There is a left-sided 4 vessel nonaneurysmal aortic arch with direct aortic origin of the left vertebral artery.  Moderate calcific atherosclerosis.  The heart is normal in size.  No pericardial effusion.  There is coronary artery atherosclerosis.  Mediastinal structures are midline.  There is a 0.8 cm right paratracheal lymph node, stable compared to prior (axial image 36).  Stable prominent anterior mediastinal lymph nodes again identified (axial image 41.)  Index retrosternal lymph node demonstrates mild interval enlargement now measuring 1.5 cm (previously 1.2 cm; axial image 52.)  The pulmonary arteries distribute normally and  there are 4 pulmonary veins.    The trachea and proximal airways are patent.  There are numerous enlarging right-sided parenchymal and pleural based pulmonary nodules, for example a nodule abutting the pleura in the superior segment right upper lobe measuring 2.1 cm (previously 1.6 cm; axial image 39) and a nodule abutting the pleura in the posterior segment right upper lobe measuring 1.0 cm (previously 0.4 cm; axial image 34.)  Pulmonary nodule in the posterior basal segment right lower lobe has enlarged measuring 1.2 cm (previously 0.9 cm; axial image 74.)    Index right pleural lesion within the lateral segment right lower lobe has enlarged now measuring 1.4 cm (previously 1.0 cm; axial image 68.)  Index lesion at the right hilum identified on axial image 47 on the prior study is difficult to delineate on the current study.  Subsegmental atelectasis versus scarring at the lung bases.  No pneumothorax.    Liver is normal in size and attenuation with no focal hepatic abnormality.  The gallbladder is decompressed with no calcified gallstones or pericholecystic fluid.  No intra or extrahepatic biliary ductal dilatation.  The spleen, adrenal glands, and pancreas are unremarkable.  Stable thickening of the adrenal glands.    Kidneys are normal in size and location and enhance symmetrically.  No nephrolithiasis or hydronephrosis.  Ureters normal in course and caliber with no asymmetric periureteral fat stranding.  Urinary bladder is decompressed uterus and adnexa are unremarkable.    The esophagus is normal in course and contour.  Stomach is unremarkable.  Visualized loops of small and large bowel reveal no evidence of obstruction or inflammation.  The appendix is visualized and is unremarkable.  Scattered colonic diverticula no bulky abdominopelvic lymphadenopathy, abdominopelvic ascites, or intraperitoneal free air.    The abdominal aorta is normal in course and caliber with moderate calcific atherosclerosis extending  the branch vessels.  The portal vein, splenic vein, and SMV are patent.    Osseous structures demonstrate age-appropriate degenerative change.  Grade 1 anterolisthesis of L4 on L5 and L5 on S1.  No acute fracture.  Stable mild cortical thickening along the posterior aspect of the right 5th and 6th ribs and subtle sclerosis of the left 6th rib at the mid axillary line.  Subtle sclerotic change along the inferior aspect of the sternum.  Sclerotic changes in the lumbar spinous processes, possibly degenerative in etiology.  There is a fat containing umbilical hernia.  Impression: Numerous enlarging right-sided pleural based and parenchymal nodules concerning for progression of disease.    Cortical thickening and sclerosis of the sternum, right posterior 5th and 6th ribs, and stable sclerotic focus in the left 6th rib at the mid axillary line.    Additional findings detailed above.    RECIST SUMMARY:    Date of prior examination for comparison: CT chest abdomen pelvis 08/06/2020    Lesion 1: Right lung mass.  Difficult to delineate on the current study. Prior measurement 0.9 cm.  Series 2, image 47 on prior study.    Lesion 2: Right pleural mass.  1.4 cm.  Series 2, image 68.  Prior measurement 1.0 cm.    Lesion 3: Anterior mediastinal node.  0.6 cm.  Series 2, image 41.  Prior measurement 0.5 cm.    Lesion 4: Inferior anterior mediastinal node.  1.5 cm.  Series 2, image 52.  Prior measurement 1.2 cm.    Lesion 5: Right lower lobe pulmonary nodule.  1.2 cm.  Series 2, image 74.  Prior measurement 0.9 cm.    This report was flagged in Epic as abnormal.    Electronically signed by resident: Tariq Liu MD  Date:    11/25/2020  Time:    13:23    Electronically signed by: Gurpreet Taylor MD  Date:    11/25/2020  Time:    14:22            Diagnoses:       1. Adenocarcinoma of right lung, stage 4    2. 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 Encounter for chemotherapy management    5. Acquired  "hypothyroidism    6. Cancer related pain    7. Anxiety    8. Depression, unspecified depression type          Assessment and Plan:         1,2,3. Stage IV PD-L1 60% Adenocarcinoma of lung:   Patient does not have targetable mutations on liquid biopsy, so was initiated on immunotherapy alone with Keytruda.  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  Reviewed at [facility] and patient is not a candidate for RT to these progressing lesions as they are numerous.  Plan was to add chemotherapy to Keytruda as patient has not received chemotherapy, but is having significant benefit from immunotherapy.  Discussed in detail with patient the potential side effects of chemotherapy, but that we would start at a dose reduction to help with tolerability.  Patient repeated multiple times that she does not want chemotherapy and will only take Keytruda.  She says she would "rather die than receive chemotherapy".  Patient was willing to take information about carboplatin and alimta and consider chemotherapy next cycle.      RTC 3 weeks with labs (CBC,CMP,TSH,free T4), prior to next cycle      5- TSH pending today. Patient reports poor compliance with synthroid. Patient currently taking 112 mcg through [facility] prescription.       6- Patient says her back pain has increased.  All pain prescriptions need to be prescribed by patient's [facility] physician.  Will notify [facility] team that her pain has increased     7,8-Patient reporting increased anxiety and worsening depression. This has caused her to miss appointments for 2 weeks and also decreased compliance with synthroid. States she is unsure what she is taking for her mood. All prescriptions through [facility].    9 Nausea over last 2 weeks.  She does not have any nausea medicine.  Will need to discuss with [facility] physician adding zofran prn to regimen.    she will return to clinic in 3 weeks, but knows to call in the interim " if symptoms change or should a problem arise.      Gurpreet Estrada M.D.  Hematology/Oncology Attending  [facility] [facility]  [facility]

## 2020-12-22 NOTE — PLAN OF CARE
Patient meets parameters for treatment today. Patient tolerated Keytruda infusion with no complications. Patient instructed to call clinic with any problems or concerns. Patient verbalize understanding. AVS given and patient discharged home.     Problem: Adult Inpatient Plan of Care  Goal: Plan of Care Review  Outcome: Ongoing, Progressing  Goal: Patient-Specific Goal (Individualization)  Outcome: Ongoing, Progressing  Goal: Absence of Hospital-Acquired Illness or Injury  Outcome: Ongoing, Progressing  Goal: Optimal Comfort and Wellbeing  Outcome: Ongoing, Progressing  Goal: Readiness for Transition of Care  Outcome: Ongoing, Progressing  Goal: Rounds/Family Conference  Outcome: Ongoing, Progressing     Problem: Anemia (Chemotherapy Effects)  Goal: Anemia Symptom Improvement  Outcome: Ongoing, Progressing     Problem: Urinary Bleeding Risk or Actual (Chemotherapy Effects)  Goal: Absence of Hematuria  Outcome: Ongoing, Progressing     Problem: Nausea and Vomiting (Chemotherapy Effects)  Goal: Fluid and Electrolyte Balance  Outcome: Ongoing, Progressing     Problem: Neurotoxicity (Chemotherapy Effects)  Goal: Neurotoxicity Symptom Control  Outcome: Ongoing, Progressing     Problem: Neutropenia (Chemotherapy Effects)  Goal: Absence of Infection  Outcome: Ongoing, Progressing     Problem: Oral Mucositis (Chemotherapy Effects)  Goal: Improved Oral Mucous Membrane Integrity  Outcome: Ongoing, Progressing     Problem: Thrombocytopenia Bleeding Risk (Chemotherapy Effects)  Goal: Absence of Bleeding  Outcome: Ongoing, Progressing

## 2021-01-11 ENCOUNTER — LAB VISIT (OUTPATIENT)
Dept: LAB | Facility: HOSPITAL | Age: 71
End: 2021-01-11
Attending: INTERNAL MEDICINE
Payer: COMMERCIAL

## 2021-01-11 ENCOUNTER — OFFICE VISIT (OUTPATIENT)
Dept: HEMATOLOGY/ONCOLOGY | Facility: CLINIC | Age: 71
End: 2021-01-11
Payer: COMMERCIAL

## 2021-01-11 VITALS
SYSTOLIC BLOOD PRESSURE: 132 MMHG | HEIGHT: 66 IN | HEART RATE: 62 BPM | RESPIRATION RATE: 20 BRPM | DIASTOLIC BLOOD PRESSURE: 70 MMHG | OXYGEN SATURATION: 96 % | BODY MASS INDEX: 25.98 KG/M2 | TEMPERATURE: 99 F

## 2021-01-11 DIAGNOSIS — E03.9 ACQUIRED HYPOTHYROIDISM: ICD-10-CM

## 2021-01-11 DIAGNOSIS — F41.9 ANXIETY: ICD-10-CM

## 2021-01-11 DIAGNOSIS — C77.1 METASTASIS TO MEDIASTINAL LYMPH NODE: ICD-10-CM

## 2021-01-11 DIAGNOSIS — G89.3 CANCER RELATED PAIN: ICD-10-CM

## 2021-01-11 DIAGNOSIS — C34.91 ADENOCARCINOMA OF RIGHT LUNG, STAGE 4: Primary | ICD-10-CM

## 2021-01-11 DIAGNOSIS — C79.51 BONE METASTASIS: ICD-10-CM

## 2021-01-11 DIAGNOSIS — C34.91 ADENOCARCINOMA OF RIGHT LUNG, STAGE 4: ICD-10-CM

## 2021-01-11 DIAGNOSIS — F32.A DEPRESSION, UNSPECIFIED DEPRESSION TYPE: ICD-10-CM

## 2021-01-11 LAB
ALBUMIN SERPL BCP-MCNC: 3.2 G/DL (ref 3.5–5.2)
ALP SERPL-CCNC: 61 U/L (ref 55–135)
ALT SERPL W/O P-5'-P-CCNC: 14 U/L (ref 10–44)
ANION GAP SERPL CALC-SCNC: 11 MMOL/L (ref 8–16)
AST SERPL-CCNC: 19 U/L (ref 10–40)
BASOPHILS # BLD AUTO: 0.03 K/UL (ref 0–0.2)
BASOPHILS NFR BLD: 0.5 % (ref 0–1.9)
BILIRUB SERPL-MCNC: 0.4 MG/DL (ref 0.1–1)
BUN SERPL-MCNC: 10 MG/DL (ref 8–23)
CALCIUM SERPL-MCNC: 8.8 MG/DL (ref 8.7–10.5)
CHLORIDE SERPL-SCNC: 106 MMOL/L (ref 95–110)
CO2 SERPL-SCNC: 27 MMOL/L (ref 23–29)
CREAT SERPL-MCNC: 0.9 MG/DL (ref 0.5–1.4)
DIFFERENTIAL METHOD: ABNORMAL
EOSINOPHIL # BLD AUTO: 0.2 K/UL (ref 0–0.5)
EOSINOPHIL NFR BLD: 2.9 % (ref 0–8)
ERYTHROCYTE [DISTWIDTH] IN BLOOD BY AUTOMATED COUNT: 14.8 % (ref 11.5–14.5)
EST. GFR  (AFRICAN AMERICAN): >60 ML/MIN/1.73 M^2
EST. GFR  (NON AFRICAN AMERICAN): >60 ML/MIN/1.73 M^2
GLUCOSE SERPL-MCNC: 83 MG/DL (ref 70–110)
HCT VFR BLD AUTO: 39.5 % (ref 37–48.5)
HGB BLD-MCNC: 12.8 G/DL (ref 12–16)
IMM GRANULOCYTES # BLD AUTO: 0.01 K/UL (ref 0–0.04)
IMM GRANULOCYTES NFR BLD AUTO: 0.2 % (ref 0–0.5)
LYMPHOCYTES # BLD AUTO: 1.5 K/UL (ref 1–4.8)
LYMPHOCYTES NFR BLD: 26.1 % (ref 18–48)
MCH RBC QN AUTO: 28.4 PG (ref 27–31)
MCHC RBC AUTO-ENTMCNC: 32.4 G/DL (ref 32–36)
MCV RBC AUTO: 88 FL (ref 82–98)
MONOCYTES # BLD AUTO: 0.5 K/UL (ref 0.3–1)
MONOCYTES NFR BLD: 8.2 % (ref 4–15)
NEUTROPHILS # BLD AUTO: 3.6 K/UL (ref 1.8–7.7)
NEUTROPHILS NFR BLD: 62.1 % (ref 38–73)
NRBC BLD-RTO: 0 /100 WBC
PLATELET # BLD AUTO: 225 K/UL (ref 150–350)
PMV BLD AUTO: 10.7 FL (ref 9.2–12.9)
POTASSIUM SERPL-SCNC: 3.4 MMOL/L (ref 3.5–5.1)
PROT SERPL-MCNC: 7.1 G/DL (ref 6–8.4)
RBC # BLD AUTO: 4.51 M/UL (ref 4–5.4)
SODIUM SERPL-SCNC: 144 MMOL/L (ref 136–145)
T4 FREE SERPL-MCNC: 0.77 NG/DL (ref 0.71–1.51)
TSH SERPL DL<=0.005 MIU/L-ACNC: 63.47 UIU/ML (ref 0.4–4)
WBC # BLD AUTO: 5.82 K/UL (ref 3.9–12.7)

## 2021-01-11 PROCEDURE — 99215 OFFICE O/P EST HI 40 MIN: CPT | Mod: S$GLB,,, | Performed by: INTERNAL MEDICINE

## 2021-01-11 PROCEDURE — 99999 PR PBB SHADOW E&M-EST. PATIENT-LVL III: CPT | Mod: PBBFAC,,, | Performed by: INTERNAL MEDICINE

## 2021-01-11 PROCEDURE — 99999 PR PBB SHADOW E&M-EST. PATIENT-LVL III: ICD-10-PCS | Mod: PBBFAC,,, | Performed by: INTERNAL MEDICINE

## 2021-01-11 PROCEDURE — 84443 ASSAY THYROID STIM HORMONE: CPT

## 2021-01-11 PROCEDURE — 99215 PR OFFICE/OUTPT VISIT, EST, LEVL V, 40-54 MIN: ICD-10-PCS | Mod: S$GLB,,, | Performed by: INTERNAL MEDICINE

## 2021-01-11 PROCEDURE — 80053 COMPREHEN METABOLIC PANEL: CPT

## 2021-01-11 PROCEDURE — 85025 COMPLETE CBC W/AUTO DIFF WBC: CPT

## 2021-01-11 PROCEDURE — 36415 COLL VENOUS BLD VENIPUNCTURE: CPT

## 2021-01-11 PROCEDURE — 84439 ASSAY OF FREE THYROXINE: CPT

## 2021-01-19 ENCOUNTER — TELEPHONE (OUTPATIENT)
Dept: HEMATOLOGY/ONCOLOGY | Facility: CLINIC | Age: 71
End: 2021-01-19

## 2021-01-21 ENCOUNTER — TELEPHONE (OUTPATIENT)
Dept: HEMATOLOGY/ONCOLOGY | Facility: CLINIC | Age: 71
End: 2021-01-21

## 2021-02-01 ENCOUNTER — TELEPHONE (OUTPATIENT)
Dept: HEMATOLOGY/ONCOLOGY | Facility: CLINIC | Age: 71
End: 2021-02-01

## 2021-02-04 ENCOUNTER — TELEPHONE (OUTPATIENT)
Dept: HEMATOLOGY/ONCOLOGY | Facility: CLINIC | Age: 71
End: 2021-02-04

## 2021-02-08 ENCOUNTER — OFFICE VISIT (OUTPATIENT)
Dept: HEMATOLOGY/ONCOLOGY | Facility: CLINIC | Age: 71
End: 2021-02-08
Payer: COMMERCIAL

## 2021-02-08 ENCOUNTER — HOSPITAL ENCOUNTER (EMERGENCY)
Facility: HOSPITAL | Age: 71
Discharge: PSYCHIATRIC OR SUBSTANCE USE FACILITY | End: 2021-02-09
Attending: EMERGENCY MEDICINE
Payer: COMMERCIAL

## 2021-02-08 ENCOUNTER — DOCUMENTATION ONLY (OUTPATIENT)
Dept: HEMATOLOGY/ONCOLOGY | Facility: CLINIC | Age: 71
End: 2021-02-08

## 2021-02-08 VITALS
RESPIRATION RATE: 20 BRPM | HEART RATE: 76 BPM | BODY MASS INDEX: 25.98 KG/M2 | HEIGHT: 66 IN | OXYGEN SATURATION: 96 % | SYSTOLIC BLOOD PRESSURE: 142 MMHG | DIASTOLIC BLOOD PRESSURE: 65 MMHG

## 2021-02-08 DIAGNOSIS — C34.91 ADENOCARCINOMA OF RIGHT LUNG, STAGE 4: Primary | ICD-10-CM

## 2021-02-08 DIAGNOSIS — E03.9 ACQUIRED HYPOTHYROIDISM: ICD-10-CM

## 2021-02-08 DIAGNOSIS — C79.51 BONE METASTASIS: ICD-10-CM

## 2021-02-08 DIAGNOSIS — R45.851 SUICIDAL IDEATION: Primary | ICD-10-CM

## 2021-02-08 DIAGNOSIS — G89.3 CANCER RELATED PAIN: ICD-10-CM

## 2021-02-08 DIAGNOSIS — C77.1 METASTASIS TO MEDIASTINAL LYMPH NODE: ICD-10-CM

## 2021-02-08 DIAGNOSIS — R45.851 DEPRESSION WITH SUICIDAL IDEATION: ICD-10-CM

## 2021-02-08 DIAGNOSIS — F32.A DEPRESSION, UNSPECIFIED DEPRESSION TYPE: ICD-10-CM

## 2021-02-08 DIAGNOSIS — F32.A DEPRESSION WITH SUICIDAL IDEATION: ICD-10-CM

## 2021-02-08 DIAGNOSIS — F41.9 ANXIETY: ICD-10-CM

## 2021-02-08 LAB
ALBUMIN SERPL BCP-MCNC: 3.2 G/DL (ref 3.5–5.2)
ALP SERPL-CCNC: 63 U/L (ref 55–135)
ALT SERPL W/O P-5'-P-CCNC: 14 U/L (ref 10–44)
AMPHET+METHAMPHET UR QL: NEGATIVE
ANION GAP SERPL CALC-SCNC: 9 MMOL/L (ref 8–16)
APAP SERPL-MCNC: <3 UG/ML (ref 10–20)
AST SERPL-CCNC: 21 U/L (ref 10–40)
BARBITURATES UR QL SCN>200 NG/ML: NEGATIVE
BASOPHILS # BLD AUTO: 0.04 K/UL (ref 0–0.2)
BASOPHILS NFR BLD: 0.7 % (ref 0–1.9)
BENZODIAZ UR QL SCN>200 NG/ML: NEGATIVE
BILIRUB SERPL-MCNC: 0.5 MG/DL (ref 0.1–1)
BILIRUB UR QL STRIP: NEGATIVE
BUN SERPL-MCNC: 12 MG/DL (ref 8–23)
BZE UR QL SCN: NORMAL
CALCIUM SERPL-MCNC: 9.2 MG/DL (ref 8.7–10.5)
CANNABINOIDS UR QL SCN: NEGATIVE
CHLORIDE SERPL-SCNC: 106 MMOL/L (ref 95–110)
CLARITY UR REFRACT.AUTO: CLEAR
CO2 SERPL-SCNC: 26 MMOL/L (ref 23–29)
COLOR UR AUTO: YELLOW
CREAT SERPL-MCNC: 0.8 MG/DL (ref 0.5–1.4)
CREAT UR-MCNC: 87 MG/DL (ref 15–325)
CTP QC/QA: YES
DIFFERENTIAL METHOD: ABNORMAL
EOSINOPHIL # BLD AUTO: 0.2 K/UL (ref 0–0.5)
EOSINOPHIL NFR BLD: 3 % (ref 0–8)
ERYTHROCYTE [DISTWIDTH] IN BLOOD BY AUTOMATED COUNT: 14.3 % (ref 11.5–14.5)
EST. GFR  (AFRICAN AMERICAN): >60 ML/MIN/1.73 M^2
EST. GFR  (NON AFRICAN AMERICAN): >60 ML/MIN/1.73 M^2
ETHANOL SERPL-MCNC: <10 MG/DL
GLUCOSE SERPL-MCNC: 101 MG/DL (ref 70–110)
GLUCOSE UR QL STRIP: NEGATIVE
HCT VFR BLD AUTO: 36.9 % (ref 37–48.5)
HGB BLD-MCNC: 12.7 G/DL (ref 12–16)
HGB UR QL STRIP: NEGATIVE
IMM GRANULOCYTES # BLD AUTO: 0.01 K/UL (ref 0–0.04)
IMM GRANULOCYTES NFR BLD AUTO: 0.2 % (ref 0–0.5)
KETONES UR QL STRIP: NEGATIVE
LEUKOCYTE ESTERASE UR QL STRIP: NEGATIVE
LYMPHOCYTES # BLD AUTO: 1.3 K/UL (ref 1–4.8)
LYMPHOCYTES NFR BLD: 23.5 % (ref 18–48)
MCH RBC QN AUTO: 29.4 PG (ref 27–31)
MCHC RBC AUTO-ENTMCNC: 34.4 G/DL (ref 32–36)
MCV RBC AUTO: 85 FL (ref 82–98)
METHADONE UR QL SCN>300 NG/ML: NEGATIVE
MONOCYTES # BLD AUTO: 0.7 K/UL (ref 0.3–1)
MONOCYTES NFR BLD: 12.5 % (ref 4–15)
NEUTROPHILS # BLD AUTO: 3.4 K/UL (ref 1.8–7.7)
NEUTROPHILS NFR BLD: 60.1 % (ref 38–73)
NITRITE UR QL STRIP: NEGATIVE
NRBC BLD-RTO: 0 /100 WBC
OPIATES UR QL SCN: NEGATIVE
PCP UR QL SCN>25 NG/ML: NEGATIVE
PH UR STRIP: 6 [PH] (ref 5–8)
PLATELET # BLD AUTO: 230 K/UL (ref 150–350)
PMV BLD AUTO: 10.7 FL (ref 9.2–12.9)
POTASSIUM SERPL-SCNC: 3.5 MMOL/L (ref 3.5–5.1)
PROT SERPL-MCNC: 7.4 G/DL (ref 6–8.4)
PROT UR QL STRIP: NEGATIVE
RBC # BLD AUTO: 4.32 M/UL (ref 4–5.4)
SARS-COV-2 RDRP RESP QL NAA+PROBE: NEGATIVE
SODIUM SERPL-SCNC: 141 MMOL/L (ref 136–145)
SP GR UR STRIP: 1.01 (ref 1–1.03)
TOXICOLOGY INFORMATION: NORMAL
TSH SERPL DL<=0.005 MIU/L-ACNC: 2.3 UIU/ML (ref 0.4–4)
URN SPEC COLLECT METH UR: NORMAL
WBC # BLD AUTO: 5.67 K/UL (ref 3.9–12.7)

## 2021-02-08 PROCEDURE — 99291 CRITICAL CARE FIRST HOUR: CPT | Mod: ,,, | Performed by: EMERGENCY MEDICINE

## 2021-02-08 PROCEDURE — 99215 PR OFFICE/OUTPT VISIT, EST, LEVL V, 40-54 MIN: ICD-10-PCS | Mod: S$GLB,,, | Performed by: INTERNAL MEDICINE

## 2021-02-08 PROCEDURE — 63600175 PHARM REV CODE 636 W HCPCS: Performed by: EMERGENCY MEDICINE

## 2021-02-08 PROCEDURE — 80307 DRUG TEST PRSMV CHEM ANLYZR: CPT

## 2021-02-08 PROCEDURE — 99291 PR CRITICAL CARE, E/M 30-74 MINUTES: ICD-10-PCS | Mod: ,,, | Performed by: EMERGENCY MEDICINE

## 2021-02-08 PROCEDURE — 99999 PR PBB SHADOW E&M-EST. PATIENT-LVL III: CPT | Mod: PBBFAC,,, | Performed by: INTERNAL MEDICINE

## 2021-02-08 PROCEDURE — 82077 ASSAY SPEC XCP UR&BREATH IA: CPT

## 2021-02-08 PROCEDURE — 99285 EMERGENCY DEPT VISIT HI MDM: CPT | Mod: 25

## 2021-02-08 PROCEDURE — 80143 DRUG ASSAY ACETAMINOPHEN: CPT

## 2021-02-08 PROCEDURE — 80053 COMPREHEN METABOLIC PANEL: CPT | Mod: 91

## 2021-02-08 PROCEDURE — 99999 PR PBB SHADOW E&M-EST. PATIENT-LVL III: ICD-10-PCS | Mod: PBBFAC,,, | Performed by: INTERNAL MEDICINE

## 2021-02-08 PROCEDURE — 99215 OFFICE O/P EST HI 40 MIN: CPT | Mod: S$GLB,,, | Performed by: INTERNAL MEDICINE

## 2021-02-08 PROCEDURE — 85025 COMPLETE CBC W/AUTO DIFF WBC: CPT | Mod: 91

## 2021-02-08 PROCEDURE — 86803 HEPATITIS C AB TEST: CPT

## 2021-02-08 PROCEDURE — U0002 COVID-19 LAB TEST NON-CDC: HCPCS | Performed by: EMERGENCY MEDICINE

## 2021-02-08 PROCEDURE — 84443 ASSAY THYROID STIM HORMONE: CPT | Mod: 91

## 2021-02-08 PROCEDURE — 81003 URINALYSIS AUTO W/O SCOPE: CPT | Mod: 59

## 2021-02-08 PROCEDURE — 96372 THER/PROPH/DIAG INJ SC/IM: CPT

## 2021-02-08 RX ORDER — LORAZEPAM 1 MG/1
2 TABLET ORAL EVERY 4 HOURS PRN
Status: DISCONTINUED | OUTPATIENT
Start: 2021-02-08 | End: 2021-02-09 | Stop reason: HOSPADM

## 2021-02-08 RX ORDER — LORAZEPAM 2 MG/ML
2 INJECTION INTRAMUSCULAR EVERY 4 HOURS PRN
Status: DISCONTINUED | OUTPATIENT
Start: 2021-02-08 | End: 2021-02-09 | Stop reason: HOSPADM

## 2021-02-08 RX ORDER — ACETAMINOPHEN 325 MG/1
650 TABLET ORAL EVERY 6 HOURS PRN
Status: DISCONTINUED | OUTPATIENT
Start: 2021-02-08 | End: 2021-02-09 | Stop reason: HOSPADM

## 2021-02-08 RX ORDER — DIAZEPAM 10 MG/2ML
5 INJECTION INTRAMUSCULAR
Status: COMPLETED | OUTPATIENT
Start: 2021-02-08 | End: 2021-02-08

## 2021-02-08 RX ADMIN — DIAZEPAM 5 MG: 10 INJECTION, SOLUTION INTRAMUSCULAR; INTRAVENOUS at 05:02

## 2021-02-09 VITALS
WEIGHT: 160 LBS | SYSTOLIC BLOOD PRESSURE: 130 MMHG | TEMPERATURE: 98 F | BODY MASS INDEX: 25.71 KG/M2 | HEIGHT: 66 IN | RESPIRATION RATE: 16 BRPM | DIASTOLIC BLOOD PRESSURE: 58 MMHG | OXYGEN SATURATION: 98 % | HEART RATE: 65 BPM

## 2021-02-09 LAB — HCV AB SERPL QL IA: NEGATIVE

## 2021-04-08 ENCOUNTER — TELEPHONE (OUTPATIENT)
Dept: HEMATOLOGY/ONCOLOGY | Facility: CLINIC | Age: 71
End: 2021-04-08

## 2021-04-09 DIAGNOSIS — C34.91 ADENOCARCINOMA OF RIGHT LUNG, STAGE 4: Primary | ICD-10-CM

## 2021-04-13 ENCOUNTER — TELEPHONE (OUTPATIENT)
Dept: HEMATOLOGY/ONCOLOGY | Facility: CLINIC | Age: 71
End: 2021-04-13

## 2021-04-23 ENCOUNTER — OFFICE VISIT (OUTPATIENT)
Dept: HEMATOLOGY/ONCOLOGY | Facility: CLINIC | Age: 71
End: 2021-04-23
Payer: COMMERCIAL

## 2021-04-23 VITALS
DIASTOLIC BLOOD PRESSURE: 73 MMHG | WEIGHT: 160.5 LBS | HEART RATE: 71 BPM | OXYGEN SATURATION: 98 % | RESPIRATION RATE: 18 BRPM | SYSTOLIC BLOOD PRESSURE: 180 MMHG | BODY MASS INDEX: 25.79 KG/M2 | HEIGHT: 66 IN | TEMPERATURE: 99 F

## 2021-04-23 DIAGNOSIS — C34.91 ADENOCARCINOMA OF RIGHT LUNG, STAGE 4: Primary | ICD-10-CM

## 2021-04-23 DIAGNOSIS — E03.9 ACQUIRED HYPOTHYROIDISM: ICD-10-CM

## 2021-04-23 DIAGNOSIS — C79.51 BONE METASTASIS: ICD-10-CM

## 2021-04-23 DIAGNOSIS — F32.A DEPRESSION, UNSPECIFIED DEPRESSION TYPE: ICD-10-CM

## 2021-04-23 DIAGNOSIS — G89.3 CANCER RELATED PAIN: ICD-10-CM

## 2021-04-23 DIAGNOSIS — F32.A DEPRESSION WITH SUICIDAL IDEATION: ICD-10-CM

## 2021-04-23 DIAGNOSIS — C77.1 METASTASIS TO MEDIASTINAL LYMPH NODE: ICD-10-CM

## 2021-04-23 DIAGNOSIS — F41.9 ANXIETY: ICD-10-CM

## 2021-04-23 DIAGNOSIS — R45.851 DEPRESSION WITH SUICIDAL IDEATION: ICD-10-CM

## 2021-04-23 PROCEDURE — 99214 PR OFFICE/OUTPT VISIT, EST, LEVL IV, 30-39 MIN: ICD-10-PCS | Mod: S$GLB,,, | Performed by: INTERNAL MEDICINE

## 2021-04-23 PROCEDURE — 99999 PR PBB SHADOW E&M-EST. PATIENT-LVL IV: ICD-10-PCS | Mod: PBBFAC,,, | Performed by: INTERNAL MEDICINE

## 2021-04-23 PROCEDURE — 99214 OFFICE O/P EST MOD 30 MIN: CPT | Mod: S$GLB,,, | Performed by: INTERNAL MEDICINE

## 2021-04-23 PROCEDURE — 99999 PR PBB SHADOW E&M-EST. PATIENT-LVL IV: CPT | Mod: PBBFAC,,, | Performed by: INTERNAL MEDICINE

## 2021-04-26 ENCOUNTER — TELEPHONE (OUTPATIENT)
Dept: SURGERY | Facility: CLINIC | Age: 71
End: 2021-04-26

## 2021-04-28 ENCOUNTER — TELEPHONE (OUTPATIENT)
Dept: SURGERY | Facility: CLINIC | Age: 71
End: 2021-04-28

## 2021-04-28 ENCOUNTER — HOSPITAL ENCOUNTER (OUTPATIENT)
Dept: RADIOLOGY | Facility: HOSPITAL | Age: 71
Discharge: HOME | End: 2021-04-28
Attending: NURSE PRACTITIONER
Payer: COMMERCIAL

## 2021-04-28 DIAGNOSIS — C34.91 ADENOCARCINOMA OF RIGHT LUNG, STAGE 4: ICD-10-CM

## 2021-04-28 LAB
CREAT SERPL-MCNC: 0.9 MG/DL (ref 0.5–1.4)
SAMPLE: NORMAL

## 2021-04-28 PROCEDURE — 71260 CT CHEST ABDOMEN PELVIS WITH CONTRAST (XPD): ICD-10-PCS | Mod: 26,,, | Performed by: INTERNAL MEDICINE

## 2021-04-28 PROCEDURE — 25500020 PHARM REV CODE 255: Performed by: NURSE PRACTITIONER

## 2021-04-28 PROCEDURE — 74177 CT CHEST ABDOMEN PELVIS WITH CONTRAST (XPD): ICD-10-PCS | Mod: 26,,, | Performed by: INTERNAL MEDICINE

## 2021-04-28 PROCEDURE — 74177 CT ABD & PELVIS W/CONTRAST: CPT | Mod: 26,,, | Performed by: INTERNAL MEDICINE

## 2021-04-28 PROCEDURE — 71260 CT THORAX DX C+: CPT | Mod: 26,,, | Performed by: INTERNAL MEDICINE

## 2021-04-28 PROCEDURE — 74177 CT ABD & PELVIS W/CONTRAST: CPT | Mod: TC

## 2021-04-28 RX ADMIN — IOHEXOL 100 ML: 350 INJECTION, SOLUTION INTRAVENOUS at 03:04

## 2021-04-28 RX ADMIN — IOHEXOL 15 ML: 350 INJECTION, SOLUTION INTRAVENOUS at 02:04

## 2021-04-28 RX ADMIN — IOHEXOL 15 ML: 350 INJECTION, SOLUTION INTRAVENOUS at 01:04

## 2021-04-30 ENCOUNTER — TELEPHONE (OUTPATIENT)
Dept: HEMATOLOGY/ONCOLOGY | Facility: CLINIC | Age: 71
End: 2021-04-30

## 2021-05-03 ENCOUNTER — TELEPHONE (OUTPATIENT)
Dept: SURGERY | Facility: CLINIC | Age: 71
End: 2021-05-03

## 2021-05-03 ENCOUNTER — TELEPHONE (OUTPATIENT)
Dept: HEMATOLOGY/ONCOLOGY | Facility: CLINIC | Age: 71
End: 2021-05-03

## 2021-05-03 ENCOUNTER — OFFICE VISIT (OUTPATIENT)
Dept: HEMATOLOGY/ONCOLOGY | Facility: CLINIC | Age: 71
End: 2021-05-03
Payer: COMMERCIAL

## 2021-05-03 ENCOUNTER — LAB VISIT (OUTPATIENT)
Dept: LAB | Facility: HOSPITAL | Age: 71
End: 2021-05-03
Payer: COMMERCIAL

## 2021-05-03 VITALS
OXYGEN SATURATION: 97 % | SYSTOLIC BLOOD PRESSURE: 172 MMHG | HEART RATE: 59 BPM | RESPIRATION RATE: 20 BRPM | HEIGHT: 66 IN | TEMPERATURE: 98 F | DIASTOLIC BLOOD PRESSURE: 73 MMHG | WEIGHT: 158.06 LBS | BODY MASS INDEX: 25.4 KG/M2

## 2021-05-03 DIAGNOSIS — E03.9 ACQUIRED HYPOTHYROIDISM: ICD-10-CM

## 2021-05-03 DIAGNOSIS — C77.1 METASTASIS TO MEDIASTINAL LYMPH NODE: ICD-10-CM

## 2021-05-03 DIAGNOSIS — C34.91 ADENOCARCINOMA OF RIGHT LUNG, STAGE 4: Primary | ICD-10-CM

## 2021-05-03 DIAGNOSIS — G89.3 CANCER RELATED PAIN: ICD-10-CM

## 2021-05-03 DIAGNOSIS — C79.51 BONE METASTASIS: ICD-10-CM

## 2021-05-03 DIAGNOSIS — F32.A DEPRESSION WITH SUICIDAL IDEATION: ICD-10-CM

## 2021-05-03 DIAGNOSIS — R11.0 NAUSEA: ICD-10-CM

## 2021-05-03 DIAGNOSIS — C34.91 ADENOCARCINOMA OF RIGHT LUNG, STAGE 4: ICD-10-CM

## 2021-05-03 DIAGNOSIS — F32.A DEPRESSION, UNSPECIFIED DEPRESSION TYPE: ICD-10-CM

## 2021-05-03 DIAGNOSIS — F41.9 ANXIETY: ICD-10-CM

## 2021-05-03 DIAGNOSIS — R45.851 DEPRESSION WITH SUICIDAL IDEATION: ICD-10-CM

## 2021-05-03 LAB
ALBUMIN SERPL BCP-MCNC: 3 G/DL (ref 3.5–5.2)
ALP SERPL-CCNC: 65 U/L (ref 55–135)
ALT SERPL W/O P-5'-P-CCNC: 11 U/L (ref 10–44)
ANION GAP SERPL CALC-SCNC: 7 MMOL/L (ref 8–16)
AST SERPL-CCNC: 17 U/L (ref 10–40)
BASOPHILS # BLD AUTO: 0.04 K/UL (ref 0–0.2)
BASOPHILS NFR BLD: 1 % (ref 0–1.9)
BILIRUB SERPL-MCNC: 0.2 MG/DL (ref 0.1–1)
BUN SERPL-MCNC: 8 MG/DL (ref 8–23)
CALCIUM SERPL-MCNC: 8.8 MG/DL (ref 8.7–10.5)
CHLORIDE SERPL-SCNC: 106 MMOL/L (ref 95–110)
CO2 SERPL-SCNC: 27 MMOL/L (ref 23–29)
CREAT SERPL-MCNC: 0.9 MG/DL (ref 0.5–1.4)
DIFFERENTIAL METHOD: NORMAL
EOSINOPHIL # BLD AUTO: 0.1 K/UL (ref 0–0.5)
EOSINOPHIL NFR BLD: 1.5 % (ref 0–8)
ERYTHROCYTE [DISTWIDTH] IN BLOOD BY AUTOMATED COUNT: 14.5 % (ref 11.5–14.5)
EST. GFR  (AFRICAN AMERICAN): >60 ML/MIN/1.73 M^2
EST. GFR  (NON AFRICAN AMERICAN): >60 ML/MIN/1.73 M^2
GLUCOSE SERPL-MCNC: 82 MG/DL (ref 70–110)
HCT VFR BLD AUTO: 38.4 % (ref 37–48.5)
HGB BLD-MCNC: 12.6 G/DL (ref 12–16)
IMM GRANULOCYTES # BLD AUTO: 0.01 K/UL (ref 0–0.04)
IMM GRANULOCYTES NFR BLD AUTO: 0.2 % (ref 0–0.5)
LYMPHOCYTES # BLD AUTO: 1.1 K/UL (ref 1–4.8)
LYMPHOCYTES NFR BLD: 26.9 % (ref 18–48)
MCH RBC QN AUTO: 27.2 PG (ref 27–31)
MCHC RBC AUTO-ENTMCNC: 32.8 G/DL (ref 32–36)
MCV RBC AUTO: 83 FL (ref 82–98)
MONOCYTES # BLD AUTO: 0.5 K/UL (ref 0.3–1)
MONOCYTES NFR BLD: 11.7 % (ref 4–15)
NEUTROPHILS # BLD AUTO: 2.4 K/UL (ref 1.8–7.7)
NEUTROPHILS NFR BLD: 58.7 % (ref 38–73)
NRBC BLD-RTO: 0 /100 WBC
PLATELET # BLD AUTO: 267 K/UL (ref 150–450)
PMV BLD AUTO: 10.6 FL (ref 9.2–12.9)
POTASSIUM SERPL-SCNC: 3.3 MMOL/L (ref 3.5–5.1)
PROT SERPL-MCNC: 7.2 G/DL (ref 6–8.4)
RBC # BLD AUTO: 4.64 M/UL (ref 4–5.4)
SODIUM SERPL-SCNC: 140 MMOL/L (ref 136–145)
T4 FREE SERPL-MCNC: 0.86 NG/DL (ref 0.71–1.51)
TSH SERPL DL<=0.005 MIU/L-ACNC: 10.34 UIU/ML (ref 0.4–4)
WBC # BLD AUTO: 4.12 K/UL (ref 3.9–12.7)

## 2021-05-03 PROCEDURE — 99999 PR PBB SHADOW E&M-EST. PATIENT-LVL IV: CPT | Mod: PBBFAC,,, | Performed by: INTERNAL MEDICINE

## 2021-05-03 PROCEDURE — 80053 COMPREHEN METABOLIC PANEL: CPT | Performed by: INTERNAL MEDICINE

## 2021-05-03 PROCEDURE — 84443 ASSAY THYROID STIM HORMONE: CPT | Performed by: INTERNAL MEDICINE

## 2021-05-03 PROCEDURE — 99215 OFFICE O/P EST HI 40 MIN: CPT | Mod: S$GLB,,, | Performed by: INTERNAL MEDICINE

## 2021-05-03 PROCEDURE — 99215 PR OFFICE/OUTPT VISIT, EST, LEVL V, 40-54 MIN: ICD-10-PCS | Mod: S$GLB,,, | Performed by: INTERNAL MEDICINE

## 2021-05-03 PROCEDURE — 84439 ASSAY OF FREE THYROXINE: CPT | Performed by: INTERNAL MEDICINE

## 2021-05-03 PROCEDURE — 36415 COLL VENOUS BLD VENIPUNCTURE: CPT | Performed by: INTERNAL MEDICINE

## 2021-05-03 PROCEDURE — 85025 COMPLETE CBC W/AUTO DIFF WBC: CPT | Performed by: INTERNAL MEDICINE

## 2021-05-03 PROCEDURE — 99999 PR PBB SHADOW E&M-EST. PATIENT-LVL IV: ICD-10-PCS | Mod: PBBFAC,,, | Performed by: INTERNAL MEDICINE

## 2021-05-03 RX ORDER — ONDANSETRON 8 MG/1
8 TABLET, ORALLY DISINTEGRATING ORAL EVERY 8 HOURS PRN
Qty: 30 TABLET | Refills: 2 | Status: ON HOLD | OUTPATIENT
Start: 2021-05-03 | End: 2022-02-09

## 2021-05-03 RX ORDER — PROCHLORPERAZINE MALEATE 5 MG
5 TABLET ORAL EVERY 6 HOURS PRN
Qty: 30 TABLET | Refills: 1 | Status: ON HOLD | OUTPATIENT
Start: 2021-05-03 | End: 2021-11-15 | Stop reason: HOSPADM

## 2021-05-03 RX ORDER — SODIUM CHLORIDE 0.9 % (FLUSH) 0.9 %
10 SYRINGE (ML) INJECTION
Status: CANCELLED | OUTPATIENT
Start: 2021-06-04

## 2021-05-03 RX ORDER — HEPARIN 100 UNIT/ML
500 SYRINGE INTRAVENOUS
Status: CANCELLED | OUTPATIENT
Start: 2021-06-04

## 2021-05-03 RX ORDER — DEXAMETHASONE 4 MG/1
8 TABLET ORAL 2 TIMES DAILY
Qty: 24 TABLET | Refills: 0 | Status: SHIPPED | OUTPATIENT
Start: 2021-05-03 | End: 2021-05-09

## 2021-05-03 RX ORDER — FOLIC ACID 1 MG/1
1 TABLET ORAL DAILY
Qty: 100 TABLET | Refills: 3 | Status: ON HOLD | OUTPATIENT
Start: 2021-05-03 | End: 2022-02-15 | Stop reason: HOSPADM

## 2021-05-05 ENCOUNTER — TELEPHONE (OUTPATIENT)
Dept: HEMATOLOGY/ONCOLOGY | Facility: CLINIC | Age: 71
End: 2021-05-05

## 2021-05-06 ENCOUNTER — TELEPHONE (OUTPATIENT)
Dept: HEMATOLOGY/ONCOLOGY | Facility: CLINIC | Age: 71
End: 2021-05-06

## 2021-05-12 ENCOUNTER — TELEPHONE (OUTPATIENT)
Dept: HEMATOLOGY/ONCOLOGY | Facility: CLINIC | Age: 71
End: 2021-05-12

## 2021-05-19 ENCOUNTER — TELEPHONE (OUTPATIENT)
Dept: VASCULAR SURGERY | Facility: CLINIC | Age: 71
End: 2021-05-19

## 2021-05-20 ENCOUNTER — DOCUMENTATION ONLY (OUTPATIENT)
Dept: VASCULAR SURGERY | Facility: CLINIC | Age: 71
End: 2021-05-20

## 2021-05-20 ENCOUNTER — TELEPHONE (OUTPATIENT)
Dept: VASCULAR SURGERY | Facility: CLINIC | Age: 71
End: 2021-05-20

## 2021-05-20 DIAGNOSIS — C34.91 ADENOCARCINOMA OF RIGHT LUNG, STAGE 4: Primary | ICD-10-CM

## 2021-05-21 ENCOUNTER — TELEPHONE (OUTPATIENT)
Dept: VASCULAR SURGERY | Facility: CLINIC | Age: 71
End: 2021-05-21

## 2021-05-25 ENCOUNTER — TELEPHONE (OUTPATIENT)
Dept: HEMATOLOGY/ONCOLOGY | Facility: CLINIC | Age: 71
End: 2021-05-25

## 2021-06-04 ENCOUNTER — TELEPHONE (OUTPATIENT)
Dept: INFUSION THERAPY | Facility: HOSPITAL | Age: 71
End: 2021-06-04

## 2021-11-05 ENCOUNTER — HOSPITAL ENCOUNTER (INPATIENT)
Facility: OTHER | Age: 71
LOS: 8 days | Discharge: HOME | DRG: 178 | End: 2021-11-16
Attending: EMERGENCY MEDICINE | Admitting: EMERGENCY MEDICINE
Payer: COMMERCIAL

## 2021-11-05 DIAGNOSIS — G89.3 CANCER RELATED PAIN: ICD-10-CM

## 2021-11-05 DIAGNOSIS — Z51.5 ENCOUNTER FOR PALLIATIVE CARE: ICD-10-CM

## 2021-11-05 DIAGNOSIS — Z20.822 SUSPECTED COVID-19 VIRUS INFECTION: ICD-10-CM

## 2021-11-05 DIAGNOSIS — R05.9 COUGH: ICD-10-CM

## 2021-11-05 DIAGNOSIS — R09.02 HYPOXIA: ICD-10-CM

## 2021-11-05 DIAGNOSIS — C34.91 ADENOCARCINOMA OF RIGHT LUNG, STAGE 4: ICD-10-CM

## 2021-11-05 DIAGNOSIS — U07.1 COVID-19: Primary | ICD-10-CM

## 2021-11-05 LAB
ALBUMIN SERPL BCP-MCNC: 3.1 G/DL (ref 3.5–5.2)
ALP SERPL-CCNC: 62 U/L (ref 55–135)
ALT SERPL W/O P-5'-P-CCNC: 12 U/L (ref 10–44)
ANION GAP SERPL CALC-SCNC: 9 MMOL/L (ref 8–16)
APTT BLDCRRT: 27.2 SEC (ref 21–32)
AST SERPL-CCNC: 19 U/L (ref 10–40)
BASOPHILS # BLD AUTO: 0.02 K/UL (ref 0–0.2)
BASOPHILS NFR BLD: 0.4 % (ref 0–1.9)
BILIRUB SERPL-MCNC: 0.5 MG/DL (ref 0.1–1)
BNP SERPL-MCNC: 20 PG/ML (ref 0–99)
BUN SERPL-MCNC: 5 MG/DL (ref 8–23)
CALCIUM SERPL-MCNC: 9.9 MG/DL (ref 8.7–10.5)
CHLORIDE SERPL-SCNC: 108 MMOL/L (ref 95–110)
CK SERPL-CCNC: 74 U/L (ref 20–180)
CO2 SERPL-SCNC: 27 MMOL/L (ref 23–29)
CREAT SERPL-MCNC: 0.8 MG/DL (ref 0.5–1.4)
CRP SERPL-MCNC: 28.8 MG/L (ref 0–8.2)
CTP QC/QA: YES
D DIMER PPP IA.FEU-MCNC: 4.23 MG/L FEU
DIFFERENTIAL METHOD: ABNORMAL
EOSINOPHIL # BLD AUTO: 0 K/UL (ref 0–0.5)
EOSINOPHIL NFR BLD: 0.7 % (ref 0–8)
ERYTHROCYTE [DISTWIDTH] IN BLOOD BY AUTOMATED COUNT: 15 % (ref 11.5–14.5)
ERYTHROCYTE [SEDIMENTATION RATE] IN BLOOD: 66 MM/HR (ref 0–20)
EST. GFR  (AFRICAN AMERICAN): >60 ML/MIN/1.73 M^2
EST. GFR  (NON AFRICAN AMERICAN): >60 ML/MIN/1.73 M^2
FERRITIN SERPL-MCNC: 163 NG/ML (ref 20–300)
GLUCOSE SERPL-MCNC: 104 MG/DL (ref 70–110)
HCT VFR BLD AUTO: 43.6 % (ref 37–48.5)
HGB BLD-MCNC: 14.6 G/DL (ref 12–16)
IMM GRANULOCYTES # BLD AUTO: 0.03 K/UL (ref 0–0.04)
IMM GRANULOCYTES NFR BLD AUTO: 0.5 % (ref 0–0.5)
INR PPP: 1 (ref 0.8–1.2)
LACTATE SERPL-SCNC: 1.6 MMOL/L (ref 0.5–2.2)
LDH SERPL L TO P-CCNC: 224 U/L (ref 110–260)
LYMPHOCYTES # BLD AUTO: 1.1 K/UL (ref 1–4.8)
LYMPHOCYTES NFR BLD: 20.4 % (ref 18–48)
MCH RBC QN AUTO: 27.4 PG (ref 27–31)
MCHC RBC AUTO-ENTMCNC: 33.5 G/DL (ref 32–36)
MCV RBC AUTO: 82 FL (ref 82–98)
MONOCYTES # BLD AUTO: 0.6 K/UL (ref 0.3–1)
MONOCYTES NFR BLD: 11.3 % (ref 4–15)
NEUTROPHILS # BLD AUTO: 3.7 K/UL (ref 1.8–7.7)
NEUTROPHILS NFR BLD: 66.7 % (ref 38–73)
NRBC BLD-RTO: 0 /100 WBC
PLATELET # BLD AUTO: 306 K/UL (ref 150–450)
PMV BLD AUTO: 11.1 FL (ref 9.2–12.9)
POTASSIUM SERPL-SCNC: 3.2 MMOL/L (ref 3.5–5.1)
PROCALCITONIN SERPL IA-MCNC: 0.02 NG/ML
PROT SERPL-MCNC: 8.2 G/DL (ref 6–8.4)
PROTHROMBIN TIME: 10.7 SEC (ref 9–12.5)
RBC # BLD AUTO: 5.32 M/UL (ref 4–5.4)
SARS-COV-2 RDRP RESP QL NAA+PROBE: POSITIVE
SODIUM SERPL-SCNC: 144 MMOL/L (ref 136–145)
TROPONIN I SERPL DL<=0.01 NG/ML-MCNC: <0.006 NG/ML (ref 0–0.03)
WBC # BLD AUTO: 5.48 K/UL (ref 3.9–12.7)

## 2021-11-05 PROCEDURE — U0002 COVID-19 LAB TEST NON-CDC: HCPCS | Performed by: EMERGENCY MEDICINE

## 2021-11-05 PROCEDURE — 93005 ELECTROCARDIOGRAM TRACING: CPT

## 2021-11-05 PROCEDURE — 63600175 PHARM REV CODE 636 W HCPCS: Performed by: NURSE PRACTITIONER

## 2021-11-05 PROCEDURE — G0378 HOSPITAL OBSERVATION PER HR: HCPCS

## 2021-11-05 PROCEDURE — 27000221 HC OXYGEN, UP TO 24 HOURS

## 2021-11-05 PROCEDURE — 85730 THROMBOPLASTIN TIME PARTIAL: CPT | Performed by: NURSE PRACTITIONER

## 2021-11-05 PROCEDURE — 83880 ASSAY OF NATRIURETIC PEPTIDE: CPT | Performed by: NURSE PRACTITIONER

## 2021-11-05 PROCEDURE — 80053 COMPREHEN METABOLIC PANEL: CPT | Performed by: EMERGENCY MEDICINE

## 2021-11-05 PROCEDURE — 83605 ASSAY OF LACTIC ACID: CPT | Performed by: EMERGENCY MEDICINE

## 2021-11-05 PROCEDURE — 93010 ELECTROCARDIOGRAM REPORT: CPT | Mod: ,,, | Performed by: INTERNAL MEDICINE

## 2021-11-05 PROCEDURE — 84484 ASSAY OF TROPONIN QUANT: CPT | Performed by: EMERGENCY MEDICINE

## 2021-11-05 PROCEDURE — 86140 C-REACTIVE PROTEIN: CPT | Performed by: EMERGENCY MEDICINE

## 2021-11-05 PROCEDURE — 96365 THER/PROPH/DIAG IV INF INIT: CPT

## 2021-11-05 PROCEDURE — 99291 CRITICAL CARE FIRST HOUR: CPT | Mod: 25

## 2021-11-05 PROCEDURE — 84145 PROCALCITONIN (PCT): CPT | Performed by: EMERGENCY MEDICINE

## 2021-11-05 PROCEDURE — 25000003 PHARM REV CODE 250: Performed by: NURSE PRACTITIONER

## 2021-11-05 PROCEDURE — 83615 LACTATE (LD) (LDH) ENZYME: CPT | Performed by: EMERGENCY MEDICINE

## 2021-11-05 PROCEDURE — 99220 PR INITIAL OBSERVATION CARE,LEVL III: CPT | Mod: CR,,, | Performed by: NURSE PRACTITIONER

## 2021-11-05 PROCEDURE — 85379 FIBRIN DEGRADATION QUANT: CPT | Performed by: NURSE PRACTITIONER

## 2021-11-05 PROCEDURE — 85651 RBC SED RATE NONAUTOMATED: CPT | Performed by: NURSE PRACTITIONER

## 2021-11-05 PROCEDURE — 99220 PR INITIAL OBSERVATION CARE,LEVL III: ICD-10-PCS | Mod: CR,,, | Performed by: NURSE PRACTITIONER

## 2021-11-05 PROCEDURE — 82306 VITAMIN D 25 HYDROXY: CPT | Performed by: NURSE PRACTITIONER

## 2021-11-05 PROCEDURE — 87040 BLOOD CULTURE FOR BACTERIA: CPT | Performed by: EMERGENCY MEDICINE

## 2021-11-05 PROCEDURE — 82728 ASSAY OF FERRITIN: CPT | Performed by: EMERGENCY MEDICINE

## 2021-11-05 PROCEDURE — 85610 PROTHROMBIN TIME: CPT | Performed by: NURSE PRACTITIONER

## 2021-11-05 PROCEDURE — 93010 EKG 12-LEAD: ICD-10-PCS | Mod: ,,, | Performed by: INTERNAL MEDICINE

## 2021-11-05 PROCEDURE — 96375 TX/PRO/DX INJ NEW DRUG ADDON: CPT

## 2021-11-05 PROCEDURE — 25000003 PHARM REV CODE 250: Performed by: EMERGENCY MEDICINE

## 2021-11-05 PROCEDURE — 63600175 PHARM REV CODE 636 W HCPCS: Performed by: EMERGENCY MEDICINE

## 2021-11-05 PROCEDURE — 82550 ASSAY OF CK (CPK): CPT | Performed by: EMERGENCY MEDICINE

## 2021-11-05 PROCEDURE — 85025 COMPLETE CBC W/AUTO DIFF WBC: CPT | Performed by: EMERGENCY MEDICINE

## 2021-11-05 RX ORDER — LEVOTHYROXINE SODIUM 112 UG/1
112 TABLET ORAL
Status: DISCONTINUED | OUTPATIENT
Start: 2021-11-06 | End: 2021-11-16 | Stop reason: HOSPADM

## 2021-11-05 RX ORDER — ONDANSETRON 2 MG/ML
4 INJECTION INTRAMUSCULAR; INTRAVENOUS
Status: COMPLETED | OUTPATIENT
Start: 2021-11-05 | End: 2021-11-05

## 2021-11-05 RX ORDER — RISPERIDONE 0.25 MG/1
0.5 TABLET ORAL DAILY
Status: DISCONTINUED | OUTPATIENT
Start: 2021-11-06 | End: 2021-11-16 | Stop reason: HOSPADM

## 2021-11-05 RX ORDER — IBUPROFEN 200 MG
24 TABLET ORAL
Status: DISCONTINUED | OUTPATIENT
Start: 2021-11-05 | End: 2021-11-16 | Stop reason: HOSPADM

## 2021-11-05 RX ORDER — HYDROMORPHONE HYDROCHLORIDE 1 MG/ML
0.5 INJECTION, SOLUTION INTRAMUSCULAR; INTRAVENOUS; SUBCUTANEOUS
Status: COMPLETED | OUTPATIENT
Start: 2021-11-05 | End: 2021-11-05

## 2021-11-05 RX ORDER — DEXAMETHASONE SODIUM PHOSPHATE 4 MG/ML
8 INJECTION, SOLUTION INTRA-ARTICULAR; INTRALESIONAL; INTRAMUSCULAR; INTRAVENOUS; SOFT TISSUE
Status: COMPLETED | OUTPATIENT
Start: 2021-11-05 | End: 2021-11-05

## 2021-11-05 RX ORDER — CHOLECALCIFEROL (VITAMIN D3) 25 MCG
1000 TABLET ORAL DAILY
Status: DISCONTINUED | OUTPATIENT
Start: 2021-11-06 | End: 2021-11-16 | Stop reason: HOSPADM

## 2021-11-05 RX ORDER — ASPIRIN 81 MG/1
81 TABLET ORAL DAILY
Status: DISCONTINUED | OUTPATIENT
Start: 2021-11-06 | End: 2021-11-16 | Stop reason: HOSPADM

## 2021-11-05 RX ORDER — PRAVASTATIN SODIUM 20 MG/1
40 TABLET ORAL NIGHTLY
Status: DISCONTINUED | OUTPATIENT
Start: 2021-11-06 | End: 2021-11-16 | Stop reason: HOSPADM

## 2021-11-05 RX ORDER — ALBUTEROL SULFATE 90 UG/1
2 AEROSOL, METERED RESPIRATORY (INHALATION) EVERY 6 HOURS
Status: DISCONTINUED | OUTPATIENT
Start: 2021-11-05 | End: 2021-11-12

## 2021-11-05 RX ORDER — DIVALPROEX SODIUM 250 MG/1
250 TABLET, DELAYED RELEASE ORAL 3 TIMES DAILY
Status: DISCONTINUED | OUTPATIENT
Start: 2021-11-05 | End: 2021-11-16 | Stop reason: HOSPADM

## 2021-11-05 RX ORDER — MORPHINE SULFATE 15 MG/1
15 TABLET, FILM COATED, EXTENDED RELEASE ORAL 2 TIMES DAILY
Status: DISCONTINUED | OUTPATIENT
Start: 2021-11-05 | End: 2021-11-06

## 2021-11-05 RX ORDER — IBUPROFEN 200 MG
16 TABLET ORAL
Status: DISCONTINUED | OUTPATIENT
Start: 2021-11-05 | End: 2021-11-16 | Stop reason: HOSPADM

## 2021-11-05 RX ORDER — DULOXETIN HYDROCHLORIDE 20 MG/1
20 CAPSULE, DELAYED RELEASE ORAL DAILY
Status: DISCONTINUED | OUTPATIENT
Start: 2021-11-06 | End: 2021-11-16 | Stop reason: HOSPADM

## 2021-11-05 RX ORDER — GLUCAGON 1 MG
1 KIT INJECTION
Status: DISCONTINUED | OUTPATIENT
Start: 2021-11-05 | End: 2021-11-16 | Stop reason: HOSPADM

## 2021-11-05 RX ORDER — ASCORBIC ACID 500 MG
500 TABLET ORAL 2 TIMES DAILY
Status: DISCONTINUED | OUTPATIENT
Start: 2021-11-05 | End: 2021-11-16 | Stop reason: HOSPADM

## 2021-11-05 RX ORDER — VENLAFAXINE HYDROCHLORIDE 75 MG/1
150 CAPSULE, EXTENDED RELEASE ORAL DAILY
Status: DISCONTINUED | OUTPATIENT
Start: 2021-11-06 | End: 2021-11-16 | Stop reason: HOSPADM

## 2021-11-05 RX ORDER — FOLIC ACID 1 MG/1
1 TABLET ORAL DAILY
Status: DISCONTINUED | OUTPATIENT
Start: 2021-11-06 | End: 2021-11-16 | Stop reason: HOSPADM

## 2021-11-05 RX ORDER — ENOXAPARIN SODIUM 100 MG/ML
1 INJECTION SUBCUTANEOUS 2 TIMES DAILY
Status: DISCONTINUED | OUTPATIENT
Start: 2021-11-05 | End: 2021-11-06

## 2021-11-05 RX ORDER — ONDANSETRON 8 MG/1
8 TABLET, ORALLY DISINTEGRATING ORAL EVERY 8 HOURS PRN
Status: DISCONTINUED | OUTPATIENT
Start: 2021-11-05 | End: 2021-11-16 | Stop reason: HOSPADM

## 2021-11-05 RX ORDER — SODIUM CHLORIDE 0.9 % (FLUSH) 0.9 %
10 SYRINGE (ML) INJECTION
Status: DISCONTINUED | OUTPATIENT
Start: 2021-11-05 | End: 2021-11-16 | Stop reason: HOSPADM

## 2021-11-05 RX ADMIN — OXYCODONE HYDROCHLORIDE AND ACETAMINOPHEN 500 MG: 500 TABLET ORAL at 09:11

## 2021-11-05 RX ADMIN — ENOXAPARIN SODIUM 70 MG: 80 INJECTION SUBCUTANEOUS at 09:11

## 2021-11-05 RX ADMIN — DIVALPROEX SODIUM 250 MG: 250 TABLET, DELAYED RELEASE ORAL at 09:11

## 2021-11-05 RX ADMIN — DEXAMETHASONE SODIUM PHOSPHATE 8 MG: 4 INJECTION INTRA-ARTICULAR; INTRALESIONAL; INTRAMUSCULAR; INTRAVENOUS; SOFT TISSUE at 07:11

## 2021-11-05 RX ADMIN — MORPHINE SULFATE 15 MG: 15 TABLET, FILM COATED, EXTENDED RELEASE ORAL at 09:11

## 2021-11-05 RX ADMIN — CEFTRIAXONE 1 G: 1 INJECTION, SOLUTION INTRAVENOUS at 05:11

## 2021-11-05 RX ADMIN — HYDROMORPHONE HYDROCHLORIDE 0.5 MG: 1 INJECTION, SOLUTION INTRAMUSCULAR; INTRAVENOUS; SUBCUTANEOUS at 05:11

## 2021-11-05 RX ADMIN — ONDANSETRON 4 MG: 2 INJECTION INTRAMUSCULAR; INTRAVENOUS at 05:11

## 2021-11-05 RX ADMIN — SODIUM CHLORIDE 1000 ML: 0.9 INJECTION, SOLUTION INTRAVENOUS at 05:11

## 2021-11-06 DIAGNOSIS — U07.1 COVID-19: ICD-10-CM

## 2021-11-06 DIAGNOSIS — C34.91 ADENOCARCINOMA OF RIGHT LUNG, STAGE 4: Primary | ICD-10-CM

## 2021-11-06 LAB
25(OH)D3+25(OH)D2 SERPL-MCNC: 31 NG/ML (ref 30–96)
ALBUMIN SERPL BCP-MCNC: 2.9 G/DL (ref 3.5–5.2)
ALP SERPL-CCNC: 57 U/L (ref 55–135)
ALT SERPL W/O P-5'-P-CCNC: 11 U/L (ref 10–44)
ANION GAP SERPL CALC-SCNC: 10 MMOL/L (ref 8–16)
AST SERPL-CCNC: 14 U/L (ref 10–40)
BASOPHILS # BLD AUTO: 0.01 K/UL (ref 0–0.2)
BASOPHILS NFR BLD: 0.2 % (ref 0–1.9)
BILIRUB SERPL-MCNC: 0.3 MG/DL (ref 0.1–1)
BUN SERPL-MCNC: 5 MG/DL (ref 8–23)
CALCIUM SERPL-MCNC: 9.4 MG/DL (ref 8.7–10.5)
CHLORIDE SERPL-SCNC: 109 MMOL/L (ref 95–110)
CO2 SERPL-SCNC: 23 MMOL/L (ref 23–29)
CREAT SERPL-MCNC: 0.8 MG/DL (ref 0.5–1.4)
DIFFERENTIAL METHOD: ABNORMAL
EOSINOPHIL # BLD AUTO: 0 K/UL (ref 0–0.5)
EOSINOPHIL NFR BLD: 0 % (ref 0–8)
ERYTHROCYTE [DISTWIDTH] IN BLOOD BY AUTOMATED COUNT: 14.9 % (ref 11.5–14.5)
EST. GFR  (AFRICAN AMERICAN): >60 ML/MIN/1.73 M^2
EST. GFR  (NON AFRICAN AMERICAN): >60 ML/MIN/1.73 M^2
GLUCOSE SERPL-MCNC: 146 MG/DL (ref 70–110)
HCT VFR BLD AUTO: 40.9 % (ref 37–48.5)
HGB BLD-MCNC: 13.1 G/DL (ref 12–16)
IMM GRANULOCYTES # BLD AUTO: 0.03 K/UL (ref 0–0.04)
IMM GRANULOCYTES NFR BLD AUTO: 0.5 % (ref 0–0.5)
LYMPHOCYTES # BLD AUTO: 0.8 K/UL (ref 1–4.8)
LYMPHOCYTES NFR BLD: 14.6 % (ref 18–48)
MAGNESIUM SERPL-MCNC: 2 MG/DL (ref 1.6–2.6)
MCH RBC QN AUTO: 27 PG (ref 27–31)
MCHC RBC AUTO-ENTMCNC: 32 G/DL (ref 32–36)
MCV RBC AUTO: 84 FL (ref 82–98)
MONOCYTES # BLD AUTO: 0.2 K/UL (ref 0.3–1)
MONOCYTES NFR BLD: 3.7 % (ref 4–15)
NEUTROPHILS # BLD AUTO: 4.6 K/UL (ref 1.8–7.7)
NEUTROPHILS NFR BLD: 81 % (ref 38–73)
NRBC BLD-RTO: 0 /100 WBC
PHOSPHATE SERPL-MCNC: 3.7 MG/DL (ref 2.7–4.5)
PLATELET # BLD AUTO: 287 K/UL (ref 150–450)
PMV BLD AUTO: 10.7 FL (ref 9.2–12.9)
POTASSIUM SERPL-SCNC: 3.8 MMOL/L (ref 3.5–5.1)
PROT SERPL-MCNC: 7.6 G/DL (ref 6–8.4)
RBC # BLD AUTO: 4.85 M/UL (ref 4–5.4)
SODIUM SERPL-SCNC: 142 MMOL/L (ref 136–145)
WBC # BLD AUTO: 5.67 K/UL (ref 3.9–12.7)

## 2021-11-06 PROCEDURE — 27100098 HC SPACER

## 2021-11-06 PROCEDURE — 36415 COLL VENOUS BLD VENIPUNCTURE: CPT | Performed by: NURSE PRACTITIONER

## 2021-11-06 PROCEDURE — 25000003 PHARM REV CODE 250: Performed by: PHYSICIAN ASSISTANT

## 2021-11-06 PROCEDURE — 94761 N-INVAS EAR/PLS OXIMETRY MLT: CPT

## 2021-11-06 PROCEDURE — 83735 ASSAY OF MAGNESIUM: CPT | Performed by: NURSE PRACTITIONER

## 2021-11-06 PROCEDURE — 99226 PR SUBSEQUENT OBSERVATION CARE,LEVEL III: ICD-10-PCS | Mod: ,,, | Performed by: PHYSICIAN ASSISTANT

## 2021-11-06 PROCEDURE — 94640 AIRWAY INHALATION TREATMENT: CPT

## 2021-11-06 PROCEDURE — C9399 UNCLASSIFIED DRUGS OR BIOLOG: HCPCS | Performed by: PHYSICIAN ASSISTANT

## 2021-11-06 PROCEDURE — 99226 PR SUBSEQUENT OBSERVATION CARE,LEVEL III: CPT | Mod: ,,, | Performed by: PHYSICIAN ASSISTANT

## 2021-11-06 PROCEDURE — 99497 PR ADVNCD CARE PLAN 30 MIN: ICD-10-PCS | Mod: 25,,, | Performed by: PHYSICIAN ASSISTANT

## 2021-11-06 PROCEDURE — 63600175 PHARM REV CODE 636 W HCPCS: Performed by: PHYSICIAN ASSISTANT

## 2021-11-06 PROCEDURE — 25000242 PHARM REV CODE 250 ALT 637 W/ HCPCS: Performed by: NURSE PRACTITIONER

## 2021-11-06 PROCEDURE — G0378 HOSPITAL OBSERVATION PER HR: HCPCS

## 2021-11-06 PROCEDURE — 99215 OFFICE O/P EST HI 40 MIN: CPT | Mod: ,,, | Performed by: STUDENT IN AN ORGANIZED HEALTH CARE EDUCATION/TRAINING PROGRAM

## 2021-11-06 PROCEDURE — 25000003 PHARM REV CODE 250: Performed by: NURSE PRACTITIONER

## 2021-11-06 PROCEDURE — 27000221 HC OXYGEN, UP TO 24 HOURS

## 2021-11-06 PROCEDURE — 99215 PR OFFICE/OUTPT VISIT, EST, LEVL V, 40-54 MIN: ICD-10-PCS | Mod: ,,, | Performed by: STUDENT IN AN ORGANIZED HEALTH CARE EDUCATION/TRAINING PROGRAM

## 2021-11-06 PROCEDURE — 84100 ASSAY OF PHOSPHORUS: CPT | Performed by: NURSE PRACTITIONER

## 2021-11-06 PROCEDURE — 85025 COMPLETE CBC W/AUTO DIFF WBC: CPT | Performed by: NURSE PRACTITIONER

## 2021-11-06 PROCEDURE — 80053 COMPREHEN METABOLIC PANEL: CPT | Performed by: NURSE PRACTITIONER

## 2021-11-06 PROCEDURE — 94799 UNLISTED PULMONARY SVC/PX: CPT

## 2021-11-06 PROCEDURE — 99497 ADVNCD CARE PLAN 30 MIN: CPT | Mod: 25,,, | Performed by: PHYSICIAN ASSISTANT

## 2021-11-06 RX ORDER — TRAMADOL HYDROCHLORIDE 50 MG/1
50 TABLET ORAL EVERY 6 HOURS PRN
Status: DISCONTINUED | OUTPATIENT
Start: 2021-11-06 | End: 2021-11-08

## 2021-11-06 RX ORDER — OXYCODONE AND ACETAMINOPHEN 5; 325 MG/1; MG/1
1 TABLET ORAL EVERY 6 HOURS PRN
Status: DISCONTINUED | OUTPATIENT
Start: 2021-11-06 | End: 2021-11-08

## 2021-11-06 RX ORDER — OXYCODONE AND ACETAMINOPHEN TABLETS 5; 300 MG/1; MG/1
1 TABLET ORAL EVERY 4 HOURS PRN
Status: ON HOLD | COMMUNITY
End: 2021-11-15 | Stop reason: HOSPADM

## 2021-11-06 RX ORDER — TRAMADOL HYDROCHLORIDE 50 MG/1
50 TABLET ORAL EVERY 6 HOURS PRN
Status: ON HOLD | COMMUNITY
End: 2021-11-15 | Stop reason: SDUPTHER

## 2021-11-06 RX ORDER — ENOXAPARIN SODIUM 100 MG/ML
1 INJECTION SUBCUTANEOUS 2 TIMES DAILY
Status: DISCONTINUED | OUTPATIENT
Start: 2021-11-06 | End: 2021-11-16 | Stop reason: HOSPADM

## 2021-11-06 RX ADMIN — DIVALPROEX SODIUM 250 MG: 250 TABLET, DELAYED RELEASE ORAL at 08:11

## 2021-11-06 RX ADMIN — FOLIC ACID 1 MG: 1 TABLET ORAL at 10:11

## 2021-11-06 RX ADMIN — LEVOTHYROXINE SODIUM 112 MCG: 112 TABLET ORAL at 06:11

## 2021-11-06 RX ADMIN — ENOXAPARIN SODIUM 70 MG: 100 INJECTION SUBCUTANEOUS at 08:11

## 2021-11-06 RX ADMIN — Medication 1000 UNITS: at 10:11

## 2021-11-06 RX ADMIN — ALBUTEROL SULFATE 2 PUFF: 90 AEROSOL, METERED RESPIRATORY (INHALATION) at 12:11

## 2021-11-06 RX ADMIN — REMDESIVIR 200 MG: 100 INJECTION, POWDER, LYOPHILIZED, FOR SOLUTION INTRAVENOUS at 11:11

## 2021-11-06 RX ADMIN — OXYCODONE HYDROCHLORIDE AND ACETAMINOPHEN 1 TABLET: 5; 325 TABLET ORAL at 08:11

## 2021-11-06 RX ADMIN — RISPERIDONE 0.5 MG: 0.25 TABLET ORAL at 10:11

## 2021-11-06 RX ADMIN — MORPHINE SULFATE 15 MG: 15 TABLET, FILM COATED, EXTENDED RELEASE ORAL at 10:11

## 2021-11-06 RX ADMIN — PRAVASTATIN SODIUM 40 MG: 20 TABLET ORAL at 08:11

## 2021-11-06 RX ADMIN — TRAMADOL HYDROCHLORIDE 50 MG: 50 TABLET ORAL at 08:11

## 2021-11-06 RX ADMIN — VENLAFAXINE HYDROCHLORIDE 150 MG: 75 CAPSULE, EXTENDED RELEASE ORAL at 10:11

## 2021-11-06 RX ADMIN — ASPIRIN 81 MG: 81 TABLET, COATED ORAL at 10:11

## 2021-11-06 RX ADMIN — ALBUTEROL SULFATE 2 PUFF: 90 AEROSOL, METERED RESPIRATORY (INHALATION) at 01:11

## 2021-11-06 RX ADMIN — ALBUTEROL SULFATE 2 PUFF: 90 AEROSOL, METERED RESPIRATORY (INHALATION) at 07:11

## 2021-11-06 RX ADMIN — OXYCODONE HYDROCHLORIDE AND ACETAMINOPHEN 500 MG: 500 TABLET ORAL at 10:11

## 2021-11-06 RX ADMIN — DEXAMETHASONE 6 MG: 4 TABLET ORAL at 10:11

## 2021-11-06 RX ADMIN — ALBUTEROL SULFATE 2 PUFF: 90 AEROSOL, METERED RESPIRATORY (INHALATION) at 06:11

## 2021-11-06 RX ADMIN — OXYCODONE HYDROCHLORIDE AND ACETAMINOPHEN 1 TABLET: 5; 325 TABLET ORAL at 03:11

## 2021-11-06 RX ADMIN — DIVALPROEX SODIUM 250 MG: 250 TABLET, DELAYED RELEASE ORAL at 03:11

## 2021-11-06 RX ADMIN — DIVALPROEX SODIUM 250 MG: 250 TABLET, DELAYED RELEASE ORAL at 10:11

## 2021-11-06 RX ADMIN — DULOXETINE HYDROCHLORIDE 20 MG: 20 CAPSULE, DELAYED RELEASE ORAL at 10:11

## 2021-11-06 RX ADMIN — OXYCODONE HYDROCHLORIDE AND ACETAMINOPHEN 500 MG: 500 TABLET ORAL at 08:11

## 2021-11-06 RX ADMIN — THERA TABS 1 TABLET: TAB at 10:11

## 2021-11-07 LAB
ALBUMIN SERPL BCP-MCNC: 2.8 G/DL (ref 3.5–5.2)
ALP SERPL-CCNC: 51 U/L (ref 55–135)
ALT SERPL W/O P-5'-P-CCNC: 7 U/L (ref 10–44)
ANION GAP SERPL CALC-SCNC: 10 MMOL/L (ref 8–16)
AST SERPL-CCNC: 13 U/L (ref 10–40)
BASOPHILS # BLD AUTO: 0.01 K/UL (ref 0–0.2)
BASOPHILS NFR BLD: 0.1 % (ref 0–1.9)
BILIRUB SERPL-MCNC: 0.3 MG/DL (ref 0.1–1)
BUN SERPL-MCNC: 8 MG/DL (ref 8–23)
CALCIUM SERPL-MCNC: 8.7 MG/DL (ref 8.7–10.5)
CHLORIDE SERPL-SCNC: 107 MMOL/L (ref 95–110)
CO2 SERPL-SCNC: 25 MMOL/L (ref 23–29)
CREAT SERPL-MCNC: 0.8 MG/DL (ref 0.5–1.4)
D DIMER PPP IA.FEU-MCNC: 3.54 MG/L FEU
DIFFERENTIAL METHOD: ABNORMAL
EOSINOPHIL # BLD AUTO: 0 K/UL (ref 0–0.5)
EOSINOPHIL NFR BLD: 0 % (ref 0–8)
ERYTHROCYTE [DISTWIDTH] IN BLOOD BY AUTOMATED COUNT: 14.9 % (ref 11.5–14.5)
EST. GFR  (AFRICAN AMERICAN): >60 ML/MIN/1.73 M^2
EST. GFR  (NON AFRICAN AMERICAN): >60 ML/MIN/1.73 M^2
GLUCOSE SERPL-MCNC: 97 MG/DL (ref 70–110)
HCT VFR BLD AUTO: 35.4 % (ref 37–48.5)
HGB BLD-MCNC: 11.9 G/DL (ref 12–16)
IMM GRANULOCYTES # BLD AUTO: 0.06 K/UL (ref 0–0.04)
IMM GRANULOCYTES NFR BLD AUTO: 0.5 % (ref 0–0.5)
LYMPHOCYTES # BLD AUTO: 1.6 K/UL (ref 1–4.8)
LYMPHOCYTES NFR BLD: 12 % (ref 18–48)
MAGNESIUM SERPL-MCNC: 2.1 MG/DL (ref 1.6–2.6)
MCH RBC QN AUTO: 27.8 PG (ref 27–31)
MCHC RBC AUTO-ENTMCNC: 33.6 G/DL (ref 32–36)
MCV RBC AUTO: 83 FL (ref 82–98)
MONOCYTES # BLD AUTO: 0.8 K/UL (ref 0.3–1)
MONOCYTES NFR BLD: 6 % (ref 4–15)
NEUTROPHILS # BLD AUTO: 10.8 K/UL (ref 1.8–7.7)
NEUTROPHILS NFR BLD: 81.4 % (ref 38–73)
NRBC BLD-RTO: 0 /100 WBC
PHOSPHATE SERPL-MCNC: 3.3 MG/DL (ref 2.7–4.5)
PLATELET # BLD AUTO: 306 K/UL (ref 150–450)
PMV BLD AUTO: 10.4 FL (ref 9.2–12.9)
POTASSIUM SERPL-SCNC: 3.6 MMOL/L (ref 3.5–5.1)
PROT SERPL-MCNC: 7.2 G/DL (ref 6–8.4)
RBC # BLD AUTO: 4.28 M/UL (ref 4–5.4)
SODIUM SERPL-SCNC: 142 MMOL/L (ref 136–145)
WBC # BLD AUTO: 13.22 K/UL (ref 3.9–12.7)

## 2021-11-07 PROCEDURE — 99226 PR SUBSEQUENT OBSERVATION CARE,LEVEL III: ICD-10-PCS | Mod: ,,, | Performed by: PHYSICIAN ASSISTANT

## 2021-11-07 PROCEDURE — 25000003 PHARM REV CODE 250: Performed by: NURSE PRACTITIONER

## 2021-11-07 PROCEDURE — 84100 ASSAY OF PHOSPHORUS: CPT | Performed by: NURSE PRACTITIONER

## 2021-11-07 PROCEDURE — 99226 PR SUBSEQUENT OBSERVATION CARE,LEVEL III: CPT | Mod: ,,, | Performed by: PHYSICIAN ASSISTANT

## 2021-11-07 PROCEDURE — 85025 COMPLETE CBC W/AUTO DIFF WBC: CPT | Performed by: NURSE PRACTITIONER

## 2021-11-07 PROCEDURE — 94761 N-INVAS EAR/PLS OXIMETRY MLT: CPT

## 2021-11-07 PROCEDURE — 25000003 PHARM REV CODE 250: Performed by: PHYSICIAN ASSISTANT

## 2021-11-07 PROCEDURE — 36415 COLL VENOUS BLD VENIPUNCTURE: CPT | Performed by: NURSE PRACTITIONER

## 2021-11-07 PROCEDURE — 94640 AIRWAY INHALATION TREATMENT: CPT

## 2021-11-07 PROCEDURE — C9399 UNCLASSIFIED DRUGS OR BIOLOG: HCPCS | Performed by: PHYSICIAN ASSISTANT

## 2021-11-07 PROCEDURE — 83735 ASSAY OF MAGNESIUM: CPT | Performed by: NURSE PRACTITIONER

## 2021-11-07 PROCEDURE — G0378 HOSPITAL OBSERVATION PER HR: HCPCS

## 2021-11-07 PROCEDURE — 63600175 PHARM REV CODE 636 W HCPCS: Performed by: PHYSICIAN ASSISTANT

## 2021-11-07 PROCEDURE — 85379 FIBRIN DEGRADATION QUANT: CPT | Performed by: NURSE PRACTITIONER

## 2021-11-07 PROCEDURE — 80053 COMPREHEN METABOLIC PANEL: CPT | Performed by: NURSE PRACTITIONER

## 2021-11-07 RX ORDER — MORPHINE SULFATE 2 MG/ML
2 INJECTION, SOLUTION INTRAMUSCULAR; INTRAVENOUS ONCE
Status: COMPLETED | OUTPATIENT
Start: 2021-11-07 | End: 2021-11-07

## 2021-11-07 RX ORDER — AZITHROMYCIN 250 MG/1
500 TABLET, FILM COATED ORAL DAILY
Status: DISCONTINUED | OUTPATIENT
Start: 2021-11-07 | End: 2021-11-07

## 2021-11-07 RX ORDER — MORPHINE SULFATE 2 MG/ML
2 INJECTION, SOLUTION INTRAMUSCULAR; INTRAVENOUS EVERY 4 HOURS PRN
Status: DISCONTINUED | OUTPATIENT
Start: 2021-11-07 | End: 2021-11-16 | Stop reason: HOSPADM

## 2021-11-07 RX ADMIN — THERA TABS 1 TABLET: TAB at 08:11

## 2021-11-07 RX ADMIN — OXYCODONE HYDROCHLORIDE AND ACETAMINOPHEN 1 TABLET: 5; 325 TABLET ORAL at 08:11

## 2021-11-07 RX ADMIN — LEVOTHYROXINE SODIUM 112 MCG: 112 TABLET ORAL at 05:11

## 2021-11-07 RX ADMIN — RISPERIDONE 0.5 MG: 0.25 TABLET ORAL at 08:11

## 2021-11-07 RX ADMIN — TRAMADOL HYDROCHLORIDE 50 MG: 50 TABLET ORAL at 08:11

## 2021-11-07 RX ADMIN — ENOXAPARIN SODIUM 70 MG: 100 INJECTION SUBCUTANEOUS at 08:11

## 2021-11-07 RX ADMIN — OXYCODONE HYDROCHLORIDE AND ACETAMINOPHEN 500 MG: 500 TABLET ORAL at 08:11

## 2021-11-07 RX ADMIN — DIVALPROEX SODIUM 250 MG: 250 TABLET, DELAYED RELEASE ORAL at 02:11

## 2021-11-07 RX ADMIN — DIVALPROEX SODIUM 250 MG: 250 TABLET, DELAYED RELEASE ORAL at 08:11

## 2021-11-07 RX ADMIN — ALBUTEROL SULFATE 2 PUFF: 90 AEROSOL, METERED RESPIRATORY (INHALATION) at 06:11

## 2021-11-07 RX ADMIN — DULOXETINE HYDROCHLORIDE 20 MG: 20 CAPSULE, DELAYED RELEASE ORAL at 08:11

## 2021-11-07 RX ADMIN — ALBUTEROL SULFATE 2 PUFF: 90 AEROSOL, METERED RESPIRATORY (INHALATION) at 01:11

## 2021-11-07 RX ADMIN — DEXAMETHASONE 6 MG: 4 TABLET ORAL at 08:11

## 2021-11-07 RX ADMIN — ASPIRIN 81 MG: 81 TABLET, COATED ORAL at 08:11

## 2021-11-07 RX ADMIN — MORPHINE SULFATE 2 MG: 2 INJECTION, SOLUTION INTRAMUSCULAR; INTRAVENOUS at 11:11

## 2021-11-07 RX ADMIN — ALBUTEROL SULFATE 2 PUFF: 90 AEROSOL, METERED RESPIRATORY (INHALATION) at 07:11

## 2021-11-07 RX ADMIN — REMDESIVIR 100 MG: 100 INJECTION, POWDER, LYOPHILIZED, FOR SOLUTION INTRAVENOUS at 08:11

## 2021-11-07 RX ADMIN — PRAVASTATIN SODIUM 40 MG: 20 TABLET ORAL at 08:11

## 2021-11-07 RX ADMIN — FOLIC ACID 1 MG: 1 TABLET ORAL at 08:11

## 2021-11-07 RX ADMIN — VENLAFAXINE HYDROCHLORIDE 150 MG: 75 CAPSULE, EXTENDED RELEASE ORAL at 08:11

## 2021-11-07 RX ADMIN — Medication 1000 UNITS: at 08:11

## 2021-11-08 PROBLEM — Z51.5 ENCOUNTER FOR PALLIATIVE CARE: Status: ACTIVE | Noted: 2021-11-08

## 2021-11-08 LAB
ALBUMIN SERPL BCP-MCNC: 2.8 G/DL (ref 3.5–5.2)
ALP SERPL-CCNC: 46 U/L (ref 55–135)
ALT SERPL W/O P-5'-P-CCNC: 9 U/L (ref 10–44)
ANION GAP SERPL CALC-SCNC: 10 MMOL/L (ref 8–16)
AST SERPL-CCNC: 13 U/L (ref 10–40)
BASOPHILS # BLD AUTO: 0 K/UL (ref 0–0.2)
BASOPHILS NFR BLD: 0 % (ref 0–1.9)
BILIRUB SERPL-MCNC: 0.3 MG/DL (ref 0.1–1)
BUN SERPL-MCNC: 11 MG/DL (ref 8–23)
CALCIUM SERPL-MCNC: 8.9 MG/DL (ref 8.7–10.5)
CHLORIDE SERPL-SCNC: 107 MMOL/L (ref 95–110)
CO2 SERPL-SCNC: 26 MMOL/L (ref 23–29)
CREAT SERPL-MCNC: 0.8 MG/DL (ref 0.5–1.4)
CRP SERPL-MCNC: 11.8 MG/L (ref 0–8.2)
DIFFERENTIAL METHOD: ABNORMAL
EOSINOPHIL # BLD AUTO: 0 K/UL (ref 0–0.5)
EOSINOPHIL NFR BLD: 0 % (ref 0–8)
ERYTHROCYTE [DISTWIDTH] IN BLOOD BY AUTOMATED COUNT: 15.3 % (ref 11.5–14.5)
EST. GFR  (AFRICAN AMERICAN): >60 ML/MIN/1.73 M^2
EST. GFR  (NON AFRICAN AMERICAN): >60 ML/MIN/1.73 M^2
GLUCOSE SERPL-MCNC: 89 MG/DL (ref 70–110)
HCT VFR BLD AUTO: 39.3 % (ref 37–48.5)
HGB BLD-MCNC: 12.4 G/DL (ref 12–16)
IMM GRANULOCYTES # BLD AUTO: 0.05 K/UL (ref 0–0.04)
IMM GRANULOCYTES NFR BLD AUTO: 0.5 % (ref 0–0.5)
LYMPHOCYTES # BLD AUTO: 1.3 K/UL (ref 1–4.8)
LYMPHOCYTES NFR BLD: 12.6 % (ref 18–48)
MAGNESIUM SERPL-MCNC: 2.1 MG/DL (ref 1.6–2.6)
MCH RBC QN AUTO: 27.9 PG (ref 27–31)
MCHC RBC AUTO-ENTMCNC: 31.6 G/DL (ref 32–36)
MCV RBC AUTO: 88 FL (ref 82–98)
MONOCYTES # BLD AUTO: 0.6 K/UL (ref 0.3–1)
MONOCYTES NFR BLD: 5.4 % (ref 4–15)
NEUTROPHILS # BLD AUTO: 8.4 K/UL (ref 1.8–7.7)
NEUTROPHILS NFR BLD: 81.5 % (ref 38–73)
NRBC BLD-RTO: 0 /100 WBC
PHOSPHATE SERPL-MCNC: 3.2 MG/DL (ref 2.7–4.5)
PLATELET # BLD AUTO: 285 K/UL (ref 150–450)
PMV BLD AUTO: 10.7 FL (ref 9.2–12.9)
POTASSIUM SERPL-SCNC: 4 MMOL/L (ref 3.5–5.1)
PROT SERPL-MCNC: 7.2 G/DL (ref 6–8.4)
RBC # BLD AUTO: 4.45 M/UL (ref 4–5.4)
SODIUM SERPL-SCNC: 143 MMOL/L (ref 136–145)
WBC # BLD AUTO: 10.34 K/UL (ref 3.9–12.7)

## 2021-11-08 PROCEDURE — 99223 1ST HOSP IP/OBS HIGH 75: CPT | Mod: CR,,, | Performed by: FAMILY MEDICINE

## 2021-11-08 PROCEDURE — 99223 PR INITIAL HOSPITAL CARE,LEVL III: ICD-10-PCS | Mod: CR,,, | Performed by: FAMILY MEDICINE

## 2021-11-08 PROCEDURE — 94799 UNLISTED PULMONARY SVC/PX: CPT

## 2021-11-08 PROCEDURE — 63600175 PHARM REV CODE 636 W HCPCS: Performed by: PHYSICIAN ASSISTANT

## 2021-11-08 PROCEDURE — 97162 PT EVAL MOD COMPLEX 30 MIN: CPT

## 2021-11-08 PROCEDURE — 94761 N-INVAS EAR/PLS OXIMETRY MLT: CPT

## 2021-11-08 PROCEDURE — 85025 COMPLETE CBC W/AUTO DIFF WBC: CPT | Performed by: NURSE PRACTITIONER

## 2021-11-08 PROCEDURE — 94618 PULMONARY STRESS TESTING: CPT

## 2021-11-08 PROCEDURE — 21400001 HC TELEMETRY ROOM

## 2021-11-08 PROCEDURE — 84100 ASSAY OF PHOSPHORUS: CPT | Performed by: NURSE PRACTITIONER

## 2021-11-08 PROCEDURE — 83735 ASSAY OF MAGNESIUM: CPT | Performed by: NURSE PRACTITIONER

## 2021-11-08 PROCEDURE — 99232 PR SUBSEQUENT HOSPITAL CARE,LEVL II: ICD-10-PCS | Mod: ,,, | Performed by: PHYSICIAN ASSISTANT

## 2021-11-08 PROCEDURE — 25000003 PHARM REV CODE 250: Performed by: NURSE PRACTITIONER

## 2021-11-08 PROCEDURE — 36415 COLL VENOUS BLD VENIPUNCTURE: CPT | Performed by: NURSE PRACTITIONER

## 2021-11-08 PROCEDURE — 97535 SELF CARE MNGMENT TRAINING: CPT

## 2021-11-08 PROCEDURE — 86140 C-REACTIVE PROTEIN: CPT | Performed by: NURSE PRACTITIONER

## 2021-11-08 PROCEDURE — 99900035 HC TECH TIME PER 15 MIN (STAT)

## 2021-11-08 PROCEDURE — 25000003 PHARM REV CODE 250: Performed by: PHYSICIAN ASSISTANT

## 2021-11-08 PROCEDURE — 27000207 HC ISOLATION

## 2021-11-08 PROCEDURE — 97166 OT EVAL MOD COMPLEX 45 MIN: CPT

## 2021-11-08 PROCEDURE — 94640 AIRWAY INHALATION TREATMENT: CPT

## 2021-11-08 PROCEDURE — C9399 UNCLASSIFIED DRUGS OR BIOLOG: HCPCS | Performed by: PHYSICIAN ASSISTANT

## 2021-11-08 PROCEDURE — 99232 SBSQ HOSP IP/OBS MODERATE 35: CPT | Mod: ,,, | Performed by: PHYSICIAN ASSISTANT

## 2021-11-08 PROCEDURE — 80053 COMPREHEN METABOLIC PANEL: CPT | Performed by: NURSE PRACTITIONER

## 2021-11-08 RX ORDER — OXYCODONE AND ACETAMINOPHEN 5; 325 MG/1; MG/1
1 TABLET ORAL
Status: DISCONTINUED | OUTPATIENT
Start: 2021-11-08 | End: 2021-11-16 | Stop reason: HOSPADM

## 2021-11-08 RX ORDER — TRAMADOL HYDROCHLORIDE 50 MG/1
50 TABLET ORAL
Status: DISCONTINUED | OUTPATIENT
Start: 2021-11-08 | End: 2021-11-16 | Stop reason: HOSPADM

## 2021-11-08 RX ADMIN — ALBUTEROL SULFATE 2 PUFF: 90 AEROSOL, METERED RESPIRATORY (INHALATION) at 01:11

## 2021-11-08 RX ADMIN — DULOXETINE HYDROCHLORIDE 20 MG: 20 CAPSULE, DELAYED RELEASE ORAL at 09:11

## 2021-11-08 RX ADMIN — REMDESIVIR 100 MG: 100 INJECTION, POWDER, LYOPHILIZED, FOR SOLUTION INTRAVENOUS at 10:11

## 2021-11-08 RX ADMIN — VENLAFAXINE HYDROCHLORIDE 150 MG: 75 CAPSULE, EXTENDED RELEASE ORAL at 09:11

## 2021-11-08 RX ADMIN — OXYCODONE AND ACETAMINOPHEN 1 TABLET: 5; 325 TABLET ORAL at 11:11

## 2021-11-08 RX ADMIN — ALBUTEROL SULFATE 2 PUFF: 90 AEROSOL, METERED RESPIRATORY (INHALATION) at 07:11

## 2021-11-08 RX ADMIN — DIVALPROEX SODIUM 250 MG: 250 TABLET, DELAYED RELEASE ORAL at 08:11

## 2021-11-08 RX ADMIN — THERA TABS 1 TABLET: TAB at 09:11

## 2021-11-08 RX ADMIN — Medication 1000 UNITS: at 09:11

## 2021-11-08 RX ADMIN — DIVALPROEX SODIUM 250 MG: 250 TABLET, DELAYED RELEASE ORAL at 09:11

## 2021-11-08 RX ADMIN — OXYCODONE AND ACETAMINOPHEN 1 TABLET: 5; 325 TABLET ORAL at 08:11

## 2021-11-08 RX ADMIN — DEXAMETHASONE 6 MG: 4 TABLET ORAL at 09:11

## 2021-11-08 RX ADMIN — PRAVASTATIN SODIUM 40 MG: 20 TABLET ORAL at 08:11

## 2021-11-08 RX ADMIN — LEVOTHYROXINE SODIUM 112 MCG: 112 TABLET ORAL at 06:11

## 2021-11-08 RX ADMIN — ASPIRIN 81 MG: 81 TABLET, COATED ORAL at 09:11

## 2021-11-08 RX ADMIN — OXYCODONE HYDROCHLORIDE AND ACETAMINOPHEN 500 MG: 500 TABLET ORAL at 09:11

## 2021-11-08 RX ADMIN — ENOXAPARIN SODIUM 70 MG: 100 INJECTION SUBCUTANEOUS at 09:11

## 2021-11-08 RX ADMIN — OXYCODONE HYDROCHLORIDE AND ACETAMINOPHEN 500 MG: 500 TABLET ORAL at 08:11

## 2021-11-08 RX ADMIN — TRAMADOL HYDROCHLORIDE 50 MG: 50 TABLET ORAL at 08:11

## 2021-11-08 RX ADMIN — FOLIC ACID 1 MG: 1 TABLET ORAL at 09:11

## 2021-11-08 RX ADMIN — RISPERIDONE 0.5 MG: 0.25 TABLET ORAL at 09:11

## 2021-11-08 RX ADMIN — MORPHINE SULFATE 2 MG: 2 INJECTION, SOLUTION INTRAMUSCULAR; INTRAVENOUS at 12:11

## 2021-11-09 LAB
ALBUMIN SERPL BCP-MCNC: 2.8 G/DL (ref 3.5–5.2)
ALP SERPL-CCNC: 50 U/L (ref 55–135)
ALT SERPL W/O P-5'-P-CCNC: 11 U/L (ref 10–44)
ANION GAP SERPL CALC-SCNC: 10 MMOL/L (ref 8–16)
AST SERPL-CCNC: 19 U/L (ref 10–40)
BASOPHILS # BLD AUTO: 0.01 K/UL (ref 0–0.2)
BASOPHILS NFR BLD: 0.1 % (ref 0–1.9)
BILIRUB SERPL-MCNC: 0.2 MG/DL (ref 0.1–1)
BUN SERPL-MCNC: 15 MG/DL (ref 8–23)
CALCIUM SERPL-MCNC: 9.1 MG/DL (ref 8.7–10.5)
CHLORIDE SERPL-SCNC: 103 MMOL/L (ref 95–110)
CO2 SERPL-SCNC: 28 MMOL/L (ref 23–29)
CREAT SERPL-MCNC: 0.8 MG/DL (ref 0.5–1.4)
DIFFERENTIAL METHOD: ABNORMAL
EOSINOPHIL # BLD AUTO: 0 K/UL (ref 0–0.5)
EOSINOPHIL NFR BLD: 0 % (ref 0–8)
ERYTHROCYTE [DISTWIDTH] IN BLOOD BY AUTOMATED COUNT: 14.9 % (ref 11.5–14.5)
EST. GFR  (AFRICAN AMERICAN): >60 ML/MIN/1.73 M^2
EST. GFR  (NON AFRICAN AMERICAN): >60 ML/MIN/1.73 M^2
GLUCOSE SERPL-MCNC: 79 MG/DL (ref 70–110)
HCT VFR BLD AUTO: 36.1 % (ref 37–48.5)
HGB BLD-MCNC: 11.9 G/DL (ref 12–16)
IMM GRANULOCYTES # BLD AUTO: 0.07 K/UL (ref 0–0.04)
IMM GRANULOCYTES NFR BLD AUTO: 0.6 % (ref 0–0.5)
LYMPHOCYTES # BLD AUTO: 1.5 K/UL (ref 1–4.8)
LYMPHOCYTES NFR BLD: 13.9 % (ref 18–48)
MCH RBC QN AUTO: 27.5 PG (ref 27–31)
MCHC RBC AUTO-ENTMCNC: 33 G/DL (ref 32–36)
MCV RBC AUTO: 83 FL (ref 82–98)
MONOCYTES # BLD AUTO: 0.9 K/UL (ref 0.3–1)
MONOCYTES NFR BLD: 8.5 % (ref 4–15)
NEUTROPHILS # BLD AUTO: 8.4 K/UL (ref 1.8–7.7)
NEUTROPHILS NFR BLD: 76.9 % (ref 38–73)
NRBC BLD-RTO: 0 /100 WBC
PLATELET # BLD AUTO: 292 K/UL (ref 150–450)
PMV BLD AUTO: 10.9 FL (ref 9.2–12.9)
POTASSIUM SERPL-SCNC: 4 MMOL/L (ref 3.5–5.1)
PROT SERPL-MCNC: 7.2 G/DL (ref 6–8.4)
RBC # BLD AUTO: 4.33 M/UL (ref 4–5.4)
SODIUM SERPL-SCNC: 141 MMOL/L (ref 136–145)
WBC # BLD AUTO: 10.97 K/UL (ref 3.9–12.7)

## 2021-11-09 PROCEDURE — 27000207 HC ISOLATION

## 2021-11-09 PROCEDURE — 21400001 HC TELEMETRY ROOM

## 2021-11-09 PROCEDURE — 80053 COMPREHEN METABOLIC PANEL: CPT | Performed by: NURSE PRACTITIONER

## 2021-11-09 PROCEDURE — 99900035 HC TECH TIME PER 15 MIN (STAT)

## 2021-11-09 PROCEDURE — 85025 COMPLETE CBC W/AUTO DIFF WBC: CPT | Performed by: NURSE PRACTITIONER

## 2021-11-09 PROCEDURE — 27000221 HC OXYGEN, UP TO 24 HOURS

## 2021-11-09 PROCEDURE — 99233 SBSQ HOSP IP/OBS HIGH 50: CPT | Mod: CR,,, | Performed by: FAMILY MEDICINE

## 2021-11-09 PROCEDURE — 97535 SELF CARE MNGMENT TRAINING: CPT

## 2021-11-09 PROCEDURE — 25000003 PHARM REV CODE 250: Performed by: PHYSICIAN ASSISTANT

## 2021-11-09 PROCEDURE — 94640 AIRWAY INHALATION TREATMENT: CPT

## 2021-11-09 PROCEDURE — 36415 COLL VENOUS BLD VENIPUNCTURE: CPT | Performed by: NURSE PRACTITIONER

## 2021-11-09 PROCEDURE — 63600175 PHARM REV CODE 636 W HCPCS: Performed by: PHYSICIAN ASSISTANT

## 2021-11-09 PROCEDURE — 94761 N-INVAS EAR/PLS OXIMETRY MLT: CPT

## 2021-11-09 PROCEDURE — 63600175 PHARM REV CODE 636 W HCPCS: Performed by: NURSE PRACTITIONER

## 2021-11-09 PROCEDURE — 25000003 PHARM REV CODE 250: Performed by: NURSE PRACTITIONER

## 2021-11-09 PROCEDURE — C9399 UNCLASSIFIED DRUGS OR BIOLOG: HCPCS | Performed by: PHYSICIAN ASSISTANT

## 2021-11-09 PROCEDURE — 99233 PR SUBSEQUENT HOSPITAL CARE,LEVL III: ICD-10-PCS | Mod: CR,,, | Performed by: FAMILY MEDICINE

## 2021-11-09 PROCEDURE — 97116 GAIT TRAINING THERAPY: CPT | Mod: CQ

## 2021-11-09 PROCEDURE — 99233 SBSQ HOSP IP/OBS HIGH 50: CPT | Mod: ,,, | Performed by: PHYSICIAN ASSISTANT

## 2021-11-09 PROCEDURE — 97110 THERAPEUTIC EXERCISES: CPT | Mod: CQ

## 2021-11-09 PROCEDURE — 99233 PR SUBSEQUENT HOSPITAL CARE,LEVL III: ICD-10-PCS | Mod: ,,, | Performed by: PHYSICIAN ASSISTANT

## 2021-11-09 RX ORDER — HYDRALAZINE HYDROCHLORIDE 20 MG/ML
10 INJECTION INTRAMUSCULAR; INTRAVENOUS ONCE
Status: COMPLETED | OUTPATIENT
Start: 2021-11-09 | End: 2021-11-09

## 2021-11-09 RX ORDER — POLYETHYLENE GLYCOL 3350 17 G/17G
17 POWDER, FOR SOLUTION ORAL DAILY
Status: DISCONTINUED | OUTPATIENT
Start: 2021-11-09 | End: 2021-11-16 | Stop reason: HOSPADM

## 2021-11-09 RX ORDER — LORAZEPAM 2 MG/ML
1 INJECTION INTRAMUSCULAR ONCE
Status: COMPLETED | OUTPATIENT
Start: 2021-11-09 | End: 2021-11-09

## 2021-11-09 RX ADMIN — HYDRALAZINE HYDROCHLORIDE 10 MG: 20 INJECTION, SOLUTION INTRAMUSCULAR; INTRAVENOUS at 05:11

## 2021-11-09 RX ADMIN — ENOXAPARIN SODIUM 70 MG: 100 INJECTION SUBCUTANEOUS at 09:11

## 2021-11-09 RX ADMIN — ALBUTEROL SULFATE 2 PUFF: 90 AEROSOL, METERED RESPIRATORY (INHALATION) at 12:11

## 2021-11-09 RX ADMIN — DULOXETINE HYDROCHLORIDE 20 MG: 20 CAPSULE, DELAYED RELEASE ORAL at 09:11

## 2021-11-09 RX ADMIN — LEVOTHYROXINE SODIUM 112 MCG: 112 TABLET ORAL at 08:11

## 2021-11-09 RX ADMIN — DEXAMETHASONE 6 MG: 4 TABLET ORAL at 09:11

## 2021-11-09 RX ADMIN — DIVALPROEX SODIUM 250 MG: 250 TABLET, DELAYED RELEASE ORAL at 10:11

## 2021-11-09 RX ADMIN — RISPERIDONE 0.5 MG: 0.25 TABLET ORAL at 09:11

## 2021-11-09 RX ADMIN — OXYCODONE AND ACETAMINOPHEN 1 TABLET: 5; 325 TABLET ORAL at 03:11

## 2021-11-09 RX ADMIN — TRAMADOL HYDROCHLORIDE 50 MG: 50 TABLET ORAL at 02:11

## 2021-11-09 RX ADMIN — DIVALPROEX SODIUM 250 MG: 250 TABLET, DELAYED RELEASE ORAL at 02:11

## 2021-11-09 RX ADMIN — OXYCODONE HYDROCHLORIDE AND ACETAMINOPHEN 500 MG: 500 TABLET ORAL at 10:11

## 2021-11-09 RX ADMIN — DIVALPROEX SODIUM 250 MG: 250 TABLET, DELAYED RELEASE ORAL at 09:11

## 2021-11-09 RX ADMIN — PRAVASTATIN SODIUM 40 MG: 20 TABLET ORAL at 10:11

## 2021-11-09 RX ADMIN — ASPIRIN 81 MG: 81 TABLET, COATED ORAL at 09:11

## 2021-11-09 RX ADMIN — OXYCODONE AND ACETAMINOPHEN 1 TABLET: 5; 325 TABLET ORAL at 10:11

## 2021-11-09 RX ADMIN — ALBUTEROL SULFATE 2 PUFF: 90 AEROSOL, METERED RESPIRATORY (INHALATION) at 01:11

## 2021-11-09 RX ADMIN — Medication 1000 UNITS: at 09:11

## 2021-11-09 RX ADMIN — ALBUTEROL SULFATE 2 PUFF: 90 AEROSOL, METERED RESPIRATORY (INHALATION) at 07:11

## 2021-11-09 RX ADMIN — VENLAFAXINE HYDROCHLORIDE 150 MG: 75 CAPSULE, EXTENDED RELEASE ORAL at 09:11

## 2021-11-09 RX ADMIN — TRAMADOL HYDROCHLORIDE 50 MG: 50 TABLET ORAL at 10:11

## 2021-11-09 RX ADMIN — THERA TABS 1 TABLET: TAB at 09:11

## 2021-11-09 RX ADMIN — FOLIC ACID 1 MG: 1 TABLET ORAL at 09:11

## 2021-11-09 RX ADMIN — TRAMADOL HYDROCHLORIDE 50 MG: 50 TABLET ORAL at 09:11

## 2021-11-09 RX ADMIN — REMDESIVIR 100 MG: 100 INJECTION, POWDER, LYOPHILIZED, FOR SOLUTION INTRAVENOUS at 09:11

## 2021-11-09 RX ADMIN — LORAZEPAM 1 MG: 2 INJECTION, SOLUTION INTRAMUSCULAR; INTRAVENOUS at 08:11

## 2021-11-09 RX ADMIN — POLYETHYLENE GLYCOL 3350 17 G: 17 POWDER, FOR SOLUTION ORAL at 05:11

## 2021-11-09 RX ADMIN — OXYCODONE AND ACETAMINOPHEN 1 TABLET: 5; 325 TABLET ORAL at 06:11

## 2021-11-09 RX ADMIN — OXYCODONE HYDROCHLORIDE AND ACETAMINOPHEN 500 MG: 500 TABLET ORAL at 09:11

## 2021-11-10 ENCOUNTER — PATIENT OUTREACH (OUTPATIENT)
Dept: ADMINISTRATIVE | Facility: OTHER | Age: 71
End: 2021-11-10
Payer: COMMERCIAL

## 2021-11-10 LAB
BACTERIA BLD CULT: NORMAL
BACTERIA BLD CULT: NORMAL

## 2021-11-10 PROCEDURE — 25000003 PHARM REV CODE 250: Performed by: PHYSICIAN ASSISTANT

## 2021-11-10 PROCEDURE — 94761 N-INVAS EAR/PLS OXIMETRY MLT: CPT

## 2021-11-10 PROCEDURE — 21400001 HC TELEMETRY ROOM

## 2021-11-10 PROCEDURE — 94799 UNLISTED PULMONARY SVC/PX: CPT

## 2021-11-10 PROCEDURE — C9399 UNCLASSIFIED DRUGS OR BIOLOG: HCPCS | Performed by: PHYSICIAN ASSISTANT

## 2021-11-10 PROCEDURE — 99233 PR SUBSEQUENT HOSPITAL CARE,LEVL III: ICD-10-PCS | Mod: ,,, | Performed by: PHYSICIAN ASSISTANT

## 2021-11-10 PROCEDURE — 27000207 HC ISOLATION

## 2021-11-10 PROCEDURE — 99233 SBSQ HOSP IP/OBS HIGH 50: CPT | Mod: ,,, | Performed by: PHYSICIAN ASSISTANT

## 2021-11-10 PROCEDURE — 25000003 PHARM REV CODE 250: Performed by: NURSE PRACTITIONER

## 2021-11-10 PROCEDURE — 63600175 PHARM REV CODE 636 W HCPCS: Performed by: PHYSICIAN ASSISTANT

## 2021-11-10 PROCEDURE — 94640 AIRWAY INHALATION TREATMENT: CPT

## 2021-11-10 RX ADMIN — ENOXAPARIN SODIUM 70 MG: 100 INJECTION SUBCUTANEOUS at 09:11

## 2021-11-10 RX ADMIN — Medication 1000 UNITS: at 09:11

## 2021-11-10 RX ADMIN — TRAMADOL HYDROCHLORIDE 50 MG: 50 TABLET ORAL at 09:11

## 2021-11-10 RX ADMIN — DULOXETINE HYDROCHLORIDE 20 MG: 20 CAPSULE, DELAYED RELEASE ORAL at 09:11

## 2021-11-10 RX ADMIN — ASPIRIN 81 MG: 81 TABLET, COATED ORAL at 09:11

## 2021-11-10 RX ADMIN — RISPERIDONE 0.5 MG: 0.25 TABLET ORAL at 09:11

## 2021-11-10 RX ADMIN — ALBUTEROL SULFATE 2 PUFF: 90 AEROSOL, METERED RESPIRATORY (INHALATION) at 12:11

## 2021-11-10 RX ADMIN — REMDESIVIR 100 MG: 100 INJECTION, POWDER, LYOPHILIZED, FOR SOLUTION INTRAVENOUS at 09:11

## 2021-11-10 RX ADMIN — LEVOTHYROXINE SODIUM 112 MCG: 112 TABLET ORAL at 05:11

## 2021-11-10 RX ADMIN — OXYCODONE HYDROCHLORIDE AND ACETAMINOPHEN 500 MG: 500 TABLET ORAL at 10:11

## 2021-11-10 RX ADMIN — OXYCODONE AND ACETAMINOPHEN 1 TABLET: 5; 325 TABLET ORAL at 02:11

## 2021-11-10 RX ADMIN — TRAMADOL HYDROCHLORIDE 50 MG: 50 TABLET ORAL at 10:11

## 2021-11-10 RX ADMIN — ALBUTEROL SULFATE 2 PUFF: 90 AEROSOL, METERED RESPIRATORY (INHALATION) at 06:11

## 2021-11-10 RX ADMIN — VENLAFAXINE HYDROCHLORIDE 150 MG: 75 CAPSULE, EXTENDED RELEASE ORAL at 09:11

## 2021-11-10 RX ADMIN — DEXAMETHASONE 6 MG: 4 TABLET ORAL at 09:11

## 2021-11-10 RX ADMIN — ALBUTEROL SULFATE 2 PUFF: 90 AEROSOL, METERED RESPIRATORY (INHALATION) at 07:11

## 2021-11-10 RX ADMIN — DIVALPROEX SODIUM 250 MG: 250 TABLET, DELAYED RELEASE ORAL at 10:11

## 2021-11-10 RX ADMIN — TRAMADOL HYDROCHLORIDE 50 MG: 50 TABLET ORAL at 03:11

## 2021-11-10 RX ADMIN — OXYCODONE AND ACETAMINOPHEN 1 TABLET: 5; 325 TABLET ORAL at 11:11

## 2021-11-10 RX ADMIN — OXYCODONE HYDROCHLORIDE AND ACETAMINOPHEN 500 MG: 500 TABLET ORAL at 09:11

## 2021-11-10 RX ADMIN — DIVALPROEX SODIUM 250 MG: 250 TABLET, DELAYED RELEASE ORAL at 09:11

## 2021-11-10 RX ADMIN — ALBUTEROL SULFATE 2 PUFF: 90 AEROSOL, METERED RESPIRATORY (INHALATION) at 01:11

## 2021-11-10 RX ADMIN — FOLIC ACID 1 MG: 1 TABLET ORAL at 09:11

## 2021-11-10 RX ADMIN — THERA TABS 1 TABLET: TAB at 09:11

## 2021-11-10 RX ADMIN — POLYETHYLENE GLYCOL 3350 17 G: 17 POWDER, FOR SOLUTION ORAL at 09:11

## 2021-11-10 RX ADMIN — PRAVASTATIN SODIUM 40 MG: 20 TABLET ORAL at 10:11

## 2021-11-10 RX ADMIN — DIVALPROEX SODIUM 250 MG: 250 TABLET, DELAYED RELEASE ORAL at 03:11

## 2021-11-10 RX ADMIN — OXYCODONE AND ACETAMINOPHEN 1 TABLET: 5; 325 TABLET ORAL at 06:11

## 2021-11-11 LAB
ERYTHROCYTE [DISTWIDTH] IN BLOOD BY AUTOMATED COUNT: 15 % (ref 11.5–14.5)
HCT VFR BLD AUTO: 36.2 % (ref 37–48.5)
HGB BLD-MCNC: 12.2 G/DL (ref 12–16)
MCH RBC QN AUTO: 27.6 PG (ref 27–31)
MCHC RBC AUTO-ENTMCNC: 33.7 G/DL (ref 32–36)
MCV RBC AUTO: 82 FL (ref 82–98)
PLATELET # BLD AUTO: 286 K/UL (ref 150–450)
PMV BLD AUTO: 10.7 FL (ref 9.2–12.9)
RBC # BLD AUTO: 4.42 M/UL (ref 4–5.4)
WBC # BLD AUTO: 12.23 K/UL (ref 3.9–12.7)

## 2021-11-11 PROCEDURE — 99233 PR SUBSEQUENT HOSPITAL CARE,LEVL III: ICD-10-PCS | Mod: ,,, | Performed by: PHYSICIAN ASSISTANT

## 2021-11-11 PROCEDURE — 99233 SBSQ HOSP IP/OBS HIGH 50: CPT | Mod: ,,, | Performed by: PHYSICIAN ASSISTANT

## 2021-11-11 PROCEDURE — 21400001 HC TELEMETRY ROOM

## 2021-11-11 PROCEDURE — 27000221 HC OXYGEN, UP TO 24 HOURS

## 2021-11-11 PROCEDURE — 94761 N-INVAS EAR/PLS OXIMETRY MLT: CPT

## 2021-11-11 PROCEDURE — 85027 COMPLETE CBC AUTOMATED: CPT | Performed by: INTERNAL MEDICINE

## 2021-11-11 PROCEDURE — 97530 THERAPEUTIC ACTIVITIES: CPT

## 2021-11-11 PROCEDURE — 63600175 PHARM REV CODE 636 W HCPCS: Performed by: PHYSICIAN ASSISTANT

## 2021-11-11 PROCEDURE — 25000003 PHARM REV CODE 250: Performed by: NURSE PRACTITIONER

## 2021-11-11 PROCEDURE — 27000207 HC ISOLATION

## 2021-11-11 PROCEDURE — 25000003 PHARM REV CODE 250: Performed by: PHYSICIAN ASSISTANT

## 2021-11-11 PROCEDURE — 36415 COLL VENOUS BLD VENIPUNCTURE: CPT | Performed by: INTERNAL MEDICINE

## 2021-11-11 PROCEDURE — 94640 AIRWAY INHALATION TREATMENT: CPT

## 2021-11-11 RX ADMIN — DULOXETINE HYDROCHLORIDE 20 MG: 20 CAPSULE, DELAYED RELEASE ORAL at 09:11

## 2021-11-11 RX ADMIN — ALBUTEROL SULFATE 2 PUFF: 90 AEROSOL, METERED RESPIRATORY (INHALATION) at 01:11

## 2021-11-11 RX ADMIN — TRAMADOL HYDROCHLORIDE 50 MG: 50 TABLET ORAL at 10:11

## 2021-11-11 RX ADMIN — LEVOTHYROXINE SODIUM 112 MCG: 112 TABLET ORAL at 05:11

## 2021-11-11 RX ADMIN — OXYCODONE AND ACETAMINOPHEN 1 TABLET: 5; 325 TABLET ORAL at 11:11

## 2021-11-11 RX ADMIN — ALBUTEROL SULFATE 2 PUFF: 90 AEROSOL, METERED RESPIRATORY (INHALATION) at 12:11

## 2021-11-11 RX ADMIN — FOLIC ACID 1 MG: 1 TABLET ORAL at 09:11

## 2021-11-11 RX ADMIN — POLYETHYLENE GLYCOL 3350 17 G: 17 POWDER, FOR SOLUTION ORAL at 09:11

## 2021-11-11 RX ADMIN — OXYCODONE HYDROCHLORIDE AND ACETAMINOPHEN 500 MG: 500 TABLET ORAL at 09:11

## 2021-11-11 RX ADMIN — DIVALPROEX SODIUM 250 MG: 250 TABLET, DELAYED RELEASE ORAL at 03:11

## 2021-11-11 RX ADMIN — OXYCODONE AND ACETAMINOPHEN 1 TABLET: 5; 325 TABLET ORAL at 02:11

## 2021-11-11 RX ADMIN — DIVALPROEX SODIUM 250 MG: 250 TABLET, DELAYED RELEASE ORAL at 09:11

## 2021-11-11 RX ADMIN — DEXAMETHASONE 6 MG: 4 TABLET ORAL at 09:11

## 2021-11-11 RX ADMIN — ALBUTEROL SULFATE 2 PUFF: 90 AEROSOL, METERED RESPIRATORY (INHALATION) at 07:11

## 2021-11-11 RX ADMIN — ENOXAPARIN SODIUM 70 MG: 100 INJECTION SUBCUTANEOUS at 09:11

## 2021-11-11 RX ADMIN — THERA TABS 1 TABLET: TAB at 09:11

## 2021-11-11 RX ADMIN — OXYCODONE AND ACETAMINOPHEN 1 TABLET: 5; 325 TABLET ORAL at 07:11

## 2021-11-11 RX ADMIN — TRAMADOL HYDROCHLORIDE 50 MG: 50 TABLET ORAL at 06:11

## 2021-11-11 RX ADMIN — Medication 1000 UNITS: at 09:11

## 2021-11-11 RX ADMIN — PRAVASTATIN SODIUM 40 MG: 20 TABLET ORAL at 09:11

## 2021-11-11 RX ADMIN — TRAMADOL HYDROCHLORIDE 50 MG: 50 TABLET ORAL at 11:11

## 2021-11-11 RX ADMIN — RISPERIDONE 0.5 MG: 0.25 TABLET ORAL at 09:11

## 2021-11-11 RX ADMIN — VENLAFAXINE HYDROCHLORIDE 150 MG: 75 CAPSULE, EXTENDED RELEASE ORAL at 09:11

## 2021-11-11 RX ADMIN — ASPIRIN 81 MG: 81 TABLET, COATED ORAL at 09:11

## 2021-11-12 PROCEDURE — 94640 AIRWAY INHALATION TREATMENT: CPT

## 2021-11-12 PROCEDURE — 11000001 HC ACUTE MED/SURG PRIVATE ROOM

## 2021-11-12 PROCEDURE — 25000003 PHARM REV CODE 250: Performed by: PHYSICIAN ASSISTANT

## 2021-11-12 PROCEDURE — 99233 PR SUBSEQUENT HOSPITAL CARE,LEVL III: ICD-10-PCS | Mod: CR,,, | Performed by: PHYSICIAN ASSISTANT

## 2021-11-12 PROCEDURE — 99233 SBSQ HOSP IP/OBS HIGH 50: CPT | Mod: CR,,, | Performed by: PHYSICIAN ASSISTANT

## 2021-11-12 PROCEDURE — 63600175 PHARM REV CODE 636 W HCPCS: Performed by: PHYSICIAN ASSISTANT

## 2021-11-12 PROCEDURE — 94761 N-INVAS EAR/PLS OXIMETRY MLT: CPT

## 2021-11-12 PROCEDURE — 27000207 HC ISOLATION

## 2021-11-12 PROCEDURE — 25000003 PHARM REV CODE 250: Performed by: NURSE PRACTITIONER

## 2021-11-12 RX ORDER — ALBUTEROL SULFATE 90 UG/1
2 AEROSOL, METERED RESPIRATORY (INHALATION) EVERY 6 HOURS PRN
Status: DISCONTINUED | OUTPATIENT
Start: 2021-11-12 | End: 2021-11-16 | Stop reason: HOSPADM

## 2021-11-12 RX ADMIN — OXYCODONE AND ACETAMINOPHEN 1 TABLET: 5; 325 TABLET ORAL at 12:11

## 2021-11-12 RX ADMIN — OXYCODONE HYDROCHLORIDE AND ACETAMINOPHEN 500 MG: 500 TABLET ORAL at 09:11

## 2021-11-12 RX ADMIN — DULOXETINE HYDROCHLORIDE 20 MG: 20 CAPSULE, DELAYED RELEASE ORAL at 09:11

## 2021-11-12 RX ADMIN — Medication 1000 UNITS: at 09:11

## 2021-11-12 RX ADMIN — ENOXAPARIN SODIUM 70 MG: 100 INJECTION SUBCUTANEOUS at 09:11

## 2021-11-12 RX ADMIN — DEXAMETHASONE 6 MG: 4 TABLET ORAL at 09:11

## 2021-11-12 RX ADMIN — TRAMADOL HYDROCHLORIDE 50 MG: 50 TABLET ORAL at 11:11

## 2021-11-12 RX ADMIN — OXYCODONE AND ACETAMINOPHEN 1 TABLET: 5; 325 TABLET ORAL at 07:11

## 2021-11-12 RX ADMIN — THERA TABS 1 TABLET: TAB at 09:11

## 2021-11-12 RX ADMIN — POLYETHYLENE GLYCOL 3350 17 G: 17 POWDER, FOR SOLUTION ORAL at 09:11

## 2021-11-12 RX ADMIN — DIVALPROEX SODIUM 250 MG: 250 TABLET, DELAYED RELEASE ORAL at 03:11

## 2021-11-12 RX ADMIN — VENLAFAXINE HYDROCHLORIDE 150 MG: 75 CAPSULE, EXTENDED RELEASE ORAL at 09:11

## 2021-11-12 RX ADMIN — FOLIC ACID 1 MG: 1 TABLET ORAL at 09:11

## 2021-11-12 RX ADMIN — OXYCODONE AND ACETAMINOPHEN 1 TABLET: 5; 325 TABLET ORAL at 03:11

## 2021-11-12 RX ADMIN — DIVALPROEX SODIUM 250 MG: 250 TABLET, DELAYED RELEASE ORAL at 09:11

## 2021-11-12 RX ADMIN — ALBUTEROL SULFATE 2 PUFF: 90 AEROSOL, METERED RESPIRATORY (INHALATION) at 12:11

## 2021-11-12 RX ADMIN — PRAVASTATIN SODIUM 40 MG: 20 TABLET ORAL at 09:11

## 2021-11-12 RX ADMIN — RISPERIDONE 0.5 MG: 0.25 TABLET ORAL at 09:11

## 2021-11-12 RX ADMIN — TRAMADOL HYDROCHLORIDE 50 MG: 50 TABLET ORAL at 10:11

## 2021-11-12 RX ADMIN — LEVOTHYROXINE SODIUM 112 MCG: 112 TABLET ORAL at 05:11

## 2021-11-12 RX ADMIN — ASPIRIN 81 MG: 81 TABLET, COATED ORAL at 09:11

## 2021-11-12 RX ADMIN — ALBUTEROL SULFATE 2 PUFF: 90 AEROSOL, METERED RESPIRATORY (INHALATION) at 07:11

## 2021-11-13 PROCEDURE — 27000221 HC OXYGEN, UP TO 24 HOURS

## 2021-11-13 PROCEDURE — 94761 N-INVAS EAR/PLS OXIMETRY MLT: CPT

## 2021-11-13 PROCEDURE — 25000003 PHARM REV CODE 250: Performed by: NURSE PRACTITIONER

## 2021-11-13 PROCEDURE — 11000001 HC ACUTE MED/SURG PRIVATE ROOM

## 2021-11-13 PROCEDURE — 25000003 PHARM REV CODE 250: Performed by: PHYSICIAN ASSISTANT

## 2021-11-13 PROCEDURE — 99233 PR SUBSEQUENT HOSPITAL CARE,LEVL III: ICD-10-PCS | Mod: CR,,, | Performed by: PHYSICIAN ASSISTANT

## 2021-11-13 PROCEDURE — 27000207 HC ISOLATION

## 2021-11-13 PROCEDURE — 63600175 PHARM REV CODE 636 W HCPCS: Performed by: PHYSICIAN ASSISTANT

## 2021-11-13 PROCEDURE — 99233 SBSQ HOSP IP/OBS HIGH 50: CPT | Mod: CR,,, | Performed by: PHYSICIAN ASSISTANT

## 2021-11-13 RX ADMIN — OXYCODONE HYDROCHLORIDE AND ACETAMINOPHEN 500 MG: 500 TABLET ORAL at 08:11

## 2021-11-13 RX ADMIN — PRAVASTATIN SODIUM 40 MG: 20 TABLET ORAL at 08:11

## 2021-11-13 RX ADMIN — TRAMADOL HYDROCHLORIDE 50 MG: 50 TABLET ORAL at 11:11

## 2021-11-13 RX ADMIN — DEXAMETHASONE 6 MG: 4 TABLET ORAL at 08:11

## 2021-11-13 RX ADMIN — ENOXAPARIN SODIUM 70 MG: 100 INJECTION SUBCUTANEOUS at 08:11

## 2021-11-13 RX ADMIN — DIVALPROEX SODIUM 250 MG: 250 TABLET, DELAYED RELEASE ORAL at 08:11

## 2021-11-13 RX ADMIN — FOLIC ACID 1 MG: 1 TABLET ORAL at 08:11

## 2021-11-13 RX ADMIN — TRAMADOL HYDROCHLORIDE 50 MG: 50 TABLET ORAL at 06:11

## 2021-11-13 RX ADMIN — RISPERIDONE 0.5 MG: 0.25 TABLET ORAL at 08:11

## 2021-11-13 RX ADMIN — DULOXETINE HYDROCHLORIDE 20 MG: 20 CAPSULE, DELAYED RELEASE ORAL at 08:11

## 2021-11-13 RX ADMIN — TRAMADOL HYDROCHLORIDE 50 MG: 50 TABLET ORAL at 03:11

## 2021-11-13 RX ADMIN — OXYCODONE AND ACETAMINOPHEN 1 TABLET: 5; 325 TABLET ORAL at 11:11

## 2021-11-13 RX ADMIN — ASPIRIN 81 MG: 81 TABLET, COATED ORAL at 08:11

## 2021-11-13 RX ADMIN — DIVALPROEX SODIUM 250 MG: 250 TABLET, DELAYED RELEASE ORAL at 03:11

## 2021-11-13 RX ADMIN — LEVOTHYROXINE SODIUM 112 MCG: 112 TABLET ORAL at 06:11

## 2021-11-13 RX ADMIN — THERA TABS 1 TABLET: TAB at 08:11

## 2021-11-13 RX ADMIN — VENLAFAXINE HYDROCHLORIDE 150 MG: 75 CAPSULE, EXTENDED RELEASE ORAL at 08:11

## 2021-11-13 RX ADMIN — OXYCODONE AND ACETAMINOPHEN 1 TABLET: 5; 325 TABLET ORAL at 06:11

## 2021-11-13 RX ADMIN — POLYETHYLENE GLYCOL 3350 17 G: 17 POWDER, FOR SOLUTION ORAL at 08:11

## 2021-11-13 RX ADMIN — OXYCODONE AND ACETAMINOPHEN 1 TABLET: 5; 325 TABLET ORAL at 03:11

## 2021-11-13 RX ADMIN — Medication 1000 UNITS: at 08:11

## 2021-11-14 PROCEDURE — 99233 SBSQ HOSP IP/OBS HIGH 50: CPT | Mod: CR,,, | Performed by: PHYSICIAN ASSISTANT

## 2021-11-14 PROCEDURE — 99900035 HC TECH TIME PER 15 MIN (STAT)

## 2021-11-14 PROCEDURE — 11000001 HC ACUTE MED/SURG PRIVATE ROOM

## 2021-11-14 PROCEDURE — 25000003 PHARM REV CODE 250: Performed by: PHYSICIAN ASSISTANT

## 2021-11-14 PROCEDURE — 27000207 HC ISOLATION

## 2021-11-14 PROCEDURE — 25000003 PHARM REV CODE 250: Performed by: NURSE PRACTITIONER

## 2021-11-14 PROCEDURE — 27000221 HC OXYGEN, UP TO 24 HOURS

## 2021-11-14 PROCEDURE — 94761 N-INVAS EAR/PLS OXIMETRY MLT: CPT

## 2021-11-14 PROCEDURE — 99233 PR SUBSEQUENT HOSPITAL CARE,LEVL III: ICD-10-PCS | Mod: CR,,, | Performed by: PHYSICIAN ASSISTANT

## 2021-11-14 PROCEDURE — 63600175 PHARM REV CODE 636 W HCPCS: Performed by: PHYSICIAN ASSISTANT

## 2021-11-14 RX ADMIN — TRAMADOL HYDROCHLORIDE 50 MG: 50 TABLET ORAL at 11:11

## 2021-11-14 RX ADMIN — DIVALPROEX SODIUM 250 MG: 250 TABLET, DELAYED RELEASE ORAL at 08:11

## 2021-11-14 RX ADMIN — Medication 1000 UNITS: at 08:11

## 2021-11-14 RX ADMIN — OXYCODONE AND ACETAMINOPHEN 1 TABLET: 5; 325 TABLET ORAL at 07:11

## 2021-11-14 RX ADMIN — RISPERIDONE 0.5 MG: 0.25 TABLET ORAL at 08:11

## 2021-11-14 RX ADMIN — OXYCODONE HYDROCHLORIDE AND ACETAMINOPHEN 500 MG: 500 TABLET ORAL at 08:11

## 2021-11-14 RX ADMIN — FOLIC ACID 1 MG: 1 TABLET ORAL at 08:11

## 2021-11-14 RX ADMIN — ASPIRIN 81 MG: 81 TABLET, COATED ORAL at 08:11

## 2021-11-14 RX ADMIN — ENOXAPARIN SODIUM 70 MG: 100 INJECTION SUBCUTANEOUS at 08:11

## 2021-11-14 RX ADMIN — THERA TABS 1 TABLET: TAB at 08:11

## 2021-11-14 RX ADMIN — OXYCODONE AND ACETAMINOPHEN 1 TABLET: 5; 325 TABLET ORAL at 12:11

## 2021-11-14 RX ADMIN — OXYCODONE AND ACETAMINOPHEN 1 TABLET: 5; 325 TABLET ORAL at 03:11

## 2021-11-14 RX ADMIN — POLYETHYLENE GLYCOL 3350 17 G: 17 POWDER, FOR SOLUTION ORAL at 08:11

## 2021-11-14 RX ADMIN — VENLAFAXINE HYDROCHLORIDE 150 MG: 75 CAPSULE, EXTENDED RELEASE ORAL at 08:11

## 2021-11-14 RX ADMIN — DULOXETINE HYDROCHLORIDE 20 MG: 20 CAPSULE, DELAYED RELEASE ORAL at 08:11

## 2021-11-14 RX ADMIN — DIVALPROEX SODIUM 250 MG: 250 TABLET, DELAYED RELEASE ORAL at 04:11

## 2021-11-14 RX ADMIN — DEXAMETHASONE 6 MG: 4 TABLET ORAL at 08:11

## 2021-11-14 RX ADMIN — TRAMADOL HYDROCHLORIDE 50 MG: 50 TABLET ORAL at 04:11

## 2021-11-14 RX ADMIN — TRAMADOL HYDROCHLORIDE 50 MG: 50 TABLET ORAL at 08:11

## 2021-11-14 RX ADMIN — PRAVASTATIN SODIUM 40 MG: 20 TABLET ORAL at 08:11

## 2021-11-15 LAB — SARS-COV-2 RDRP RESP QL NAA+PROBE: NEGATIVE

## 2021-11-15 PROCEDURE — 99900035 HC TECH TIME PER 15 MIN (STAT)

## 2021-11-15 PROCEDURE — 25000003 PHARM REV CODE 250: Performed by: NURSE PRACTITIONER

## 2021-11-15 PROCEDURE — U0002 COVID-19 LAB TEST NON-CDC: HCPCS | Performed by: PHYSICIAN ASSISTANT

## 2021-11-15 PROCEDURE — 25000003 PHARM REV CODE 250: Performed by: PHYSICIAN ASSISTANT

## 2021-11-15 PROCEDURE — 99239 HOSP IP/OBS DSCHRG MGMT >30: CPT | Mod: CR,,, | Performed by: PHYSICIAN ASSISTANT

## 2021-11-15 PROCEDURE — 63600175 PHARM REV CODE 636 W HCPCS: Performed by: PHYSICIAN ASSISTANT

## 2021-11-15 PROCEDURE — 94761 N-INVAS EAR/PLS OXIMETRY MLT: CPT

## 2021-11-15 PROCEDURE — 11000001 HC ACUTE MED/SURG PRIVATE ROOM

## 2021-11-15 PROCEDURE — 99239 PR HOSPITAL DISCHARGE DAY,>30 MIN: ICD-10-PCS | Mod: CR,,, | Performed by: PHYSICIAN ASSISTANT

## 2021-11-15 RX ORDER — TRAMADOL HYDROCHLORIDE 50 MG/1
50 TABLET ORAL EVERY 6 HOURS PRN
Qty: 20 TABLET | Refills: 0 | Status: ON HOLD | OUTPATIENT
Start: 2021-11-15 | End: 2022-02-15 | Stop reason: HOSPADM

## 2021-11-15 RX ORDER — ALBUTEROL SULFATE 90 UG/1
2 AEROSOL, METERED RESPIRATORY (INHALATION) EVERY 6 HOURS PRN
Status: ON HOLD
Start: 2021-11-15 | End: 2022-02-09

## 2021-11-15 RX ORDER — TRAMADOL HYDROCHLORIDE 50 MG/1
50 TABLET ORAL EVERY 8 HOURS
Qty: 20 EACH | Refills: 0 | Status: SHIPPED | OUTPATIENT
Start: 2021-11-15 | End: 2021-11-15 | Stop reason: HOSPADM

## 2021-11-15 RX ORDER — OXYCODONE AND ACETAMINOPHEN 5; 325 MG/1; MG/1
1 TABLET ORAL EVERY 8 HOURS
Qty: 20 EACH | Refills: 0 | Status: SHIPPED | OUTPATIENT
Start: 2021-11-15 | End: 2021-11-15

## 2021-11-15 RX ORDER — OXYCODONE AND ACETAMINOPHEN 5; 325 MG/1; MG/1
1 TABLET ORAL EVERY 8 HOURS PRN
Qty: 20 EACH | Refills: 0 | Status: SHIPPED | OUTPATIENT
Start: 2021-11-15

## 2021-11-15 RX ADMIN — THERA TABS 1 TABLET: TAB at 09:11

## 2021-11-15 RX ADMIN — ASPIRIN 81 MG: 81 TABLET, COATED ORAL at 09:11

## 2021-11-15 RX ADMIN — RISPERIDONE 0.5 MG: 0.25 TABLET ORAL at 09:11

## 2021-11-15 RX ADMIN — OXYCODONE HYDROCHLORIDE AND ACETAMINOPHEN 500 MG: 500 TABLET ORAL at 08:11

## 2021-11-15 RX ADMIN — OXYCODONE HYDROCHLORIDE AND ACETAMINOPHEN 500 MG: 500 TABLET ORAL at 09:11

## 2021-11-15 RX ADMIN — DULOXETINE HYDROCHLORIDE 20 MG: 20 CAPSULE, DELAYED RELEASE ORAL at 09:11

## 2021-11-15 RX ADMIN — DIVALPROEX SODIUM 250 MG: 250 TABLET, DELAYED RELEASE ORAL at 05:11

## 2021-11-15 RX ADMIN — LEVOTHYROXINE SODIUM 112 MCG: 112 TABLET ORAL at 04:11

## 2021-11-15 RX ADMIN — OXYCODONE AND ACETAMINOPHEN 1 TABLET: 5; 325 TABLET ORAL at 08:11

## 2021-11-15 RX ADMIN — OXYCODONE AND ACETAMINOPHEN 1 TABLET: 5; 325 TABLET ORAL at 04:11

## 2021-11-15 RX ADMIN — FOLIC ACID 1 MG: 1 TABLET ORAL at 09:11

## 2021-11-15 RX ADMIN — Medication 1000 UNITS: at 09:11

## 2021-11-15 RX ADMIN — DIVALPROEX SODIUM 250 MG: 250 TABLET, DELAYED RELEASE ORAL at 09:11

## 2021-11-15 RX ADMIN — POLYETHYLENE GLYCOL 3350 17 G: 17 POWDER, FOR SOLUTION ORAL at 09:11

## 2021-11-15 RX ADMIN — TRAMADOL HYDROCHLORIDE 50 MG: 50 TABLET ORAL at 09:11

## 2021-11-15 RX ADMIN — OXYCODONE AND ACETAMINOPHEN 1 TABLET: 5; 325 TABLET ORAL at 11:11

## 2021-11-15 RX ADMIN — TRAMADOL HYDROCHLORIDE 50 MG: 50 TABLET ORAL at 05:11

## 2021-11-15 RX ADMIN — VENLAFAXINE HYDROCHLORIDE 150 MG: 75 CAPSULE, EXTENDED RELEASE ORAL at 09:11

## 2021-11-15 RX ADMIN — DEXAMETHASONE 6 MG: 4 TABLET ORAL at 09:11

## 2021-11-15 RX ADMIN — TRAMADOL HYDROCHLORIDE 50 MG: 50 TABLET ORAL at 11:11

## 2021-11-15 RX ADMIN — DIVALPROEX SODIUM 250 MG: 250 TABLET, DELAYED RELEASE ORAL at 08:11

## 2021-11-15 RX ADMIN — PRAVASTATIN SODIUM 40 MG: 20 TABLET ORAL at 08:11

## 2021-11-16 VITALS
HEART RATE: 88 BPM | RESPIRATION RATE: 16 BRPM | WEIGHT: 158 LBS | SYSTOLIC BLOOD PRESSURE: 115 MMHG | HEIGHT: 66 IN | DIASTOLIC BLOOD PRESSURE: 54 MMHG | BODY MASS INDEX: 25.39 KG/M2 | OXYGEN SATURATION: 95 % | TEMPERATURE: 97 F

## 2021-11-16 PROCEDURE — 25000003 PHARM REV CODE 250: Performed by: PHYSICIAN ASSISTANT

## 2021-11-16 PROCEDURE — 25000003 PHARM REV CODE 250: Performed by: NURSE PRACTITIONER

## 2021-11-16 RX ADMIN — RISPERIDONE 0.5 MG: 0.25 TABLET ORAL at 09:11

## 2021-11-16 RX ADMIN — POLYETHYLENE GLYCOL 3350 17 G: 17 POWDER, FOR SOLUTION ORAL at 09:11

## 2021-11-16 RX ADMIN — FOLIC ACID 1 MG: 1 TABLET ORAL at 09:11

## 2021-11-16 RX ADMIN — DIVALPROEX SODIUM 250 MG: 250 TABLET, DELAYED RELEASE ORAL at 09:11

## 2021-11-16 RX ADMIN — VENLAFAXINE HYDROCHLORIDE 150 MG: 75 CAPSULE, EXTENDED RELEASE ORAL at 09:11

## 2021-11-16 RX ADMIN — OXYCODONE AND ACETAMINOPHEN 1 TABLET: 5; 325 TABLET ORAL at 03:11

## 2021-11-16 RX ADMIN — LEVOTHYROXINE SODIUM 112 MCG: 112 TABLET ORAL at 05:11

## 2021-11-16 RX ADMIN — OXYCODONE AND ACETAMINOPHEN 1 TABLET: 5; 325 TABLET ORAL at 11:11

## 2021-11-16 RX ADMIN — TRAMADOL HYDROCHLORIDE 50 MG: 50 TABLET ORAL at 09:11

## 2021-11-16 RX ADMIN — OXYCODONE HYDROCHLORIDE AND ACETAMINOPHEN 500 MG: 500 TABLET ORAL at 09:11

## 2021-11-16 RX ADMIN — DULOXETINE HYDROCHLORIDE 20 MG: 20 CAPSULE, DELAYED RELEASE ORAL at 09:11

## 2021-11-16 RX ADMIN — ASPIRIN 81 MG: 81 TABLET, COATED ORAL at 09:11

## 2021-11-16 RX ADMIN — Medication 1000 UNITS: at 09:11

## 2021-11-16 RX ADMIN — THERA TABS 1 TABLET: TAB at 09:11

## 2022-02-08 ENCOUNTER — HOSPITAL ENCOUNTER (INPATIENT)
Facility: HOSPITAL | Age: 72
LOS: 7 days | Discharge: HOSPICE | DRG: 181 | End: 2022-02-15
Attending: EMERGENCY MEDICINE | Admitting: STUDENT IN AN ORGANIZED HEALTH CARE EDUCATION/TRAINING PROGRAM
Payer: COMMERCIAL

## 2022-02-08 DIAGNOSIS — Z71.89 ADVANCE CARE PLANNING: ICD-10-CM

## 2022-02-08 DIAGNOSIS — Z85.118 HISTORY OF LUNG CANCER: ICD-10-CM

## 2022-02-08 DIAGNOSIS — R52 PAIN: ICD-10-CM

## 2022-02-08 DIAGNOSIS — Z71.89 GOALS OF CARE, COUNSELING/DISCUSSION: ICD-10-CM

## 2022-02-08 DIAGNOSIS — R06.00 DYSPNEA, UNSPECIFIED TYPE: ICD-10-CM

## 2022-02-08 DIAGNOSIS — Z51.5 PALLIATIVE CARE ENCOUNTER: ICD-10-CM

## 2022-02-08 DIAGNOSIS — J18.9 PNEUMONIA OF RIGHT LOWER LOBE DUE TO INFECTIOUS ORGANISM: ICD-10-CM

## 2022-02-08 DIAGNOSIS — J90 PLEURAL EFFUSION, RIGHT: ICD-10-CM

## 2022-02-08 DIAGNOSIS — R11.0 NAUSEA: ICD-10-CM

## 2022-02-08 DIAGNOSIS — R07.9 CHEST PAIN: ICD-10-CM

## 2022-02-08 LAB
ALBUMIN SERPL BCP-MCNC: 3.3 G/DL (ref 3.5–5.2)
ALP SERPL-CCNC: 53 U/L (ref 55–135)
ALT SERPL W/O P-5'-P-CCNC: 11 U/L (ref 10–44)
ANION GAP SERPL CALC-SCNC: 9 MMOL/L (ref 8–16)
AST SERPL-CCNC: 19 U/L (ref 10–40)
BASOPHILS # BLD AUTO: 0.04 K/UL (ref 0–0.2)
BASOPHILS NFR BLD: 0.6 % (ref 0–1.9)
BILIRUB SERPL-MCNC: 0.3 MG/DL (ref 0.1–1)
BNP SERPL-MCNC: 29 PG/ML (ref 0–99)
BUN SERPL-MCNC: 7 MG/DL (ref 8–23)
CALCIUM SERPL-MCNC: 10.1 MG/DL (ref 8.7–10.5)
CHLORIDE SERPL-SCNC: 105 MMOL/L (ref 95–110)
CO2 SERPL-SCNC: 27 MMOL/L (ref 23–29)
CREAT SERPL-MCNC: 0.8 MG/DL (ref 0.5–1.4)
CTP QC/QA: YES
DIFFERENTIAL METHOD: ABNORMAL
EOSINOPHIL # BLD AUTO: 0.1 K/UL (ref 0–0.5)
EOSINOPHIL NFR BLD: 0.7 % (ref 0–8)
ERYTHROCYTE [DISTWIDTH] IN BLOOD BY AUTOMATED COUNT: 15.8 % (ref 11.5–14.5)
EST. GFR  (AFRICAN AMERICAN): >60 ML/MIN/1.73 M^2
EST. GFR  (NON AFRICAN AMERICAN): >60 ML/MIN/1.73 M^2
GLUCOSE SERPL-MCNC: 114 MG/DL (ref 70–110)
HCT VFR BLD AUTO: 40.8 % (ref 37–48.5)
HGB BLD-MCNC: 13.4 G/DL (ref 12–16)
IMM GRANULOCYTES # BLD AUTO: 0.01 K/UL (ref 0–0.04)
IMM GRANULOCYTES NFR BLD AUTO: 0.1 % (ref 0–0.5)
LYMPHOCYTES # BLD AUTO: 1.7 K/UL (ref 1–4.8)
LYMPHOCYTES NFR BLD: 23.7 % (ref 18–48)
MCH RBC QN AUTO: 26.9 PG (ref 27–31)
MCHC RBC AUTO-ENTMCNC: 32.8 G/DL (ref 32–36)
MCV RBC AUTO: 82 FL (ref 82–98)
MONOCYTES # BLD AUTO: 0.8 K/UL (ref 0.3–1)
MONOCYTES NFR BLD: 11.2 % (ref 4–15)
NEUTROPHILS # BLD AUTO: 4.4 K/UL (ref 1.8–7.7)
NEUTROPHILS NFR BLD: 63.7 % (ref 38–73)
NRBC BLD-RTO: 0 /100 WBC
PLATELET # BLD AUTO: 300 K/UL (ref 150–450)
PMV BLD AUTO: 10.8 FL (ref 9.2–12.9)
POTASSIUM SERPL-SCNC: 3.3 MMOL/L (ref 3.5–5.1)
PROCALCITONIN SERPL IA-MCNC: 0.02 NG/ML
PROT SERPL-MCNC: 8.2 G/DL (ref 6–8.4)
RBC # BLD AUTO: 4.98 M/UL (ref 4–5.4)
SARS-COV-2 RDRP RESP QL NAA+PROBE: NEGATIVE
SODIUM SERPL-SCNC: 141 MMOL/L (ref 136–145)
WBC # BLD AUTO: 6.95 K/UL (ref 3.9–12.7)

## 2022-02-08 PROCEDURE — 99223 PR INITIAL HOSPITAL CARE,LEVL III: ICD-10-PCS | Mod: GC,,, | Performed by: STUDENT IN AN ORGANIZED HEALTH CARE EDUCATION/TRAINING PROGRAM

## 2022-02-08 PROCEDURE — 93005 ELECTROCARDIOGRAM TRACING: CPT

## 2022-02-08 PROCEDURE — 83880 ASSAY OF NATRIURETIC PEPTIDE: CPT | Performed by: EMERGENCY MEDICINE

## 2022-02-08 PROCEDURE — 25000003 PHARM REV CODE 250: Performed by: STUDENT IN AN ORGANIZED HEALTH CARE EDUCATION/TRAINING PROGRAM

## 2022-02-08 PROCEDURE — 63600175 PHARM REV CODE 636 W HCPCS

## 2022-02-08 PROCEDURE — 84145 PROCALCITONIN (PCT): CPT | Performed by: STUDENT IN AN ORGANIZED HEALTH CARE EDUCATION/TRAINING PROGRAM

## 2022-02-08 PROCEDURE — 85025 COMPLETE CBC W/AUTO DIFF WBC: CPT | Performed by: EMERGENCY MEDICINE

## 2022-02-08 PROCEDURE — 99285 EMERGENCY DEPT VISIT HI MDM: CPT | Mod: 25

## 2022-02-08 PROCEDURE — 80053 COMPREHEN METABOLIC PANEL: CPT | Performed by: EMERGENCY MEDICINE

## 2022-02-08 PROCEDURE — 63600175 PHARM REV CODE 636 W HCPCS: Performed by: EMERGENCY MEDICINE

## 2022-02-08 PROCEDURE — 93010 ELECTROCARDIOGRAM REPORT: CPT | Mod: ,,, | Performed by: STUDENT IN AN ORGANIZED HEALTH CARE EDUCATION/TRAINING PROGRAM

## 2022-02-08 PROCEDURE — 12000002 HC ACUTE/MED SURGE SEMI-PRIVATE ROOM

## 2022-02-08 PROCEDURE — 99284 EMERGENCY DEPT VISIT MOD MDM: CPT | Mod: CS,,, | Performed by: EMERGENCY MEDICINE

## 2022-02-08 PROCEDURE — 63700000 PHARM REV CODE 250 ALT 637 W/O HCPCS: Performed by: EMERGENCY MEDICINE

## 2022-02-08 PROCEDURE — 93010 EKG 12-LEAD: ICD-10-PCS | Mod: ,,, | Performed by: STUDENT IN AN ORGANIZED HEALTH CARE EDUCATION/TRAINING PROGRAM

## 2022-02-08 PROCEDURE — 99223 1ST HOSP IP/OBS HIGH 75: CPT | Mod: GC,,, | Performed by: STUDENT IN AN ORGANIZED HEALTH CARE EDUCATION/TRAINING PROGRAM

## 2022-02-08 PROCEDURE — 25000003 PHARM REV CODE 250: Performed by: EMERGENCY MEDICINE

## 2022-02-08 PROCEDURE — 25000003 PHARM REV CODE 250

## 2022-02-08 PROCEDURE — 99284 PR EMERGENCY DEPT VISIT,LEVEL IV: ICD-10-PCS | Mod: CS,,, | Performed by: EMERGENCY MEDICINE

## 2022-02-08 PROCEDURE — U0002 COVID-19 LAB TEST NON-CDC: HCPCS | Performed by: EMERGENCY MEDICINE

## 2022-02-08 RX ORDER — AZITHROMYCIN 250 MG/1
500 TABLET, FILM COATED ORAL
Status: COMPLETED | OUTPATIENT
Start: 2022-02-08 | End: 2022-02-08

## 2022-02-08 RX ORDER — VENLAFAXINE HYDROCHLORIDE 75 MG/1
150 CAPSULE, EXTENDED RELEASE ORAL DAILY
Status: DISCONTINUED | OUTPATIENT
Start: 2022-02-09 | End: 2022-02-16 | Stop reason: HOSPADM

## 2022-02-08 RX ORDER — NALOXONE HCL 0.4 MG/ML
0.02 VIAL (ML) INJECTION
Status: DISCONTINUED | OUTPATIENT
Start: 2022-02-08 | End: 2022-02-16 | Stop reason: HOSPADM

## 2022-02-08 RX ORDER — IBUPROFEN 200 MG
24 TABLET ORAL
Status: DISCONTINUED | OUTPATIENT
Start: 2022-02-08 | End: 2022-02-16 | Stop reason: HOSPADM

## 2022-02-08 RX ORDER — ONDANSETRON 8 MG/1
8 TABLET, ORALLY DISINTEGRATING ORAL EVERY 8 HOURS PRN
Status: DISCONTINUED | OUTPATIENT
Start: 2022-02-08 | End: 2022-02-16 | Stop reason: HOSPADM

## 2022-02-08 RX ORDER — ACETAMINOPHEN 325 MG/1
650 TABLET ORAL 3 TIMES DAILY
Status: DISCONTINUED | OUTPATIENT
Start: 2022-02-08 | End: 2022-02-09

## 2022-02-08 RX ORDER — HYDROMORPHONE HYDROCHLORIDE 1 MG/ML
1 INJECTION, SOLUTION INTRAMUSCULAR; INTRAVENOUS; SUBCUTANEOUS EVERY 4 HOURS PRN
Status: DISCONTINUED | OUTPATIENT
Start: 2022-02-08 | End: 2022-02-08

## 2022-02-08 RX ORDER — AZITHROMYCIN 250 MG/1
250 TABLET, FILM COATED ORAL DAILY
Status: DISCONTINUED | OUTPATIENT
Start: 2022-02-09 | End: 2022-02-10

## 2022-02-08 RX ORDER — ENOXAPARIN SODIUM 100 MG/ML
40 INJECTION SUBCUTANEOUS EVERY 24 HOURS
Status: DISCONTINUED | OUTPATIENT
Start: 2022-02-08 | End: 2022-02-13

## 2022-02-08 RX ORDER — SODIUM CHLORIDE 0.9 % (FLUSH) 0.9 %
10 SYRINGE (ML) INJECTION EVERY 12 HOURS PRN
Status: DISCONTINUED | OUTPATIENT
Start: 2022-02-08 | End: 2022-02-16 | Stop reason: HOSPADM

## 2022-02-08 RX ORDER — POTASSIUM CHLORIDE 20 MEQ/1
40 TABLET, EXTENDED RELEASE ORAL ONCE
Status: COMPLETED | OUTPATIENT
Start: 2022-02-08 | End: 2022-02-08

## 2022-02-08 RX ORDER — MORPHINE SULFATE ORAL SOLUTION 10 MG/5ML
5 SOLUTION ORAL EVERY 6 HOURS PRN
Status: DISCONTINUED | OUTPATIENT
Start: 2022-02-08 | End: 2022-02-09

## 2022-02-08 RX ORDER — DIVALPROEX SODIUM 250 MG/1
250 TABLET, DELAYED RELEASE ORAL 3 TIMES DAILY
Status: DISCONTINUED | OUTPATIENT
Start: 2022-02-08 | End: 2022-02-16 | Stop reason: HOSPADM

## 2022-02-08 RX ORDER — ALBUTEROL SULFATE 90 UG/1
2 AEROSOL, METERED RESPIRATORY (INHALATION) EVERY 6 HOURS PRN
Status: DISCONTINUED | OUTPATIENT
Start: 2022-02-08 | End: 2022-02-16 | Stop reason: HOSPADM

## 2022-02-08 RX ORDER — OXYCODONE AND ACETAMINOPHEN 5; 325 MG/1; MG/1
1 TABLET ORAL
Status: COMPLETED | OUTPATIENT
Start: 2022-02-08 | End: 2022-02-08

## 2022-02-08 RX ORDER — GLUCAGON 1 MG
1 KIT INJECTION
Status: DISCONTINUED | OUTPATIENT
Start: 2022-02-08 | End: 2022-02-16 | Stop reason: HOSPADM

## 2022-02-08 RX ORDER — LEVOTHYROXINE SODIUM 112 UG/1
112 TABLET ORAL DAILY
Status: DISCONTINUED | OUTPATIENT
Start: 2022-02-09 | End: 2022-02-16 | Stop reason: HOSPADM

## 2022-02-08 RX ORDER — CHOLECALCIFEROL (VITAMIN D3) 25 MCG
1000 TABLET ORAL DAILY
Status: DISCONTINUED | OUTPATIENT
Start: 2022-02-09 | End: 2022-02-16 | Stop reason: HOSPADM

## 2022-02-08 RX ORDER — IBUPROFEN 200 MG
16 TABLET ORAL
Status: DISCONTINUED | OUTPATIENT
Start: 2022-02-08 | End: 2022-02-16 | Stop reason: HOSPADM

## 2022-02-08 RX ORDER — FOLIC ACID 1 MG/1
1 TABLET ORAL DAILY
Status: DISCONTINUED | OUTPATIENT
Start: 2022-02-09 | End: 2022-02-16 | Stop reason: HOSPADM

## 2022-02-08 RX ORDER — SODIUM CHLORIDE 0.9 % (FLUSH) 0.9 %
10 SYRINGE (ML) INJECTION
Status: DISCONTINUED | OUTPATIENT
Start: 2022-02-08 | End: 2022-02-16 | Stop reason: HOSPADM

## 2022-02-08 RX ORDER — DULOXETIN HYDROCHLORIDE 20 MG/1
20 CAPSULE, DELAYED RELEASE ORAL DAILY
Status: DISCONTINUED | OUTPATIENT
Start: 2022-02-09 | End: 2022-02-16 | Stop reason: HOSPADM

## 2022-02-08 RX ORDER — ASPIRIN 81 MG/1
81 TABLET ORAL DAILY
Status: DISCONTINUED | OUTPATIENT
Start: 2022-02-09 | End: 2022-02-16 | Stop reason: HOSPADM

## 2022-02-08 RX ADMIN — HYDROMORPHONE HYDROCHLORIDE 1 MG: 1 INJECTION, SOLUTION INTRAMUSCULAR; INTRAVENOUS; SUBCUTANEOUS at 06:02

## 2022-02-08 RX ADMIN — DIVALPROEX SODIUM 250 MG: 250 TABLET, DELAYED RELEASE ORAL at 09:02

## 2022-02-08 RX ADMIN — MORPHINE SULFATE 5 MG: 10 SOLUTION ORAL at 11:02

## 2022-02-08 RX ADMIN — OXYCODONE HYDROCHLORIDE AND ACETAMINOPHEN 1 TABLET: 5; 325 TABLET ORAL at 01:02

## 2022-02-08 RX ADMIN — AZITHROMYCIN MONOHYDRATE 500 MG: 250 TABLET ORAL at 03:02

## 2022-02-08 RX ADMIN — ENOXAPARIN SODIUM 40 MG: 100 INJECTION SUBCUTANEOUS at 06:02

## 2022-02-08 RX ADMIN — POTASSIUM CHLORIDE 40 MEQ: 1500 TABLET, EXTENDED RELEASE ORAL at 06:02

## 2022-02-08 RX ADMIN — CEFTRIAXONE 1 G: 1 INJECTION, POWDER, FOR SOLUTION INTRAMUSCULAR; INTRAVENOUS at 04:02

## 2022-02-08 NOTE — SUBJECTIVE & OBJECTIVE
Past Medical History:   Diagnosis Date    Depression     Hypercholesteremia     Pneumonia     Stage 4 lung cancer     Thyroid disease        Past Surgical History:   Procedure Laterality Date     SECTION      ENDOBRONCHIAL ULTRASOUND N/A 2020    Procedure: ENDOBRONCHIAL ULTRASOUND (EBUS);  Surgeon: Karen Mills MD;  Location: [facility] OR [address] [place] [address];  Service: Endoscopy;  Laterality: N/A;    INSERTION OF TUNNELED CENTRAL VENOUS CATHETER (CVC) WITH SUBCUTANEOUS PORT N/A 2021    Procedure: [id number];  Surgeon: Ray Foreman MD;  Location: [facility] OR [address] [place] [address];  Service: Vascular;  Laterality: N/A;       Review of patient's allergies indicates:   Allergen Reactions    Codeine Nausea And Vomiting       No current facility-administered medications on file prior to encounter.     Current Outpatient Medications on File Prior to Encounter   Medication Sig    albuterol (PROVENTIL/VENTOLIN HFA) 90 mcg/actuation inhaler Inhale 2 puffs into the lungs every 6 (six) hours as needed for Wheezing. Rescue    aspirin (ECOTRIN) 81 MG EC tablet Take 81 mg by mouth once daily.    divalproex (DEPAKOTE) 250 MG EC tablet Take 250 mg by mouth 3 (three) times daily.    DULoxetine (CYMBALTA) 20 MG capsule Take 20 mg by mouth once daily.    folic acid (FOLVITE) 1 MG tablet Take 1 tablet (1 mg total) by mouth once daily.    levocetirizine (XYZAL) 5 MG tablet Take 5 mg by mouth every evening.    levothyroxine (SYNTHROID) 112 MCG tablet Take 1 tablet (112 mcg total) by mouth once daily.    ondansetron (ZOFRAN-ODT) 8 MG TbDL Dissolve 1 tablet (8 mg total) by mouth every 8 (eight) hours as needed (Nausea).    oxyCODONE-acetaminophen (PERCOCET) 5-325 mg per tablet Take 1 tablet by mouth every 8 (eight) hours as needed for Pain.    pravastatin (PRAVACHOL) 40 MG tablet Take 1 tablet (40 mg total) by mouth once daily.    risperiDONE (RISPERDAL) 0.5 MG Tab Take 0.5 mg by mouth.     traMADoL (ULTRAM) 50 mg tablet  Take 1 tablet (50 mg total) by mouth every 6 (six) hours as needed for Pain (moderate).    venlafaxine (EFFEXOR-XR) 150 MG Cp24 Take 150 mg by mouth once daily.    vitamin D (VITAMIN D3) 1000 units Tab Take 1 tablet (1,000 Units total) by mouth once daily.    [DISCONTINUED] multivitamin capsule Take 1 capsule by mouth once daily.     Family History     Problem Relation (Age of Onset)    Heart attack Mother    Liver cancer Father    Lung cancer Mother        Tobacco Use    Smoking status: Current Every Day Smoker     Packs/day: 0.10     Years: 40.00     Pack years: 4.00     Types: Cigarettes    Smokeless tobacco: Never Used   Substance and Sexual Activity    Alcohol use: Not Currently     Comment: social    Drug use: No    Sexual activity: Not Currently     Partners: Male     Birth control/protection: None     Review of Systems   Constitutional: Positive for appetite change, diaphoresis (Night sweats) and fatigue. Negative for fever.   HENT: Positive for trouble swallowing. Negative for congestion and sore throat.    Eyes: Negative for pain and visual disturbance.   Respiratory: Positive for cough (Non-productive) and shortness of breath.    Cardiovascular: Positive for chest pain (right sided) and palpitations. Negative for leg swelling.   Gastrointestinal: Negative for abdominal pain, constipation, diarrhea, nausea and vomiting.   Genitourinary: Positive for frequency. Negative for dysuria and hematuria.   Musculoskeletal: Positive for back pain (right sided). Negative for myalgias.   Skin: Negative for color change and rash.   Neurological: Positive for weakness. Negative for dizziness, light-headedness and headaches.   Psychiatric/Behavioral: Negative for agitation and confusion.     Objective:     Vital Signs (Most Recent):  Temp: 98 °F (36.7 °C) (02/08/22 1712)  Pulse: (!) 58 (02/08/22 1712)  Resp: 16 (02/08/22 1712)  BP: (!) 146/65 (02/08/22 1712)  SpO2: 96 % (02/08/22 1712) Vital Signs (24h  Range):  Temp:  [98 °F (36.7 °C)-98.6 °F (37 °C)] 98 °F (36.7 °C)  Pulse:  [54-67] 58  Resp:  [16-22] 16  SpO2:  [95 %-99 %] 96 %  BP: (146-180)/(65-76) 146/65     Weight: 64.9 kg (143 lb)  Body mass index is 23.08 kg/m².    Physical Exam  Vitals and nursing note reviewed.   Constitutional:       General: She is not in acute distress.     Appearance: She is underweight. She is ill-appearing.   HENT:      Head: Normocephalic and atraumatic.      Mouth/Throat:      Mouth: Mucous membranes are dry.      Pharynx: Oropharynx is clear. No oropharyngeal exudate or posterior oropharyngeal erythema.   Eyes:      General: No scleral icterus.     Extraocular Movements: Extraocular movements intact.      Conjunctiva/sclera: Conjunctivae normal.      Pupils: Pupils are equal, round, and reactive to light.   Cardiovascular:      Rate and Rhythm: Normal rate and regular rhythm.      Pulses: Normal pulses.   Pulmonary:      Effort: Tachypnea present.      Breath sounds: No rales.      Comments: Decrease breath sounds on right  Abdominal:      General: Abdomen is flat. Bowel sounds are normal. There is no distension.      Palpations: Abdomen is soft.      Tenderness: There is no abdominal tenderness. There is no guarding.   Musculoskeletal:         General: No tenderness. Normal range of motion.      Cervical back: Normal range of motion and neck supple. No tenderness.      Right lower leg: No edema.      Left lower leg: No edema.   Skin:     General: Skin is warm and dry.   Neurological:      General: No focal deficit present.      Mental Status: She is alert and oriented to person, place, and time.   Psychiatric:         Mood and Affect: Mood normal.         Behavior: Behavior normal. Behavior is cooperative.         Thought Content: Thought content normal.           CRANIAL NERVES     CN III, IV, VI   Pupils are equal, round, and reactive to light.       Significant Labs:   All pertinent labs within the past 24 hours have been  reviewed.  CBC:   Recent Labs   Lab 02/08/22  1245   WBC 6.95   HGB 13.4   HCT 40.8        CMP:   Recent Labs   Lab 02/08/22  1245      K 3.3*      CO2 27   *   BUN 7*   CREATININE 0.8   CALCIUM 10.1   PROT 8.2   ALBUMIN 3.3*   BILITOT 0.3   ALKPHOS 53*   AST 19   ALT 11   ANIONGAP 9   EGFRNONAA >60.0     Urine Studies: No results for input(s): COLORU, APPEARANCEUA, PHUR, SPECGRAV, PROTEINUA, GLUCUA, KETONESU, BILIRUBINUA, OCCULTUA, NITRITE, UROBILINOGEN, LEUKOCYTESUR, RBCUA, WBCUA, BACTERIA, SQUAMEPITHEL, HYALINECASTS in the last 48 hours.    Invalid input(s): SOILA    Significant Imaging: I have reviewed all pertinent imaging results/findings within the past 24 hours.  CXR: I have reviewed all pertinent results/findings within the past 24 hours and my personal findings are:  Right sided opacification more prominent at base with small right side pleural effusion

## 2022-02-08 NOTE — ED PROVIDER NOTES
Encounter Date: 2022       History     Chief Complaint   Patient presents with    Shortness of Breath     Hx lung cancer; increased SOB x 1 day; seen by PCP, told to come to ED for eval      Ms. Raza is a 71-year-old who presents by personal transportation.  She complains of several weeks of worsening shortness of breath.  She feels short of breath at rest but it is exacerbated greatly by walking and other activities.  She has a history of right-sided lung cancer.  She was treated with Keytruda but had no improvement.  She refused further chemotherapy and radiation therapy.    She has a past medical history of stage 4 right lung cancer, Depression, Hypercholesteremia, Pneumonia, and Thyroid disease.    The history is provided by the patient and medical records. No  was used.     Review of patient's allergies indicates:   Allergen Reactions    Codeine Nausea And Vomiting     Past Medical History:   Diagnosis Date    Depression     Hypercholesteremia     Pneumonia     Stage 4 lung cancer     Thyroid disease      Past Surgical History:   Procedure Laterality Date     SECTION      ENDOBRONCHIAL ULTRASOUND N/A 2020    Procedure: ENDOBRONCHIAL ULTRASOUND (EBUS);  Surgeon: Karen Mills MD;  Location: [facility] OR [address] [place] [address];  Service: Endoscopy;  Laterality: N/A;    INSERTION OF TUNNELED CENTRAL VENOUS CATHETER (CVC) WITH SUBCUTANEOUS PORT N/A 2021    Procedure: [id number];  Surgeon: Ray Foreman MD;  Location: [facility] OR [address] [place] [address];  Service: Vascular;  Laterality: N/A;     Family History   Problem Relation Age of Onset    Heart attack Mother     Lung cancer Mother     Liver cancer Father     Miscarriages / Stillbirths Neg Hx     Diabetes Neg Hx      Social History     Tobacco Use    Smoking status: Current Every Day Smoker     Packs/day: 0.10     Years: 40.00     Pack years: 4.00     Types: Cigarettes    Smokeless tobacco: Never Used   Substance Use Topics     Alcohol use: Not Currently     Comment: social    Drug use: No     Review of Systems   Constitutional: Positive for appetite change and fatigue. Negative for chills and fever.   Respiratory: Positive for shortness of breath. Negative for cough.    Cardiovascular: Negative for chest pain.   Gastrointestinal: Negative for abdominal pain, diarrhea, nausea and vomiting.   Musculoskeletal: Negative for arthralgias and myalgias.   Neurological: Negative for dizziness and headaches.       Physical Exam     Initial Vitals [02/08/22 1136]   BP Pulse Resp Temp SpO2   (!) 166/72 67 20 98.6 °F (37 °C) 95 %      MAP       --         Physical Exam    Nursing note and vitals reviewed.  Constitutional: She is not diaphoretic. No distress.   Eyes: Conjunctivae are normal.   Cardiovascular: Normal rate, regular rhythm and normal heart sounds. Exam reveals no gallop and no friction rub.    No murmur heard.  Pulmonary/Chest: No accessory muscle usage or stridor. Tachypnea noted. She has no decreased breath sounds. She has no wheezes. She has no rhonchi. She has no rales.   Abdominal: Abdomen is soft. She exhibits no distension. There is no abdominal tenderness.   Musculoskeletal:         General: No tenderness or edema.     Neurological: She is alert and oriented to person, place, and time. GCS score is 15. GCS eye subscore is 4. GCS verbal subscore is 5. GCS motor subscore is 6.   Skin: Skin is warm and dry. No pallor.   Psychiatric: She is not actively hallucinating. She is attentive.         ED Course   Procedures  Labs Reviewed   CBC W/ AUTO DIFFERENTIAL - Abnormal; Notable for the following components:       Result Value    MCH 26.9 (*)     RDW 15.8 (*)     All other components within normal limits   COMPREHENSIVE METABOLIC PANEL - Abnormal; Notable for the following components:    Potassium 3.3 (*)     Glucose 114 (*)     BUN 7 (*)     Albumin 3.3 (*)     Alkaline Phosphatase 53 (*)     All other components within normal  limits   B-TYPE NATRIURETIC PEPTIDE   PROCALCITONIN   SARS-COV-2 RDRP GENE    Narrative:     This test utilizes isothermal nucleic acid amplification   technology to detect the SARS-CoV-2 RdRp nucleic acid segment.   The analytical sensitivity (limit of detection) is 125 genome   equivalents/mL.   A POSITIVE result implies infection with the SARS-CoV-2 virus;   the patient is presumed to be contagious.     A NEGATIVE result means that SARS-CoV-2 nucleic acids are not   present above the limit of detection. A NEGATIVE result should be   treated as presumptive. It does not rule out the possibility of   COVID-19 and should not be the sole basis for treatment decisions.   If COVID-19 is strongly suspected based on clinical and exposure   history, re-testing using an alternate molecular assay should be   considered.   This test is only for use under the Food and Drug   Administration s Emergency Use Authorization (EUA).   Commercial kits are provided by [employer].   Performance characteristics of the EUA have been independently   verified by [facility] Department of   Pathology and Laboratory Medicine.   _________________________________________________________________   The authorized Fact Sheet for Healthcare Providers and the authorized Fact   Sheet for Patients of the ID NOW COVID-19 are available on the FDA   website:     https://www.fda.gov/media/454986/download  https://www.fda.gov/media/741743/download         EKG Readings: (Independently Interpreted)   02/08/2022 12:37  Normal sinus rhythm.  Ventricular rate 64 beats per minute.  Normal axis.  Normal QRS and QT intervals.          Imaging Results          X-Ray Chest AP Portable (Final result)  Result time 02/08/22 13:36:31    Final result by Marc Fu III, MD (02/08/22 13:36:31)                 Impression:      Findings are worrisome for a right-sided pneumonia with a pleural effusion.      Electronically signed by: Marc Fu  MD  Date:    02/08/2022  Time:    13:36             Narrative:    EXAMINATION:  XR CHEST AP PORTABLE    CLINICAL HISTORY:  Shortness of breath;    FINDINGS:  Chest one view AP portable.    Heart size is normal.  There is an improving right infiltrate compared to 11/05/2021 but may be recurrent.  There is a small right pleural effusion.  Heart size is normal there is aortic plaque and DJD.                                 Medications   sodium chloride 0.9% flush 10 mL (has no administration in time range)   albuterol inhaler 2 puff (has no administration in time range)   divalproex EC tablet 250 mg (250 mg Oral Given 2/9/22 0921)   DULoxetine DR capsule 20 mg (20 mg Oral Given 2/9/22 0920)   aspirin EC tablet 81 mg (81 mg Oral Given 2/9/22 0920)   folic acid tablet 1 mg (1 mg Oral Given 2/9/22 0921)   ondansetron disintegrating tablet 8 mg (has no administration in time range)   levothyroxine tablet 112 mcg ( Oral Canceled Entry 2/9/22 0700)   venlafaxine 24 hr capsule 150 mg (150 mg Oral Given 2/9/22 0921)   vitamin D 1000 units tablet 1,000 Units (1,000 Units Oral Given 2/9/22 0920)   sodium chloride 0.9% flush 10 mL (has no administration in time range)   naloxone 0.4 mg/mL injection 0.02 mg (has no administration in time range)   glucose chewable tablet 16 g (has no administration in time range)   glucose chewable tablet 24 g (has no administration in time range)   dextrose 10% bolus 125 mL (has no administration in time range)   dextrose 10% bolus 250 mL (has no administration in time range)   glucagon (human recombinant) injection 1 mg (has no administration in time range)   enoxaparin injection 40 mg (40 mg Subcutaneous Given 2/8/22 1813)   cefTRIAXone (ROCEPHIN) 1 g/50 mL D5W IVPB (0 g Intravenous Stopped 2/9/22 1331)   azithromycin tablet 250 mg (250 mg Oral Given 2/9/22 1257)   melatonin tablet 6 mg (6 mg Oral Given 2/9/22 0112)   hydrOXYzine HCL tablet 25 mg (25 mg Oral Given 2/9/22 0111)   traMADoL tablet  50 mg (50 mg Oral Not Given 2/9/22 0108)   oxyCODONE-acetaminophen 7.5-325 mg per tablet 1 tablet (has no administration in time range)   oxyCODONE-acetaminophen 5-325 mg per tablet 1 tablet (1 tablet Oral Given 2/8/22 1305)   cefTRIAXone (ROCEPHIN) 1 g/50 mL D5W IVPB (0 g Intravenous Stopped 2/8/22 1700)   azithromycin tablet 500 mg (500 mg Oral Given 2/8/22 1505)   potassium chloride SA CR tablet 40 mEq (40 mEq Oral Given 2/8/22 1812)   sodium chloride 0.9% bolus 1,000 mL (1,000 mLs Intravenous New Bag 2/9/22 1145)     Medical Decision Making:   History:   Old Medical Records: I decided to obtain old medical records.  Old Records Summarized: other records.       <> Summary of Records: Past medical history reviewed as above.  Differential Diagnosis:   Cardiac arrhythmia, acute coronary syndromes, new onset heart failure, spontaneous pneumothorax, worsened lung cancer  Independently Interpreted Test(s):   I have ordered and independently interpreted EKG Reading(s) - see prior notes  Clinical Tests:   Lab Tests: Reviewed and Ordered  Radiological Study: Reviewed and Ordered  Medical Tests: Reviewed and Ordered    MDM:  The patient underwent emergent evaluation for worsening shortness of breath.  She has known lung cancer.  She was not receiving treatment for this diagnosis.  She was not on supplemental oxygen.  She was afebrile with stable vitals.  On exam, she had tachypnea but no concerning hypoxia.  Her work of breathing improved with supplemental oxygen.  Workup included cardiopulmonary monitoring, serial exams, EKG, chest x-ray, and labs.  EKG was negative for arrhythmia.  Chest x-ray was concerning for right lower lung zone infiltrate and pleural effusion.  Antibiotics for community-acquired pneumonia were administered.  She had no white count elevation, troponin elevation, BNP elevation.  She had no renal insufficiency.  She was admitted to Hospital Medicine for further evaluation and management.           Scribe Attestation:   Scribe #1: I performed the above scribed service and the documentation accurately describes the services I performed. I attest to the accuracy of the note.    Attending Attestation:             Attending ED Notes:   Portions of this chart were completed by the scribe by interpretive transcription of statements made by the patient as a result of my questions at the bedside. Other portions were completed by the scribe from statements made by me for direct transcription into the medical record. Following completion of the charting by the scribe, I made modifications for both correctness and proper phrasing.               Clinical Impression:   Final diagnoses:  [J18.9] Pneumonia of right lower lobe due to infectious organism  [J90] Pleural effusion, right  [R06.00] Dyspnea, unspecified type  [Z85.118] History of lung cancer          ED Disposition Condition    Admit                    Issac Garcia III, MD  02/09/22 4539

## 2022-02-08 NOTE — ED TRIAGE NOTES
Pt c/o SOB, weakness, bladder incontinence, weight loss and loss of appetite x 2 weeks. Pt has lung cancer and is not currently undergoing treatment. Pt does not wear oxygen at home. Had a recent admission for COVID within the last two months. Pt in wheelchair and walks with cane, alert and oriented x4, NAD.

## 2022-02-08 NOTE — HPI
Ms. 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  Patient states dyspnea progressively worsening for 2 weeks with intermittent non-productive cough, not on home O2.  Patient also endorses chronic right sided chest and back pain that is usually relieved with home narcotics.  Dyspnea worsens with exertion and is associated with fatigue, appetite change, weight loss, and night sweats.  Patient follows heme/onc outpatient and was initially started on Keytruda for Tx.  Patient was scheduled for chemotherapy as she was deemed not a candidate for RT however patient refusing chemotherapy and discussing possibility of hospice care.  Of note, patient recently admitted for COVID 2 months ago.    In ED, patient afebrile and hypertensive with SBP of 160-180s.  Initially sats of 95% on RA however increasing dyspnea and placed on 2L NC.  WBC 6.9 with Hgb 13.  K 3.3 and BNP wnl.  EKG showing NSR.  CXR with right sided opacification more prominent at base concerning for PNA along with CA with small right sided pleural effusion.  Patient given pain medication, azithromycin x1 and rocephin x1.

## 2022-02-09 PROBLEM — Z51.5 PALLIATIVE CARE ENCOUNTER: Status: ACTIVE | Noted: 2022-02-09

## 2022-02-09 LAB
ANION GAP SERPL CALC-SCNC: 9 MMOL/L (ref 8–16)
BASOPHILS # BLD AUTO: 0.02 K/UL (ref 0–0.2)
BASOPHILS NFR BLD: 0.4 % (ref 0–1.9)
BUN SERPL-MCNC: 7 MG/DL (ref 8–23)
CALCIUM SERPL-MCNC: 9.5 MG/DL (ref 8.7–10.5)
CHLORIDE SERPL-SCNC: 106 MMOL/L (ref 95–110)
CO2 SERPL-SCNC: 26 MMOL/L (ref 23–29)
CREAT SERPL-MCNC: 0.8 MG/DL (ref 0.5–1.4)
DIFFERENTIAL METHOD: ABNORMAL
EOSINOPHIL # BLD AUTO: 0.1 K/UL (ref 0–0.5)
EOSINOPHIL NFR BLD: 2.5 % (ref 0–8)
ERYTHROCYTE [DISTWIDTH] IN BLOOD BY AUTOMATED COUNT: 15.6 % (ref 11.5–14.5)
EST. GFR  (AFRICAN AMERICAN): >60 ML/MIN/1.73 M^2
EST. GFR  (NON AFRICAN AMERICAN): >60 ML/MIN/1.73 M^2
GLUCOSE SERPL-MCNC: 99 MG/DL (ref 70–110)
HCT VFR BLD AUTO: 36.5 % (ref 37–48.5)
HGB BLD-MCNC: 12.1 G/DL (ref 12–16)
IMM GRANULOCYTES # BLD AUTO: 0.01 K/UL (ref 0–0.04)
IMM GRANULOCYTES NFR BLD AUTO: 0.2 % (ref 0–0.5)
LYMPHOCYTES # BLD AUTO: 1.7 K/UL (ref 1–4.8)
LYMPHOCYTES NFR BLD: 33.1 % (ref 18–48)
MAGNESIUM SERPL-MCNC: 2 MG/DL (ref 1.6–2.6)
MAGNESIUM SERPL-MCNC: 2 MG/DL (ref 1.6–2.6)
MCH RBC QN AUTO: 26.9 PG (ref 27–31)
MCHC RBC AUTO-ENTMCNC: 33.2 G/DL (ref 32–36)
MCV RBC AUTO: 81 FL (ref 82–98)
MONOCYTES # BLD AUTO: 0.7 K/UL (ref 0.3–1)
MONOCYTES NFR BLD: 14.1 % (ref 4–15)
NEUTROPHILS # BLD AUTO: 2.5 K/UL (ref 1.8–7.7)
NEUTROPHILS NFR BLD: 49.7 % (ref 38–73)
NRBC BLD-RTO: 0 /100 WBC
PHOSPHATE SERPL-MCNC: 3.9 MG/DL (ref 2.7–4.5)
PLATELET # BLD AUTO: 288 K/UL (ref 150–450)
PMV BLD AUTO: 11.8 FL (ref 9.2–12.9)
POTASSIUM SERPL-SCNC: 3.6 MMOL/L (ref 3.5–5.1)
RBC # BLD AUTO: 4.49 M/UL (ref 4–5.4)
SODIUM SERPL-SCNC: 141 MMOL/L (ref 136–145)
WBC # BLD AUTO: 5.1 K/UL (ref 3.9–12.7)

## 2022-02-09 PROCEDURE — 27000221 HC OXYGEN, UP TO 24 HOURS

## 2022-02-09 PROCEDURE — 99233 SBSQ HOSP IP/OBS HIGH 50: CPT | Mod: GC,,, | Performed by: STUDENT IN AN ORGANIZED HEALTH CARE EDUCATION/TRAINING PROGRAM

## 2022-02-09 PROCEDURE — 99900035 HC TECH TIME PER 15 MIN (STAT)

## 2022-02-09 PROCEDURE — 20600001 HC STEP DOWN PRIVATE ROOM

## 2022-02-09 PROCEDURE — 63600175 PHARM REV CODE 636 W HCPCS

## 2022-02-09 PROCEDURE — 99497 ADVNCD CARE PLAN 30 MIN: CPT | Mod: 25,,, | Performed by: CLINICAL NURSE SPECIALIST

## 2022-02-09 PROCEDURE — 80048 BASIC METABOLIC PNL TOTAL CA: CPT | Performed by: STUDENT IN AN ORGANIZED HEALTH CARE EDUCATION/TRAINING PROGRAM

## 2022-02-09 PROCEDURE — 99497 PR ADVNCD CARE PLAN 30 MIN: ICD-10-PCS | Mod: 25,,, | Performed by: CLINICAL NURSE SPECIALIST

## 2022-02-09 PROCEDURE — 85025 COMPLETE CBC W/AUTO DIFF WBC: CPT | Performed by: STUDENT IN AN ORGANIZED HEALTH CARE EDUCATION/TRAINING PROGRAM

## 2022-02-09 PROCEDURE — 84100 ASSAY OF PHOSPHORUS: CPT

## 2022-02-09 PROCEDURE — 99223 PR INITIAL HOSPITAL CARE,LEVL III: ICD-10-PCS | Mod: ,,, | Performed by: CLINICAL NURSE SPECIALIST

## 2022-02-09 PROCEDURE — 99233 PR SUBSEQUENT HOSPITAL CARE,LEVL III: ICD-10-PCS | Mod: GC,,, | Performed by: STUDENT IN AN ORGANIZED HEALTH CARE EDUCATION/TRAINING PROGRAM

## 2022-02-09 PROCEDURE — 25000003 PHARM REV CODE 250

## 2022-02-09 PROCEDURE — 83735 ASSAY OF MAGNESIUM: CPT | Performed by: STUDENT IN AN ORGANIZED HEALTH CARE EDUCATION/TRAINING PROGRAM

## 2022-02-09 PROCEDURE — 63700000 PHARM REV CODE 250 ALT 637 W/O HCPCS

## 2022-02-09 PROCEDURE — 36415 COLL VENOUS BLD VENIPUNCTURE: CPT | Performed by: STUDENT IN AN ORGANIZED HEALTH CARE EDUCATION/TRAINING PROGRAM

## 2022-02-09 PROCEDURE — 99223 1ST HOSP IP/OBS HIGH 75: CPT | Mod: ,,, | Performed by: CLINICAL NURSE SPECIALIST

## 2022-02-09 RX ORDER — CARBOXYMETHYLCELLULOSE SODIUM, GLYCERIN, POLYSORBATE 80 5; 10; 5 MG/ML; MG/ML; MG/ML
1 SOLUTION/ DROPS OPHTHALMIC 4 TIMES DAILY PRN
Status: ON HOLD | COMMUNITY
End: 2022-02-15 | Stop reason: HOSPADM

## 2022-02-09 RX ORDER — OXYCODONE AND ACETAMINOPHEN 5; 325 MG/1; MG/1
1 TABLET ORAL EVERY 8 HOURS PRN
Status: DISCONTINUED | OUTPATIENT
Start: 2022-02-09 | End: 2022-02-09

## 2022-02-09 RX ORDER — MULTIVITAMIN
1 TABLET ORAL DAILY
Status: ON HOLD | COMMUNITY
End: 2022-02-15 | Stop reason: HOSPADM

## 2022-02-09 RX ORDER — LEVOTHYROXINE SODIUM 125 UG/1
125 TABLET ORAL DAILY
COMMUNITY

## 2022-02-09 RX ORDER — MELOXICAM 15 MG/1
15 TABLET ORAL DAILY
Status: ON HOLD | COMMUNITY
End: 2022-02-15 | Stop reason: HOSPADM

## 2022-02-09 RX ORDER — CHOLECALCIFEROL (VITAMIN D3) 50 MCG
1 TABLET ORAL DAILY
Status: ON HOLD | COMMUNITY
End: 2022-02-15 | Stop reason: HOSPADM

## 2022-02-09 RX ORDER — ACETAMINOPHEN 500 MG
5 TABLET ORAL NIGHTLY
COMMUNITY

## 2022-02-09 RX ORDER — TRAMADOL HYDROCHLORIDE 50 MG/1
50 TABLET ORAL EVERY 6 HOURS PRN
Status: DISCONTINUED | OUTPATIENT
Start: 2022-02-09 | End: 2022-02-10

## 2022-02-09 RX ORDER — DOCUSATE SODIUM 100 MG/1
100 CAPSULE, LIQUID FILLED ORAL NIGHTLY
Status: ON HOLD | COMMUNITY
End: 2022-02-15 | Stop reason: HOSPADM

## 2022-02-09 RX ORDER — OXYCODONE AND ACETAMINOPHEN 7.5; 325 MG/1; MG/1
1 TABLET ORAL EVERY 4 HOURS PRN
Status: DISCONTINUED | OUTPATIENT
Start: 2022-02-09 | End: 2022-02-09

## 2022-02-09 RX ORDER — OXYCODONE AND ACETAMINOPHEN 7.5; 325 MG/1; MG/1
1 TABLET ORAL EVERY 4 HOURS PRN
Status: DISCONTINUED | OUTPATIENT
Start: 2022-02-09 | End: 2022-02-10

## 2022-02-09 RX ORDER — FLUTICASONE PROPIONATE 50 MCG
1 SPRAY, SUSPENSION (ML) NASAL DAILY
Status: ON HOLD | COMMUNITY
End: 2022-02-15 | Stop reason: HOSPADM

## 2022-02-09 RX ORDER — NALOXONE HYDROCHLORIDE 4 MG/.1ML
SPRAY NASAL
Status: ON HOLD | COMMUNITY
End: 2022-02-15 | Stop reason: HOSPADM

## 2022-02-09 RX ORDER — TALC
6 POWDER (GRAM) TOPICAL NIGHTLY PRN
Status: DISCONTINUED | OUTPATIENT
Start: 2022-02-09 | End: 2022-02-16 | Stop reason: HOSPADM

## 2022-02-09 RX ORDER — HYDROXYZINE HYDROCHLORIDE 25 MG/1
25 TABLET, FILM COATED ORAL 3 TIMES DAILY PRN
Status: DISCONTINUED | OUTPATIENT
Start: 2022-02-09 | End: 2022-02-16 | Stop reason: HOSPADM

## 2022-02-09 RX ORDER — RAMELTEON 8 MG/1
8 TABLET ORAL NIGHTLY
Status: ON HOLD | COMMUNITY
End: 2022-02-15 | Stop reason: HOSPADM

## 2022-02-09 RX ORDER — FAMOTIDINE 20 MG/1
20 TABLET, FILM COATED ORAL DAILY
Status: ON HOLD | COMMUNITY
End: 2022-02-15 | Stop reason: HOSPADM

## 2022-02-09 RX ADMIN — FOLIC ACID 1 MG: 1 TABLET ORAL at 09:02

## 2022-02-09 RX ADMIN — ENOXAPARIN SODIUM 40 MG: 100 INJECTION SUBCUTANEOUS at 05:02

## 2022-02-09 RX ADMIN — OXYCODONE AND ACETAMINOPHEN 1 TABLET: 7.5; 325 TABLET ORAL at 11:02

## 2022-02-09 RX ADMIN — CEFTRIAXONE 1 G: 1 INJECTION, POWDER, FOR SOLUTION INTRAMUSCULAR; INTRAVENOUS at 01:02

## 2022-02-09 RX ADMIN — ACETAMINOPHEN 650 MG: 325 TABLET ORAL at 09:02

## 2022-02-09 RX ADMIN — AZITHROMYCIN MONOHYDRATE 250 MG: 250 TABLET ORAL at 12:02

## 2022-02-09 RX ADMIN — OXYCODONE HYDROCHLORIDE AND ACETAMINOPHEN 1 TABLET: 5; 325 TABLET ORAL at 09:02

## 2022-02-09 RX ADMIN — DIVALPROEX SODIUM 250 MG: 250 TABLET, DELAYED RELEASE ORAL at 09:02

## 2022-02-09 RX ADMIN — Medication 1000 UNITS: at 09:02

## 2022-02-09 RX ADMIN — VENLAFAXINE HYDROCHLORIDE 150 MG: 75 CAPSULE, EXTENDED RELEASE ORAL at 09:02

## 2022-02-09 RX ADMIN — LEVOTHYROXINE SODIUM 112 MCG: 112 TABLET ORAL at 05:02

## 2022-02-09 RX ADMIN — HYDROXYZINE HYDROCHLORIDE 25 MG: 25 TABLET ORAL at 01:02

## 2022-02-09 RX ADMIN — Medication 6 MG: at 01:02

## 2022-02-09 RX ADMIN — DIVALPROEX SODIUM 250 MG: 250 TABLET, DELAYED RELEASE ORAL at 08:02

## 2022-02-09 RX ADMIN — HYDROXYZINE HYDROCHLORIDE 25 MG: 25 TABLET ORAL at 08:02

## 2022-02-09 RX ADMIN — OXYCODONE HYDROCHLORIDE AND ACETAMINOPHEN 1 TABLET: 5; 325 TABLET ORAL at 01:02

## 2022-02-09 RX ADMIN — Medication 6 MG: at 08:02

## 2022-02-09 RX ADMIN — DULOXETINE HYDROCHLORIDE 20 MG: 20 CAPSULE, DELAYED RELEASE ORAL at 09:02

## 2022-02-09 RX ADMIN — DIVALPROEX SODIUM 250 MG: 250 TABLET, DELAYED RELEASE ORAL at 05:02

## 2022-02-09 RX ADMIN — ASPIRIN 81 MG: 81 TABLET, COATED ORAL at 09:02

## 2022-02-09 RX ADMIN — SODIUM CHLORIDE 1000 ML: 0.9 INJECTION, SOLUTION INTRAVENOUS at 11:02

## 2022-02-09 NOTE — PHARMACY MED REC
"Admission Medication History     The home medication history was taken by Brigitte Mcnamara.    You may go to "Admission" then "Reconcile Home Medications" tabs to review and/or act upon these items.      The home medication list has been updated by the Pharmacy department.    Please read ALL comments highlighted in yellow.    Please address this information as you see fit.     Feel free to contact us if you have any questions or require assistance.      The medications listed below were removed from the home medication list. Please reorder if appropriate:  Patient reports no longer taking the following medication(s):   ALBUTEROL HFA    ONDANSETRON 8 MG      Medications listed below were obtained from: Medical records  PTA Medications   Medication Sig    aspirin (ECOTRIN) 81 MG EC tablet   Take 81 mg by mouth once daily.    [id number] (REFRESH DIGITAL) 0.5-1-0.5 % Drop   Place 1 drop into both eyes 4 (four) times daily as needed (dry eye).    cholecalciferol, vitamin D3, (VITAMIN D3) 50 mcg (2,000 unit) Tab   Take 1 tablet by mouth once daily.    divalproex (DEPAKOTE) 250 MG EC tablet   Take 250 mg by mouth 3 (three) times daily.    docusate sodium (COLACE) 100 MG capsule   Take 100 mg by mouth every evening.    DULoxetine (CYMBALTA) 60 MG capsule   Take 60 mg by mouth once daily.    famotidine (PEPCID) 20 MG tablet   Take 20 mg by mouth once daily.    fluticasone propionate (FLONASE) 50 mcg/actuation nasal spray   1 spray by Each Nostril route once daily.    folic acid (FOLVITE) 1 MG tablet   Take 1 tablet (1 mg total) by mouth once daily.    ipratropium (ATROVENT HFA) 17 mcg/actuation inhaler   Inhale 2 puffs into the lungs every 8 (eight) hours as needed (chest congestion). Rescue    levocetirizine (XYZAL) 5 MG tablet   Take 5 mg by mouth every evening.    levothyroxine (SYNTHROID) 125 MCG tablet   Take 125 mcg by mouth once daily. Take on an empty stomach before other " medications    melatonin (MELATIN) 5 mg   Take 5 mg by mouth every evening.    meloxicam (MOBIC) 15 MG tablet   Take 15 mg by mouth once daily.    multivitamin (ONE DAILY MULTIVITAMIN) per tablet   Take 1 tablet by mouth once daily.    naloxone (NARCAN) 4 mg/actuation Spry Use 1 spray into one nostril every 2 minutes as needed for opioid overdose . Alternate nostrils with each dose    oxyCODONE-acetaminophen (PERCOCET) 5-325 mg per tablet    Take 1 tablet by mouth every 12 (twelve) hours as needed for Pain.    pravastatin (PRAVACHOL) 40 MG tablet   Take 1 tablet (40 mg total) by mouth once daily.    ramelteon (ROZEREM) 8 mg tablet   Take 8 mg by mouth every evening.    risperiDONE (RISPERDAL) 0.5 MG Tab   Take 0.5 mg by mouth every morning and 1 mg every evening    venlafaxine (EFFEXOR-XR) 150 MG Cp24   Take 150 mg by mouth once daily.    traMADoL (ULTRAM) 50 mg tablet Take 1 tablet (50 mg total) by mouth every 6 (six) hours as needed for Pain (moderate).       Potential issues to be addressed PRIOR TO DISCHARGE  The listed medications were obtained from another facility ([facility] The patient may not have been able to fill these prescriptions prior to this admission and may require new scripts upon discharge.     Brigitte Mcnamara  EXT [id number]                  .

## 2022-02-09 NOTE — HPI
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  Per chart review, patient stated dyspnea progressively worsening for 2 weeks with intermittent non-productive cough, not on home O2.  Patient also endorses chronic right sided chest and back pain that is usually relieved with home narcotics.  Dyspnea worsens with exertion and is associated with fatigue, appetite change, weight loss, and night sweats.  Patient follows heme/onc outpatient and was initially started on Keytruda for Tx.  pt reports she is not taking Keytruda. Patient was scheduled for chemotherapy as she was deemed not a candidate for RT however patient refusing chemotherapy and discussing possibility of hospice care.  Of note, patient recently admitted for COVID 2 months ago.     In ED, patient afebrile and hypertensive with SBP of 160-180s.  Initially sats of 95% on RA however increasing dyspnea and placed on 2L NC.  WBC 6.9 with Hgb 13.  K 3.3 and BNP wnl.  EKG showing NSR.  CXR with right sided opacification more prominent at base concerning for PNA along with CA with small right sided pleural effusion.  Patient given pain medication, azithromycin x1 and rocephin x1.

## 2022-02-09 NOTE — ASSESSMENT & PLAN NOTE
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  Follows outpatient heme/onc on Keytruda.  Refused chemotherapy and deemed not a candidate for RT.  Patient with severe cancer related pain as well as worsening dyspnea associated with fatigue, decreased appetite, weight loss, and night sweats.  Not on home O2.  CXR showing right base opacification with small right pleural effusion concerning for PNA vs CA.  S/p azithromycin x1 and rocephin x1.  No leukocytosis seen    - Supplemental O2 for sats 88-92% in setting of CA  - Continue CAP coverage with rocephin and azithromycin  - Morphine PRN  - Dilaudid PRN for severe breakthrough pain  - scheduled Tylenol 650mg TID  - Palliative consulted for GOC/ACP, appreciate recs  - F/u procal

## 2022-02-09 NOTE — CONSULTS
"[facility] - Oncology ([facility])  Adult Nutrition  Consult Note    SUMMARY     Recommendations    1. Continue regular diet as tolerated with texture per SLP.     2. Add Boost Plus (chocolate) TID to aid in caloric intake.     3. RD following.     Goals: consume >50% of meals by RD follow-up  Nutrition Goal Status: new  Communication of RD Recs:  (POC)    Assessment and Plan    Nutrition Problem  Increased Nutrient Needs     Related to (etiology):   Physiological demands     Signs and Symptoms (as evidenced by):   Stage IV adenocarcinoma of right lung with pleural mets    Interventions (treatment strategy):  Collaboration of nutrition care with other providers    Nutrition Diagnosis Status:   New     Malnutrition Assessment             Weight Loss (Malnutrition):  (3% x 3 months per chart review)                         Reason for Assessment    Reason For Assessment: consult  Diagnosis: cancer diagnosis/related complications (Adenocarcinoma of right lung, stage 4)  Relevant Medical History: Stage IV adenocarcinoma of right lung with pleural mets, Depression, HLD, Thyroid disease  Interdisciplinary Rounds: did not attend  General Information Comments: Pt resting in bed, no family present. Pt states she is starving and would like more breakfast. Pt would like ONS with meals. No N/V. Pt reports decreased po intake x several months PTA. Pt was receiving meals on wheels and getting cases of Boost supplements at home. No significant wt loss per chart review. Unable to complete NFPE at time of visit. Noted some wasting, but possibly age related. Pt at high risk for malnutrition.   Nutrition Discharge Planning: Regular Diet, texture per SLP    Nutrition Risk Screen    Nutrition Risk Screen: other (see comments) (poor po intake)    Nutrition/Diet History    Factors Affecting Nutritional Intake: None identified at this time    Anthropometrics    Temp: 98.3 °F (36.8 °C)  Height Method: Stated  Height: 5' 7" (170.2 cm)  Height " (inches): 67 in  Weight Method: Bed Scale  Weight: 69.5 kg (153 lb 3.5 oz)  Weight (lb): 153.22 lb  Ideal Body Weight (IBW), Female: 135 lb  % Ideal Body Weight, Female (lb): 113.5 %  BMI (Calculated): 24  BMI Grade: 18.5-24.9 - normal  Weight Loss: unintentional  Usual Body Weight (UBW), k.7 kg (per chart review 2021)  % Usual Body Weight: 97.13  % Weight Change From Usual Weight: -3.07 %     Lab/Procedures/Meds    Pertinent Labs Reviewed: reviewed  Pertinent Labs Comments: BUN 7  Pertinent Medications Reviewed: reviewed  Pertinent Medications Comments: acetaminophen, aspirin, folic acid, levothyroxine, vitamin D    Estimated/Assessed Needs    Weight Used For Calorie Calculations: 69.5 kg (153 lb 3.5 oz)  Energy Calorie Requirements (kcal): 8036-0952 kcal/day  Energy Need Method: Kcal/kg (25-30)  Protein Requirements: 70-84 gm/day (1.0-1.2 gm/kg)  Weight Used For Protein Calculations: 69.5 kg (153 lb 3.5 oz)  Fluid Requirements (mL): 1 mL/kcal or per MD     RDA Method (mL): 1737     Nutrition Prescription Ordered    Current Diet Order: Pureed    Evaluation of Received Nutrient/Fluid Intake    I/O: no data   Comments: LBM 2  Tolerance: tolerating  % Intake of Estimated Energy Needs: 25 - 50 %  % Meal Intake: 25 - 50 %    Nutrition Risk    Level of Risk/Frequency of Follow-up:  (f/u 1 x wk)       Monitor and Evaluation    Food and Nutrient Intake: energy intake,food and beverage intake  Food and Nutrient Adminstration: diet order  Physical Activity and Function: nutrition-related ADLs and IADLs  Anthropometric Measurements: weight,weight change,body mass index  Biochemical Data, Medical Tests and Procedures: electrolyte and renal panel,gastrointestinal profile,glucose/endocrine profile,inflammatory profile,lipid profile  Nutrition-Focused Physical Findings: overall appearance       Nutrition Follow-Up    RD Follow-up?: Yes

## 2022-02-09 NOTE — ASSESSMENT & PLAN NOTE
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  Follows outpatient heme/onc on Keytruda.  Refused chemotherapy and deemed not a candidate for RT.  Patient with severe cancer related pain as well as worsening dyspnea associated with fatigue, decreased appetite, weight loss, and night sweats.  Not on home O2.  CXR showing right base opacification with small right pleural effusion concerning for PNA vs CA.  S/p azithromycin x1 and rocephin x1.  No leukocytosis seen, procal wnl    - Supplemental O2 for sats 88-92% in setting of CA  - Continue CAP coverage with rocephin and azithromycin  - Home pain regimen of tramadol and percocet PRN  - Dilaudid PRN for severe breakthrough pain  - Palliative consulted for GOC/ACP, appreciate recs  - Plan for 6 minute walk test to assess for need of home O2 prior to discharge
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
## 2022-02-09 NOTE — ASSESSMENT & PLAN NOTE
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  Patient stateed dyspnea progressively worsening for 2 weeks with intermittent non-productive cough, not on home O2.  Patient also endorses chronic right sided chest and back pain that is usually relieved with home narcotics.  Pt is alert, oriented to person, place, and situation. Pt is a full code.     Reason for consult: Goals of care/advacne care planning:     Met with pt along with Marian Persaud LCSW. Per pt, she does not want to do chemo. Pt reports she does not always take her regular meds but does cristi her pain meds. Pt reports she does not want to go back home due to house beinf "filthy" and no help at home.  [facility] with hospice care discussed. Pt had questions about her disability check and if NH will take it. Per SW, they take the check for room and board and she woul have Long-term Medicaid. Per pt, she needs to think about this due to she helps with the bills. Pt reports he will speak to her son.     Code status discussed. DNR status discussed. Pt wants to think about decision and we will re-visit tomorrow.   MPOA: Pt' son, Cedrick is NOK. MPOA education done. Pt reports she may want her sister to be MPOA but will think about.     Symptom management:   Pt reports sharp pain to back area and right side of chest area. Pt reports pain 8/10 and with pain meds goes to 5/10.     Pt is on oxycodone-acetaminophen 5-325 q 8 hrs prn.   Pt reports pain meds wear off around 3-4 hours and she still is in pain.    Consider Oxycodone-acetaminophen 7.5- 325 mg q 4 hrs prn moderate pain.     Dyspnea:  Pt on O2 per NC.     Plan:   Will f/u with pt tomorrow concerning code status, hospice care.   See above for sym[joseline recs.     "

## 2022-02-09 NOTE — PLAN OF CARE
Recommendations    1. Continue regular diet as tolerated with texture per SLP.     2. Add Boost Plus (chocolate) TID to aid in caloric intake.     3. RD following.     Goals: consume >50% of meals by RD follow-up  Nutrition Goal Status: new

## 2022-02-09 NOTE — ED NOTES
Telemetry Verification   Patient placed on Telemetry Box  Verified with War Room  Box # [id number]   Monitor Tech    Rate    Rhythm

## 2022-02-09 NOTE — SUBJECTIVE & OBJECTIVE
Interval History: No acute events overnight.  Patient with continued pain of back side and chest on right side, location of cancer.  Denies abdominal pain, n/v.  Patient notes muscle wasting and concerned about nutrition.    Review of Systems   Constitutional: Positive for appetite change and fatigue. Negative for fever.   HENT: Positive for trouble swallowing. Negative for congestion and sore throat.    Respiratory: Positive for cough (Non-productive) and shortness of breath.    Cardiovascular: Positive for chest pain (right sided). Negative for leg swelling.   Gastrointestinal: Negative for abdominal pain, constipation, diarrhea, nausea and vomiting.   Genitourinary: Negative for dysuria and hematuria.   Musculoskeletal: Positive for back pain (right sided). Negative for myalgias.   Skin: Negative for color change and rash.   Neurological: Positive for weakness. Negative for dizziness, light-headedness and headaches.   Psychiatric/Behavioral: Negative for agitation and confusion.     Objective:     Vital Signs (Most Recent):  Temp: 98.3 °F (36.8 °C) (02/09/22 0736)  Pulse: 66 (02/09/22 1205)  Resp: 18 (02/09/22 1205)  BP: 124/60 (02/09/22 0736)  SpO2: (!) 93 % (02/09/22 1205) Vital Signs (24h Range):  Temp:  [97.5 °F (36.4 °C)-98.3 °F (36.8 °C)] 98.3 °F (36.8 °C)  Pulse:  [54-80] 66  Resp:  [16-18] 18  SpO2:  [92 %-99 %] 93 %  BP: (124-180)/(60-76) 124/60     Weight: 69.5 kg (153 lb 3.5 oz)  Body mass index is 24 kg/m².  No intake or output data in the 24 hours ending 02/09/22 1311   Physical Exam  Vitals and nursing note reviewed.   Constitutional:       General: She is not in acute distress.     Appearance: She is underweight. She is ill-appearing.   HENT:      Head: Normocephalic and atraumatic.      Mouth/Throat:      Mouth: Mucous membranes are dry.      Pharynx: Oropharynx is clear. No oropharyngeal exudate or posterior oropharyngeal erythema.   Eyes:      General: No scleral icterus.     Extraocular  Movements: Extraocular movements intact.      Conjunctiva/sclera: Conjunctivae normal.      Pupils: Pupils are equal, round, and reactive to light.   Cardiovascular:      Rate and Rhythm: Normal rate and regular rhythm.      Pulses: Normal pulses.   Pulmonary:      Effort: Tachypnea present.      Breath sounds: No rales.      Comments: Decrease breath sounds on right  Chest:      Chest wall: Tenderness (right side) present.   Abdominal:      General: Abdomen is flat. Bowel sounds are normal. There is no distension.      Palpations: Abdomen is soft.      Tenderness: There is no abdominal tenderness. There is no guarding.   Musculoskeletal:         General: No tenderness. Normal range of motion.      Cervical back: Normal range of motion and neck supple. No tenderness.      Right lower leg: No edema.      Left lower leg: No edema.   Skin:     General: Skin is warm and dry.   Neurological:      General: No focal deficit present.      Mental Status: She is alert and oriented to person, place, and time.      Motor: Weakness present.   Psychiatric:         Mood and Affect: Mood normal.         Behavior: Behavior normal. Behavior is cooperative.         Thought Content: Thought content normal.         Significant Labs:   All pertinent labs within the past 24 hours have been reviewed.  CBC:   Recent Labs   Lab 02/08/22  1245 02/09/22  0237   WBC 6.95 5.10   HGB 13.4 12.1   HCT 40.8 36.5*    288     CMP:   Recent Labs   Lab 02/08/22  1245 02/09/22  0237    141   K 3.3* 3.6    106   CO2 27 26   * 99   BUN 7* 7*   CREATININE 0.8 0.8   CALCIUM 10.1 9.5   PROT 8.2  --    ALBUMIN 3.3*  --    BILITOT 0.3  --    ALKPHOS 53*  --    AST 19  --    ALT 11  --    ANIONGAP 9 9   EGFRNONAA >60.0 >60.0     Magnesium:   Recent Labs   Lab 02/09/22 0237   MG 2.0  2.0       Significant Imaging: I have reviewed all pertinent imaging results/findings within the past 24 hours.

## 2022-02-09 NOTE — PLAN OF CARE
[facility] - Oncology (Hospital)  Initial Discharge Assessment       Primary Care Provider: [facility]    Admission Diagnosis: History of lung cancer [Z85.118]  Pleural effusion, right [J90]  Chest pain [R07.9]  Pneumonia of right lower lobe due to infectious organism [J18.9]  Dyspnea, unspecified type [R06.00]    Admission Date: 2/8/2022  Expected Discharge Date: 2/12/2022    Discharge Barriers Identified: None    Payor: [employer] [facility] / Plan: [employer] [place] / Product Type: Medicare Advantage /     Extended Emergency Contact Information  Primary Emergency Contact: Anuja Aldana   [facility]  Home Phone: [phone]  Relation: Sister  Secondary Emergency Contact: Marcell Raza  Mobile Phone: [phone]  Relation: Son    Discharge Plan A: Hospice/home,Home with family  Discharge Plan B: New Nursing Home placement - half-way care facility      [facility] - [place] - [address]  [address]  [address]  [place] [address]  Phone: [phone] Fax: [phone]      Initial Assessment (most recent)     Adult Discharge Assessment - 02/09/22 1646        Discharge Assessment    Assessment Type Discharge Planning Assessment     Confirmed/corrected address, phone number and insurance Yes     Confirmed Demographics Correct on Facesheet     Source of Information patient     If unable to respond/provide information was family/caregiver contacted? --   Pt is able to provide information    Communicated HEAVENLY with patient/caregiver Date not available/Unable to determine     Reason For Admission Adenocarcinoma of right lung, stage 4     Lives With child(sabrina), adult   Son- Marcell Raza- [phone]    Facility Arrived From: Home     Do you expect to return to your current living situation? Yes   Patient wants to return home and don't want no body coming to her house.    Do you have help at home or someone to help you manage your care at  home? Yes     Who are your caregiver(s) and their phone number(s)? SonCabrera Naylor [phone] and sister Anuja Aldana-[phone]     Prior to hospitilization cognitive status: Alert/Oriented     Current cognitive status: Alert/Oriented     Walking or Climbing Stairs Difficulty none     Dressing/Bathing Difficulty none     Do you have any problems with: --   Tati Naylro [phone]    Home Accessibility stairs to enter home     Number of Stairs, Main Entrance five     Surface of Stairs, Main Entrance hardwood     Stair Railings, Main Entrance railings safe and in good condition     Landing, Stairs, Main Entrance railings present     Stairs Comment, Main Entrance five stairs to main entrance     Home Layout Able to live on 1st floor     Equipment Currently Used at Home none     Readmission within 30 days? No     Patient currently being followed by outpatient case management? No     Do you currently have service(s) that help you manage your care at home? No     Do you take prescription medications? Yes     Do you have prescription coverage? Yes     Coverage [facility] [employer] [facility]     Who is going to help you get home at discharge? Tati Naylor [phone]     How do you get to doctors appointments? family or friend will provide   Tati Naylor [phone]    Are you on dialysis? No     Do you take coumadin? No     Discharge Plan A Hospice/home;Home with family     Discharge Plan B New Nursing Home placement - residential care facility     DME Needed Upon Discharge  other (see comments)   TBA    Discharge Barriers Identified None        Relationship/Environment    Name(s) of Who Lives With Patient Tati Naylor [phone]                 CHRISTOPHER spoke with patient at bedside in 844 for Discharge Planning Assessment. Per patient, Patient lives with her son in a single family home on a slab foundation with five steps to porch and point of  entry.  Patient was independent with ADLS and DID NOT use DME or in-home assistive equipment. Patient is not on dialysis  or coumadin,  takes medications as prescribed / keeps refilled / has resources for all daily and prescriptive needs. Preferred pharmacy is [facility] - Agreeable to bedside delivery.  Will have help from Tati Raza- [phone]  and other immediate family upon discharge - Patient will need  transportation home or facility.  All questions addressed. Unit and SW direct numbers provided. Will continue to follow for course of hospitalization.    2/8/2022 11:53 AM  History of lung cancer [Z85.118]  Pleural effusion, right [J90]  Chest pain [R07.9]  Pneumonia of right lower lobe due to infectious organism [J18.9]  Dyspnea, unspecified type [R06.00]    PCP: [facility]a [facility]    PHARMACY:   [facility] - [place] [address]  [address]  [address]  [place] [address]  Phone: [phone] Fax: [phone]      Payor: [facility] / Plan: [facility] / Product Type: Medicare Advantage /       Argentina Colin LMSW  PRN - [facility]  [id number]

## 2022-02-09 NOTE — CONSULTS
"[facility] - Oncology ([facility])  Palliative Medicine  Consult Note    Patient Name: Micheline Raza  MRN: [id number]  Admission Date: 2/8/2022  Hospital Length of Stay: 1 days  Code Status: Full Code   Attending Provider: Yovani Sarmiento MD  Consulting Provider: VIOLETTA Szymanski  Primary Care Physician: Jose Re[facility]  Principal Problem:Adenocarcinoma of right lung, stage 4    Patient information was obtained from patient and primary team.      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  Patient stateed dyspnea progressively worsening for 2 weeks with intermittent non-productive cough, not on home O2.  Patient also endorses chronic right sided chest and back pain that is usually relieved with home narcotics.  Pt is alert, oriented to person, place, and situation. Pt is a full code.     Reason for consult: Goals of care/advacne care planning:     Met with pt along with Marian Persaud LCSW. Per pt, she does not want to do chemo. Pt reports she does not always take her regular meds but does cristi her pain meds. Pt reports she does not want to go back home due to house beinf "filthy" and no help at home.  [facility] with hospice care discussed. Pt had questions about her disability check and if NH will take it. Per SW, they take the check for room and board and she woul have Long-term Medicaid. Per pt, she needs to think about this due to she helps with the bills. Pt reports he will speak to her son.     Code status discussed. DNR status discussed. Pt wants to think about decision and we will re-visit tomorrow.   MPOA: Pt' sonCedrick is NOK. MPOA " education done. Pt reports she may want her sister to be MPOA but will think about.     Symptom management:   Pt reports sharp pain to back area and right side of chest area. Pt reports pain 8/10 and with pain meds goes to 5/10.     Pt is on oxycodone-acetaminophen 5-325 q 8 hrs prn.   Pt reports pain meds wear off around 3-4 hours and she still is in pain.    Consider Oxycodone-acetaminophen 7.5- 325 mg q 4 hrs prn moderate pain.     Dyspnea:  Pt on O2 per NC.     Plan:   Will f/u with pt tomorrow concerning code status, hospice care.   See above for sym[joseline recs.           Thank you for your consult. I will follow-up with patient. Please contact us if you have any additional questions.    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  Per chart review, patient stated dyspnea progressively worsening for 2 weeks with intermittent non-productive cough, not on home O2.  Patient also endorses chronic right sided chest and back pain that is usually relieved with home narcotics.  Dyspnea worsens with exertion and is associated with fatigue, appetite change, weight loss, and night sweats.  Patient follows heme/onc outpatient and was initially started on Keytruda for Tx.  pt reports she is not taking Keytruda. Patient was scheduled for chemotherapy as she was deemed not a candidate for RT however patient refusing chemotherapy and discussing possibility of hospice care.  Of note, patient recently admitted for COVID 2 months ago.     In ED, patient afebrile and hypertensive with SBP of 160-180s.  Initially sats of 95% on RA however increasing dyspnea and placed on 2L NC.  WBC 6.9 with Hgb 13.  K 3.3 and BNP wnl.  EKG showing NSR.  CXR with right sided opacification more prominent at base concerning for PNA along with CA with small right  sided pleural effusion.  Patient given pain medication, azithromycin x1 and rocephin x1.      Hospital Course:  No notes on file    Interval History: Pt interested in hospice care home vs NH.     Past Medical History:   Diagnosis Date    Depression     Hypercholesteremia     Pneumonia     Stage 4 lung cancer     Thyroid disease        Past Surgical History:   Procedure Laterality Date     SECTION      ENDOBRONCHIAL ULTRASOUND N/A 2020    Procedure: ENDOBRONCHIAL ULTRASOUND (EBUS);  Surgeon: Karen Mills MD;  Location: [facility] OR [address] [place] [address];  Service: Endoscopy;  Laterality: N/A;    INSERTION OF TUNNELED CENTRAL VENOUS CATHETER (CVC) WITH SUBCUTANEOUS PORT N/A 2021    Procedure: [id number]-G-CATH;  Surgeon: Ray Foreman MD;  Location: [facility] OR [address] [place] [address];  Service: Vascular;  Laterality: N/A;       Review of patient's allergies indicates:   Allergen Reactions    Codeine Nausea And Vomiting       Medications:  Continuous Infusions:  Scheduled Meds:   acetaminophen  650 mg Oral TID    aspirin  81 mg Oral Daily    azithromycin  250 mg Oral Daily    cefTRIAXone (ROCEPHIN) IVPB  1 g Intravenous Q24H    divalproex  250 mg Oral TID    DULoxetine  20 mg Oral Daily    enoxaparin  40 mg Subcutaneous Daily    folic acid  1 mg Oral Daily    levothyroxine  112 mcg Oral Daily    sodium chloride 0.9%  1,000 mL Intravenous Once    venlafaxine  150 mg Oral Daily    vitamin D  1,000 Units Oral Daily     PRN Meds:albuterol, dextrose 10%, dextrose 10%, glucagon (human recombinant), glucose, glucose, hydrOXYzine HCL, melatonin, naloxone, ondansetron, oxyCODONE-acetaminophen, sodium chloride 0.9%, sodium chloride 0.9%, traMADoL    Family History     Problem Relation (Age of Onset)    Heart attack Mother    Liver cancer Father    Lung cancer Mother        Tobacco Use    Smoking status: Current Every Day Smoker     Packs/day: 0.10     Years: 40.00     Pack years: 4.00     Types: Cigarettes     Smokeless tobacco: Never Used   Substance and Sexual Activity    Alcohol use: Not Currently     Comment: social    Drug use: No    Sexual activity: Not Currently     Partners: Male     Birth control/protection: None       Review of Systems   Constitutional: Positive for appetite change and fatigue.   HENT: Negative for trouble swallowing.    Eyes: Negative.    Respiratory: Positive for cough and shortness of breath.    Cardiovascular: Negative for leg swelling.   Genitourinary: Negative.    Musculoskeletal: Positive for back pain.   Skin: Negative.    Neurological: Positive for weakness.   Psychiatric/Behavioral: Negative.      Objective:     Vital Signs (Most Recent):  Temp: 98.3 °F (36.8 °C) (02/09/22 0736)  Pulse: 61 (02/09/22 0736)  Resp: 16 (02/09/22 0921)  BP: 124/60 (02/09/22 0736)  SpO2: (!) 92 % (02/09/22 0736) Vital Signs (24h Range):  Temp:  [97.5 °F (36.4 °C)-98.6 °F (37 °C)] 98.3 °F (36.8 °C)  Pulse:  [54-80] 61  Resp:  [16-22] 16  SpO2:  [92 %-99 %] 92 %  BP: (124-180)/(60-76) 124/60     Weight: 69.5 kg (153 lb 3.5 oz)  Body mass index is 24 kg/m².    Physical Exam  Constitutional:       Interventions: Nasal cannula in place.   HENT:      Head: Normocephalic and atraumatic.   Eyes:      General: Lids are normal.      Conjunctiva/sclera: Conjunctivae normal.   Cardiovascular:      Rate and Rhythm: Normal rate and regular rhythm.   Pulmonary:      Effort: Pulmonary effort is normal. No respiratory distress.   Chest:      Chest wall: Tenderness present.      Comments: Tenderness on right side.   Abdominal:      General: Abdomen is flat.      Tenderness: There is no abdominal tenderness.   Musculoskeletal:      Cervical back: Full passive range of motion without pain and normal range of motion.      Comments: Normal ROM, weakness noted.    Skin:     General: Skin is warm and dry.   Neurological:      Mental Status: She is alert and oriented to person, place, and time.   Psychiatric:         Attention  and Perception: Attention normal.         Speech: Speech normal.         Behavior: Behavior is cooperative.         Review of Symptoms    Symptom Assessment (ESAS 0-10 Scale)  Pain:  8  Dyspnea:  0  Anxiety:  3  Nausea:  0  Depression:  0  Anorexia:  6  Fatigue:  0  Insomnia:  0  Restlessness:  0  Agitation:  0         Pain Assessment:  Location(s): back            Quality: sharp        Quantity: 8/10 in intensity        Alleviating Factors: opiates    Performance Status:  60    Living Arrangements:  Lives with family    Psychosocial/Cultural: Pt lives with her, Cedrick. Per pt, her house is filthy and she sleeps on the sofa. Per pt, son works during the day. Pt reports he has a sister who she speaks with often but sister is OOT a lot.     Spiritual:  F - [place] and Belief:  Pentecostalism        Advance Care Planning   Advance Directives:   Do Not Resuscitate Status: No    Medical Power of : No    Agent's Name:  Srinivasan Philip is NOK.     Decision Making:  Patient answered questions         Significant Labs: All pertinent labs within the past 24 hours have been reviewed.  CBC:   Recent Labs   Lab 02/09/22 0237   WBC 5.10   HGB 12.1   HCT 36.5*   MCV 81*        BMP:  Recent Labs   Lab 02/09/22 0237   GLU 99      K 3.6      CO2 26   BUN 7*   CREATININE 0.8   CALCIUM 9.5   MG 2.0  2.0     LFT:  Lab Results   Component Value Date    AST 19 02/08/2022    ALKPHOS 53 (L) 02/08/2022    BILITOT 0.3 02/08/2022     Albumin:   Albumin   Date Value Ref Range Status   02/08/2022 3.3 (L) 3.5 - 5.2 g/dL Final     Protein:   Total Protein   Date Value Ref Range Status   02/08/2022 8.2 6.0 - 8.4 g/dL Final     Lactic acid:   Lab Results   Component Value Date    LACTATE 1.6 11/05/2021    LACTATE 1.7 12/05/2019       Significant Imaging: I have reviewed all pertinent imaging results/findings within the past 24 hours.    25 minutes of advance care planning completed.     Maria Luz Reid, CNS  Palliative  Medicine  [facility] - Oncology ([facility])

## 2022-02-09 NOTE — ED NOTES
Telemetry Verification   Patient placed on Telemetry Box  Verified with War Room  Box # [id number]   Monitor Tech    Rate    Rhythm

## 2022-02-09 NOTE — SUBJECTIVE & OBJECTIVE
Interval History: Pt interested in hospice care home vs NH.     Past Medical History:   Diagnosis Date    Depression     Hypercholesteremia     Pneumonia     Stage 4 lung cancer     Thyroid disease        Past Surgical History:   Procedure Laterality Date     SECTION      ENDOBRONCHIAL ULTRASOUND N/A 2020    Procedure: ENDOBRONCHIAL ULTRASOUND (EBUS);  Surgeon: Karen Mills MD;  Location: [facility] OR [address] [place] [address];  Service: Endoscopy;  Laterality: N/A;    INSERTION OF TUNNELED CENTRAL VENOUS CATHETER (CVC) WITH SUBCUTANEOUS PORT N/A 2021    Procedure: [id number]-J-CATH;  Surgeon: Ray Foreman MD;  Location: [facility] OR [address] [place] [address];  Service: Vascular;  Laterality: N/A;       Review of patient's allergies indicates:   Allergen Reactions    Codeine Nausea And Vomiting       Medications:  Continuous Infusions:  Scheduled Meds:   acetaminophen  650 mg Oral TID    aspirin  81 mg Oral Daily    azithromycin  250 mg Oral Daily    cefTRIAXone (ROCEPHIN) IVPB  1 g Intravenous Q24H    divalproex  250 mg Oral TID    DULoxetine  20 mg Oral Daily    enoxaparin  40 mg Subcutaneous Daily    folic acid  1 mg Oral Daily    levothyroxine  112 mcg Oral Daily    sodium chloride 0.9%  1,000 mL Intravenous Once    venlafaxine  150 mg Oral Daily    vitamin D  1,000 Units Oral Daily     PRN Meds:albuterol, dextrose 10%, dextrose 10%, glucagon (human recombinant), glucose, glucose, hydrOXYzine HCL, melatonin, naloxone, ondansetron, oxyCODONE-acetaminophen, sodium chloride 0.9%, sodium chloride 0.9%, traMADoL    Family History     Problem Relation (Age of Onset)    Heart attack Mother    Liver cancer Father    Lung cancer Mother        Tobacco Use    Smoking status: Current Every Day Smoker     Packs/day: 0.10     Years: 40.00     Pack years: 4.00     Types: Cigarettes    Smokeless tobacco: Never Used   Substance and Sexual Activity    Alcohol use: Not Currently     Comment: social    Drug use: No    Sexual  activity: Not Currently     Partners: Male     Birth control/protection: None       Review of Systems   Constitutional: Positive for appetite change and fatigue.   HENT: Negative for trouble swallowing.    Eyes: Negative.    Respiratory: Positive for cough and shortness of breath.    Cardiovascular: Negative for leg swelling.   Genitourinary: Negative.    Musculoskeletal: Positive for back pain.   Skin: Negative.    Neurological: Positive for weakness.   Psychiatric/Behavioral: Negative.      Objective:     Vital Signs (Most Recent):  Temp: 98.3 °F (36.8 °C) (02/09/22 0736)  Pulse: 61 (02/09/22 0736)  Resp: 16 (02/09/22 0921)  BP: 124/60 (02/09/22 0736)  SpO2: (!) 92 % (02/09/22 0736) Vital Signs (24h Range):  Temp:  [97.5 °F (36.4 °C)-98.6 °F (37 °C)] 98.3 °F (36.8 °C)  Pulse:  [54-80] 61  Resp:  [16-22] 16  SpO2:  [92 %-99 %] 92 %  BP: (124-180)/(60-76) 124/60     Weight: 69.5 kg (153 lb 3.5 oz)  Body mass index is 24 kg/m².    Physical Exam  Constitutional:       Interventions: Nasal cannula in place.   HENT:      Head: Normocephalic and atraumatic.   Eyes:      General: Lids are normal.      Conjunctiva/sclera: Conjunctivae normal.   Cardiovascular:      Rate and Rhythm: Normal rate and regular rhythm.   Pulmonary:      Effort: Pulmonary effort is normal. No respiratory distress.   Chest:      Chest wall: Tenderness present.      Comments: Tenderness on right side.   Abdominal:      General: Abdomen is flat.      Tenderness: There is no abdominal tenderness.   Musculoskeletal:      Cervical back: Full passive range of motion without pain and normal range of motion.      Comments: Normal ROM, weakness noted.    Skin:     General: Skin is warm and dry.   Neurological:      Mental Status: She is alert and oriented to person, place, and time.   Psychiatric:         Attention and Perception: Attention normal.         Speech: Speech normal.         Behavior: Behavior is cooperative.         Review of  Symptoms    Symptom Assessment (ESAS 0-10 Scale)  Pain:  8  Dyspnea:  0  Anxiety:  3  Nausea:  0  Depression:  0  Anorexia:  6  Fatigue:  0  Insomnia:  0  Restlessness:  0  Agitation:  0         Pain Assessment:  Location(s): back            Quality: sharp        Quantity: 8/10 in intensity        Alleviating Factors: opiates    Performance Status:  60    Living Arrangements:  Lives with family    Psychosocial/Cultural: Pt lives with her, Cedrick. Per pt, her house is filthy and she sleeps on the sofa. Per pt, son works during the day. Pt reports he has a sister who she speaks with often but sister is OOT a lot.     Spiritual:  F - [place] and Belief:  Amish        Advance Care Planning   Advance Directives:   Do Not Resuscitate Status: No    Medical Power of : No    Agent's Name:  Srinivasan Philip is NOK.     Decision Making:  Patient answered questions         Significant Labs: All pertinent labs within the past 24 hours have been reviewed.  CBC:   Recent Labs   Lab 02/09/22 0237   WBC 5.10   HGB 12.1   HCT 36.5*   MCV 81*        BMP:  Recent Labs   Lab 02/09/22 0237   GLU 99      K 3.6      CO2 26   BUN 7*   CREATININE 0.8   CALCIUM 9.5   MG 2.0  2.0     LFT:  Lab Results   Component Value Date    AST 19 02/08/2022    ALKPHOS 53 (L) 02/08/2022    BILITOT 0.3 02/08/2022     Albumin:   Albumin   Date Value Ref Range Status   02/08/2022 3.3 (L) 3.5 - 5.2 g/dL Final     Protein:   Total Protein   Date Value Ref Range Status   02/08/2022 8.2 6.0 - 8.4 g/dL Final     Lactic acid:   Lab Results   Component Value Date    LACTATE 1.6 11/05/2021    LACTATE 1.7 12/05/2019       Significant Imaging: I have reviewed all pertinent imaging results/findings within the past 24 hours.

## 2022-02-09 NOTE — HOSPITAL COURSE
Home pain regimen restarted and patient placed on supplemental O2.  Palliative consulted and following.  Adjusting pain regimen for intractable pain during admission.  Patient discussing options on discharge of home or nursing home.  Abx changed from CTX/Azithromycin to Levaquin and completed course for CAP coverage.  PACE met with the family today and she is being discharged to inpatient hospice overnight 2/15-2/16. She will have 2 weeks of time there before transferring to home hospice.

## 2022-02-09 NOTE — ASSESSMENT & PLAN NOTE
Holding home atorvastatin for now.  GOC discussion to be had.  Palliative consulted, appreciate recs.

## 2022-02-09 NOTE — PROGRESS NOTES
[facility] - Oncology ([facility])  [facility] Medicine  Progress Note    Patient Name: Micheline Raza  MRN: [id number]  Patient Class: IP- Inpatient   Admission Date: 2/8/2022  Length of Stay: 1 days  Attending Physician: Yovani Sarmiento MD  Primary Care Provider: [facility]        Subjective:     Principal Problem:Adenocarcinoma of right lung, stage 4        HPI:  Ms. 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  Patient states dyspnea progressively worsening for 2 weeks with intermittent non-productive cough, not on home O2.  Patient also endorses chronic right sided chest and back pain that is usually relieved with home narcotics.  Dyspnea worsens with exertion and is associated with fatigue, appetite change, weight loss, and night sweats.  Patient follows heme/onc outpatient and was initially started on Keytruda for Tx.  Patient was scheduled for chemotherapy as she was deemed not a candidate for RT however patient refusing chemotherapy and discussing possibility of hospice care.  Of note, patient recently admitted for COVID 2 months ago.    In ED, patient afebrile and hypertensive with SBP of 160-180s.  Initially sats of 95% on RA however increasing dyspnea and placed on 2L NC.  WBC 6.9 with Hgb 13.  K 3.3 and BNP wnl.  EKG showing NSR.  CXR with right sided opacification more prominent at base concerning for PNA along with CA with small right sided pleural effusion.  Patient given pain medication, azithromycin x1 and rocephin x1.      Overview/Hospital Course:  Home pain regimen restarted and patient placed on supplemental O2.  Palliative consulted and following.  Adjusting pain regimen for intractable pain during admission.      Interval History: No acute events overnight.  Patient with continued pain of back side and  chest on right side, location of cancer.  Denies abdominal pain, n/v.  Patient notes muscle wasting and concerned about nutrition.    Review of Systems   Constitutional: Positive for appetite change and fatigue. Negative for fever.   HENT: Positive for trouble swallowing. Negative for congestion and sore throat.    Respiratory: Positive for cough (Non-productive) and shortness of breath.    Cardiovascular: Positive for chest pain (right sided). Negative for leg swelling.   Gastrointestinal: Negative for abdominal pain, constipation, diarrhea, nausea and vomiting.   Genitourinary: Negative for dysuria and hematuria.   Musculoskeletal: Positive for back pain (right sided). Negative for myalgias.   Skin: Negative for color change and rash.   Neurological: Positive for weakness. Negative for dizziness, light-headedness and headaches.   Psychiatric/Behavioral: Negative for agitation and confusion.     Objective:     Vital Signs (Most Recent):  Temp: 98.3 °F (36.8 °C) (02/09/22 0736)  Pulse: 66 (02/09/22 1205)  Resp: 18 (02/09/22 1205)  BP: 124/60 (02/09/22 0736)  SpO2: (!) 93 % (02/09/22 1205) Vital Signs (24h Range):  Temp:  [97.5 °F (36.4 °C)-98.3 °F (36.8 °C)] 98.3 °F (36.8 °C)  Pulse:  [54-80] 66  Resp:  [16-18] 18  SpO2:  [92 %-99 %] 93 %  BP: (124-180)/(60-76) 124/60     Weight: 69.5 kg (153 lb 3.5 oz)  Body mass index is 24 kg/m².  No intake or output data in the 24 hours ending 02/09/22 1311   Physical Exam  Vitals and nursing note reviewed.   Constitutional:       General: She is not in acute distress.     Appearance: She is underweight. She is ill-appearing.   HENT:      Head: Normocephalic and atraumatic.      Mouth/Throat:      Mouth: Mucous membranes are dry.      Pharynx: Oropharynx is clear. No oropharyngeal exudate or posterior oropharyngeal erythema.   Eyes:      General: No scleral icterus.     Extraocular Movements: Extraocular movements intact.      Conjunctiva/sclera: Conjunctivae normal.       Pupils: Pupils are equal, round, and reactive to light.   Cardiovascular:      Rate and Rhythm: Normal rate and regular rhythm.      Pulses: Normal pulses.   Pulmonary:      Effort: Tachypnea present.      Breath sounds: No rales.      Comments: Decrease breath sounds on right  Chest:      Chest wall: Tenderness (right side) present.   Abdominal:      General: Abdomen is flat. Bowel sounds are normal. There is no distension.      Palpations: Abdomen is soft.      Tenderness: There is no abdominal tenderness. There is no guarding.   Musculoskeletal:         General: No tenderness. Normal range of motion.      Cervical back: Normal range of motion and neck supple. No tenderness.      Right lower leg: No edema.      Left lower leg: No edema.   Skin:     General: Skin is warm and dry.   Neurological:      General: No focal deficit present.      Mental Status: She is alert and oriented to person, place, and time.      Motor: Weakness present.   Psychiatric:         Mood and Affect: Mood normal.         Behavior: Behavior normal. Behavior is cooperative.         Thought Content: Thought content normal.         Significant Labs:   All pertinent labs within the past 24 hours have been reviewed.  CBC:   Recent Labs   Lab 02/08/22  1245 02/09/22  0237   WBC 6.95 5.10   HGB 13.4 12.1   HCT 40.8 36.5*    288     CMP:   Recent Labs   Lab 02/08/22  1245 02/09/22  0237    141   K 3.3* 3.6    106   CO2 27 26   * 99   BUN 7* 7*   CREATININE 0.8 0.8   CALCIUM 10.1 9.5   PROT 8.2  --    ALBUMIN 3.3*  --    BILITOT 0.3  --    ALKPHOS 53*  --    AST 19  --    ALT 11  --    ANIONGAP 9 9   EGFRNONAA >60.0 >60.0     Magnesium:   Recent Labs   Lab 02/09/22  0237   MG 2.0  2.0       Significant Imaging: I have reviewed all pertinent imaging results/findings within the past 24 hours.      Assessment/Plan:      * Adenocarcinoma of right lung, stage 4  Patient with stage 4 adenocarcinoma of right lung with pleural  mets.  Follows outpatient heme/onc on Keytruda.  Refused chemotherapy and deemed not a candidate for RT.  Patient with severe cancer related pain as well as worsening dyspnea associated with fatigue, decreased appetite, weight loss, and night sweats.  Not on home O2.  CXR showing right base opacification with small right pleural effusion concerning for PNA vs CA.  S/p azithromycin x1 and rocephin x1.  No leukocytosis seen, procal wnl    - Supplemental O2 for sats 88-92% in setting of CA  - Continue CAP coverage with rocephin and azithromycin  - Home pain regimen of tramadol and percocet PRN  - Dilaudid PRN for severe breakthrough pain  - Palliative consulted for GOC/ACP, appreciate recs  - Plan for 6 minute walk test to assess for need of home O2 prior to discharge      Pneumonia involving right lung  See adenocarcinoma of right lung, stage 4      Cancer related pain    See adenocarcinoma of right lung, stage 4    Depression  Continue home anti-depressants      Hypokalemia  Replete PRN  Daily CMP      Thyroid disease  Continue home synthroid      HLD (hyperlipidemia)  Holding home atorvastatin for now.  GOC discussion to be had.  Palliative consulted, appreciate recs.        VTE Risk Mitigation (From admission, onward)         Ordered     enoxaparin injection 40 mg  Daily         02/08/22 1714     Place sequential compression device  Until discontinued         02/08/22 1714     IP VTE HIGH RISK PATIENT  Once         02/08/22 1707     IP VTE HIGH RISK PATIENT  Once         02/08/22 1714                Discharge Planning   HEAVENLY: 2/12/2022     Code Status: Full Code   Is the patient medically ready for discharge?:     Reason for patient still in hospital (select all that apply): Patient trending condition, Consult recommendations and Pending disposition                     Aniceto Heath MD  Department of Hospital Medicine   [facility] - Oncology ([facility])

## 2022-02-09 NOTE — H&P
[facility] - Emergency Dept  [facility] Medicine  History & Physical    Patient Name: Micheline Raza  MRN: [id number]  Patient Class: IP- Inpatient  Admission Date: 2/8/2022  Attending Physician: Yovani Sarmiento MD   Primary Care Provider: [facility]         Patient information was obtained from patient, past medical records and ER records.     Subjective:     Principal Problem:Adenocarcinoma of right lung, stage 4    Chief Complaint:   Chief Complaint   Patient presents with    Shortness of Breath     Hx lung cancer; increased SOB x 1 day; seen by PCP, told to come to ED for eval         HPI: Ms. 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  Patient states dyspnea progressively worsening for 2 weeks with intermittent non-productive cough, not on home O2.  Patient also endorses chronic right sided chest and back pain that is usually relieved with home narcotics.  Dyspnea worsens with exertion and is associated with fatigue, appetite change, weight loss, and night sweats.  Patient follows heme/onc outpatient and was initially started on Keytruda for Tx.  Patient was scheduled for chemotherapy as she was deemed not a candidate for RT however patient refusing chemotherapy and discussing possibility of hospice care.  Of note, patient recently admitted for COVID 2 months ago.    In ED, patient afebrile and hypertensive with SBP of 160-180s.  Initially sats of 95% on RA however increasing dyspnea and placed on 2L NC.  WBC 6.9 with Hgb 13.  K 3.3 and BNP wnl.  EKG showing NSR.  CXR with right sided opacification more prominent at base concerning for PNA along with CA with small right sided pleural effusion.  Patient given pain medication, azithromycin x1 and rocephin x1.      Past Medical History:   Diagnosis Date    Depression      Hypercholesteremia     Pneumonia     Stage 4 lung cancer     Thyroid disease        Past Surgical History:   Procedure Laterality Date     SECTION      ENDOBRONCHIAL ULTRASOUND N/A 2020    Procedure: ENDOBRONCHIAL ULTRASOUND (EBUS);  Surgeon: Karen Mills MD;  Location: [facility] OR [address] [place] [address];  Service: Endoscopy;  Laterality: N/A;    INSERTION OF TUNNELED CENTRAL VENOUS CATHETER (CVC) WITH SUBCUTANEOUS PORT N/A 2021    Procedure: [id number];  Surgeon: Ray Foreman MD;  Location: [facility] OR [address] [place] [address];  Service: Vascular;  Laterality: N/A;       Review of patient's allergies indicates:   Allergen Reactions    Codeine Nausea And Vomiting       No current facility-administered medications on file prior to encounter.     Current Outpatient Medications on File Prior to Encounter   Medication Sig    albuterol (PROVENTIL/VENTOLIN HFA) 90 mcg/actuation inhaler Inhale 2 puffs into the lungs every 6 (six) hours as needed for Wheezing. Rescue    aspirin (ECOTRIN) 81 MG EC tablet Take 81 mg by mouth once daily.    divalproex (DEPAKOTE) 250 MG EC tablet Take 250 mg by mouth 3 (three) times daily.    DULoxetine (CYMBALTA) 20 MG capsule Take 20 mg by mouth once daily.    folic acid (FOLVITE) 1 MG tablet Take 1 tablet (1 mg total) by mouth once daily.    levocetirizine (XYZAL) 5 MG tablet Take 5 mg by mouth every evening.    levothyroxine (SYNTHROID) 112 MCG tablet Take 1 tablet (112 mcg total) by mouth once daily.    ondansetron (ZOFRAN-ODT) 8 MG TbDL Dissolve 1 tablet (8 mg total) by mouth every 8 (eight) hours as needed (Nausea).    oxyCODONE-acetaminophen (PERCOCET) 5-325 mg per tablet Take 1 tablet by mouth every 8 (eight) hours as needed for Pain.    pravastatin (PRAVACHOL) 40 MG tablet Take 1 tablet (40 mg total) by mouth once daily.    risperiDONE (RISPERDAL) 0.5 MG Tab Take 0.5 mg by mouth.     traMADoL (ULTRAM) 50 mg tablet Take 1 tablet (50 mg total) by mouth every 6 (six) hours  as needed for Pain (moderate).    venlafaxine (EFFEXOR-XR) 150 MG Cp24 Take 150 mg by mouth once daily.    vitamin D (VITAMIN D3) 1000 units Tab Take 1 tablet (1,000 Units total) by mouth once daily.    [DISCONTINUED] multivitamin capsule Take 1 capsule by mouth once daily.     Family History     Problem Relation (Age of Onset)    Heart attack Mother    Liver cancer Father    Lung cancer Mother        Tobacco Use    Smoking status: Current Every Day Smoker     Packs/day: 0.10     Years: 40.00     Pack years: 4.00     Types: Cigarettes    Smokeless tobacco: Never Used   Substance and Sexual Activity    Alcohol use: Not Currently     Comment: social    Drug use: No    Sexual activity: Not Currently     Partners: Male     Birth control/protection: None     Review of Systems   Constitutional: Positive for appetite change, diaphoresis (Night sweats) and fatigue. Negative for fever.   HENT: Positive for trouble swallowing. Negative for congestion and sore throat.    Eyes: Negative for pain and visual disturbance.   Respiratory: Positive for cough (Non-productive) and shortness of breath.    Cardiovascular: Positive for chest pain (right sided) and palpitations. Negative for leg swelling.   Gastrointestinal: Negative for abdominal pain, constipation, diarrhea, nausea and vomiting.   Genitourinary: Positive for frequency. Negative for dysuria and hematuria.   Musculoskeletal: Positive for back pain (right sided). Negative for myalgias.   Skin: Negative for color change and rash.   Neurological: Positive for weakness. Negative for dizziness, light-headedness and headaches.   Psychiatric/Behavioral: Negative for agitation and confusion.     Objective:     Vital Signs (Most Recent):  Temp: 98 °F (36.7 °C) (02/08/22 1712)  Pulse: (!) 58 (02/08/22 1712)  Resp: 16 (02/08/22 1712)  BP: (!) 146/65 (02/08/22 1712)  SpO2: 96 % (02/08/22 1712) Vital Signs (24h Range):  Temp:  [98 °F (36.7 °C)-98.6 °F (37 °C)] 98 °F (36.7  °C)  Pulse:  [54-67] 58  Resp:  [16-22] 16  SpO2:  [95 %-99 %] 96 %  BP: (146-180)/(65-76) 146/65     Weight: 64.9 kg (143 lb)  Body mass index is 23.08 kg/m².    Physical Exam  Vitals and nursing note reviewed.   Constitutional:       General: She is not in acute distress.     Appearance: She is underweight. She is ill-appearing.   HENT:      Head: Normocephalic and atraumatic.      Mouth/Throat:      Mouth: Mucous membranes are dry.      Pharynx: Oropharynx is clear. No oropharyngeal exudate or posterior oropharyngeal erythema.   Eyes:      General: No scleral icterus.     Extraocular Movements: Extraocular movements intact.      Conjunctiva/sclera: Conjunctivae normal.      Pupils: Pupils are equal, round, and reactive to light.   Cardiovascular:      Rate and Rhythm: Normal rate and regular rhythm.      Pulses: Normal pulses.   Pulmonary:      Effort: Tachypnea present.      Breath sounds: No rales.      Comments: Decrease breath sounds on right  Abdominal:      General: Abdomen is flat. Bowel sounds are normal. There is no distension.      Palpations: Abdomen is soft.      Tenderness: There is no abdominal tenderness. There is no guarding.   Musculoskeletal:         General: No tenderness. Normal range of motion.      Cervical back: Normal range of motion and neck supple. No tenderness.      Right lower leg: No edema.      Left lower leg: No edema.   Skin:     General: Skin is warm and dry.   Neurological:      General: No focal deficit present.      Mental Status: She is alert and oriented to person, place, and time.   Psychiatric:         Mood and Affect: Mood normal.         Behavior: Behavior normal. Behavior is cooperative.         Thought Content: Thought content normal.           CRANIAL NERVES     CN III, IV, VI   Pupils are equal, round, and reactive to light.       Significant Labs:   All pertinent labs within the past 24 hours have been reviewed.  CBC:   Recent Labs   Lab 02/08/22  1245   WBC 6.95    HGB 13.4   HCT 40.8        CMP:   Recent Labs   Lab 02/08/22  1245      K 3.3*      CO2 27   *   BUN 7*   CREATININE 0.8   CALCIUM 10.1   PROT 8.2   ALBUMIN 3.3*   BILITOT 0.3   ALKPHOS 53*   AST 19   ALT 11   ANIONGAP 9   EGFRNONAA >60.0     Urine Studies: No results for input(s): COLORU, APPEARANCEUA, PHUR, SPECGRAV, PROTEINUA, GLUCUA, KETONESU, BILIRUBINUA, OCCULTUA, NITRITE, UROBILINOGEN, LEUKOCYTESUR, RBCUA, WBCUA, BACTERIA, SQUAMEPITHEL, HYALINECASTS in the last 48 hours.    Invalid input(s): WRIGHTSUR    Significant Imaging: I have reviewed all pertinent imaging results/findings within the past 24 hours.  CXR: I have reviewed all pertinent results/findings within the past 24 hours and my personal findings are:  Right sided opacification more prominent at base with small right side pleural effusion    Assessment/Plan:     Pneumonia involving right lung  See adenocarcinoma of right lung, stage 4      Cancer related pain    See adenocarcinoma of right lung, stage 4    Adenocarcinoma of right lung, stage 4  Patient with stage 4 adenocarcinoma of right lung with pleural mets.  Follows outpatient heme/onc on Keytruda.  Refused chemotherapy and deemed not a candidate for RT.  Patient with severe cancer related pain as well as worsening dyspnea associated with fatigue, decreased appetite, weight loss, and night sweats.  Not on home O2.  CXR showing right base opacification with small right pleural effusion concerning for PNA vs CA.  S/p azithromycin x1 and rocephin x1.  No leukocytosis seen    - Supplemental O2 for sats 88-92% in setting of CA  - Continue CAP coverage with rocephin and azithromycin  - Morphine PRN  - Dilaudid PRN for severe breakthrough pain  - scheduled Tylenol 650mg TID  - Palliative consulted for GOC/ACP, appreciate recs  - F/u procal      Depression  Continue home anti-depressants      Hypokalemia  Replete PRN  Daily CMP      Thyroid disease  Continue home  synthroid      HLD (hyperlipidemia)  Holding home atorvastatin for now.  GOC discussion to be had.  Palliative consulted, appreciate recs.        VTE Risk Mitigation (From admission, onward)         Ordered     enoxaparin injection 40 mg  Daily         02/08/22 1714     Place sequential compression device  Until discontinued         02/08/22 1714     IP VTE HIGH RISK PATIENT  Once         02/08/22 1707     IP VTE HIGH RISK PATIENT  Once         02/08/22 1714                   Aniceto Heath MD  Department of Hospital Medicine   [facility] - Emergency Dept

## 2022-02-09 NOTE — ED NOTES
Pt refused tylenol and morphine and reports she usually takes percocet and tramadol for pain. MD Whitfield notified

## 2022-02-10 LAB
MAGNESIUM SERPL-MCNC: 1.9 MG/DL (ref 1.6–2.6)
PHOSPHATE SERPL-MCNC: 3.8 MG/DL (ref 2.7–4.5)

## 2022-02-10 PROCEDURE — 25000242 PHARM REV CODE 250 ALT 637 W/ HCPCS

## 2022-02-10 PROCEDURE — 27000221 HC OXYGEN, UP TO 24 HOURS

## 2022-02-10 PROCEDURE — 94760 N-INVAS EAR/PLS OXIMETRY 1: CPT

## 2022-02-10 PROCEDURE — 25000003 PHARM REV CODE 250

## 2022-02-10 PROCEDURE — 25000003 PHARM REV CODE 250: Performed by: STUDENT IN AN ORGANIZED HEALTH CARE EDUCATION/TRAINING PROGRAM

## 2022-02-10 PROCEDURE — 99232 SBSQ HOSP IP/OBS MODERATE 35: CPT | Mod: GC,,, | Performed by: STUDENT IN AN ORGANIZED HEALTH CARE EDUCATION/TRAINING PROGRAM

## 2022-02-10 PROCEDURE — 63600175 PHARM REV CODE 636 W HCPCS: Performed by: STUDENT IN AN ORGANIZED HEALTH CARE EDUCATION/TRAINING PROGRAM

## 2022-02-10 PROCEDURE — 63600175 PHARM REV CODE 636 W HCPCS

## 2022-02-10 PROCEDURE — 84100 ASSAY OF PHOSPHORUS: CPT

## 2022-02-10 PROCEDURE — 83735 ASSAY OF MAGNESIUM: CPT

## 2022-02-10 PROCEDURE — 99900035 HC TECH TIME PER 15 MIN (STAT)

## 2022-02-10 PROCEDURE — 63700000 PHARM REV CODE 250 ALT 637 W/O HCPCS

## 2022-02-10 PROCEDURE — 99232 PR SUBSEQUENT HOSPITAL CARE,LEVL II: ICD-10-PCS | Mod: GC,,, | Performed by: STUDENT IN AN ORGANIZED HEALTH CARE EDUCATION/TRAINING PROGRAM

## 2022-02-10 PROCEDURE — 20600001 HC STEP DOWN PRIVATE ROOM

## 2022-02-10 PROCEDURE — 99233 SBSQ HOSP IP/OBS HIGH 50: CPT | Mod: ,,, | Performed by: CLINICAL NURSE SPECIALIST

## 2022-02-10 PROCEDURE — 99233 PR SUBSEQUENT HOSPITAL CARE,LEVL III: ICD-10-PCS | Mod: ,,, | Performed by: CLINICAL NURSE SPECIALIST

## 2022-02-10 PROCEDURE — 36415 COLL VENOUS BLD VENIPUNCTURE: CPT

## 2022-02-10 RX ORDER — LEVOFLOXACIN 750 MG/1
750 TABLET ORAL DAILY
Status: COMPLETED | OUTPATIENT
Start: 2022-02-11 | End: 2022-02-12

## 2022-02-10 RX ORDER — OXYCODONE HYDROCHLORIDE 10 MG/1
10 TABLET ORAL EVERY 4 HOURS PRN
Status: DISCONTINUED | OUTPATIENT
Start: 2022-02-10 | End: 2022-02-16 | Stop reason: HOSPADM

## 2022-02-10 RX ORDER — OXYCODONE HYDROCHLORIDE 5 MG/1
5 TABLET ORAL EVERY 4 HOURS PRN
Status: DISCONTINUED | OUTPATIENT
Start: 2022-02-10 | End: 2022-02-16 | Stop reason: HOSPADM

## 2022-02-10 RX ORDER — HYDROMORPHONE HYDROCHLORIDE 1 MG/ML
1 INJECTION, SOLUTION INTRAMUSCULAR; INTRAVENOUS; SUBCUTANEOUS EVERY 6 HOURS PRN
Status: DISCONTINUED | OUTPATIENT
Start: 2022-02-10 | End: 2022-02-16 | Stop reason: HOSPADM

## 2022-02-10 RX ORDER — AMOXICILLIN 250 MG
1 CAPSULE ORAL DAILY
Status: DISCONTINUED | OUTPATIENT
Start: 2022-02-10 | End: 2022-02-12

## 2022-02-10 RX ADMIN — Medication 1000 UNITS: at 08:02

## 2022-02-10 RX ADMIN — DOCUSATE SODIUM AND SENNOSIDES 1 TABLET: 8.6; 5 TABLET, FILM COATED ORAL at 12:02

## 2022-02-10 RX ADMIN — OXYCODONE HYDROCHLORIDE 10 MG: 10 TABLET ORAL at 05:02

## 2022-02-10 RX ADMIN — ENOXAPARIN SODIUM 40 MG: 100 INJECTION SUBCUTANEOUS at 05:02

## 2022-02-10 RX ADMIN — DIVALPROEX SODIUM 250 MG: 250 TABLET, DELAYED RELEASE ORAL at 02:02

## 2022-02-10 RX ADMIN — DIVALPROEX SODIUM 250 MG: 250 TABLET, DELAYED RELEASE ORAL at 09:02

## 2022-02-10 RX ADMIN — VENLAFAXINE HYDROCHLORIDE 150 MG: 75 CAPSULE, EXTENDED RELEASE ORAL at 08:02

## 2022-02-10 RX ADMIN — AZITHROMYCIN MONOHYDRATE 250 MG: 250 TABLET ORAL at 12:02

## 2022-02-10 RX ADMIN — OXYCODONE HYDROCHLORIDE 10 MG: 10 TABLET ORAL at 02:02

## 2022-02-10 RX ADMIN — DIVALPROEX SODIUM 250 MG: 250 TABLET, DELAYED RELEASE ORAL at 08:02

## 2022-02-10 RX ADMIN — FOLIC ACID 1 MG: 1 TABLET ORAL at 08:02

## 2022-02-10 RX ADMIN — LEVOTHYROXINE SODIUM 112 MCG: 112 TABLET ORAL at 05:02

## 2022-02-10 RX ADMIN — OXYCODONE AND ACETAMINOPHEN 1 TABLET: 7.5; 325 TABLET ORAL at 08:02

## 2022-02-10 RX ADMIN — DULOXETINE HYDROCHLORIDE 20 MG: 20 CAPSULE, DELAYED RELEASE ORAL at 08:02

## 2022-02-10 RX ADMIN — CEFTRIAXONE 1 G: 1 INJECTION, POWDER, FOR SOLUTION INTRAMUSCULAR; INTRAVENOUS at 12:02

## 2022-02-10 RX ADMIN — ASPIRIN 81 MG: 81 TABLET, COATED ORAL at 08:02

## 2022-02-10 RX ADMIN — OXYCODONE HYDROCHLORIDE 10 MG: 10 TABLET ORAL at 09:02

## 2022-02-10 RX ADMIN — ALBUTEROL SULFATE 2 PUFF: 108 INHALANT RESPIRATORY (INHALATION) at 08:02

## 2022-02-10 RX ADMIN — HYDROMORPHONE HYDROCHLORIDE 1 MG: 1 INJECTION, SOLUTION INTRAMUSCULAR; INTRAVENOUS; SUBCUTANEOUS at 11:02

## 2022-02-10 NOTE — PT/OT/SLP PROGRESS
"Occupational Therapy      Patient Name:  Micheline Raza   MRN:  [id number]    Patient not seen today secondary to patient unwilling to participate due to abdominal pain on right side.  "I'm not feeling too well, I don't want to do therapy today". Will follow-up 2/11/22.    2/10/2022  "

## 2022-02-10 NOTE — PROGRESS NOTES
"[facility] - Oncology ([facility])  Palliative Medicine  Progress Note    Patient Name: Micheline Raza  MRN: [id number]  Admission Date: 2/8/2022  Hospital Length of Stay: 2 days  Code Status: Full Code   Attending Provider: Yovani Sarmiento MD  Consulting Provider: VIOLETTA Szymanski  Primary Care Physician: Jose Rees [facility]  Principal Problem:Adenocarcinoma of right lung, stage 4    Patient information was obtained from patient and primary team.      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  Patient stateed dyspnea progressively worsening for 2 weeks with intermittent non-productive cough, not on home O2.  Patient also endorses chronic right sided chest and back pain that is usually relieved with home narcotics.  Pt is alert, oriented to person, place, and situation. Pt is a full code.     Reason for consult: Goals of care/advacne care planning:     Today: Met with pt who reports she still needs to speak to son about hospice at home of hospice at NH. Pt did have questions about NH hospice. Questions answered.       2/10/22  Met with pt along with Marian Persaud LCSW. Per pt, she does not want to do chemo. Pt reports she does not always take her regular meds but does cristi her pain meds. Pt reports she does not want to go back home due to house beinf "filthy" and no help at home.  NH with hospice care discussed. Pt had questions about her disability check and if NH will take it. Per SW, they take the check for room and board and she woul have Long-term Medicaid. Per pt, she needs to think about this due to she helps with the bills. Pt reports he will speak to her son.     Code status discussed. DNR status discussed. Pt wants to think about decision and we will re-visit " tomorrow.   MPOA: Pt' son, Cedrick is NOK. MPOA education done. Pt reports she may want her sister to be MPOA but will think about.     Symptom management:   Pt reports sharp pain to back area and right side of chest area. Pt reports pain 10/10 and with pain meds goes to 8/10.     Would have oxycodone 5 mg q 4 hrs moderate pain.   Would have oxycodone 10 mg q 4 hrs severe pain.       Pt has dilaudid 1 mg IVP q 6 hrs prn -  Rec: Use only if pain not relieved by PO meds.     Would d/c percocer  Dyspnea:  Pt on O2 per NC.     Plan:   Will f/u with pt tomorrow concerning code status, hospice care.   See above for sym[joseline recs.   Communicated with IM team and Dr. Michele with [facility].           I will follow-up with patient. Please contact us if you have any additional questions.    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  Per chart review, patient stated dyspnea progressively worsening for 2 weeks with intermittent non-productive cough, not on home O2.  Patient also endorses chronic right sided chest and back pain that is usually relieved with home narcotics.  Dyspnea worsens with exertion and is associated with fatigue, appetite change, weight loss, and night sweats.  Patient follows heme/onc outpatient and was initially started on Keytruda for Tx.  pt reports she is not taking Keytruda. Patient was scheduled for chemotherapy as she was deemed not a candidate for RT however patient refusing chemotherapy and discussing possibility of hospice care.  Of note, patient recently admitted for COVID 2 months ago.     In ED, patient afebrile and hypertensive with SBP of  160-180s.  Initially sats of 95% on RA however increasing dyspnea and placed on 2L NC.  WBC 6.9 with Hgb 13.  K 3.3 and BNP wnl.  EKG showing NSR.  CXR with right sided opacification more prominent at base concerning for PNA along with CA with small right sided pleural effusion.  Patient given pain medication, azithromycin x1 and rocephin x1.      Hospital Course:  No notes on file    Interval History: Pt interested in hospice care home vs NH.     Past Medical History:   Diagnosis Date    Depression     Hypercholesteremia     Pneumonia     Stage 4 lung cancer     Thyroid disease        Past Surgical History:   Procedure Laterality Date     SECTION      ENDOBRONCHIAL ULTRASOUND N/A 2020    Procedure: ENDOBRONCHIAL ULTRASOUND (EBUS);  Surgeon: Karen Mills MD;  Location: [facility] OR [address] [place] [address];  Service: Endoscopy;  Laterality: N/A;    INSERTION OF TUNNELED CENTRAL VENOUS CATHETER (CVC) WITH SUBCUTANEOUS PORT N/A 2021    Procedure: CREOUAQIU-TEMO-E-CATH;  Surgeon: Ray Foreman MD;  Location: [facility] OR [address] [place] [address];  Service: Vascular;  Laterality: N/A;       Review of patient's allergies indicates:   Allergen Reactions    Codeine Nausea And Vomiting       Medications:  Continuous Infusions:  Scheduled Meds:   aspirin  81 mg Oral Daily    azithromycin  250 mg Oral Daily    cefTRIAXone (ROCEPHIN) IVPB  1 g Intravenous Q24H    divalproex  250 mg Oral TID    DULoxetine  20 mg Oral Daily    enoxaparin  40 mg Subcutaneous Daily    folic acid  1 mg Oral Daily    levothyroxine  112 mcg Oral Daily    senna-docusate 8.6-50 mg  1 tablet Oral Daily    venlafaxine  150 mg Oral Daily    vitamin D  1,000 Units Oral Daily     PRN Meds:albuterol, dextrose 10%, dextrose 10%, glucagon (human recombinant), glucose, glucose, HYDROmorphone, hydrOXYzine HCL, melatonin, naloxone, ondansetron, oxyCODONE, oxyCODONE, sodium chloride 0.9%, sodium chloride 0.9%    Family History     Problem Relation (Age of Onset)     Heart attack Mother    Liver cancer Father    Lung cancer Mother        Tobacco Use    Smoking status: Current Every Day Smoker     Packs/day: 0.10     Years: 40.00     Pack years: 4.00     Types: Cigarettes    Smokeless tobacco: Never Used   Substance and Sexual Activity    Alcohol use: Not Currently     Comment: social    Drug use: No    Sexual activity: Not Currently     Partners: Male     Birth control/protection: None       Review of Systems   Constitutional: Positive for appetite change and fatigue.   HENT: Negative for trouble swallowing.    Eyes: Negative.    Respiratory: Positive for cough and shortness of breath.    Cardiovascular: Negative for leg swelling.   Genitourinary: Negative.    Musculoskeletal: Positive for back pain.   Skin: Negative.    Neurological: Positive for weakness.   Psychiatric/Behavioral: Negative.      Objective:     Vital Signs (Most Recent):  Temp: 97.6 °F (36.4 °C) (02/10/22 1127)  Pulse: 61 (02/10/22 1127)  Resp: 18 (02/10/22 1127)  BP: 138/66 (02/10/22 1127)  SpO2: 95 % (02/10/22 1127) Vital Signs (24h Range):  Temp:  [97.4 °F (36.3 °C)-98.4 °F (36.9 °C)] 97.6 °F (36.4 °C)  Pulse:  [59-68] 61  Resp:  [16-24] 18  SpO2:  [85 %-96 %] 95 %  BP: (118-148)/(59-69) 138/66     Weight: 69.5 kg (153 lb 3.5 oz)  Body mass index is 24 kg/m².    Physical Exam  Constitutional:       Interventions: Nasal cannula in place.   HENT:      Head: Normocephalic and atraumatic.   Eyes:      General: Lids are normal.      Conjunctiva/sclera: Conjunctivae normal.   Cardiovascular:      Rate and Rhythm: Normal rate and regular rhythm.   Pulmonary:      Effort: Pulmonary effort is normal. No respiratory distress.   Chest:      Chest wall: Tenderness present.      Comments: Tenderness on right side.   Abdominal:      General: Abdomen is flat.      Tenderness: There is no abdominal tenderness.   Musculoskeletal:      Cervical back: Full passive range of motion without pain and normal range of motion.       Comments: Normal ROM, weakness noted.    Skin:     General: Skin is warm and dry.   Neurological:      Mental Status: She is alert and oriented to person, place, and time.   Psychiatric:         Attention and Perception: Attention normal.         Speech: Speech normal.         Behavior: Behavior is cooperative.         Review of Symptoms    Symptom Assessment (ESAS 0-10 Scale)  Pain:  8  Dyspnea:  0  Anxiety:  3  Nausea:  0  Depression:  0  Anorexia:  6  Fatigue:  0  Insomnia:  0  Restlessness:  0  Agitation:  0         Pain Assessment:  Location(s): back            Quality: sharp        Quantity: 8/10 in intensity        Alleviating Factors: opiates    Performance Status:  60    Living Arrangements:  Lives with family    Psychosocial/Cultural: Pt lives with her, Cedrick. Per pt, her house is filthy and she sleeps on the sofa. Per pt, son works during the day. Pt reports he has a sister who she speaks with often but sister is OOT a lot.     Spiritual:  F - Balbina and Belief:  Cheondoism        Advance Care Planning   Advance Directives:   Do Not Resuscitate Status: No    Medical Power of : No    Agent's Name:  Srinivasan Philip is NOK.     Decision Making:  Patient answered questions         Significant Labs: All pertinent labs within the past 24 hours have been reviewed.  CBC:   Recent Labs   Lab 02/09/22  0237   WBC 5.10   HGB 12.1   HCT 36.5*   MCV 81*        BMP:  Recent Labs   Lab 02/10/22  0412   MG 1.9     LFT:  Lab Results   Component Value Date    AST 19 02/08/2022    ALKPHOS 53 (L) 02/08/2022    BILITOT 0.3 02/08/2022     Albumin:   Albumin   Date Value Ref Range Status   02/08/2022 3.3 (L) 3.5 - 5.2 g/dL Final     Protein:   Total Protein   Date Value Ref Range Status   02/08/2022 8.2 6.0 - 8.4 g/dL Final     Lactic acid:   Lab Results   Component Value Date    LACTATE 1.6 11/05/2021    LACTATE 1.7 12/05/2019       Significant Imaging: I have reviewed all pertinent imaging results/findings within  the past 24 hours.    > 50% of   35 min visit spent in chart review, face to face discussion of goals of care,  symptom assessment, coordination of care and emotional support  Maria Luz Reid, CNS  Palliative Medicine  [facility] - Oncology ([facility])

## 2022-02-10 NOTE — PLAN OF CARE
SW spoke with patient earlier today concerning Hospice/NH placement and if she decides on NP, she will have to turn over her SS check to them and left with $26-$36 dollars after paying the cost for being there in the facility. Patient reported, she still is undecided about which option to choose; with Hospice Home she can still retain her SS check and with Hospice/NH placement is unable to keep the monies to sustain the cost of her current household.  Pt report she will let SW know on her decision. SW will follow up on patient.      Argentina Colin, KIMBERLY  PRN - [facility]  [phone]

## 2022-02-10 NOTE — PLAN OF CARE
Patient alert verbally responsive denies SOB at this time o2 at 2L via prongs.Ambulatory with  use of cane and stand by assist from staff related to unsteady gait and generalized weakness. Wearing  socks appetite is good diet has been changed to regular diet with no problems noted. Will continue to observe and address concerns as needed.

## 2022-02-10 NOTE — ASSESSMENT & PLAN NOTE
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  Patient stateed dyspnea progressively worsening for 2 weeks with intermittent non-productive cough, not on home O2.  Patient also endorses chronic right sided chest and back pain that is usually relieved with home narcotics.  Pt is alert, oriented to person, place, and situation. Pt is a full code.     Reason for consult: Goals of care/advacne care planning:     Today: Met with pt who reports she still needs to speak to son about hospice at home of hospice at NH. Pt did have questions about NH hospice. Questions answered.       2/10/22  Met with pt along with Marian Persaud LCSW. Per pt, she does not want to do chemo. Pt reports she does not always take her regular meds but does cristi her pain meds. Pt reports she does not want to go back home due to house beinf "filthy" and no help at home.  NH with hospice care discussed. Pt had questions about her disability check and if NH will take it. Per SW, they take the check for room and board and she woul have Long-term Medicaid. Per pt, she needs to think about this due to she helps with the bills. Pt reports he will speak to her son.     Code status discussed. DNR status discussed. Pt wants to think about decision and we will re-visit tomorrow.   MPOA: Pt' son, Cedrick is NOK. MPOA education done. Pt reports she may want her sister to be MPOA but will think about.     Symptom management:   Pt reports sharp pain to back area and right side of chest area. Pt reports pain 10/10 and with pain meds goes to 8/10.     Would have oxycodone 5 mg q 4 hrs moderate pain.   Would have oxycodone 10 mg q 4 hrs severe pain.       Pt has dilaudid 1 mg IVP q 6 hrs prn -  Rec: Use only if pain not relieved by PO meds.     Would d/c percocer  Dyspnea:  Pt on O2 per NC. "     Plan:   Will f/u with pt tomorrow concerning code status, hospice care.   See above for sym[joseline recs.   Communicated with IM team and Dr. Michele with [facility].

## 2022-02-10 NOTE — ASSESSMENT & PLAN NOTE
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  Follows outpatient heme/onc on Keytruda.  Refused chemotherapy and deemed not a candidate for RT.  Patient with severe cancer related pain as well as worsening dyspnea associated with fatigue, decreased appetite, weight loss, and night sweats.  Not on home O2.  CXR showing right base opacification with small right pleural effusion concerning for PNA vs CA.  S/p azithromycin x1 and rocephin x1.  No leukocytosis seen, procal wnl    - Supplemental O2 for sats 88-92% in setting of CA  - Transition Abx to Levaquin for 2 days for total of 5 days treatment  - Home pain regimen of tramadol and percocet PRN  - Dilaudid PRN for severe breakthrough pain  - Palliative consulted for GOC/ACP, appreciate recs  - Patient with desat on 6MWT likely will need home O2 on discharge

## 2022-02-10 NOTE — SUBJECTIVE & OBJECTIVE
Interval History: No acute events overnight.  Patient states with continued pain however improved s/p PRN.  Reports dyspnea and weakness.  Denies chest pain, abdominal pain, n/v/f/c.    Review of Systems   Constitutional: Positive for appetite change and fatigue. Negative for chills and fever.   HENT: Positive for trouble swallowing. Negative for congestion and sore throat.    Respiratory: Positive for cough (Non-productive) and shortness of breath.    Cardiovascular: Negative for chest pain and leg swelling.   Gastrointestinal: Negative for abdominal pain, constipation, diarrhea, nausea and vomiting.   Genitourinary: Negative for dysuria and hematuria.   Musculoskeletal: Positive for back pain (right sided). Negative for myalgias.   Skin: Negative for color change and rash.   Neurological: Positive for weakness. Negative for dizziness, light-headedness and headaches.   Psychiatric/Behavioral: Negative for agitation and confusion.     Objective:     Vital Signs (Most Recent):  Temp: 97.6 °F (36.4 °C) (02/10/22 1127)  Pulse: 61 (02/10/22 1127)  Resp: 18 (02/10/22 1127)  BP: 138/66 (02/10/22 1127)  SpO2: 95 % (02/10/22 1127) Vital Signs (24h Range):  Temp:  [97.4 °F (36.3 °C)-98.4 °F (36.9 °C)] 97.6 °F (36.4 °C)  Pulse:  [59-68] 61  Resp:  [16-24] 18  SpO2:  [85 %-96 %] 95 %  BP: (118-148)/(59-69) 138/66     Weight: 69.5 kg (153 lb 3.5 oz)  Body mass index is 24 kg/m².    Intake/Output Summary (Last 24 hours) at 2/10/2022 1337  Last data filed at 2/10/2022 0920  Gross per 24 hour   Intake 800 ml   Output --   Net 800 ml      Physical Exam  Vitals and nursing note reviewed.   Constitutional:       General: She is not in acute distress.     Appearance: She is underweight. She is ill-appearing.   HENT:      Head: Normocephalic and atraumatic.      Mouth/Throat:      Mouth: Mucous membranes are dry.      Pharynx: Oropharynx is clear. No oropharyngeal exudate or posterior oropharyngeal erythema.   Eyes:      General: No  scleral icterus.     Extraocular Movements: Extraocular movements intact.      Conjunctiva/sclera: Conjunctivae normal.      Pupils: Pupils are equal, round, and reactive to light.   Cardiovascular:      Rate and Rhythm: Normal rate and regular rhythm.      Pulses: Normal pulses.   Pulmonary:      Effort: Tachypnea present.      Breath sounds: No rales.      Comments: Decrease breath sounds on right  Abdominal:      General: Abdomen is flat. Bowel sounds are normal. There is no distension.      Palpations: Abdomen is soft.      Tenderness: There is no abdominal tenderness. There is no guarding.   Musculoskeletal:         General: No tenderness. Normal range of motion.      Cervical back: Normal range of motion and neck supple. No tenderness.      Right lower leg: No edema.      Left lower leg: No edema.   Skin:     General: Skin is warm and dry.   Neurological:      General: No focal deficit present.      Mental Status: She is alert and oriented to person, place, and time.      Motor: Weakness present.   Psychiatric:         Mood and Affect: Mood normal.         Behavior: Behavior normal. Behavior is cooperative.         Thought Content: Thought content normal.         Significant Labs:   All pertinent labs within the past 24 hours have been reviewed.  CBC:   Recent Labs   Lab 02/09/22 0237   WBC 5.10   HGB 12.1   HCT 36.5*        CMP:   Recent Labs   Lab 02/09/22 0237      K 3.6      CO2 26   GLU 99   BUN 7*   CREATININE 0.8   CALCIUM 9.5   ANIONGAP 9   EGFRNONAA >60.0     Magnesium:   Recent Labs   Lab 02/09/22  0237 02/10/22  0412   MG 2.0  2.0 1.9       Significant Imaging: I have reviewed all pertinent imaging results/findings within the past 24 hours.

## 2022-02-10 NOTE — PROGRESS NOTES
Six Minute Walking Test:  SpO2 85% at rest on RA  Pt. placed on 2L O2 with SpO2 91% after 4 minute recovery time.

## 2022-02-10 NOTE — SUBJECTIVE & OBJECTIVE
Interval History: Pt interested in hospice care home vs NH.     Past Medical History:   Diagnosis Date    Depression     Hypercholesteremia     Pneumonia     Stage 4 lung cancer     Thyroid disease        Past Surgical History:   Procedure Laterality Date     SECTION      ENDOBRONCHIAL ULTRASOUND N/A 2020    Procedure: ENDOBRONCHIAL ULTRASOUND (EBUS);  Surgeon: Karen Mills MD;  Location: [facility] OR [address] [place] [address];  Service: Endoscopy;  Laterality: N/A;    INSERTION OF TUNNELED CENTRAL VENOUS CATHETER (CVC) WITH SUBCUTANEOUS PORT N/A 2021    Procedure: [id number]-CATH;  Surgeon: Ray Foreman MD;  Location: [facility] OR [address] [place] [address];  Service: Vascular;  Laterality: N/A;       Review of patient's allergies indicates:   Allergen Reactions    Codeine Nausea And Vomiting       Medications:  Continuous Infusions:  Scheduled Meds:   aspirin  81 mg Oral Daily    azithromycin  250 mg Oral Daily    cefTRIAXone (ROCEPHIN) IVPB  1 g Intravenous Q24H    divalproex  250 mg Oral TID    DULoxetine  20 mg Oral Daily    enoxaparin  40 mg Subcutaneous Daily    folic acid  1 mg Oral Daily    levothyroxine  112 mcg Oral Daily    senna-docusate 8.6-50 mg  1 tablet Oral Daily    venlafaxine  150 mg Oral Daily    vitamin D  1,000 Units Oral Daily     PRN Meds:albuterol, dextrose 10%, dextrose 10%, glucagon (human recombinant), glucose, glucose, HYDROmorphone, hydrOXYzine HCL, melatonin, naloxone, ondansetron, oxyCODONE, oxyCODONE, sodium chloride 0.9%, sodium chloride 0.9%    Family History     Problem Relation (Age of Onset)    Heart attack Mother    Liver cancer Father    Lung cancer Mother        Tobacco Use    Smoking status: Current Every Day Smoker     Packs/day: 0.10     Years: 40.00     Pack years: 4.00     Types: Cigarettes    Smokeless tobacco: Never Used   Substance and Sexual Activity    Alcohol use: Not Currently     Comment: social    Drug use: No    Sexual activity: Not Currently      Partners: Male     Birth control/protection: None       Review of Systems   Constitutional: Positive for appetite change and fatigue.   HENT: Negative for trouble swallowing.    Eyes: Negative.    Respiratory: Positive for cough and shortness of breath.    Cardiovascular: Negative for leg swelling.   Genitourinary: Negative.    Musculoskeletal: Positive for back pain.   Skin: Negative.    Neurological: Positive for weakness.   Psychiatric/Behavioral: Negative.      Objective:     Vital Signs (Most Recent):  Temp: 97.6 °F (36.4 °C) (02/10/22 1127)  Pulse: 61 (02/10/22 1127)  Resp: 18 (02/10/22 1127)  BP: 138/66 (02/10/22 1127)  SpO2: 95 % (02/10/22 1127) Vital Signs (24h Range):  Temp:  [97.4 °F (36.3 °C)-98.4 °F (36.9 °C)] 97.6 °F (36.4 °C)  Pulse:  [59-68] 61  Resp:  [16-24] 18  SpO2:  [85 %-96 %] 95 %  BP: (118-148)/(59-69) 138/66     Weight: 69.5 kg (153 lb 3.5 oz)  Body mass index is 24 kg/m².    Physical Exam  Constitutional:       Interventions: Nasal cannula in place.   HENT:      Head: Normocephalic and atraumatic.   Eyes:      General: Lids are normal.      Conjunctiva/sclera: Conjunctivae normal.   Cardiovascular:      Rate and Rhythm: Normal rate and regular rhythm.   Pulmonary:      Effort: Pulmonary effort is normal. No respiratory distress.   Chest:      Chest wall: Tenderness present.      Comments: Tenderness on right side.   Abdominal:      General: Abdomen is flat.      Tenderness: There is no abdominal tenderness.   Musculoskeletal:      Cervical back: Full passive range of motion without pain and normal range of motion.      Comments: Normal ROM, weakness noted.    Skin:     General: Skin is warm and dry.   Neurological:      Mental Status: She is alert and oriented to person, place, and time.   Psychiatric:         Attention and Perception: Attention normal.         Speech: Speech normal.         Behavior: Behavior is cooperative.         Review of Symptoms    Symptom Assessment (ESAS 0-10  Scale)  Pain:  8  Dyspnea:  0  Anxiety:  3  Nausea:  0  Depression:  0  Anorexia:  6  Fatigue:  0  Insomnia:  0  Restlessness:  0  Agitation:  0         Pain Assessment:  Location(s): back            Quality: sharp        Quantity: 8/10 in intensity        Alleviating Factors: opiates    Performance Status:  60    Living Arrangements:  Lives with family    Psychosocial/Cultural: Pt lives with her, Cedrick. Per pt, her house is filthy and she sleeps on the sofa. Per pt, son works during the day. Pt reports he has a sister who she speaks with often but sister is OOT a lot.     Spiritual:  F - [place] and Belief:  [employer]ar[employer]        Advance Care Planning   Advance Directives:   Do Not Resuscitate Status: No    Medical Power of : No    Agent's Name:  Son Cedrick is NOK.     Decision Making:  Patient answered questions         Significant Labs: All pertinent labs within the past 24 hours have been reviewed.  CBC:   Recent Labs   Lab 02/09/22  0237   WBC 5.10   HGB 12.1   HCT 36.5*   MCV 81*        BMP:  Recent Labs   Lab 02/10/22  0412   MG 1.9     LFT:  Lab Results   Component Value Date    AST 19 02/08/2022    ALKPHOS 53 (L) 02/08/2022    BILITOT 0.3 02/08/2022     Albumin:   Albumin   Date Value Ref Range Status   02/08/2022 3.3 (L) 3.5 - 5.2 g/dL Final     Protein:   Total Protein   Date Value Ref Range Status   02/08/2022 8.2 6.0 - 8.4 g/dL Final     Lactic acid:   Lab Results   Component Value Date    LACTATE 1.6 11/05/2021    LACTATE 1.7 12/05/2019       Significant Imaging: I have reviewed all pertinent imaging results/findings within the past 24 hours.

## 2022-02-10 NOTE — PROGRESS NOTES
[facility] - Oncology ([facility])  [facility] Medicine  Progress Note    Patient Name: Micheline Raza  MRN: [id number]  Patient Class: IP- Inpatient   Admission Date: 2/8/2022  Length of Stay: 2 days  Attending Physician: Yovani Sarmiento MD  Primary Care Provider: [facility]        Subjective:     Principal Problem:Adenocarcinoma of right lung, stage 4        HPI:  Ms. 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  Patient states dyspnea progressively worsening for 2 weeks with intermittent non-productive cough, not on home O2.  Patient also endorses chronic right sided chest and back pain that is usually relieved with home narcotics.  Dyspnea worsens with exertion and is associated with fatigue, appetite change, weight loss, and night sweats.  Patient follows heme/onc outpatient and was initially started on Keytruda for Tx.  Patient was scheduled for chemotherapy as she was deemed not a candidate for RT however patient refusing chemotherapy and discussing possibility of hospice care.  Of note, patient recently admitted for COVID 2 months ago.    In ED, patient afebrile and hypertensive with SBP of 160-180s.  Initially sats of 95% on RA however increasing dyspnea and placed on 2L NC.  WBC 6.9 with Hgb 13.  K 3.3 and BNP wnl.  EKG showing NSR.  CXR with right sided opacification more prominent at base concerning for PNA along with CA with small right sided pleural effusion.  Patient given pain medication, azithromycin x1 and rocephin x1.      Overview/Hospital Course:  Home pain regimen restarted and patient placed on supplemental O2.  Palliative consulted and following.  Adjusting pain regimen for intractable pain during admission.  Patient discussing options on discharge of home or nursing home.  Abx changed from  CTX/Azithromycin to [facility] for discharge for CAP coverage.        Interval History: No acute events overnight.  Patient states with continued pain however improved s/p PRN.  Reports dyspnea and weakness.  Denies chest pain, abdominal pain, n/v/f/c.    Review of Systems   Constitutional: Positive for appetite change and fatigue. Negative for chills and fever.   HENT: Positive for trouble swallowing. Negative for congestion and sore throat.    Respiratory: Positive for cough (Non-productive) and shortness of breath.    Cardiovascular: Negative for chest pain and leg swelling.   Gastrointestinal: Negative for abdominal pain, constipation, diarrhea, nausea and vomiting.   Genitourinary: Negative for dysuria and hematuria.   Musculoskeletal: Positive for back pain (right sided). Negative for myalgias.   Skin: Negative for color change and rash.   Neurological: Positive for weakness. Negative for dizziness, light-headedness and headaches.   Psychiatric/Behavioral: Negative for agitation and confusion.     Objective:     Vital Signs (Most Recent):  Temp: 97.6 °F (36.4 °C) (02/10/22 1127)  Pulse: 61 (02/10/22 1127)  Resp: 18 (02/10/22 1127)  BP: 138/66 (02/10/22 1127)  SpO2: 95 % (02/10/22 1127) Vital Signs (24h Range):  Temp:  [97.4 °F (36.3 °C)-98.4 °F (36.9 °C)] 97.6 °F (36.4 °C)  Pulse:  [59-68] 61  Resp:  [16-24] 18  SpO2:  [85 %-96 %] 95 %  BP: (118-148)/(59-69) 138/66     Weight: 69.5 kg (153 lb 3.5 oz)  Body mass index is 24 kg/m².    Intake/Output Summary (Last 24 hours) at 2/10/2022 1337  Last data filed at 2/10/2022 0920  Gross per 24 hour   Intake 800 ml   Output --   Net 800 ml      Physical Exam  Vitals and nursing note reviewed.   Constitutional:       General: She is not in acute distress.     Appearance: She is underweight. She is ill-appearing.   HENT:      Head: Normocephalic and atraumatic.      Mouth/Throat:      Mouth: Mucous membranes are dry.      Pharynx: Oropharynx is clear. No oropharyngeal  exudate or posterior oropharyngeal erythema.   Eyes:      General: No scleral icterus.     Extraocular Movements: Extraocular movements intact.      Conjunctiva/sclera: Conjunctivae normal.      Pupils: Pupils are equal, round, and reactive to light.   Cardiovascular:      Rate and Rhythm: Normal rate and regular rhythm.      Pulses: Normal pulses.   Pulmonary:      Effort: Tachypnea present.      Breath sounds: No rales.      Comments: Decrease breath sounds on right  Abdominal:      General: Abdomen is flat. Bowel sounds are normal. There is no distension.      Palpations: Abdomen is soft.      Tenderness: There is no abdominal tenderness. There is no guarding.   Musculoskeletal:         General: No tenderness. Normal range of motion.      Cervical back: Normal range of motion and neck supple. No tenderness.      Right lower leg: No edema.      Left lower leg: No edema.   Skin:     General: Skin is warm and dry.   Neurological:      General: No focal deficit present.      Mental Status: She is alert and oriented to person, place, and time.      Motor: Weakness present.   Psychiatric:         Mood and Affect: Mood normal.         Behavior: Behavior normal. Behavior is cooperative.         Thought Content: Thought content normal.         Significant Labs:   All pertinent labs within the past 24 hours have been reviewed.  CBC:   Recent Labs   Lab 02/09/22 0237   WBC 5.10   HGB 12.1   HCT 36.5*        CMP:   Recent Labs   Lab 02/09/22 0237      K 3.6      CO2 26   GLU 99   BUN 7*   CREATININE 0.8   CALCIUM 9.5   ANIONGAP 9   EGFRNONAA >60.0     Magnesium:   Recent Labs   Lab 02/09/22  0237 02/10/22  0412   MG 2.0  2.0 1.9       Significant Imaging: I have reviewed all pertinent imaging results/findings within the past 24 hours.      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  Follows outpatient heme/onc on  Keytruda.  Refused chemotherapy and deemed not a candidate for RT.  Patient with severe cancer related pain as well as worsening dyspnea associated with fatigue, decreased appetite, weight loss, and night sweats.  Not on home O2.  CXR showing right base opacification with small right pleural effusion concerning for PNA vs CA.  S/p azithromycin x1 and rocephin x1.  No leukocytosis seen, procal wnl    - Supplemental O2 for sats 88-92% in setting of CA  - Transition Abx to Levaquin for 2 days for total of 5 days treatment  - Home pain regimen of tramadol and percocet PRN  - Dilaudid PRN for severe breakthrough pain  - Palliative consulted for GOC/ACP, appreciate recs  - Patient with desat on 6MWT likely will need home O2 on discharge      Pneumonia involving right lung  See adenocarcinoma of right lung, stage 4      Cancer related pain    See adenocarcinoma of right lung, stage 4    Depression  Continue home anti-depressants      Hypokalemia  Replete PRN  Daily CMP      Thyroid disease  Continue home synthroid      HLD (hyperlipidemia)  Holding home atorvastatin for now.  GOC discussion to be had.  Palliative consulted, appreciate recs.        VTE Risk Mitigation (From admission, onward)         Ordered     enoxaparin injection 40 mg  Daily         02/08/22 1714     Place sequential compression device  Until discontinued         02/08/22 1714     IP VTE HIGH RISK PATIENT  Once         02/08/22 1707     IP VTE HIGH RISK PATIENT  Once         02/08/22 1714                Discharge Planning   HEAVENLY: 2/13/2022     Code Status: Full Code   Is the patient medically ready for discharge?:     Reason for patient still in hospital (select all that apply): Pending disposition  Discharge Plan A: Hospice/home,Home with family                  Aniceto Heath MD  Department of Hospital Medicine   [facility] - Oncology ([facility])

## 2022-02-10 NOTE — PT/OT/SLP PROGRESS
Physical Therapy      Patient Name:  Micheline Raza   MRN:  [id number]    Patient not seen today secondary to Patient unwilling to participate,Patient fatigue. Will follow-up 2/11/22.

## 2022-02-11 PROCEDURE — 99233 SBSQ HOSP IP/OBS HIGH 50: CPT | Mod: ,,, | Performed by: CLINICAL NURSE SPECIALIST

## 2022-02-11 PROCEDURE — 99231 PR SUBSEQUENT HOSPITAL CARE,LEVL I: ICD-10-PCS | Mod: GC,,, | Performed by: STUDENT IN AN ORGANIZED HEALTH CARE EDUCATION/TRAINING PROGRAM

## 2022-02-11 PROCEDURE — 94761 N-INVAS EAR/PLS OXIMETRY MLT: CPT

## 2022-02-11 PROCEDURE — 25000003 PHARM REV CODE 250

## 2022-02-11 PROCEDURE — 97161 PT EVAL LOW COMPLEX 20 MIN: CPT

## 2022-02-11 PROCEDURE — 97116 GAIT TRAINING THERAPY: CPT

## 2022-02-11 PROCEDURE — 63600175 PHARM REV CODE 636 W HCPCS: Performed by: STUDENT IN AN ORGANIZED HEALTH CARE EDUCATION/TRAINING PROGRAM

## 2022-02-11 PROCEDURE — 97530 THERAPEUTIC ACTIVITIES: CPT

## 2022-02-11 PROCEDURE — 94640 AIRWAY INHALATION TREATMENT: CPT

## 2022-02-11 PROCEDURE — 25000003 PHARM REV CODE 250: Performed by: STUDENT IN AN ORGANIZED HEALTH CARE EDUCATION/TRAINING PROGRAM

## 2022-02-11 PROCEDURE — 99231 SBSQ HOSP IP/OBS SF/LOW 25: CPT | Mod: GC,,, | Performed by: STUDENT IN AN ORGANIZED HEALTH CARE EDUCATION/TRAINING PROGRAM

## 2022-02-11 PROCEDURE — 27000221 HC OXYGEN, UP TO 24 HOURS

## 2022-02-11 PROCEDURE — 63600175 PHARM REV CODE 636 W HCPCS

## 2022-02-11 PROCEDURE — 97165 OT EVAL LOW COMPLEX 30 MIN: CPT

## 2022-02-11 PROCEDURE — 20600001 HC STEP DOWN PRIVATE ROOM

## 2022-02-11 PROCEDURE — 99233 PR SUBSEQUENT HOSPITAL CARE,LEVL III: ICD-10-PCS | Mod: ,,, | Performed by: CLINICAL NURSE SPECIALIST

## 2022-02-11 RX ORDER — LORAZEPAM 0.5 MG/1
0.5 TABLET ORAL EVERY 6 HOURS PRN
Status: DISCONTINUED | OUTPATIENT
Start: 2022-02-11 | End: 2022-02-16 | Stop reason: HOSPADM

## 2022-02-11 RX ORDER — MORPHINE SULFATE 15 MG/1
15 TABLET, FILM COATED, EXTENDED RELEASE ORAL EVERY 12 HOURS
Status: DISCONTINUED | OUTPATIENT
Start: 2022-02-11 | End: 2022-02-14

## 2022-02-11 RX ADMIN — HYDROMORPHONE HYDROCHLORIDE 1 MG: 1 INJECTION, SOLUTION INTRAMUSCULAR; INTRAVENOUS; SUBCUTANEOUS at 12:02

## 2022-02-11 RX ADMIN — DIVALPROEX SODIUM 250 MG: 250 TABLET, DELAYED RELEASE ORAL at 08:02

## 2022-02-11 RX ADMIN — DIVALPROEX SODIUM 250 MG: 250 TABLET, DELAYED RELEASE ORAL at 04:02

## 2022-02-11 RX ADMIN — HYDROXYZINE HYDROCHLORIDE 25 MG: 25 TABLET ORAL at 12:02

## 2022-02-11 RX ADMIN — ALBUTEROL SULFATE 2 PUFF: 108 INHALANT RESPIRATORY (INHALATION) at 03:02

## 2022-02-11 RX ADMIN — OXYCODONE HYDROCHLORIDE 10 MG: 10 TABLET ORAL at 08:02

## 2022-02-11 RX ADMIN — ASPIRIN 81 MG: 81 TABLET, COATED ORAL at 08:02

## 2022-02-11 RX ADMIN — DULOXETINE HYDROCHLORIDE 20 MG: 20 CAPSULE, DELAYED RELEASE ORAL at 08:02

## 2022-02-11 RX ADMIN — FOLIC ACID 1 MG: 1 TABLET ORAL at 08:02

## 2022-02-11 RX ADMIN — DOCUSATE SODIUM AND SENNOSIDES 1 TABLET: 8.6; 5 TABLET, FILM COATED ORAL at 08:02

## 2022-02-11 RX ADMIN — MORPHINE SULFATE 15 MG: 15 TABLET, FILM COATED, EXTENDED RELEASE ORAL at 08:02

## 2022-02-11 RX ADMIN — LEVOFLOXACIN 750 MG: 750 TABLET, FILM COATED ORAL at 08:02

## 2022-02-11 RX ADMIN — ENOXAPARIN SODIUM 40 MG: 100 INJECTION SUBCUTANEOUS at 04:02

## 2022-02-11 RX ADMIN — VENLAFAXINE HYDROCHLORIDE 150 MG: 75 CAPSULE, EXTENDED RELEASE ORAL at 08:02

## 2022-02-11 RX ADMIN — LEVOTHYROXINE SODIUM 112 MCG: 112 TABLET ORAL at 05:02

## 2022-02-11 RX ADMIN — Medication 1000 UNITS: at 08:02

## 2022-02-11 RX ADMIN — OXYCODONE HYDROCHLORIDE 10 MG: 10 TABLET ORAL at 04:02

## 2022-02-11 RX ADMIN — LORAZEPAM 0.5 MG: 0.5 TABLET ORAL at 07:02

## 2022-02-11 RX ADMIN — MORPHINE SULFATE 15 MG: 15 TABLET, FILM COATED, EXTENDED RELEASE ORAL at 12:02

## 2022-02-11 RX ADMIN — ALBUTEROL SULFATE 2 PUFF: 108 INHALANT RESPIRATORY (INHALATION) at 10:02

## 2022-02-11 NOTE — PLAN OF CARE
POC reviewed with patient; understanding verbalized. Pt. with nonskid footwear on, bed in lowest position, and locked with bed rails up x2.  Pt. instructed to call prior to getting OOB.  Pt. has call light and personal items within reach. Patient ambulates in room independently. VSS and afebrile this shift. All questions and concerns addressed at this time. Will continue to monitor.

## 2022-02-11 NOTE — SUBJECTIVE & OBJECTIVE
Interval History: Pt interested in hospice care home vs NH.     Past Medical History:   Diagnosis Date    Depression     Hypercholesteremia     Pneumonia     Stage 4 lung cancer     Thyroid disease        Past Surgical History:   Procedure Laterality Date     SECTION      ENDOBRONCHIAL ULTRASOUND N/A 2020    Procedure: ENDOBRONCHIAL ULTRASOUND (EBUS);  Surgeon: Karen Mills MD;  Location: [facility] OR [address] [place] [address];  Service: Endoscopy;  Laterality: N/A;    INSERTION OF TUNNELED CENTRAL VENOUS CATHETER (CVC) WITH SUBCUTANEOUS PORT N/A 2021    Procedure: [id number];  Surgeon: Ray Foreman MD;  Location: [facility] OR [address] [place] [address];  Service: Vascular;  Laterality: N/A;       Review of patient's allergies indicates:   Allergen Reactions    Codeine Nausea And Vomiting       Medications:  Continuous Infusions:  Scheduled Meds:   aspirin  81 mg Oral Daily    divalproex  250 mg Oral TID    DULoxetine  20 mg Oral Daily    enoxaparin  40 mg Subcutaneous Daily    folic acid  1 mg Oral Daily    levoFLOXacin  750 mg Oral Daily    levothyroxine  112 mcg Oral Daily    morphine  15 mg Oral Q12H    senna-docusate 8.6-50 mg  1 tablet Oral Daily    venlafaxine  150 mg Oral Daily    vitamin D  1,000 Units Oral Daily     PRN Meds:albuterol, dextrose 10%, dextrose 10%, glucagon (human recombinant), glucose, glucose, HYDROmorphone, hydrOXYzine HCL, melatonin, naloxone, ondansetron, oxyCODONE, oxyCODONE, sodium chloride 0.9%, sodium chloride 0.9%    Family History     Problem Relation (Age of Onset)    Heart attack Mother    Liver cancer Father    Lung cancer Mother        Tobacco Use    Smoking status: Current Every Day Smoker     Packs/day: 0.10     Years: 40.00     Pack years: 4.00     Types: Cigarettes    Smokeless tobacco: Never Used   Substance and Sexual Activity    Alcohol use: Not Currently     Comment: social    Drug use: No    Sexual activity: Not Currently     Partners: Male     Birth  control/protection: None       Review of Systems   Constitutional: Positive for appetite change and fatigue.   HENT: Negative for trouble swallowing.    Eyes: Negative.    Respiratory: Positive for cough and shortness of breath.    Cardiovascular: Negative for leg swelling.   Genitourinary: Negative.    Musculoskeletal: Positive for back pain.   Skin: Negative.    Neurological: Positive for weakness.   Psychiatric/Behavioral: Negative.      Objective:     Vital Signs (Most Recent):  Temp: 98.3 °F (36.8 °C) (02/11/22 1120)  Pulse: 62 (02/11/22 1120)  Resp: 18 (02/11/22 1120)  BP: (!) 147/64 (02/11/22 1120)  SpO2: (!) 92 % (02/11/22 1120) Vital Signs (24h Range):  Temp:  [97.5 °F (36.4 °C)-98.3 °F (36.8 °C)] 98.3 °F (36.8 °C)  Pulse:  [61-70] 62  Resp:  [16-22] 18  SpO2:  [92 %-97 %] 92 %  BP: (129-147)/(63-75) 147/64     Weight: 69.5 kg (153 lb 3.5 oz)  Body mass index is 24 kg/m².    Physical Exam  Constitutional:       Interventions: Nasal cannula in place.   HENT:      Head: Normocephalic and atraumatic.   Eyes:      General: Lids are normal.      Conjunctiva/sclera: Conjunctivae normal.   Cardiovascular:      Rate and Rhythm: Normal rate and regular rhythm.   Pulmonary:      Effort: Pulmonary effort is normal. No respiratory distress.   Chest:      Chest wall: Tenderness present.      Comments: Tenderness on right side.   Abdominal:      General: Abdomen is flat.      Tenderness: There is no abdominal tenderness.   Musculoskeletal:      Cervical back: Full passive range of motion without pain and normal range of motion.      Comments: Normal ROM, weakness noted.    Skin:     General: Skin is warm and dry.   Neurological:      Mental Status: She is alert and oriented to person, place, and time.   Psychiatric:         Attention and Perception: Attention normal.         Speech: Speech normal.         Behavior: Behavior is cooperative.         Review of Symptoms    Symptom Assessment (ESAS 0-10 Scale)  Pain:   6  Dyspnea:  0  Anxiety:  3  Nausea:  0  Depression:  0  Anorexia:  6  Fatigue:  0  Insomnia:  0  Restlessness:  0  Agitation:  0         Pain Assessment:  Location(s): back            Quality: sharp        Quantity: 8/10 in intensity        Alleviating Factors: opiates    Performance Status:  60    Living Arrangements:  Lives with family    Psychosocial/Cultural: Pt lives with her, Cedrick. Per pt, her house is filthy and she sleeps on the sofa. Per pt, son works during the day. Pt reports he has a sister who she speaks with often but sister is OOT a lot.     Spiritual:  F - [place] and Belief:  [employer]        Advance Care Planning   Advance Directives:   Do Not Resuscitate Status: No    Medical Power of : No    Agent's Name:  Son Cedrick is NOK.     Decision Making:  Patient answered questions         Significant Labs: All pertinent labs within the past 24 hours have been reviewed.  CBC:   Recent Labs   Lab 02/09/22  0237   WBC 5.10   HGB 12.1   HCT 36.5*   MCV 81*        BMP:  No results for input(s): GLU, NA, K, CL, CO2, BUN, CREATININE, CALCIUM, MG in the last 24 hours.  LFT:  Lab Results   Component Value Date    AST 19 02/08/2022    ALKPHOS 53 (L) 02/08/2022    BILITOT 0.3 02/08/2022     Albumin:   Albumin   Date Value Ref Range Status   02/08/2022 3.3 (L) 3.5 - 5.2 g/dL Final     Protein:   Total Protein   Date Value Ref Range Status   02/08/2022 8.2 6.0 - 8.4 g/dL Final     Lactic acid:   Lab Results   Component Value Date    LACTATE 1.6 11/05/2021    LACTATE 1.7 12/05/2019       Significant Imaging: I have reviewed all pertinent imaging results/findings within the past 24 hours.

## 2022-02-11 NOTE — PT/OT/SLP EVAL
"Physical Therapy Evaluation    Patient Name:  Micheline Raza   MRN:  [id number]    Recommendations:     Discharge Recommendations:  nursing facility, skilled   Discharge Equipment Recommendations: none   Barriers to discharge: None    Assessment:     Micheline Raza is a 71 y.o. female admitted with a medical diagnosis of Adenocarcinoma of right lung, stage 4.  She presents with the following impairments/functional limitations:  weakness,impaired endurance,impaired functional mobilty,gait instability,decreased lower extremity function,pain Patient presents with ability to complete bed mobility with SBA. Her transfers were CGA with use of RW. She ambulated 12 ft around the bed with CGA and RW, but was limited by lines and SOB.     Rehab Prognosis: Good; patient would benefit from acute skilled PT services to address these deficits and reach maximum level of function.    Recent Surgery: * No surgery found *      Plan:     During this hospitalization, patient to be seen 3 x/week to address the identified rehab impairments via gait training,therapeutic activities,therapeutic exercises and progress toward the following goals:    · Plan of Care Expires:  03/13/22    Subjective     Chief Complaint: "I feel anxious."  Patient/Family Comments/goals: return home to [facility]  Pain/Comfort:  Pain Rating 1: 8/10  Location - Side 1: Right  Location - Orientation 1: generalized  Location 1: shoulder  Pain Addressed 1: Reposition,Distraction,Cessation of Activity,Pre-medicate for activity (MD reports patient recently had pain meds administered)  Pain Rating Post-Intervention 1: 8/10  Pain Rating 2: 8/10  Location - Side 2: Right  Location - Orientation 2: generalized  Location 2: flank  Pain Addressed 2: Reposition,Distraction,Cessation of Activity,Pre-medicate for activity (MD reports patient recently had pain meds administered)  Pain Rating Post-Intervention 2: 8/10    Patients cultural, spiritual, Orthodoxy conflicts given " the current situation: no    Living Environment:  Patient lives in a H with 5 LASHAWN and BHR with her adult son, who works during the day. The bathroom contains a tub-shower combination without grab bars or a seat inside.   Prior to admission, patients level of function was modified independent, with use of SPC.  Equipment used at home: cane, straight.  DME owned (not currently used): [employer].  Upon discharge, patient will have assistance from son.    Objective:   Therapy evaluation completed with Occupational Therapist to best establish plan of care for acute setting and to provide skilled treatment.  Communicated with nurse prior to session.  Patient found HOB elevated with oxygen,bed alarm  upon PT entry to room.    General Precautions: Standard, fall   Orthopedic Precautions:N/A   Braces: N/A  Respiratory Status: Nasal cannula, flow 2.0 L/min    Exams:  · Cognitive Exam:  Patient is oriented to Person, Place, Time and Situation  · Gross Motor Coordination:  WFL  · RLE ROM: WFL  · RLE Strength: WFL  · LLE ROM: WFL  · LLE Strength: WFL    Functional Mobility:  · Bed Mobility:     · Supine to Sit: supervision  · Sit to Supine: supervision  · Transfers:     · Sit to Stand:  contact guard assistance with rolling walker and from EOB and min vc required for hand placement  · Gait: ambulated 12 ft around bed with RW and CGA. Patient demonstrated decreased step length and decreased roby. No LOB noted. Limited by lines and SOB. O2 saturation monitored throughout session, and never declined below 91% on 2.0L O2.  · Balance: sitting: BUE support when seated at EOB. standing: BUE support on RW to remain in upright position    Therapeutic Activities and Exercises:   Patient educated on PT POC, call for assistance, gait/trf techniques, AD usage    AM-PAC 6 CLICK MOBILITY  Total Score:17     Patient left HOB elevated with all lines intact, call button in reach, bed alarm on, nurse notified and medical team  present.    GOALS:   Multidisciplinary Problems     Physical Therapy Goals        Problem: Physical Therapy Goal    Goal Priority Disciplines Outcome Goal Variances Interventions   Physical Therapy Goal     PT, PT/OT Ongoing, Progressing     Description: Goals to be met by: 2022     Patient will increase functional independence with mobility by performin. Supine to sit with Modified [place]  2. Sit to supine with Modified [place]  3. Sit to stand transfer with Modified [place]  4. Gait  x 150 feet with Contact Guard Assistance using Rolling Walker or LRAD.   5. Ascend/descend 5 stair with bilateral Handrails Contact Guard Assistance using LRAD.   6. Lower extremity exercise program x15 reps per handout, with assistance as needed                     History:     Past Medical History:   Diagnosis Date    Depression     Hypercholesteremia     Pneumonia     Stage 4 lung cancer     Thyroid disease        Past Surgical History:   Procedure Laterality Date     SECTION      ENDOBRONCHIAL ULTRASOUND N/A 2020    Procedure: ENDOBRONCHIAL ULTRASOUND (EBUS);  Surgeon: Karen Mills MD;  Location: [address];  Service: Endoscopy;  Laterality: N/A;    INSERTION OF TUNNELED CENTRAL VENOUS CATHETER (CVC) WITH SUBCUTANEOUS PORT N/A 2021    Procedure: [id number];  Surgeon: Ray Foreman MD;  Location: [facility] OR [address] [place] [address];  Service: Vascular;  Laterality: N/A;       Time Tracking:     PT Received On: 22  PT Start Time: 936     PT Stop Time: 958  PT Total Time (min): 22 min     Billable Minutes: Evaluation 8 and Gait Training 8      2022

## 2022-02-11 NOTE — PT/OT/SLP EVAL
"Occupational Therapy   Co-Evaluation    *co-evaluation to maximize functional assistance and safety    Name: Micheline Raza  MRN: [id number]  Admitting Diagnosis:  Adenocarcinoma of right lung, stage 4  Recent Surgery: * No surgery found *      Recommendations:     Discharge Recommendations: nursing facility, skilled  Discharge Equipment Recommendations:  none  Barriers to discharge:   (increased A needed)    Assessment:     Micheline Raza is a 71 y.o. female with a medical diagnosis of Adenocarcinoma of right lung, stage 4.  She presents with fatigue and SOB, though amenable to therapy with encouragement. Performance deficits affecting function: weakness,impaired endurance,impaired functional mobilty,gait instability,decreased lower extremity function,pain.  Patient tolerated therapy session fairly, demonstrating increased SOB and increased wheezing with movement, though O2 stayed in 91-94% range. Patient able to complete bed mobility with SBA, and transferred with CGA with RW. She demonstrated functional ambulation around bed, with CGA and RW, limited by lines and SOB.     SNF recommended for patient discharge.     Rehab Prognosis: Good; patient would benefit from acute skilled OT services to address these deficits and reach maximum level of function.       Plan:     Patient to be seen 3 x/week to address the above listed problems via self-care/home management,therapeutic activities,therapeutic exercises  · Plan of Care Expires: 03/13/22  · Plan of Care Reviewed with: patient    Subjective     Chief Complaint: "I'm feeling a little out of breath"   Patient/Family Comments/goals: Get better, return home    Occupational Profile:  Living Environment: Lives with son in [facility], 4-5 [place] with railing. Patient states it has recently been hard to complete ADLs due to SOB, would like oxygen if discharged home. Bathroom has tub/shower combo without grab bars or seat.  Previous level of function: Indep in ADLs with " SPC  Equipment Used at Home:  cane, straight   Equipment Owned, not currently used: Rollator  Assistance upon Discharge: patient will have occasional assistance from son     Pain/Comfort:  · Pain Rating 1: 8/10  · Location - Side 1: Right  · Location - Orientation 1: generalized  · Location 1: shoulder  · Pain Addressed 1: Reposition,Distraction,Cessation of Activity,Pre-medicate for activity (MD reports recent pain meds administered)  · Pain Rating Post-Intervention 1: 8/10  · Pain Rating 2: 8/10  · Location - Side 2: Right  · Location - Orientation 2: generalized  · Location 2: flank  · Pain Addressed 2: Reposition,Pre-medicate for activity,Distraction,Cessation of Activity  · Pain Rating Post-Intervention 2: 8/10    Patients cultural, spiritual, Mormonism conflicts given the current situation: no    Objective:     Communicated with: DIRK Maki prior to session.  Patient found supine with oxygen,bed alarm upon OT entry to room.    General Precautions: Standard, fall   Orthopedic Precautions:N/A   Braces: N/A  Respiratory Status: Nasal cannula, flow 2 L/min    Occupational Performance:    Bed Mobility:    · Patient completed Rolling/Turning to Left with  supervision  · Patient completed Rolling/Turning to Right with supervision  · Patient completed Scooting/Bridging with supervision  · Patient completed Supine to Sit with supervision  · Patient completed Sit to Supine with supervision    Functional Mobility/Transfers:  · Patient completed Sit <> Stand Transfer with contact guard assistance  with  rolling walker   · Functional Mobility: Pt engaging in functional mobility to simulate household/community distances utilizing CGA and RW in order to maximize functional activity tolerance and standing balance required for engagement in occupations of choice.  Pt limited by lines and SOB, O2 saturation monitored throughout session, did not decline below 91% on 2.0L O2, stayed in 91%-96% range    Activities of Daily  Living:  · Declined this date    Cognitive/Visual Perceptual:  Cognitive/Psychosocial Skills:     -       Oriented to: Person, Place and Situation   -       Follows Commands/attention:Follows multistep  commands  -       Communication: clear/fluent  -       Memory: No Deficits noted  -       Safety awareness/insight to disability: impaired   -       Mood/Affect/Coping skills/emotional control: Appropriate to situation    Physical Exam:  Balance:    -       Intact  Motor Planning:    -       Intact  Dominant hand:    -       Right  Upper Extremity Range of Motion:   WFL  Upper Extremity Strength:  WFL   Strength:  WFL  Fine Motor Coordination:    -       Intact    AMPAC 6 Click ADL:  AMPAC Total Score: 24    Treatment & Education:       Time provided for therapeutic counseling and discussion of health disposition.    Educated on importance of EOB/OOB mobility, maintaining routine, sitting up in chair, and maximizing independence with ADLs during admission    Pt completed ADLs and functional mobility for treatment session as noted above    Pt/caregiver verbalized understanding and expressed no further concerns/questions.    Education:    Patient left supine with all lines intact, call button in reach, RN notified and MD present    GOALS:   Multidisciplinary Problems     Occupational Therapy Goals        Problem: Occupational Therapy Goal    Goal Priority Disciplines Outcome Interventions   Occupational Therapy Goal     OT, PT/OT Ongoing, Progressing    Description: Goals to be met by: 3/13/22     Patient will increase functional independence with ADLs by performing:    Grooming while standing at sink with Contact Guard Assistance.  Toileting from toilet with Stand by Assistance for hygiene and clothing management.   Toilet transfer to toilet with Stand-by Assistance.                     History:     Past Medical History:   Diagnosis Date    Depression     Hypercholesteremia     Pneumonia     Stage 4 lung  cancer     Thyroid disease        Past Surgical History:   Procedure Laterality Date     SECTION      ENDOBRONCHIAL ULTRASOUND N/A 2020    Procedure: ENDOBRONCHIAL ULTRASOUND (EBUS);  Surgeon: Karen Mills MD;  Location: [facility] OR [address] [place] [address];  Service: Endoscopy;  Laterality: N/A;    INSERTION OF TUNNELED CENTRAL VENOUS CATHETER (CVC) WITH SUBCUTANEOUS PORT N/A 2021    Procedure: [id number];  Surgeon: Ray Foreman MD;  Location: [facility] OR [address] [place] [address];  Service: Vascular;  Laterality: N/A;       Time Tracking:     OT Date of Treatment: 22  OT Start Time: 936  OT Stop Time: 958  OT Total Time (min): 22 min    Billable Minutes:Evaluation 8  Therapeutic Activity 8    2022

## 2022-02-11 NOTE — PLAN OF CARE
Patient progressing towards discharge.    Patient had no acute events noted.   Discussed POC with patient and family with verbalization of understanding.    Will continue to monitor.

## 2022-02-11 NOTE — PLAN OF CARE
Problem: Physical Therapy Goal  Goal: Physical Therapy Goal  Description: Goals to be met by: 2022     Patient will increase functional independence with mobility by performin. Supine to sit with Modified [place]  2. Sit to supine with Modified [place]  3. Sit to stand transfer with Modified [place]mer  4. Gait  x 150 feet with Contact Guard Assistance using Rolling Walker or LRAD.   5. Ascend/descend 5 stair with bilateral Handrails Contact Guard Assistance using LRAD.   6. Lower extremity exercise program x15 reps per handout, with assistance as needed    PT EVAL: 2022

## 2022-02-11 NOTE — ASSESSMENT & PLAN NOTE
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  Patient stateed dyspnea progressively worsening for 2 weeks with intermittent non-productive cough, not on home O2.  Patient also endorses chronic right sided chest and back pain that is usually relieved with home narcotics.  Pt is alert, oriented to person, place, and situation. Pt is a full code.     Reason for consult: Goals of care/advacne care planning:      Met with pt who reports she still needs to speak to son about hospice at home of hospice at NH. Pt did have questions about [facility] hospice. Questions answered. Sw has also met with pt.       2/10/22  Met with pt along with Marian Persaud LCSW. Per pt, she does not want to do chemo. Pt reports she does not always take her regular meds but does cristi her pain meds. Pt reports she does not want to go back home due to house beinf "filthy" and no help at home.  [facility] with hospice care discussed. Pt had questions about her disability check and if NH will take it. Per SW, they take the check for room and board and she woul have Long-term Medicaid. Per pt, she needs to think about this due to she helps with the bills. Pt reports he will speak to her son.     Code status discussed. DNR status discussed. Pt wants to think about decision and we will re-visit tomorrow.   MPOA: Pt' son, Cedrick is NOK. MPOA education done. Pt reports she may want her sister to be MPOA but will think about.     Symptom management:   Pt reports sharp pain to back area and right side of chest area. Pt reports pain 10/10 and with pain meds goes to 7/10.   OME=97.     Pt on  oxycodone 5 mg q 4 hrs moderate pain.   Pt on oxycodone 10 mg q 4 hrs severe pain.     Recs: Consider MSContin 15 mg bid.       Pt has dilaudid 1 mg IVP q 6 hrs prn -  Rec: Use only if pain not relieved by PO " meds.     Anxiety:   Pt reports she was on Klonopin at home for anxiety,       Dyspnea:  Pt on O2 per NC.     Plan:   Will  Continue tof/u with pt concerning code status, hospice care.   See above for sym[joseline anderson.   Communicated with IM team and Dr. Michele with [facility].

## 2022-02-11 NOTE — PROGRESS NOTES
[facility] - Oncology ([facility])  [facility] Medicine  Progress Note    Patient Name: Micheline Raza  MRN: [id number]  Patient Class: IP- Inpatient   Admission Date: 2/8/2022  Length of Stay: 3 days  Attending Physician: Yovani Sarmiento MD  Primary Care Provider: [facility]        Subjective:     Principal Problem:Adenocarcinoma of right lung, stage 4        HPI:  Ms. 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  Patient states dyspnea progressively worsening for 2 weeks with intermittent non-productive cough, not on home O2.  Patient also endorses chronic right sided chest and back pain that is usually relieved with home narcotics.  Dyspnea worsens with exertion and is associated with fatigue, appetite change, weight loss, and night sweats.  Patient follows heme/onc outpatient and was initially started on Keytruda for Tx.  Patient was scheduled for chemotherapy as she was deemed not a candidate for RT however patient refusing chemotherapy and discussing possibility of hospice care.  Of note, patient recently admitted for COVID 2 months ago.    In ED, patient afebrile and hypertensive with SBP of 160-180s.  Initially sats of 95% on RA however increasing dyspnea and placed on 2L NC.  WBC 6.9 with Hgb 13.  K 3.3 and BNP wnl.  EKG showing NSR.  CXR with right sided opacification more prominent at base concerning for PNA along with CA with small right sided pleural effusion.  Patient given pain medication, azithromycin x1 and rocephin x1.      Overview/Hospital Course:  Home pain regimen restarted and patient placed on supplemental O2.  Palliative consulted and following.  Adjusting pain regimen for intractable pain during admission.  Patient discussing options on discharge of home or nursing home.  Abx changed from  CTX/Azithromycin to [facility] for discharge for CAP coverage.        Interval History: pt seen and evaluated in the AM. Reports improvement of pain. Pending placement    Review of Systems   Constitutional: Positive for appetite change and fatigue. Negative for chills and fever.   HENT: Positive for trouble swallowing. Negative for congestion and sore throat.    Respiratory: Positive for cough (Non-productive) and shortness of breath.    Cardiovascular: Negative for chest pain and leg swelling.   Gastrointestinal: Negative for abdominal pain, constipation, diarrhea, nausea and vomiting.   Genitourinary: Negative for dysuria and hematuria.   Musculoskeletal: Positive for back pain (right sided). Negative for myalgias.   Skin: Negative for color change and rash.   Neurological: Positive for weakness. Negative for dizziness, light-headedness and headaches.   Psychiatric/Behavioral: Negative for agitation and confusion.     Objective:     Vital Signs (Most Recent):  Temp: 98.3 °F (36.8 °C) (02/11/22 1120)  Pulse: 62 (02/11/22 1120)  Resp: 18 (02/11/22 1120)  BP: (!) 147/64 (02/11/22 1120)  SpO2: (!) 92 % (02/11/22 1120) Vital Signs (24h Range):  Temp:  [97.5 °F (36.4 °C)-98.3 °F (36.8 °C)] 98.3 °F (36.8 °C)  Pulse:  [61-70] 62  Resp:  [16-22] 18  SpO2:  [92 %-97 %] 92 %  BP: (129-147)/(63-75) 147/64     Weight: 69.5 kg (153 lb 3.5 oz)  Body mass index is 24 kg/m².    Intake/Output Summary (Last 24 hours) at 2/11/2022 1143  Last data filed at 2/11/2022 0000  Gross per 24 hour   Intake 780 ml   Output --   Net 780 ml      Physical Exam  Vitals and nursing note reviewed.   Constitutional:       General: She is not in acute distress.     Appearance: She is underweight.   HENT:      Head: Normocephalic and atraumatic.      Mouth/Throat:      Mouth: Mucous membranes are dry.      Pharynx: Oropharynx is clear. No oropharyngeal exudate or posterior oropharyngeal erythema.   Eyes:      General: No scleral icterus.     Extraocular  Movements: Extraocular movements intact.      Conjunctiva/sclera: Conjunctivae normal.      Pupils: Pupils are equal, round, and reactive to light.   Cardiovascular:      Rate and Rhythm: Normal rate and regular rhythm.      Pulses: Normal pulses.   Pulmonary:      Effort: Tachypnea present.      Breath sounds: No rales.      Comments: Decrease breath sounds on right  Abdominal:      General: Abdomen is flat. Bowel sounds are normal. There is no distension.      Palpations: Abdomen is soft.      Tenderness: There is no abdominal tenderness. There is no guarding.   Musculoskeletal:         General: No tenderness. Normal range of motion.      Cervical back: Normal range of motion and neck supple. No tenderness.      Right lower leg: No edema.      Left lower leg: No edema.   Skin:     General: Skin is warm and dry.   Neurological:      Mental Status: She is alert and oriented to person, place, and time.   Psychiatric:         Mood and Affect: Mood normal.         Behavior: Behavior normal. Behavior is cooperative.         Significant Labs:   All pertinent labs within the past 24 hours have been reviewed.  CBC:   No results for input(s): WBC, HGB, HCT, PLT in the last 48 hours.  CMP:   No results for input(s): NA, K, CL, CO2, GLU, BUN, CREATININE, CALCIUM, PROT, ALBUMIN, BILITOT, ALKPHOS, AST, ALT, ANIONGAP, EGFRNONAA in the last 48 hours.    Invalid input(s): ESTGFAFRICA  Magnesium:   Recent Labs   Lab 02/10/22  0412   MG 1.9       Significant Imaging: I have reviewed all pertinent imaging results/findings within the past 24 hours.      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  Follows outpatient heme/onc on Keytruda.  Refused chemotherapy and deemed not a candidate for RT.  Patient with severe cancer related pain as well as worsening dyspnea associated with fatigue, decreased appetite, weight loss, and night sweats.  Not on  home O2.  CXR showing right base opacification with small right pleural effusion concerning for PNA vs CA.  S/p azithromycin x1 and rocephin x1.  No leukocytosis seen, procal wnl    - Supplemental O2 for sats 88-92% in setting of CA  - Transition Abx to Levaquin for 2 days for total of 5 days treatment  - current pain regimen: scheduled morphin 15 mg bid  - Dilaudid PRN for severe breakthrough pain  - Palliative consulted for GOC/ACP, appreciate recs  - Patient with desat on 6MWT likely will need home O2 on discharge      Pneumonia involving right lung  See adenocarcinoma of right lung, stage 4      Cancer related pain    See adenocarcinoma of right lung, stage 4    Depression  Continue home anti-depressants      Hypokalemia  Replete PRN  Daily CMP      Thyroid disease  Continue home synthroid      HLD (hyperlipidemia)  Holding home atorvastatin for now.  GOC discussion to be had.  Palliative consulted, appreciate recs.        VTE Risk Mitigation (From admission, onward)         Ordered     enoxaparin injection 40 mg  Daily         02/08/22 1714     Place sequential compression device  Until discontinued         02/08/22 1714     IP VTE HIGH RISK PATIENT  Once         02/08/22 1707     IP VTE HIGH RISK PATIENT  Once         02/08/22 1714                Discharge Planning   HEAVENLY: 2/13/2022     Code Status: Full Code   Is the patient medically ready for discharge?:     Reason for patient still in hospital (select all that apply): Pending disposition  Discharge Plan A: Hospice/home,Home with family                  Edith Chang MD  Department of Hospital Medicine   [facility] - Oncology ([facility])

## 2022-02-11 NOTE — SUBJECTIVE & OBJECTIVE
Interval History: pt seen and evaluated in the AM. Reports improvement of pain. Pending placement    Review of Systems   Constitutional: Positive for appetite change and fatigue. Negative for chills and fever.   HENT: Positive for trouble swallowing. Negative for congestion and sore throat.    Respiratory: Positive for cough (Non-productive) and shortness of breath.    Cardiovascular: Negative for chest pain and leg swelling.   Gastrointestinal: Negative for abdominal pain, constipation, diarrhea, nausea and vomiting.   Genitourinary: Negative for dysuria and hematuria.   Musculoskeletal: Positive for back pain (right sided). Negative for myalgias.   Skin: Negative for color change and rash.   Neurological: Positive for weakness. Negative for dizziness, light-headedness and headaches.   Psychiatric/Behavioral: Negative for agitation and confusion.     Objective:     Vital Signs (Most Recent):  Temp: 98.3 °F (36.8 °C) (02/11/22 1120)  Pulse: 62 (02/11/22 1120)  Resp: 18 (02/11/22 1120)  BP: (!) 147/64 (02/11/22 1120)  SpO2: (!) 92 % (02/11/22 1120) Vital Signs (24h Range):  Temp:  [97.5 °F (36.4 °C)-98.3 °F (36.8 °C)] 98.3 °F (36.8 °C)  Pulse:  [61-70] 62  Resp:  [16-22] 18  SpO2:  [92 %-97 %] 92 %  BP: (129-147)/(63-75) 147/64     Weight: 69.5 kg (153 lb 3.5 oz)  Body mass index is 24 kg/m².    Intake/Output Summary (Last 24 hours) at 2/11/2022 1143  Last data filed at 2/11/2022 0000  Gross per 24 hour   Intake 780 ml   Output --   Net 780 ml      Physical Exam  Vitals and nursing note reviewed.   Constitutional:       General: She is not in acute distress.     Appearance: She is underweight.   HENT:      Head: Normocephalic and atraumatic.      Mouth/Throat:      Mouth: Mucous membranes are dry.      Pharynx: Oropharynx is clear. No oropharyngeal exudate or posterior oropharyngeal erythema.   Eyes:      General: No scleral icterus.     Extraocular Movements: Extraocular movements intact.      Conjunctiva/sclera:  Conjunctivae normal.      Pupils: Pupils are equal, round, and reactive to light.   Cardiovascular:      Rate and Rhythm: Normal rate and regular rhythm.      Pulses: Normal pulses.   Pulmonary:      Effort: Tachypnea present.      Breath sounds: No rales.      Comments: Decrease breath sounds on right  Abdominal:      General: Abdomen is flat. Bowel sounds are normal. There is no distension.      Palpations: Abdomen is soft.      Tenderness: There is no abdominal tenderness. There is no guarding.   Musculoskeletal:         General: No tenderness. Normal range of motion.      Cervical back: Normal range of motion and neck supple. No tenderness.      Right lower leg: No edema.      Left lower leg: No edema.   Skin:     General: Skin is warm and dry.   Neurological:      Mental Status: She is alert and oriented to person, place, and time.   Psychiatric:         Mood and Affect: Mood normal.         Behavior: Behavior normal. Behavior is cooperative.         Significant Labs:   All pertinent labs within the past 24 hours have been reviewed.  CBC:   No results for input(s): WBC, HGB, HCT, PLT in the last 48 hours.  CMP:   No results for input(s): NA, K, CL, CO2, GLU, BUN, CREATININE, CALCIUM, PROT, ALBUMIN, BILITOT, ALKPHOS, AST, ALT, ANIONGAP, EGFRNONAA in the last 48 hours.    Invalid input(s): ESTGFAFRICA  Magnesium:   Recent Labs   Lab 02/10/22  0412   MG 1.9       Significant Imaging: I have reviewed all pertinent imaging results/findings within the past 24 hours.

## 2022-02-11 NOTE — PLAN OF CARE
Pt. with nonskid footwear on with bed in lowest position and locked with bed rails up x2, with bed alarm being utilized.  Pt. instructed to call prior to getting OOB.  Pt. with call light within reach and verbalized understanding.  Pt. with antibiotics changed to levaquin today.  Pt. with c/o right sided chest pain with oxycodone and dilaudid being given PRN.  Pt. Has a good appetite.  Pt. is pending decision on D/C planning/placement.  Will continue to monitor pt.

## 2022-02-11 NOTE — PROGRESS NOTES
"[facility] - Oncology ([facility])  Palliative Medicine  Progress Note    Patient Name: Micheline Raza  MRN: [id number]  Admission Date: 2/8/2022  Hospital Length of Stay: 3 days  Code Status: Full Code   Attending Provider: Yovani Sarmiento MD  Consulting Provider: VIOLETTA Szymanski  Primary Care Physician: Jose Rees [facility]  Principal Problem:Adenocarcinoma of right lung, stage 4    Patient information was obtained from patient and primary team.      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  Patient stateed dyspnea progressively worsening for 2 weeks with intermittent non-productive cough, not on home O2.  Patient also endorses chronic right sided chest and back pain that is usually relieved with home narcotics.  Pt is alert, oriented to person, place, and situation. Pt is a full code.     Reason for consult: Goals of care/advacne care planning:      Met with pt who reports she still needs to speak to son about hospice at home of hospice at NH. Pt did have questions about NH hospice. Questions answered. Sw has also met with pt.       2/10/22  Met with pt along with Marian Persaud LCSW. Per pt, she does not want to do chemo. Pt reports she does not always take her regular meds but does cristi her pain meds. Pt reports she does not want to go back home due to house beinf "filthy" and no help at home.  [facility] with hospice care discussed. Pt had questions about her disability check and if NH will take it. Per CHRISTOPHER, they take the check for room and board and she woul have Long-term Medicaid. Per pt, she needs to think about this due to she helps with the bills. Pt reports he will speak to her son.     Code status discussed. DNR status discussed. Pt wants to think about decision and " we will re-visit tomorrow.   MPOA: Pt' son, Cedrick is NOK. MPOA education done. Pt reports she may want her sister to be MPOA but will think about.     Symptom management:   Pt reports sharp pain to back area and right side of chest area. Pt reports pain 10/10 and with pain meds goes to 7/10.   OME=97.     Pt on  oxycodone 5 mg q 4 hrs moderate pain.   Pt on oxycodone 10 mg q 4 hrs severe pain.     Recs: Consider MSContin 15 mg bid.       Pt has dilaudid 1 mg IVP q 6 hrs prn -  Rec: Use only if pain not relieved by PO meds.     Anxiety:   Pt reports she was on Klonopin at home for anxiety,       Dyspnea:  Pt on O2 per NC.     Plan:   Will  Continue tof/u with pt concerning code status, hospice care.   See above for sym[joseline recs.   Communicated with IM team and Dr. Michele with [facility].           I will follow-up with patient. Please contact us if you have any additional questions.    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  Per chart review, patient stated dyspnea progressively worsening for 2 weeks with intermittent non-productive cough, not on home O2.  Patient also endorses chronic right sided chest and back pain that is usually relieved with home narcotics.  Dyspnea worsens with exertion and is associated with fatigue, appetite change, weight loss, and night sweats.  Patient follows heme/onc outpatient and was initially started on Keytruda for Tx.  pt reports she is not taking Keytruda. Patient was scheduled for chemotherapy as she was deemed not a candidate for RT however patient refusing chemotherapy and discussing possibility of hospice care.  Of note,  patient recently admitted for COVID 2 months ago.     In ED, patient afebrile and hypertensive with SBP of 160-180s.  Initially sats of 95% on RA however increasing dyspnea and placed on 2L NC.  WBC 6.9 with Hgb 13.  K 3.3 and BNP wnl.  EKG showing NSR.  CXR with right sided opacification more prominent at base concerning for PNA along with CA with small right sided pleural effusion.  Patient given pain medication, azithromycin x1 and rocephin x1.      Hospital Course:  No notes on file    Interval History: Pt interested in hospice care home vs NH.     Past Medical History:   Diagnosis Date    Depression     Hypercholesteremia     Pneumonia     Stage 4 lung cancer     Thyroid disease        Past Surgical History:   Procedure Laterality Date     SECTION      ENDOBRONCHIAL ULTRASOUND N/A 2020    Procedure: ENDOBRONCHIAL ULTRASOUND (EBUS);  Surgeon: Karen Mills MD;  Location: [facility] OR [address] [place] [address];  Service: Endoscopy;  Laterality: N/A;    INSERTION OF TUNNELED CENTRAL VENOUS CATHETER (CVC) WITH SUBCUTANEOUS PORT N/A 2021    Procedure: XTZECLZED-UYLA-M-CATH;  Surgeon: Ray Foreman MD;  Location: [facility] OR [address] [place] [address];  Service: Vascular;  Laterality: N/A;       Review of patient's allergies indicates:   Allergen Reactions    Codeine Nausea And Vomiting       Medications:  Continuous Infusions:  Scheduled Meds:   aspirin  81 mg Oral Daily    divalproex  250 mg Oral TID    DULoxetine  20 mg Oral Daily    enoxaparin  40 mg Subcutaneous Daily    folic acid  1 mg Oral Daily    levoFLOXacin  750 mg Oral Daily    levothyroxine  112 mcg Oral Daily    morphine  15 mg Oral Q12H    senna-docusate 8.6-50 mg  1 tablet Oral Daily    venlafaxine  150 mg Oral Daily    vitamin D  1,000 Units Oral Daily     PRN Meds:albuterol, dextrose 10%, dextrose 10%, glucagon (human recombinant), glucose, glucose, HYDROmorphone, hydrOXYzine HCL, melatonin, naloxone, ondansetron, oxyCODONE, oxyCODONE, sodium chloride  0.9%, sodium chloride 0.9%    Family History     Problem Relation (Age of Onset)    Heart attack Mother    Liver cancer Father    Lung cancer Mother        Tobacco Use    Smoking status: Current Every Day Smoker     Packs/day: 0.10     Years: 40.00     Pack years: 4.00     Types: Cigarettes    Smokeless tobacco: Never Used   Substance and Sexual Activity    Alcohol use: Not Currently     Comment: social    Drug use: No    Sexual activity: Not Currently     Partners: Male     Birth control/protection: None       Review of Systems   Constitutional: Positive for appetite change and fatigue.   HENT: Negative for trouble swallowing.    Eyes: Negative.    Respiratory: Positive for cough and shortness of breath.    Cardiovascular: Negative for leg swelling.   Genitourinary: Negative.    Musculoskeletal: Positive for back pain.   Skin: Negative.    Neurological: Positive for weakness.   Psychiatric/Behavioral: Negative.      Objective:     Vital Signs (Most Recent):  Temp: 98.3 °F (36.8 °C) (02/11/22 1120)  Pulse: 62 (02/11/22 1120)  Resp: 18 (02/11/22 1120)  BP: (!) 147/64 (02/11/22 1120)  SpO2: (!) 92 % (02/11/22 1120) Vital Signs (24h Range):  Temp:  [97.5 °F (36.4 °C)-98.3 °F (36.8 °C)] 98.3 °F (36.8 °C)  Pulse:  [61-70] 62  Resp:  [16-22] 18  SpO2:  [92 %-97 %] 92 %  BP: (129-147)/(63-75) 147/64     Weight: 69.5 kg (153 lb 3.5 oz)  Body mass index is 24 kg/m².    Physical Exam  Constitutional:       Interventions: Nasal cannula in place.   HENT:      Head: Normocephalic and atraumatic.   Eyes:      General: Lids are normal.      Conjunctiva/sclera: Conjunctivae normal.   Cardiovascular:      Rate and Rhythm: Normal rate and regular rhythm.   Pulmonary:      Effort: Pulmonary effort is normal. No respiratory distress.   Chest:      Chest wall: Tenderness present.      Comments: Tenderness on right side.   Abdominal:      General: Abdomen is flat.      Tenderness: There is no abdominal tenderness.   Musculoskeletal:       Cervical back: Full passive range of motion without pain and normal range of motion.      Comments: Normal ROM, weakness noted.    Skin:     General: Skin is warm and dry.   Neurological:      Mental Status: She is alert and oriented to person, place, and time.   Psychiatric:         Attention and Perception: Attention normal.         Speech: Speech normal.         Behavior: Behavior is cooperative.         Review of Symptoms    Symptom Assessment (ESAS 0-10 Scale)  Pain:  6  Dyspnea:  0  Anxiety:  3  Nausea:  0  Depression:  0  Anorexia:  6  Fatigue:  0  Insomnia:  0  Restlessness:  0  Agitation:  0         Pain Assessment:  Location(s): back            Quality: sharp        Quantity: 8/10 in intensity        Alleviating Factors: opiates    Performance Status:  60    Living Arrangements:  Lives with family    Psychosocial/Cultural: Pt lives with her, Cedrick. Per pt, her house is filthy and she sleeps on the sofa. Per pt, son works during the day. Pt reports he has a sister who she speaks with often but sister is OOT a lot.     Spiritual:  F - [place] and Belief:  Buddhist        Advance Care Planning   Advance Directives:   Do Not Resuscitate Status: No    Medical Power of : No    Agent's Name:  Srinivasan Philip is NOK.     Decision Making:  Patient answered questions         Significant Labs: All pertinent labs within the past 24 hours have been reviewed.  CBC:   Recent Labs   Lab 02/09/22  0237   WBC 5.10   HGB 12.1   HCT 36.5*   MCV 81*        BMP:  No results for input(s): GLU, NA, K, CL, CO2, BUN, CREATININE, CALCIUM, MG in the last 24 hours.  LFT:  Lab Results   Component Value Date    AST 19 02/08/2022    ALKPHOS 53 (L) 02/08/2022    BILITOT 0.3 02/08/2022     Albumin:   Albumin   Date Value Ref Range Status   02/08/2022 3.3 (L) 3.5 - 5.2 g/dL Final     Protein:   Total Protein   Date Value Ref Range Status   02/08/2022 8.2 6.0 - 8.4 g/dL Final     Lactic acid:   Lab Results   Component Value  Date    LACTATE 1.6 11/05/2021    LACTATE 1.7 12/05/2019       Significant Imaging: I have reviewed all pertinent imaging results/findings within the past 24 hours.    > 50% of 35  min visit spent in chart review, face to face discussion of goals of care,  symptom assessment, coordination of care and emotional support    Maria Luz Reid, CNS  Palliative Medicine  [facility] - Oncology ([facility])

## 2022-02-11 NOTE — PLAN OF CARE
Eval initiated and POC established. Patient amenable to therapy with encouragement.     Problem: Occupational Therapy Goal  Goal: Occupational Therapy Goal  Description: Goals to be met by: 3/13/22     Patient will increase functional independence with ADLs by performing:    Grooming while standing at sink with Contact Guard Assistance.  Toileting from toilet with Stand by Assistance for hygiene and clothing management.   Toilet transfer to toilet with Stand-by Assistance.    Outcome: Ongoing, Progressing

## 2022-02-12 PROCEDURE — 99232 PR SUBSEQUENT HOSPITAL CARE,LEVL II: ICD-10-PCS | Mod: GC,,, | Performed by: STUDENT IN AN ORGANIZED HEALTH CARE EDUCATION/TRAINING PROGRAM

## 2022-02-12 PROCEDURE — 20600001 HC STEP DOWN PRIVATE ROOM

## 2022-02-12 PROCEDURE — 27000221 HC OXYGEN, UP TO 24 HOURS

## 2022-02-12 PROCEDURE — 25000242 PHARM REV CODE 250 ALT 637 W/ HCPCS

## 2022-02-12 PROCEDURE — 25000003 PHARM REV CODE 250: Performed by: STUDENT IN AN ORGANIZED HEALTH CARE EDUCATION/TRAINING PROGRAM

## 2022-02-12 PROCEDURE — 25000003 PHARM REV CODE 250

## 2022-02-12 PROCEDURE — 94761 N-INVAS EAR/PLS OXIMETRY MLT: CPT

## 2022-02-12 PROCEDURE — 94640 AIRWAY INHALATION TREATMENT: CPT

## 2022-02-12 PROCEDURE — 99900035 HC TECH TIME PER 15 MIN (STAT)

## 2022-02-12 PROCEDURE — 99232 SBSQ HOSP IP/OBS MODERATE 35: CPT | Mod: GC,,, | Performed by: STUDENT IN AN ORGANIZED HEALTH CARE EDUCATION/TRAINING PROGRAM

## 2022-02-12 PROCEDURE — 63600175 PHARM REV CODE 636 W HCPCS

## 2022-02-12 PROCEDURE — 63600175 PHARM REV CODE 636 W HCPCS: Performed by: STUDENT IN AN ORGANIZED HEALTH CARE EDUCATION/TRAINING PROGRAM

## 2022-02-12 RX ORDER — IPRATROPIUM BROMIDE AND ALBUTEROL SULFATE 2.5; .5 MG/3ML; MG/3ML
3 SOLUTION RESPIRATORY (INHALATION)
Status: DISCONTINUED | OUTPATIENT
Start: 2022-02-12 | End: 2022-02-16 | Stop reason: HOSPADM

## 2022-02-12 RX ORDER — POLYETHYLENE GLYCOL 3350 17 G/17G
17 POWDER, FOR SOLUTION ORAL DAILY
Status: DISCONTINUED | OUTPATIENT
Start: 2022-02-12 | End: 2022-02-16 | Stop reason: HOSPADM

## 2022-02-12 RX ORDER — AMOXICILLIN 250 MG
2 CAPSULE ORAL DAILY
Status: DISCONTINUED | OUTPATIENT
Start: 2022-02-13 | End: 2022-02-16 | Stop reason: HOSPADM

## 2022-02-12 RX ADMIN — IPRATROPIUM BROMIDE AND ALBUTEROL SULFATE 3 ML: 2.5; .5 SOLUTION RESPIRATORY (INHALATION) at 01:02

## 2022-02-12 RX ADMIN — OXYCODONE HYDROCHLORIDE 10 MG: 10 TABLET ORAL at 08:02

## 2022-02-12 RX ADMIN — ALBUTEROL SULFATE 2 PUFF: 108 INHALANT RESPIRATORY (INHALATION) at 08:02

## 2022-02-12 RX ADMIN — Medication 1000 UNITS: at 08:02

## 2022-02-12 RX ADMIN — FOLIC ACID 1 MG: 1 TABLET ORAL at 08:02

## 2022-02-12 RX ADMIN — DIVALPROEX SODIUM 250 MG: 250 TABLET, DELAYED RELEASE ORAL at 08:02

## 2022-02-12 RX ADMIN — ALBUTEROL SULFATE 2 PUFF: 108 INHALANT RESPIRATORY (INHALATION) at 04:02

## 2022-02-12 RX ADMIN — DIVALPROEX SODIUM 250 MG: 250 TABLET, DELAYED RELEASE ORAL at 03:02

## 2022-02-12 RX ADMIN — OXYCODONE HYDROCHLORIDE 10 MG: 10 TABLET ORAL at 04:02

## 2022-02-12 RX ADMIN — MORPHINE SULFATE 15 MG: 15 TABLET, FILM COATED, EXTENDED RELEASE ORAL at 08:02

## 2022-02-12 RX ADMIN — LEVOTHYROXINE SODIUM 112 MCG: 112 TABLET ORAL at 04:02

## 2022-02-12 RX ADMIN — VENLAFAXINE HYDROCHLORIDE 150 MG: 75 CAPSULE, EXTENDED RELEASE ORAL at 08:02

## 2022-02-12 RX ADMIN — LEVOFLOXACIN 750 MG: 750 TABLET, FILM COATED ORAL at 08:02

## 2022-02-12 RX ADMIN — IPRATROPIUM BROMIDE AND ALBUTEROL SULFATE 3 ML: 2.5; .5 SOLUTION RESPIRATORY (INHALATION) at 08:02

## 2022-02-12 RX ADMIN — ASPIRIN 81 MG: 81 TABLET, COATED ORAL at 08:02

## 2022-02-12 RX ADMIN — OXYCODONE HYDROCHLORIDE 10 MG: 10 TABLET ORAL at 01:02

## 2022-02-12 RX ADMIN — DOCUSATE SODIUM AND SENNOSIDES 1 TABLET: 8.6; 5 TABLET, FILM COATED ORAL at 08:02

## 2022-02-12 RX ADMIN — DULOXETINE HYDROCHLORIDE 20 MG: 20 CAPSULE, DELAYED RELEASE ORAL at 08:02

## 2022-02-12 RX ADMIN — HYDROMORPHONE HYDROCHLORIDE 1 MG: 1 INJECTION, SOLUTION INTRAMUSCULAR; INTRAVENOUS; SUBCUTANEOUS at 04:02

## 2022-02-12 RX ADMIN — ALBUTEROL SULFATE 2 PUFF: 108 INHALANT RESPIRATORY (INHALATION) at 01:02

## 2022-02-12 RX ADMIN — ENOXAPARIN SODIUM 40 MG: 100 INJECTION SUBCUTANEOUS at 04:02

## 2022-02-12 RX ADMIN — POLYETHYLENE GLYCOL 3350 17 G: 17 POWDER, FOR SOLUTION ORAL at 09:02

## 2022-02-12 NOTE — PROGRESS NOTES
[facility] - Oncology ([facility])  [facility] Medicine  Progress Note    Patient Name: Micheline Raza  MRN: [id number]  Patient Class: IP- Inpatient   Admission Date: 2/8/2022  Length of Stay: 4 days  Attending Physician: Esteban Lopez DO  Primary Care Provider: [place]        Subjective:     Principal Problem:Adenocarcinoma of right lung, stage 4        HPI:  Ms. 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  Patient states dyspnea progressively worsening for 2 weeks with intermittent non-productive cough, not on home O2.  Patient also endorses chronic right sided chest and back pain that is usually relieved with home narcotics.  Dyspnea worsens with exertion and is associated with fatigue, appetite change, weight loss, and night sweats.  Patient follows heme/onc outpatient and was initially started on Keytruda for Tx.  Patient was scheduled for chemotherapy as she was deemed not a candidate for RT however patient refusing chemotherapy and discussing possibility of hospice care.  Of note, patient recently admitted for COVID 2 months ago.    In ED, patient afebrile and hypertensive with SBP of 160-180s.  Initially sats of 95% on RA however increasing dyspnea and placed on 2L NC.  WBC 6.9 with Hgb 13.  K 3.3 and BNP wnl.  EKG showing NSR.  CXR with right sided opacification more prominent at base concerning for PNA along with CA with small right sided pleural effusion.  Patient given pain medication, azithromycin x1 and rocephin x1.      Overview/Hospital Course:  Home pain regimen restarted and patient placed on supplemental O2.  Palliative consulted and following.  Adjusting pain regimen for intractable pain during admission.  Patient discussing options on discharge of home or nursing home.  Abx changed from  CTX/Azithromycin to [facility] and completed course for CAP coverage.  Attempting to plan meeting with patient, son, hospital medicine, and palliative.      Interval History: No acute events overnight.  This am, patient somnolent but arousable and answering questions appropriately.  States pain is better controlled.  Patient more alert later in evening on re-examination.  Agreeable to family meeting along with medicine and palliative as she is having difficult time on decision of goals of care and disposition.    Review of Systems   Constitutional: Positive for appetite change and fatigue. Negative for chills and fever.   HENT: Positive for trouble swallowing. Negative for congestion and sore throat.    Respiratory: Positive for cough (Non-productive) and shortness of breath.    Cardiovascular: Negative for chest pain and leg swelling.   Gastrointestinal: Negative for abdominal pain, constipation, diarrhea, nausea and vomiting.   Genitourinary: Negative for dysuria and hematuria.   Musculoskeletal: Positive for back pain (right sided). Negative for myalgias.   Skin: Negative for color change and rash.   Neurological: Positive for weakness. Negative for dizziness, light-headedness and headaches.   Psychiatric/Behavioral: Negative for agitation and confusion.     Objective:     Vital Signs (Most Recent):  Temp: 97.9 °F (36.6 °C) (02/12/22 1229)  Pulse: 80 (02/12/22 1347)  Resp: 18 (02/12/22 1347)  BP: 135/84 (02/12/22 1229)  SpO2: 95 % (02/12/22 1347) Vital Signs (24h Range):  Temp:  [97 °F (36.1 °C)-98.5 °F (36.9 °C)] 97.9 °F (36.6 °C)  Pulse:  [66-80] 80  Resp:  [16-20] 18  SpO2:  [90 %-95 %] 95 %  BP: (122-135)/(55-84) 135/84     Weight: 69.5 kg (153 lb 3.5 oz)  Body mass index is 24 kg/m².    Intake/Output Summary (Last 24 hours) at 2/12/2022 6427  Last data filed at 2/12/2022 1331  Gross per 24 hour   Intake 1200 ml   Output --   Net 1200 ml      Physical Exam  Vitals and nursing note reviewed.   Constitutional:        General: She is not in acute distress.     Appearance: She is underweight. She is ill-appearing.   HENT:      Head: Normocephalic and atraumatic.      Mouth/Throat:      Mouth: Mucous membranes are dry.      Pharynx: Oropharynx is clear. No oropharyngeal exudate or posterior oropharyngeal erythema.   Eyes:      General: No scleral icterus.     Extraocular Movements: Extraocular movements intact.      Conjunctiva/sclera: Conjunctivae normal.      Pupils: Pupils are equal, round, and reactive to light.   Cardiovascular:      Rate and Rhythm: Normal rate and regular rhythm.      Pulses: Normal pulses.   Pulmonary:      Effort: Tachypnea present.      Breath sounds: No rales.      Comments: Decrease breath sounds on right  Abdominal:      General: Abdomen is flat. Bowel sounds are normal. There is no distension.      Palpations: Abdomen is soft.      Tenderness: There is no abdominal tenderness. There is no guarding.   Musculoskeletal:         General: No tenderness. Normal range of motion.      Cervical back: Normal range of motion and neck supple. No tenderness.      Right lower leg: No edema.      Left lower leg: No edema.   Skin:     General: Skin is warm and dry.   Neurological:      General: No focal deficit present.      Mental Status: She is alert and oriented to person, place, and time.      Motor: Weakness present.   Psychiatric:         Mood and Affect: Mood normal.         Behavior: Behavior normal. Behavior is cooperative.         Thought Content: Thought content normal.         Significant Labs: All pertinent labs within the past 24 hours have been reviewed.  CBC: No results for input(s): WBC, HGB, HCT, PLT in the last 48 hours.  CMP: No results for input(s): NA, K, CL, CO2, GLU, BUN, CREATININE, CALCIUM, PROT, ALBUMIN, BILITOT, ALKPHOS, AST, ALT, ANIONGAP, EGFRNONAA in the last 48 hours.    Invalid input(s): ESTGFAFR[place]    Significant Imaging: I have reviewed all pertinent imaging results/findings  within the past 24 hours.      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  Follows outpatient heme/onc on Keytruda.  Refused chemotherapy and deemed not a candidate for RT.  Patient with severe cancer related pain as well as worsening dyspnea associated with fatigue, decreased appetite, weight loss, and night sweats.  Not on home O2.  CXR showing right base opacification with small right pleural effusion concerning for PNA vs CA.  S/p azithromycin x1 and rocephin x1.  No leukocytosis seen, procal wnl    - Supplemental O2 for sats 88-92% in setting of CA  - Last day of Levaquin for total of 5 days treatment for CAP coverage  - MS Contin 15mg BID  - Oxy 5/10 q4hr PRN  - Dilaudid PRN for severe breakthrough pain  - Palliative consulted for GOC/ACP, appreciate recs.  Attempting to set up family meeting early in week  - Patient with desat on 6MWT likely will need home O2 on discharge      Pneumonia involving right lung  See adenocarcinoma of right lung, stage 4      Cancer related pain    See adenocarcinoma of right lung, stage 4    Depression  Continue home anti-depressants      Hypokalemia  Replete PRN  Daily CMP      Thyroid disease  Continue home synthroid      HLD (hyperlipidemia)  Holding home atorvastatin for now.  GOC discussion to be had.  Palliative consulted, appreciate recs.        VTE Risk Mitigation (From admission, onward)         Ordered     enoxaparin injection 40 mg  Daily         02/08/22 1714     Place sequential compression device  Until discontinued         02/08/22 1714     IP VTE HIGH RISK PATIENT  Once         02/08/22 1707     IP VTE HIGH RISK PATIENT  Once         02/08/22 1714                Discharge Planning   HEAVENLY: 2/12/2022     Code Status: Full Code   Is the patient medically ready for discharge?:     Reason for patient still in hospital (select all that apply): Patient trending condition and Pending  disposition  Discharge Plan A: Hospice/home,Home with family                  Aniceto Heath MD  Department of Hospital Medicine   [facility] - Oncology ([facility])

## 2022-02-12 NOTE — PLAN OF CARE
Pt involved in plan of care and communicating needs throughout shift. Pt slept most of the day. Pt main c/o pain with mild relief from MS Contin and PRN PO Oxy and IV dilaudidn. Pt with noted audible wheezing with mild relief from PRN Resp treatments. Up in room and to bathroom with assistance. Tolerating diet, voiding without difficulty. Pt O2 sats WNL on 3 L of Oxygen. All VSS; no acute events so far this shift.  Pt remaining free from falls or injury throughout shift; bed locked and in lowest position; call light within reach.  Pt instructed to call for assistance as needed.  Q1H rounding done on pt.

## 2022-02-12 NOTE — SUBJECTIVE & OBJECTIVE
Interval History: No acute events overnight.  This am, patient somnolent but arousable and answering questions appropriately.  States pain is better controlled.  Patient more alert later in evening on re-examination.  Agreeable to family meeting along with medicine and palliative as she is having difficult time on decision of goals of care and disposition.    Review of Systems   Constitutional: Positive for appetite change and fatigue. Negative for chills and fever.   HENT: Positive for trouble swallowing. Negative for congestion and sore throat.    Respiratory: Positive for cough (Non-productive) and shortness of breath.    Cardiovascular: Negative for chest pain and leg swelling.   Gastrointestinal: Negative for abdominal pain, constipation, diarrhea, nausea and vomiting.   Genitourinary: Negative for dysuria and hematuria.   Musculoskeletal: Positive for back pain (right sided). Negative for myalgias.   Skin: Negative for color change and rash.   Neurological: Positive for weakness. Negative for dizziness, light-headedness and headaches.   Psychiatric/Behavioral: Negative for agitation and confusion.     Objective:     Vital Signs (Most Recent):  Temp: 97.9 °F (36.6 °C) (02/12/22 1229)  Pulse: 80 (02/12/22 1347)  Resp: 18 (02/12/22 1347)  BP: 135/84 (02/12/22 1229)  SpO2: 95 % (02/12/22 1347) Vital Signs (24h Range):  Temp:  [97 °F (36.1 °C)-98.5 °F (36.9 °C)] 97.9 °F (36.6 °C)  Pulse:  [66-80] 80  Resp:  [16-20] 18  SpO2:  [90 %-95 %] 95 %  BP: (122-135)/(55-84) 135/84     Weight: 69.5 kg (153 lb 3.5 oz)  Body mass index is 24 kg/m².    Intake/Output Summary (Last 24 hours) at 2/12/2022 1453  Last data filed at 2/12/2022 1331  Gross per 24 hour   Intake 1200 ml   Output --   Net 1200 ml      Physical Exam  Vitals and nursing note reviewed.   Constitutional:       General: She is not in acute distress.     Appearance: She is underweight. She is ill-appearing.   HENT:      Head: Normocephalic and atraumatic.       Mouth/Throat:      Mouth: Mucous membranes are dry.      Pharynx: Oropharynx is clear. No oropharyngeal exudate or posterior oropharyngeal erythema.   Eyes:      General: No scleral icterus.     Extraocular Movements: Extraocular movements intact.      Conjunctiva/sclera: Conjunctivae normal.      Pupils: Pupils are equal, round, and reactive to light.   Cardiovascular:      Rate and Rhythm: Normal rate and regular rhythm.      Pulses: Normal pulses.   Pulmonary:      Effort: Tachypnea present.      Breath sounds: No rales.      Comments: Decrease breath sounds on right  Abdominal:      General: Abdomen is flat. Bowel sounds are normal. There is no distension.      Palpations: Abdomen is soft.      Tenderness: There is no abdominal tenderness. There is no guarding.   Musculoskeletal:         General: No tenderness. Normal range of motion.      Cervical back: Normal range of motion and neck supple. No tenderness.      Right lower leg: No edema.      Left lower leg: No edema.   Skin:     General: Skin is warm and dry.   Neurological:      General: No focal deficit present.      Mental Status: She is alert and oriented to person, place, and time.      Motor: Weakness present.   Psychiatric:         Mood and Affect: Mood normal.         Behavior: Behavior normal. Behavior is cooperative.         Thought Content: Thought content normal.         Significant Labs: All pertinent labs within the past 24 hours have been reviewed.  CBC: No results for input(s): WBC, HGB, HCT, PLT in the last 48 hours.  CMP: No results for input(s): NA, K, CL, CO2, GLU, BUN, CREATININE, CALCIUM, PROT, ALBUMIN, BILITOT, ALKPHOS, AST, ALT, ANIONGAP, EGFRNONAA in the last 48 hours.    Invalid input(s): ESTGF[place]    Significant Imaging: I have reviewed all pertinent imaging results/findings within the past 24 hours.

## 2022-02-12 NOTE — PLAN OF CARE
Side rails up x2; call bell in place; bed in lowest, locked position; skid proof socks on; no evidence of skin breakdown; care plan explained to patient; pt remains free of injury.  Pt tolerated small amount of po, voids, ambulates with assistance. 2L NC in progress. Pt with c/o pain oxy IR given x 2, pt with anxiety, atarax given. Pt stated she had relief. Pt encouraged to call for assistance, frequent rounding in progress. VSS and afebrile.

## 2022-02-12 NOTE — ASSESSMENT & PLAN NOTE
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  Follows outpatient heme/onc on Keytruda.  Refused chemotherapy and deemed not a candidate for RT.  Patient with severe cancer related pain as well as worsening dyspnea associated with fatigue, decreased appetite, weight loss, and night sweats.  Not on home O2.  CXR showing right base opacification with small right pleural effusion concerning for PNA vs CA.  S/p azithromycin x1 and rocephin x1.  No leukocytosis seen, procal wnl    - Supplemental O2 for sats 88-92% in setting of CA  - Last day of Levaquin for total of 5 days treatment for CAP coverage  - MS Contin 15mg BID  - Oxy 5/10 q4hr PRN  - Dilaudid PRN for severe breakthrough pain  - Palliative consulted for GOC/ACP, appreciate recs.  Attempting to set up family meeting early in week  - Patient with desat on 6MWT likely will need home O2 on discharge

## 2022-02-13 PROCEDURE — 94761 N-INVAS EAR/PLS OXIMETRY MLT: CPT

## 2022-02-13 PROCEDURE — 25000003 PHARM REV CODE 250

## 2022-02-13 PROCEDURE — 94640 AIRWAY INHALATION TREATMENT: CPT

## 2022-02-13 PROCEDURE — 25000003 PHARM REV CODE 250: Performed by: STUDENT IN AN ORGANIZED HEALTH CARE EDUCATION/TRAINING PROGRAM

## 2022-02-13 PROCEDURE — 25000242 PHARM REV CODE 250 ALT 637 W/ HCPCS

## 2022-02-13 PROCEDURE — 27000221 HC OXYGEN, UP TO 24 HOURS

## 2022-02-13 PROCEDURE — 20600001 HC STEP DOWN PRIVATE ROOM

## 2022-02-13 PROCEDURE — 99232 SBSQ HOSP IP/OBS MODERATE 35: CPT | Mod: GC,,, | Performed by: STUDENT IN AN ORGANIZED HEALTH CARE EDUCATION/TRAINING PROGRAM

## 2022-02-13 PROCEDURE — 99232 PR SUBSEQUENT HOSPITAL CARE,LEVL II: ICD-10-PCS | Mod: GC,,, | Performed by: STUDENT IN AN ORGANIZED HEALTH CARE EDUCATION/TRAINING PROGRAM

## 2022-02-13 PROCEDURE — 63600175 PHARM REV CODE 636 W HCPCS: Performed by: STUDENT IN AN ORGANIZED HEALTH CARE EDUCATION/TRAINING PROGRAM

## 2022-02-13 PROCEDURE — 99900035 HC TECH TIME PER 15 MIN (STAT)

## 2022-02-13 RX ADMIN — DOCUSATE SODIUM 50 MG AND SENNOSIDES 8.6 MG 2 TABLET: 8.6; 5 TABLET, FILM COATED ORAL at 08:02

## 2022-02-13 RX ADMIN — IPRATROPIUM BROMIDE AND ALBUTEROL SULFATE 3 ML: 2.5; .5 SOLUTION RESPIRATORY (INHALATION) at 02:02

## 2022-02-13 RX ADMIN — Medication 1000 UNITS: at 08:02

## 2022-02-13 RX ADMIN — DULOXETINE HYDROCHLORIDE 20 MG: 20 CAPSULE, DELAYED RELEASE ORAL at 08:02

## 2022-02-13 RX ADMIN — ASPIRIN 81 MG: 81 TABLET, COATED ORAL at 08:02

## 2022-02-13 RX ADMIN — DIVALPROEX SODIUM 250 MG: 250 TABLET, DELAYED RELEASE ORAL at 08:02

## 2022-02-13 RX ADMIN — MORPHINE SULFATE 15 MG: 15 TABLET, FILM COATED, EXTENDED RELEASE ORAL at 08:02

## 2022-02-13 RX ADMIN — ALBUTEROL SULFATE 2 PUFF: 108 INHALANT RESPIRATORY (INHALATION) at 04:02

## 2022-02-13 RX ADMIN — IPRATROPIUM BROMIDE AND ALBUTEROL SULFATE 3 ML: 2.5; .5 SOLUTION RESPIRATORY (INHALATION) at 07:02

## 2022-02-13 RX ADMIN — LEVOTHYROXINE SODIUM 112 MCG: 112 TABLET ORAL at 06:02

## 2022-02-13 RX ADMIN — OXYCODONE HYDROCHLORIDE 10 MG: 10 TABLET ORAL at 06:02

## 2022-02-13 RX ADMIN — POLYETHYLENE GLYCOL 3350 17 G: 17 POWDER, FOR SOLUTION ORAL at 08:02

## 2022-02-13 RX ADMIN — HYDROXYZINE HYDROCHLORIDE 25 MG: 25 TABLET ORAL at 02:02

## 2022-02-13 RX ADMIN — VENLAFAXINE HYDROCHLORIDE 150 MG: 75 CAPSULE, EXTENDED RELEASE ORAL at 08:02

## 2022-02-13 RX ADMIN — IPRATROPIUM BROMIDE AND ALBUTEROL SULFATE 3 ML: 2.5; .5 SOLUTION RESPIRATORY (INHALATION) at 09:02

## 2022-02-13 RX ADMIN — HYDROMORPHONE HYDROCHLORIDE 1 MG: 1 INJECTION, SOLUTION INTRAMUSCULAR; INTRAVENOUS; SUBCUTANEOUS at 12:02

## 2022-02-13 RX ADMIN — FOLIC ACID 1 MG: 1 TABLET ORAL at 08:02

## 2022-02-13 RX ADMIN — OXYCODONE HYDROCHLORIDE 10 MG: 10 TABLET ORAL at 11:02

## 2022-02-13 RX ADMIN — DIVALPROEX SODIUM 250 MG: 250 TABLET, DELAYED RELEASE ORAL at 03:02

## 2022-02-13 RX ADMIN — HYDROMORPHONE HYDROCHLORIDE 1 MG: 1 INJECTION, SOLUTION INTRAMUSCULAR; INTRAVENOUS; SUBCUTANEOUS at 02:02

## 2022-02-13 NOTE — SUBJECTIVE & OBJECTIVE
Interval History: No acute events overnight.  States pain better controlled however asking for multiple PRNs. Discussed with patient about decision of goals of care.  Patient reports wishes of focusing more on comfort measures at this time with symptom relief.  Discussed different options for patient and patient would like to meet with palliative and medicine team in the coming days to think about decision.    Review of Systems   Constitutional: Positive for appetite change and fatigue. Negative for chills and fever.   HENT: Positive for trouble swallowing. Negative for congestion and sore throat.    Respiratory: Positive for cough (Non-productive) and shortness of breath.    Cardiovascular: Negative for chest pain and leg swelling.   Gastrointestinal: Negative for abdominal pain, constipation, diarrhea, nausea and vomiting.   Genitourinary: Negative for dysuria and hematuria.   Musculoskeletal: Negative for back pain and myalgias.   Skin: Negative for color change and rash.   Neurological: Positive for weakness. Negative for dizziness, light-headedness and headaches.   Psychiatric/Behavioral: Negative for agitation and confusion.     Objective:     Vital Signs (Most Recent):  Temp: 98.1 °F (36.7 °C) (02/13/22 1131)  Pulse: 84 (02/13/22 1131)  Resp: 20 (02/13/22 1253)  BP: (!) 143/65 (02/13/22 1131)  SpO2: (!) 91 % (02/13/22 1131) Vital Signs (24h Range):  Temp:  [98 °F (36.7 °C)-99 °F (37.2 °C)] 98.1 °F (36.7 °C)  Pulse:  [77-89] 84  Resp:  [15-22] 20  SpO2:  [87 %-98 %] 91 %  BP: (105-143)/(60-91) 143/65     Weight: 69.5 kg (153 lb 3.5 oz)  Body mass index is 24 kg/m².    Intake/Output Summary (Last 24 hours) at 2/13/2022 1315  Last data filed at 2/13/2022 1300  Gross per 24 hour   Intake 1080 ml   Output 400 ml   Net 680 ml      Physical Exam  Vitals and nursing note reviewed.   Constitutional:       General: She is not in acute distress.     Appearance: She is underweight. She is ill-appearing.   HENT:       Head: Normocephalic and atraumatic.      Mouth/Throat:      Mouth: Mucous membranes are moist.      Pharynx: Oropharynx is clear. No oropharyngeal exudate or posterior oropharyngeal erythema.   Eyes:      General: No scleral icterus.     Extraocular Movements: Extraocular movements intact.      Conjunctiva/sclera: Conjunctivae normal.      Pupils: Pupils are equal, round, and reactive to light.   Cardiovascular:      Rate and Rhythm: Normal rate and regular rhythm.      Pulses: Normal pulses.   Pulmonary:      Effort: Tachypnea present.      Breath sounds: No rales.      Comments: Decrease breath sounds on right  Abdominal:      General: Abdomen is flat. Bowel sounds are normal. There is no distension.      Palpations: Abdomen is soft.      Tenderness: There is no abdominal tenderness. There is no guarding.   Musculoskeletal:         General: No tenderness. Normal range of motion.      Cervical back: Normal range of motion and neck supple. No tenderness.      Right lower leg: No edema.      Left lower leg: No edema.   Skin:     General: Skin is warm and dry.   Neurological:      General: No focal deficit present.      Mental Status: She is alert and oriented to person, place, and time.      Motor: Weakness present.   Psychiatric:         Mood and Affect: Mood normal.         Behavior: Behavior normal. Behavior is cooperative.         Thought Content: Thought content normal.         Significant Labs: All pertinent labs within the past 24 hours have been reviewed.    Significant Imaging: I have reviewed all pertinent imaging results/findings within the past 24 hours.

## 2022-02-13 NOTE — NURSING
Dr Coon notified of pt's o2 sat dropping due to taking oxygen off, and not recovering once being put back on. Non rebreather applied for a quick recovery and pt o2 sat 100%. Nasal cannula applied at 3 liters and albuterol inhaler given by respiratory. Tele sitter to bedside and continuous o2 monitored.

## 2022-02-13 NOTE — PLAN OF CARE
Pt involved in plan of care and communicating needs throughout shift. Pt slept most of the day. Pt main c/o pain with mild relief from MS Contin and PRN PO Oxy and IV dilaudid. Transition to comfort measures only today. Pt breathing improved with scheduled RT treatment. Up to BS with assistance, pt repositioned as tolerated. Tolerating diet, voiding without difficulty. Pt O2 sats WNL on 3 L of Oxygen. All VSS; no acute events so far this shift.  Pt remaining free from falls or injury throughout shift; bed locked and in lowest position; call light within reach.  Pt instructed to call for assistance as needed.  Q1H rounding done on pt

## 2022-02-13 NOTE — ASSESSMENT & PLAN NOTE
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  Follows outpatient heme/onc on Keytruda.  Refused chemotherapy and deemed not a candidate for RT.  Patient with severe cancer related pain as well as worsening dyspnea associated with fatigue, decreased appetite, weight loss, and night sweats.  Not on home O2.  CXR showing right base opacification with small right pleural effusion concerning for PNA vs CA.  S/p azithromycin x1 and rocephin x1.  No leukocytosis seen, procal wnl    - Supplemental O2 for sats 88-92% in setting of CA  - Last day of Levaquin for total of 5 days treatment for CAP coverage  - MS Contin 15mg BID  - Oxy 5/10 q4hr PRN  - Dilaudid PRN for severe breakthrough pain  - Palliative consulted for GOC/ACP, appreciate recs.  Attempting to set up meeting with medicine and palliative early in week.  After long discussion, patient requesting to transition focus of care to comfort and symptom control.  Code status discussed and explained with patient's decision to be DNR.  - Patient with desat on 6MWT likely will need home O2 on discharge

## 2022-02-13 NOTE — PLAN OF CARE
Plan of care reviewed with the pt at the beginning of the shift. Pt has remained free from injury and falls. Pt up with assist to bedside commode.Fall precautions maintained. Bed locked in lowest position, side rails up x2, non skid footwear on, personal belongings and call light within reach. Instructed pt to call for assistance as needed. Pt has remained afebrile at this time.

## 2022-02-13 NOTE — PROGRESS NOTES
[facility] ([facility])  [facility]  Progress Note    Patient Name: Micheline Raza  MRN: [id number]  Patient Class: IP- Inpatient   Admission Date: 2/8/2022  Length of Stay: 5 days  Attending Physician: Esteban Lopez DO  Primary Care Provider: [facility]        Subjective:     Principal Problem:Adenocarcinoma of right lung, stage 4        HPI:  Ms. 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  Patient states dyspnea progressively worsening for 2 weeks with intermittent non-productive cough, not on home O2.  Patient also endorses chronic right sided chest and back pain that is usually relieved with home narcotics.  Dyspnea worsens with exertion and is associated with fatigue, appetite change, weight loss, and night sweats.  Patient follows heme/onc outpatient and was initially started on Keytruda for Tx.  Patient was scheduled for chemotherapy as she was deemed not a candidate for RT however patient refusing chemotherapy and discussing possibility of hospice care.  Of note, patient recently admitted for COVID 2 months ago.    In ED, patient afebrile and hypertensive with SBP of 160-180s.  Initially sats of 95% on RA however increasing dyspnea and placed on 2L NC.  WBC 6.9 with Hgb 13.  K 3.3 and BNP wnl.  EKG showing NSR.  CXR with right sided opacification more prominent at base concerning for PNA along with CA with small right sided pleural effusion.  Patient given pain medication, azithromycin x1 and rocephin x1.      Overview/Hospital Course:  Home pain regimen restarted and patient placed on supplemental O2.  Palliative consulted and following.  Adjusting pain regimen for intractable pain during admission.  Patient discussing options on discharge of home or nursing home.  Abx changed from  CTX/Azithromycin to [facility] and completed course for CAP coverage.  Attempting to plan meeting with patient, son, hospital medicine, and palliative.      Interval History: No acute events overnight.  States pain better controlled however asking for multiple PRNs. Discussed with patient about decision of goals of care.  Patient reports wishes of focusing more on comfort measures at this time with symptom relief.  Discussed different options for patient and patient would like to meet with palliative and medicine team in the coming days to think about decision.    Review of Systems   Constitutional: Positive for appetite change and fatigue. Negative for chills and fever.   HENT: Positive for trouble swallowing. Negative for congestion and sore throat.    Respiratory: Positive for cough (Non-productive) and shortness of breath.    Cardiovascular: Negative for chest pain and leg swelling.   Gastrointestinal: Negative for abdominal pain, constipation, diarrhea, nausea and vomiting.   Genitourinary: Negative for dysuria and hematuria.   Musculoskeletal: Negative for back pain and myalgias.   Skin: Negative for color change and rash.   Neurological: Positive for weakness. Negative for dizziness, light-headedness and headaches.   Psychiatric/Behavioral: Negative for agitation and confusion.     Objective:     Vital Signs (Most Recent):  Temp: 98.1 °F (36.7 °C) (02/13/22 1131)  Pulse: 84 (02/13/22 1131)  Resp: 20 (02/13/22 1253)  BP: (!) 143/65 (02/13/22 1131)  SpO2: (!) 91 % (02/13/22 1131) Vital Signs (24h Range):  Temp:  [98 °F (36.7 °C)-99 °F (37.2 °C)] 98.1 °F (36.7 °C)  Pulse:  [77-89] 84  Resp:  [15-22] 20  SpO2:  [87 %-98 %] 91 %  BP: (105-143)/(60-91) 143/65     Weight: 69.5 kg (153 lb 3.5 oz)  Body mass index is 24 kg/m².    Intake/Output Summary (Last 24 hours) at 2/13/2022 1315  Last data filed at 2/13/2022 1300  Gross per 24 hour   Intake 1080 ml   Output 400 ml   Net 680 ml      Physical Exam  Vitals and  nursing note reviewed.   Constitutional:       General: She is not in acute distress.     Appearance: She is underweight. She is ill-appearing.   HENT:      Head: Normocephalic and atraumatic.      Mouth/Throat:      Mouth: Mucous membranes are moist.      Pharynx: Oropharynx is clear. No oropharyngeal exudate or posterior oropharyngeal erythema.   Eyes:      General: No scleral icterus.     Extraocular Movements: Extraocular movements intact.      Conjunctiva/sclera: Conjunctivae normal.      Pupils: Pupils are equal, round, and reactive to light.   Cardiovascular:      Rate and Rhythm: Normal rate and regular rhythm.      Pulses: Normal pulses.   Pulmonary:      Effort: Tachypnea present.      Breath sounds: No rales.      Comments: Decrease breath sounds on right  Abdominal:      General: Abdomen is flat. Bowel sounds are normal. There is no distension.      Palpations: Abdomen is soft.      Tenderness: There is no abdominal tenderness. There is no guarding.   Musculoskeletal:         General: No tenderness. Normal range of motion.      Cervical back: Normal range of motion and neck supple. No tenderness.      Right lower leg: No edema.      Left lower leg: No edema.   Skin:     General: Skin is warm and dry.   Neurological:      General: No focal deficit present.      Mental Status: She is alert and oriented to person, place, and time.      Motor: Weakness present.   Psychiatric:         Mood and Affect: Mood normal.         Behavior: Behavior normal. Behavior is cooperative.         Thought Content: Thought content normal.         Significant Labs: All pertinent labs within the past 24 hours have been reviewed.    Significant Imaging: I have reviewed all pertinent imaging results/findings within the past 24 hours.      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  Follows outpatient heme/onc on Keytruda.  Refused chemotherapy  and deemed not a candidate for RT.  Patient with severe cancer related pain as well as worsening dyspnea associated with fatigue, decreased appetite, weight loss, and night sweats.  Not on home O2.  CXR showing right base opacification with small right pleural effusion concerning for PNA vs CA.  S/p azithromycin x1 and rocephin x1.  No leukocytosis seen, procal wnl    - Supplemental O2 for sats 88-92% in setting of CA  - Last day of Levaquin for total of 5 days treatment for CAP coverage  - MS Contin 15mg BID  - Oxy 5/10 q4hr PRN  - Dilaudid PRN for severe breakthrough pain  - Palliative consulted for GOC/ACP, appreciate recs.  Attempting to set up meeting with medicine and palliative early in week.  After long discussion, patient requesting to transition focus of care to comfort and symptom control.  Code status discussed and explained with patient's decision to be DNR.  - Patient with desat on 6MWT likely will need home O2 on discharge      Pneumonia involving right lung  See adenocarcinoma of right lung, stage 4      Cancer related pain    See adenocarcinoma of right lung, stage 4    Depression  Continue home anti-depressants      Hypokalemia  Replete PRN  Daily CMP      Thyroid disease  Continue home synthroid      HLD (hyperlipidemia)  Holding home atorvastatin as patient transitioning to focus care on comfort measures        VTE Risk Mitigation (From admission, onward)         Ordered     Place sequential compression device  Until discontinued         02/08/22 1714     IP VTE HIGH RISK PATIENT  Once         02/08/22 1707     IP VTE HIGH RISK PATIENT  Once         02/08/22 1714                Discharge Planning   HEAVENLY: 2/14/2022     Code Status: DNR   Is the patient medically ready for discharge?: No    Reason for patient still in hospital (select all that apply): Pending disposition  Discharge Plan A: Hospice/home,Home with family                  Aniceto Heath MD  Department of Hospital Medicine   [facility] -  Oncology (Hospital)

## 2022-02-13 NOTE — CHAPLAIN
Patient presented as fatigued and was limited verbally and no family was present. She indicated she is a woman of herminio and would like prayer.  provided quiet presence and prayer and she thanked .

## 2022-02-14 PROCEDURE — 27000221 HC OXYGEN, UP TO 24 HOURS

## 2022-02-14 PROCEDURE — 97116 GAIT TRAINING THERAPY: CPT

## 2022-02-14 PROCEDURE — 97530 THERAPEUTIC ACTIVITIES: CPT

## 2022-02-14 PROCEDURE — 94761 N-INVAS EAR/PLS OXIMETRY MLT: CPT

## 2022-02-14 PROCEDURE — 20600001 HC STEP DOWN PRIVATE ROOM

## 2022-02-14 PROCEDURE — 99232 SBSQ HOSP IP/OBS MODERATE 35: CPT | Mod: GC,,, | Performed by: STUDENT IN AN ORGANIZED HEALTH CARE EDUCATION/TRAINING PROGRAM

## 2022-02-14 PROCEDURE — 94640 AIRWAY INHALATION TREATMENT: CPT

## 2022-02-14 PROCEDURE — 99497 PR ADVNCD CARE PLAN 30 MIN: ICD-10-PCS | Mod: ,,, | Performed by: CLINICAL NURSE SPECIALIST

## 2022-02-14 PROCEDURE — 99233 PR SUBSEQUENT HOSPITAL CARE,LEVL III: ICD-10-PCS | Mod: ,,, | Performed by: CLINICAL NURSE SPECIALIST

## 2022-02-14 PROCEDURE — 25000003 PHARM REV CODE 250

## 2022-02-14 PROCEDURE — 99497 ADVNCD CARE PLAN 30 MIN: CPT | Mod: ,,, | Performed by: CLINICAL NURSE SPECIALIST

## 2022-02-14 PROCEDURE — 99233 SBSQ HOSP IP/OBS HIGH 50: CPT | Mod: ,,, | Performed by: CLINICAL NURSE SPECIALIST

## 2022-02-14 PROCEDURE — 63600175 PHARM REV CODE 636 W HCPCS: Performed by: STUDENT IN AN ORGANIZED HEALTH CARE EDUCATION/TRAINING PROGRAM

## 2022-02-14 PROCEDURE — 99232 PR SUBSEQUENT HOSPITAL CARE,LEVL II: ICD-10-PCS | Mod: GC,,, | Performed by: STUDENT IN AN ORGANIZED HEALTH CARE EDUCATION/TRAINING PROGRAM

## 2022-02-14 PROCEDURE — 25000242 PHARM REV CODE 250 ALT 637 W/ HCPCS

## 2022-02-14 PROCEDURE — 25000003 PHARM REV CODE 250: Performed by: STUDENT IN AN ORGANIZED HEALTH CARE EDUCATION/TRAINING PROGRAM

## 2022-02-14 PROCEDURE — 97535 SELF CARE MNGMENT TRAINING: CPT

## 2022-02-14 PROCEDURE — 99900035 HC TECH TIME PER 15 MIN (STAT)

## 2022-02-14 RX ORDER — MORPHINE SULFATE 30 MG/1
30 TABLET, FILM COATED, EXTENDED RELEASE ORAL EVERY 12 HOURS
Status: DISCONTINUED | OUTPATIENT
Start: 2022-02-14 | End: 2022-02-16 | Stop reason: HOSPADM

## 2022-02-14 RX ADMIN — MORPHINE SULFATE 30 MG: 30 TABLET, FILM COATED, EXTENDED RELEASE ORAL at 08:02

## 2022-02-14 RX ADMIN — ALBUTEROL SULFATE 2 PUFF: 108 INHALANT RESPIRATORY (INHALATION) at 05:02

## 2022-02-14 RX ADMIN — DOCUSATE SODIUM 50 MG AND SENNOSIDES 8.6 MG 2 TABLET: 8.6; 5 TABLET, FILM COATED ORAL at 08:02

## 2022-02-14 RX ADMIN — ASPIRIN 81 MG: 81 TABLET, COATED ORAL at 08:02

## 2022-02-14 RX ADMIN — DIVALPROEX SODIUM 250 MG: 250 TABLET, DELAYED RELEASE ORAL at 08:02

## 2022-02-14 RX ADMIN — IPRATROPIUM BROMIDE AND ALBUTEROL SULFATE 3 ML: 2.5; .5 SOLUTION RESPIRATORY (INHALATION) at 07:02

## 2022-02-14 RX ADMIN — VENLAFAXINE HYDROCHLORIDE 150 MG: 75 CAPSULE, EXTENDED RELEASE ORAL at 08:02

## 2022-02-14 RX ADMIN — DIVALPROEX SODIUM 250 MG: 250 TABLET, DELAYED RELEASE ORAL at 03:02

## 2022-02-14 RX ADMIN — OXYCODONE HYDROCHLORIDE 10 MG: 10 TABLET ORAL at 03:02

## 2022-02-14 RX ADMIN — Medication 1000 UNITS: at 08:02

## 2022-02-14 RX ADMIN — IPRATROPIUM BROMIDE AND ALBUTEROL SULFATE 3 ML: 2.5; .5 SOLUTION RESPIRATORY (INHALATION) at 09:02

## 2022-02-14 RX ADMIN — POLYETHYLENE GLYCOL 3350 17 G: 17 POWDER, FOR SOLUTION ORAL at 08:02

## 2022-02-14 RX ADMIN — HYDROMORPHONE HYDROCHLORIDE 1 MG: 1 INJECTION, SOLUTION INTRAMUSCULAR; INTRAVENOUS; SUBCUTANEOUS at 05:02

## 2022-02-14 RX ADMIN — FOLIC ACID 1 MG: 1 TABLET ORAL at 08:02

## 2022-02-14 RX ADMIN — LEVOTHYROXINE SODIUM 112 MCG: 112 TABLET ORAL at 06:02

## 2022-02-14 RX ADMIN — IPRATROPIUM BROMIDE AND ALBUTEROL SULFATE 3 ML: 2.5; .5 SOLUTION RESPIRATORY (INHALATION) at 01:02

## 2022-02-14 RX ADMIN — LORAZEPAM 0.5 MG: 0.5 TABLET ORAL at 05:02

## 2022-02-14 RX ADMIN — OXYCODONE HYDROCHLORIDE 10 MG: 10 TABLET ORAL at 09:02

## 2022-02-14 RX ADMIN — DULOXETINE HYDROCHLORIDE 20 MG: 20 CAPSULE, DELAYED RELEASE ORAL at 08:02

## 2022-02-14 NOTE — PT/OT/SLP PROGRESS
"Occupational Therapy  Co- Treatment    *cotreatment to maximize functional mobility and safety    Name: Micheline Raza  MRN: [id number]  Admitting Diagnosis:  Adenocarcinoma of right lung, stage 4       Recommendations:     Discharge Recommendations: nursing facility, skilled  Discharge Equipment Recommendations:  none  Barriers to discharge:  increased A needed    Assessment:     Micheline Raza is a 71 y.o. female with a medical diagnosis of Adenocarcinoma of right lung, stage 4.  She presents with pleasant demeanor, and tolerated treatment fairly. Performance deficits affecting function are weakness,impaired endurance,impaired functional mobilty,gait instability,decreased lower extremity function,pain,impaired balance. Patient able to transfer from sit at EOB > stand with min A>CGA, and CGA>min A for bed mobility.      SNF appropriate discharge location.      Rehab Prognosis:  Fair; patient would benefit from acute skilled OT services to address these deficits and reach maximum level of function.       Plan:     Patient to be seen 3 x/week to address the above listed problems via self-care/home management,therapeutic activities,therapeutic exercises  · Plan of Care Expires: 03/13/22  · Plan of Care Reviewed with: patient    Subjective     "I guess it will be good for me to get up"     Pain/Comfort:  · Pain Rating 1:  (not rated)  · Location - Side 1: Bilateral  · Location - Orientation 1: midline  · Location 1: back  · Pain Addressed 1: Reposition,Distraction,Cessation of Activity,Other (see comments) (applied hot packs to back)  · Pain Rating Post-Intervention 1:  (not rated)    Objective:     Communicated with: [facility] prior to session. Amisha reported that patient nearly had fall on previous transfer from EOB to BSC with RN, and walked with complex gait. Patient found supine with bed alarm,oxygen upon OT entry to room.    General Precautions: Standard, fall   Orthopedic Precautions:N/A   Braces: " N/A  Respiratory Status: Nasal cannula, flow 2 L/min     Occupational Performance:     Bed Mobility:    · Patient completed Rolling/Turning to Left with  contact guard assistance  · Patient completed Rolling/Turning to Right with contact guard assistance  · Patient completed Scooting/Bridging with contact guard assistance  · Patient completed Supine to Sit with contact guard assistance  · Patient completed Sit to Supine with contact guard assistance     Functional Mobility/Transfers:  · Patient completed Sit <> Stand Transfer from EOB with minimum assistance using rolling walker   · Patient completed Toilet Step Transfer technique with minimum assistance of 2 persons with  rolling walker   · Patient completed Stand <> Sit Transfer to BSC with minimum assistance, RW, verbal and tactile cues for hand placement  · Patient completed Sit <> Stand Transfer from BSC with minimum assistance and RW with verbal, tactile cues for hand placement  · Functional Mobility: Pt engaging in functional mobility to simulate household/community distances utilizing RW and min A x2 in order to maximize functional activity tolerance and standing balance required for engagement in occupations of choice, demonstrated moderate instability while walking and standing, with difficulty in gross motor movement for right foot  ·     Activities of Daily Living:  · Toileting: Contact Guard Assistance managing hospital gown when transferring to sit from stand, supervision as patient used BSc with good trunk control and balance, SBA as patient completed mary-care while seated on BSC, requiring verbal cues to shift weight for thorough mary-care      [facility] 6 Click ADL: 21    Treatment & Education:     Patient completed bed mobility, transfers to BSC, and toileting during session     Patient and family aware of patient's deficits and therapy progression.    Time provided for therapeutic counseling and discussion of health disposition.    Pt completed  ADLs and functional mobility for treatment session as noted above    Pt/caregiver verbalized understanding and expressed no further concerns/questions.        Patient left supine with all lines intact, bed alarm on and RN notifiedEducation:      GOALS:   Multidisciplinary Problems     Occupational Therapy Goals        Problem: Occupational Therapy Goal    Goal Priority Disciplines Outcome Interventions   Occupational Therapy Goal     OT, PT/OT Ongoing, Progressing    Description: Goals to be met by: 3/13/22     Patient will increase functional independence with ADLs by performing:    Grooming while standing at sink with Contact Guard Assistance.  Toileting from toilet with Stand by Assistance for hygiene and clothing management.   Toilet transfer to toilet with Stand-by Assistance.                     Time Tracking:     OT Date of Treatment: 02/14/22  OT Start Time: 1108  OT Stop Time: 1138  OT Total Time (min): 30 min    Billable Minutes:Self Care/Home Management 10  Therapeutic Activity 20    OT/FRANCHESCA: OT          2/14/2022

## 2022-02-14 NOTE — PT/OT/SLP PROGRESS
"Physical Therapy Treatment    Patient Name:  Micheline Raza   MRN:  [id number]    Recommendations:     Discharge Recommendations:  nursing facility, skilled   Discharge Equipment Recommendations: none   Barriers to discharge: patient requires increased level of assistance    Assessment:     Micheline Raza is a 71 y.o. female admitted with a medical diagnosis of Adenocarcinoma of right lung, stage 4.  She presents with the following impairments/functional limitations:  weakness,impaired endurance,impaired functional mobilty,gait instability,decreased lower extremity function,pain,impaired balance Patient completed bed mobility with CGA. She required assistance to complete transfer from bed to [facility] with RW and moderate instability.     Rehab Prognosis: Good; patient would benefit from acute skilled PT services to address these deficits and reach maximum level of function.    Recent Surgery: * No surgery found *      Plan:     During this hospitalization, patient to be seen 3 x/week to address the identified rehab impairments via gait training,therapeutic exercises,therapeutic activities and progress toward the following goals:    · Plan of Care Expires:  03/13/22    Subjective     Chief Complaint: "I need to use the restroom."  Patient/Family Comments/goals: return home to [facility]  Pain/Comfort:  · Pain Rating 1:  (no pain rating given)  · Location - Side 1: Bilateral  · Location - Orientation 1: midline  · Location 1: back  · Pain Addressed 1: Reposition,Distraction,Cessation of Activity  · Pain Rating Post-Intervention 1:  (no pain rating given)      Objective:     Communicated with nurse prior to session.  Patient found HOB elevated with bed alarm,oxygen upon PT entry to room.     General Precautions: Standard, fall   Orthopedic Precautions:N/A   Braces: N/A  Respiratory Status: Nasal cannula, flow 3.0 L/min     Functional Mobility:  · Bed Mobility:     · Scooting: contact guard assistance and anteriorly toward " EOB in seated  · Supine to Sit: contact guard assistance  · Sit to Supine: contact guard assistance  · Transfers:     · Bed to BSC: minimum assistance with  rolling walker and 2nd person CGA for safety  using  Step Transfer  · Gait: Patient took side steps and pivoting steps from bed <> BSC with RW and min A, 2nd person CGA for safety. Demonstrated shuffling gait pattern and moderate unsteadiness. Required vc and assist for RW management.   · Balance: sitting: CGA with BUE support and minimal sway noted. standing: min A and BUE support on RW to remain upright      AM-PAC 6 CLICK MOBILITY  Turning over in bed (including adjusting bedclothes, sheets and blankets)?: 3  Sitting down on and standing up from a chair with arms (e.g., wheelchair, bedside commode, etc.): 3  Moving from lying on back to sitting on the side of the bed?: 3  Moving to and from a bed to a chair (including a wheelchair)?: 3  Need to walk in hospital room?: 3  Climbing 3-5 steps with a railing?: 2  Basic Mobility Total Score: 17       Therapeutic Activities and Exercises:   Patient educated on PT POC, call for assistance, importance of bed mobility, OOB activity, gait/trf techniques  Toileting completed on BS with setup assistance for hygiene    Patient left HOB elevated with all lines intact, call button in reach and nurse notified..    GOALS:   Multidisciplinary Problems     Physical Therapy Goals        Problem: Physical Therapy Goal    Goal Priority Disciplines Outcome Goal Variances Interventions   Physical Therapy Goal     PT, PT/OT Ongoing, Progressing     Description: Goals to be met by: 2022     Patient will increase functional independence with mobility by performin. Supine to sit with Modified [place]  2. Sit to supine with Modified [place]  3. Sit to stand transfer with Modified [place]  4. Gait  x 150 feet with Contact Guard Assistance using Rolling Walker or LRAD.   5. Ascend/descend 5 stair with bilateral  Handrails Contact Guard Assistance using LRAD.   6. Lower extremity exercise program x15 reps per handout, with assistance as needed                     Time Tracking:     PT Received On: 02/14/22  PT Start Time: 1108     PT Stop Time: 1137  PT Total Time (min): 29 min     Billable Minutes: Gait Training 13 and Therapeutic Activity 10    Treatment Type: Treatment  PT/PTA: PT     PTA Visit Number: 0     02/14/2022

## 2022-02-14 NOTE — SUBJECTIVE & OBJECTIVE
Interval History: Pt interested in hospice care home vs NH.     Past Medical History:   Diagnosis Date    Depression     Hypercholesteremia     Pneumonia     Stage 4 lung cancer     Thyroid disease        Past Surgical History:   Procedure Laterality Date     SECTION      ENDOBRONCHIAL ULTRASOUND N/A 2020    Procedure: ENDOBRONCHIAL ULTRASOUND (EBUS);  Surgeon: Karen Mills MD;  Location: [facility] OR [address] [place] [address];  Service: Endoscopy;  Laterality: N/A;    INSERTION OF TUNNELED CENTRAL VENOUS CATHETER (CVC) WITH SUBCUTANEOUS PORT N/A 2021    Procedure: RDIOMOWBI-IUVY-G-CATH;  Surgeon: Ray Foreman MD;  Location: [facility] OR [address] [place] [address];  Service: Vascular;  Laterality: N/A;       Review of patient's allergies indicates:   Allergen Reactions    Codeine Nausea And Vomiting       Medications:  Continuous Infusions:  Scheduled Meds:   albuterol-ipratropium  3 mL Nebulization Q6H WAKE    aspirin  81 mg Oral Daily    divalproex  250 mg Oral TID    DULoxetine  20 mg Oral Daily    folic acid  1 mg Oral Daily    levothyroxine  112 mcg Oral Daily    morphine  30 mg Oral Q12H    polyethylene glycol  17 g Oral Daily    senna-docusate 8.6-50 mg  2 tablet Oral Daily    venlafaxine  150 mg Oral Daily    vitamin D  1,000 Units Oral Daily     PRN Meds:albuterol, dextrose 10%, dextrose 10%, glucagon (human recombinant), glucose, glucose, HYDROmorphone, hydrOXYzine HCL, LORazepam, melatonin, naloxone, ondansetron, oxyCODONE, oxyCODONE, sodium chloride 0.9%, sodium chloride 0.9%    Family History     Problem Relation (Age of Onset)    Heart attack Mother    Liver cancer Father    Lung cancer Mother        Tobacco Use    Smoking status: Current Every Day Smoker     Packs/day: 0.10     Years: 40.00     Pack years: 4.00     Types: Cigarettes    Smokeless tobacco: Never Used   Substance and Sexual Activity    Alcohol use: Not Currently     Comment: social    Drug use: No    Sexual activity: Not Currently      Partners: Male     Birth control/protection: None       Review of Systems   Constitutional: Positive for appetite change and fatigue.   HENT: Negative for trouble swallowing.    Eyes: Negative.    Respiratory: Positive for cough and shortness of breath.    Cardiovascular: Negative for leg swelling.   Genitourinary: Negative.    Musculoskeletal: Positive for back pain.   Skin: Negative.    Neurological: Positive for weakness.   Psychiatric/Behavioral: Negative.      Objective:     Vital Signs (Most Recent):  Temp: 98 °F (36.7 °C) (02/14/22 0717)  Pulse: 82 (02/14/22 0947)  Resp: 14 (02/14/22 0947)  BP: 121/64 (02/14/22 0717)  SpO2: 95 % (02/14/22 0947) Vital Signs (24h Range):  Temp:  [98 °F (36.7 °C)-98.7 °F (37.1 °C)] 98 °F (36.7 °C)  Pulse:  [79-90] 82  Resp:  [12-22] 14  SpO2:  [91 %-96 %] 95 %  BP: (121-143)/(63-66) 121/64     Weight: 69.5 kg (153 lb 3.5 oz)  Body mass index is 24 kg/m².    Physical Exam  Constitutional:       Interventions: Nasal cannula in place.   HENT:      Head: Normocephalic and atraumatic.   Eyes:      General: Lids are normal.      Conjunctiva/sclera: Conjunctivae normal.   Cardiovascular:      Rate and Rhythm: Normal rate and regular rhythm.   Pulmonary:      Effort: Pulmonary effort is normal. No respiratory distress.   Chest:      Chest wall: Tenderness present.      Comments: Tenderness on right side.   Abdominal:      General: Abdomen is flat.      Tenderness: There is no abdominal tenderness.   Musculoskeletal:      Cervical back: Full passive range of motion without pain and normal range of motion.      Comments: Normal ROM, weakness noted.    Skin:     General: Skin is warm and dry.   Neurological:      Mental Status: She is alert and oriented to person, place, and time.   Psychiatric:         Attention and Perception: Attention normal.         Speech: Speech normal.         Behavior: Behavior is cooperative.         Review of Symptoms    Symptom Assessment (ESAS 0-10  Scale)  Pain:  6  Dyspnea:  0  Anxiety:  3  Nausea:  0  Depression:  0  Anorexia:  6  Fatigue:  0  Insomnia:  0  Restlessness:  0  Agitation:  0         Pain Assessment:  Location(s): back            Quality: sharp        Quantity: 8/10 in intensity        Alleviating Factors: opiates    Performance Status:  60    Living Arrangements:  Lives with family    Psychosocial/Cultural: Pt lives with her, Cedrick. Per pt, her house is filthy and she sleeps on the sofa. Per pt, son works during the day. Pt reports he has a sister who she speaks with often but sister is OOT a lot.     Spiritual:  F - [place] and Belief:  Quaker        Advance Care Planning   Advance Directives:   Do Not Resuscitate Status: No    Medical Power of : No    Agent's Name:  Son Cedrick is NOK.     Decision Making:  Patient answered questions         Significant Labs: All pertinent labs within the past 24 hours have been reviewed.  CBC:   Recent Labs   Lab 02/09/22  0237   WBC 5.10   HGB 12.1   HCT 36.5*   MCV 81*        BMP:  No results for input(s): GLU, NA, K, CL, CO2, BUN, CREATININE, CALCIUM, MG in the last 24 hours.  LFT:  Lab Results   Component Value Date    AST 19 02/08/2022    ALKPHOS 53 (L) 02/08/2022    BILITOT 0.3 02/08/2022     Albumin:   Albumin   Date Value Ref Range Status   02/08/2022 3.3 (L) 3.5 - 5.2 g/dL Final     Protein:   Total Protein   Date Value Ref Range Status   02/08/2022 8.2 6.0 - 8.4 g/dL Final     Lactic acid:   Lab Results   Component Value Date    LACTATE 1.6 11/05/2021    LACTATE 1.7 12/05/2019       Significant Imaging: I have reviewed all pertinent imaging results/findings within the past 24 hours.

## 2022-02-14 NOTE — SUBJECTIVE & OBJECTIVE
Interval History: No acute events overnight.  Patient with increased confusion today however redirectable.  Pain better controlled with increase in MS Contin.      Review of Systems   Constitutional: Positive for appetite change and fatigue. Negative for chills and fever.   HENT: Positive for trouble swallowing. Negative for congestion and sore throat.    Respiratory: Positive for cough (Non-productive) and shortness of breath.    Cardiovascular: Negative for chest pain and leg swelling.   Gastrointestinal: Negative for abdominal pain, constipation, diarrhea, nausea and vomiting.   Genitourinary: Negative for dysuria and hematuria.   Musculoskeletal: Negative for back pain and myalgias.   Skin: Negative for color change and rash.   Neurological: Positive for weakness. Negative for dizziness, light-headedness and headaches.   Psychiatric/Behavioral: Negative for agitation and confusion.     Objective:     Vital Signs (Most Recent):  Temp: 98 °F (36.7 °C) (02/14/22 0717)  Pulse: 100 (02/14/22 1324)  Resp: 18 (02/14/22 1324)  BP: 121/64 (02/14/22 0717)  SpO2: 95 % (02/14/22 0947) Vital Signs (24h Range):  Temp:  [98 °F (36.7 °C)-98.7 °F (37.1 °C)] 98 °F (36.7 °C)  Pulse:  [] 100  Resp:  [12-22] 18  SpO2:  [92 %-96 %] 95 %  BP: (121-132)/(63-66) 121/64     Weight: 69.5 kg (153 lb 3.5 oz)  Body mass index is 24 kg/m².    Intake/Output Summary (Last 24 hours) at 2/14/2022 1326  Last data filed at 2/14/2022 0534  Gross per 24 hour   Intake 360 ml   Output 400 ml   Net -40 ml      Physical Exam  Vitals and nursing note reviewed.   Constitutional:       General: She is not in acute distress.     Appearance: She is underweight. She is ill-appearing.   HENT:      Head: Normocephalic and atraumatic.      Mouth/Throat:      Mouth: Mucous membranes are moist.      Pharynx: Oropharynx is clear. No oropharyngeal exudate or posterior oropharyngeal erythema.   Eyes:      General: No scleral icterus.     Extraocular Movements:  Extraocular movements intact.      Conjunctiva/sclera: Conjunctivae normal.      Pupils: Pupils are equal, round, and reactive to light.   Cardiovascular:      Rate and Rhythm: Normal rate and regular rhythm.      Pulses: Normal pulses.   Pulmonary:      Effort: Tachypnea present.      Breath sounds: No rales.      Comments: Decrease breath sounds on right  Abdominal:      General: Abdomen is flat. Bowel sounds are normal. There is no distension.      Palpations: Abdomen is soft.      Tenderness: There is no abdominal tenderness. There is no guarding.   Musculoskeletal:         General: No tenderness. Normal range of motion.      Cervical back: Normal range of motion and neck supple. No tenderness.      Right lower leg: No edema.      Left lower leg: No edema.   Skin:     General: Skin is warm and dry.   Neurological:      General: No focal deficit present.      Mental Status: She is alert and oriented to person, place, and time.      Motor: Weakness present.   Psychiatric:         Mood and Affect: Mood normal.         Behavior: Behavior normal. Behavior is cooperative.         Thought Content: Thought content normal.         Significant Labs: All pertinent labs within the past 24 hours have been reviewed.    Significant Imaging: I have reviewed all pertinent imaging results/findings within the past 24 hours.

## 2022-02-14 NOTE — PLAN OF CARE
Advance Care Planning   [facility] - Oncology ([facility])  Palliative Care       Patient Name: Micheline Raza  MRN: [id number]  Admission Date: 2/8/2022  Hospital Length of Stay: 6 days  Code Status: DNR   Attending Provider: Esteban Lopez DO  Palliative Care Provider: TANYA Carlos, APRN, AGCNS  Primary Care Physician: Michela - [facility]  Principal Problem:Adenocarcinoma of right lung, stage 4     Visit to bedside with [facility] APRMOUNIKA. Pt stated that she was just waking up. Spoke with pt about her thoughts about discharge planning. Pt stated that she does not want someone coming to her house.    SW explained to pt directly, that it is unsafe for pt to be discharged home without nursing care/Hospice. Pal Care APRN and SW explained that with pt being on pain medications for symptom control, MICHELA DIEGO has stated that pt must have follow up. Explained that the only other option is nursing home with hospice.    Informed pt that MICHELA Jay to visit hospital around 1:30 pm to further discuss options.    Support provided.    Marian Persaud, YOELW, ACHP-SW

## 2022-02-14 NOTE — ASSESSMENT & PLAN NOTE
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  Follows outpatient heme/onc on Keytruda.  Refused chemotherapy and deemed not a candidate for RT.  Patient with severe cancer related pain as well as worsening dyspnea associated with fatigue, decreased appetite, weight loss, and night sweats.  Not on home O2.  CXR showing right base opacification with small right pleural effusion concerning for PNA vs CA.  S/p azithromycin x1 and rocephin x1.  No leukocytosis seen, procal wnl    - Supplemental O2 for sats 88-92% in setting of CA  - MS Contin 30mg BID  - Oxy 5/10 q4hr PRN  - Dilaudid PRN for severe breakthrough pain  - Palliative consulted for GOC/ACP, appreciate recs.  After long discussion, patient requesting to transition focus of care to comfort and symptom control.  Code status discussed and explained with patient's decision to be DNR.  - F/u PACE meeting today  - Patient with desat on 6MWT likely will need home O2 on discharge

## 2022-02-14 NOTE — PLAN OF CARE
Advance Care Planning   [facility] - Oncology ([facility])  Palliative Care       Patient Name: Micheline Raza  MRN: [id number]  Admission Date: 2/8/2022  Hospital Length of Stay: 6 days  Code Status: DNR   Attending Provider: Esteban Lopez DO  Palliative Care Provider: TANYA Carlos, APRN, AGCNS  Primary Care Physician: Michela [place]  Principal Problem:Adenocarcinoma of right lung, stage 4     Met with MICHELA Cooley outside of pt's room. Discussed options of home with hospice and nursing home with hospice for pt at discharge. MICHELA WHITE and MICHELA OT to visit pt.    REceived call from MICHELA Cooley. Lenora stated that pt was agreeable to going to [facility] for 2 weeks. CHRISTOPHER expressed that pt did not meet criteria for inpatient at this time. MICHELA WHITE stated that MICHELA would pay for respite then further discuss a plan of care. CHRISTOPHER informed MICHELA WHITE that SW to inform primary CHRISTOPHER.    CHRISTOPHER spoke with erika Navarro and informed her of above. CHRISTOPHER also provided her with contact information for MICHELA White.    Will follow.    Marian Persaud, YOELW, ACHP-CHRISTOPHER

## 2022-02-14 NOTE — MEDICAL/APP STUDENT
[facility] (Hospital)  Progress Note    Patient Name: Micheline Raza  MRN: [id number]  Patient Class: IP- Inpatient   Admission Date: 2/8/2022  Length of Stay: 6 days  Attending Physician: Esteban Lopez DO  Primary Care Provider: [facility]    Subjective:       72 y/o F with PMHx of Stage IV adenocarcinoma of right lung with pleural mets, Depression, HLD, Thyroid disease who was recently seen by her PCP and presented with worsening dyspnea, was told to go to ED.  Patient stateed dyspnea progressively worsening for 2 weeks with intermittent non-productive cough, not on home O2.  Patient also endorses chronic right sided chest and back pain that is usually relieved with home narcotics. No acute events overnight. Pt has no major questions or complaints today, 2/14/2022. She is still considering hospice as an option. The main decision she has to make is whether she would like hospice to come to her home, or be d/c to a NH with hospice care.    Review of Systems   Respiratory: Positive for shortness of breath.    Cardiovascular: Positive for chest pain.   Musculoskeletal: Positive for back pain.     Physical Exam  Constitutional:       General: She is not in acute distress.     Appearance: Normal appearance. She is not ill-appearing.   HENT:      Head: Normocephalic and atraumatic.   Cardiovascular:      Rate and Rhythm: Normal rate and regular rhythm.      Heart sounds: Normal heart sounds.   Pulmonary:      Effort: Pulmonary effort is normal.      Breath sounds: Rhonchi present. No rales.   Abdominal:      General: Abdomen is flat.      Palpations: Abdomen is soft.      Tenderness: There is no abdominal tenderness.   Musculoskeletal:         General: Tenderness present.      Comments: R sided chest wall tenderness that radiates to her back   Neurological:      Mental Status: She is alert and oriented to person, place, and time.         Assessment/Plan:      Adenocarcinoma of right  lung, stage 4     - Supplemental O2 for sats 88-92% in setting of CA  - Continue pain management with medication, MS Contin 15mg BID  - Oxy 5/10 q4hr PRN  - Dilaudid PRN for severe breakthrough pain  - Palliative care, , and case management will discuss with pt today about hospice options. Pt will need to decide if she wants to go to a NH w/ hospice or go on home hospice. Code status discussed, pt is DNR.  - Patient with desat on 6MWT likely will need home O2 on discharg      No notes on file    Active Diagnoses:    Diagnosis Date Noted POA    PRINCIPAL PROBLEM:  Adenocarcinoma of right lung, stage 4 [C34.91] 04/22/2020 Yes    Palliative care encounter [Z51.5] 02/09/2022 Not Applicable    Pain [R52]  Unknown    Goals of care, counseling/discussion [Z71.89]  Not Applicable    Advance care planning [Z71.89]  Not Applicable    Pneumonia involving right lung [J18.9] 02/08/2022 Yes    Cancer related pain [G89.3] 05/05/2020 Yes    Depression [F32.A] 07/24/2018 Yes    HLD (hyperlipidemia) [E78.5] 06/10/2016 Yes    Thyroid disease [E07.9] 06/10/2016 Yes    Hypokalemia [E87.6] 06/10/2016 Yes      Problems Resolved During this Admission:     VTE Risk Mitigation (From admission, onward)         Ordered     Place sequential compression device  Until discontinued         02/08/22 1714     IP VTE HIGH RISK PATIENT  Once         02/08/22 1707     IP VTE HIGH RISK PATIENT  Once         02/08/22 1714                   [facility] - Oncology ([facility])   normal...

## 2022-02-14 NOTE — ASSESSMENT & PLAN NOTE
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  Patient stateed dyspnea progressively worsening for 2 weeks with intermittent non-productive cough, not on home O2.  Patient also endorses chronic right sided chest and back pain that is usually relieved with home narcotics.  Pt is alert, oriented to person, place, and situation. Pt is a full code.     Reason for consult: Goals of care/advacne care planning:     Today:    Met with pt. She is aware options for hospice are home with hospice and NH with hospice. Pt not sure she wants to let hospice in her home due to not being clean. Marian Persaud LCSW and this JUNIOR let pt know if plan is home it would be unsafe to send her home without hospice or NH care going in . Pt verbalized understanding. PACE SW to meet with pt at 130 for discharge planning. Made pt aware.     2/10/22  Met with pt along with Marian Persaud LCSW. Per pt, she does not want to do chemo. Pt reports she does not always take her regular meds but does cristi her pain meds. Pt reports she does not want to go back home due to house beinf "filthy" and no help at home.  NH with hospice care discussed. Pt had questions about her disability check and if NH will take it. Per SW, they take the check for room and board and she woul have Long-term Medicaid. Per pt, she needs to think about this due to she helps with the bills. Pt reports he will speak to her son.     Code status discussed. DNR status discussed. Pt wants to think about decision and we will re-visit tomorrow.   MPOA: Pt' son, Cedrick is NOK. MPOA education done. Pt reports she may want her sister to be MPOA but will think about.     Symptom management:   Pt reports sharp pain to back area and right side of chest area. Pt reports pain 10/10 and with pain meds goes to 5/10.   Pt " reports pain controlled.     Pt on MS Contin 30 mg bid.     Pt on  oxycodone 5 mg q 4 hrs moderate pain.   Pt on oxycodone 10 mg q 4 hrs severe pain.     Recs: Consider MSContin 15 mg bid.       Pt has dilaudid 1 mg IVP q 6 hrs prn -  Rec: Use only if pain not relieved by PO meds.     Anxiety:   Pt reports she was on Ativan prn.       Dyspnea:  Pt on O2 per NC.     Plan:   Pt is DNR.   See above for sym[joseline recs.   Communicated with IM team and Dr. Michele with MICHELA.   MICHELA SW to visit with pt at 130 today for discharge planning.

## 2022-02-14 NOTE — PROGRESS NOTES
Rey Montalvo - Oncology ([facility])  [facility] Medicine  Progress Note    Patient Name: Micheline Raza  MRN: [id number]  Patient Class: IP- Inpatient   Admission Date: 2/8/2022  Length of Stay: 6 days  Attending Physician: Esteban Lopez DO  Primary Care Provider: [facility]        Subjective:     Principal Problem:Adenocarcinoma of right lung, stage 4        HPI:  Ms. 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  Patient states dyspnea progressively worsening for 2 weeks with intermittent non-productive cough, not on home O2.  Patient also endorses chronic right sided chest and back pain that is usually relieved with home narcotics.  Dyspnea worsens with exertion and is associated with fatigue, appetite change, weight loss, and night sweats.  Patient follows heme/onc outpatient and was initially started on Keytruda for Tx.  Patient was scheduled for chemotherapy as she was deemed not a candidate for RT however patient refusing chemotherapy and discussing possibility of hospice care.  Of note, patient recently admitted for COVID 2 months ago.    In ED, patient afebrile and hypertensive with SBP of 160-180s.  Initially sats of 95% on RA however increasing dyspnea and placed on 2L NC.  WBC 6.9 with Hgb 13.  K 3.3 and BNP wnl.  EKG showing NSR.  CXR with right sided opacification more prominent at base concerning for PNA along with CA with small right sided pleural effusion.  Patient given pain medication, azithromycin x1 and rocephin x1.      Overview/Hospital Course:  Home pain regimen restarted and patient placed on supplemental O2.  Palliative consulted and following.  Adjusting pain regimen for intractable pain during admission.  Patient discussing options on discharge of home or nursing home.  Abx changed from  CTX/Azithromycin to [facility] and completed course for CAP coverage.  Attempting to plan meeting with patient, son, hospital medicine, and palliative.      Interval History: No acute events overnight.  Patient with increased confusion today however redirectable.  Pain better controlled with increase in MS Contin.      Review of Systems   Constitutional: Positive for appetite change and fatigue. Negative for chills and fever.   HENT: Positive for trouble swallowing. Negative for congestion and sore throat.    Respiratory: Positive for cough (Non-productive) and shortness of breath.    Cardiovascular: Negative for chest pain and leg swelling.   Gastrointestinal: Negative for abdominal pain, constipation, diarrhea, nausea and vomiting.   Genitourinary: Negative for dysuria and hematuria.   Musculoskeletal: Negative for back pain and myalgias.   Skin: Negative for color change and rash.   Neurological: Positive for weakness. Negative for dizziness, light-headedness and headaches.   Psychiatric/Behavioral: Negative for agitation and confusion.     Objective:     Vital Signs (Most Recent):  Temp: 98 °F (36.7 °C) (02/14/22 0717)  Pulse: 100 (02/14/22 1324)  Resp: 18 (02/14/22 1324)  BP: 121/64 (02/14/22 0717)  SpO2: 95 % (02/14/22 0947) Vital Signs (24h Range):  Temp:  [98 °F (36.7 °C)-98.7 °F (37.1 °C)] 98 °F (36.7 °C)  Pulse:  [] 100  Resp:  [12-22] 18  SpO2:  [92 %-96 %] 95 %  BP: (121-132)/(63-66) 121/64     Weight: 69.5 kg (153 lb 3.5 oz)  Body mass index is 24 kg/m².    Intake/Output Summary (Last 24 hours) at 2/14/2022 1326  Last data filed at 2/14/2022 0534  Gross per 24 hour   Intake 360 ml   Output 400 ml   Net -40 ml      Physical Exam  Vitals and nursing note reviewed.   Constitutional:       General: She is not in acute distress.     Appearance: She is underweight. She is ill-appearing.   HENT:      Head: Normocephalic and atraumatic.      Mouth/Throat:      Mouth: Mucous membranes are moist.       Pharynx: Oropharynx is clear. No oropharyngeal exudate or posterior oropharyngeal erythema.   Eyes:      General: No scleral icterus.     Extraocular Movements: Extraocular movements intact.      Conjunctiva/sclera: Conjunctivae normal.      Pupils: Pupils are equal, round, and reactive to light.   Cardiovascular:      Rate and Rhythm: Normal rate and regular rhythm.      Pulses: Normal pulses.   Pulmonary:      Effort: Tachypnea present.      Breath sounds: No rales.      Comments: Decrease breath sounds on right  Abdominal:      General: Abdomen is flat. Bowel sounds are normal. There is no distension.      Palpations: Abdomen is soft.      Tenderness: There is no abdominal tenderness. There is no guarding.   Musculoskeletal:         General: No tenderness. Normal range of motion.      Cervical back: Normal range of motion and neck supple. No tenderness.      Right lower leg: No edema.      Left lower leg: No edema.   Skin:     General: Skin is warm and dry.   Neurological:      General: No focal deficit present.      Mental Status: She is alert and oriented to person, place, and time.      Motor: Weakness present.   Psychiatric:         Mood and Affect: Mood normal.         Behavior: Behavior normal. Behavior is cooperative.         Thought Content: Thought content normal.         Significant Labs: All pertinent labs within the past 24 hours have been reviewed.    Significant Imaging: I have reviewed all pertinent imaging results/findings within the past 24 hours.      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  Follows outpatient heme/onc on Keytruda.  Refused chemotherapy and deemed not a candidate for RT.  Patient with severe cancer related pain as well as worsening dyspnea associated with fatigue, decreased appetite, weight loss, and night sweats.  Not on home O2.  CXR showing right base opacification with small right  pleural effusion concerning for PNA vs CA.  S/p azithromycin x1 and rocephin x1.  No leukocytosis seen, procal wnl    - Supplemental O2 for sats 88-92% in setting of CA  - MS Contin 30mg BID  - Oxy 5/10 q4hr PRN  - Dilaudid PRN for severe breakthrough pain  - Palliative consulted for GOC/ACP, appreciate recs.  After long discussion, patient requesting to transition focus of care to comfort and symptom control.  Code status discussed and explained with patient's decision to be DNR.  - F/u PACE meeting today  - Patient with desat on 6MWT likely will need home O2 on discharge      Pneumonia involving right lung  See adenocarcinoma of right lung, stage 4      Cancer related pain    See adenocarcinoma of right lung, stage 4    Depression  Continue home anti-depressants      Hypokalemia  Replete PRN  Daily CMP      Thyroid disease  Continue home synthroid      HLD (hyperlipidemia)  Holding home atorvastatin as patient transitioning to focus care on comfort measures        VTE Risk Mitigation (From admission, onward)         Ordered     Place sequential compression device  Until discontinued         02/08/22 1714     IP VTE HIGH RISK PATIENT  Once         02/08/22 1707     IP VTE HIGH RISK PATIENT  Once         02/08/22 1714                Discharge Planning   HEAVENLY: 2/17/2022     Code Status: DNR   Is the patient medically ready for discharge?: No    Reason for patient still in hospital (select all that apply): Pending disposition  Discharge Plan A: Hospice/home                  Aniceto Heath MD  Department of Hospital Medicine   Rey suyapa - Oncology ([facility])

## 2022-02-14 NOTE — PLAN OF CARE
Pt involved in plan of care and communicating needs throughout shift. Pt much more alert and awake today though noted to be confused at the day progress, pt intermittently disoriented x3. Pt more restless towards end of shift and having some visual hallucinations. MD aware, PRN Ativan given. Pt re-oriented throughout the day as needed. AvaSys in place and bed alarm set. Pt main c/o pain with mild relief from MS Contin and PRN PO Oxy. Plan for [facility]. Pt breathing improved with scheduled RT treatment. Up to BS with assistance, pt repositioned as tolerated. Tolerating diet, voiding without difficulty. Pt O2 sats WNL on 3 L of Oxygen. All VSS; no acute events so far this shift.  Pt remaining free from falls or injury throughout shift; bed locked and in lowest position; call light within reach.  Pt instructed to call for assistance as needed.  Q1H rounding done on pt

## 2022-02-14 NOTE — PLAN OF CARE
Plan of care reviewed with the pt at the beginning of the shift. Pt has remained free from injury and falls. Bed alarm set. Tele sitter at bedside. Fall precautions maintained. Bed locked in lowest position, side rails up x2, non skid footwear on, personal belongings and call light within reach. Instructed pt to call for assistance as needed. Pt has remained afebrile at this time.  Comfort measures taken. Pt denies any pain over night. Pt without any urine output overnight.

## 2022-02-14 NOTE — PLAN OF CARE
Patient tolerated treatment fairly, requiring min A>CGA for transfer from EOB, and CGA>min A for bed mobility.     Problem: Occupational Therapy Goal  Goal: Occupational Therapy Goal  Description: Goals to be met by: 3/13/22     Patient will increase functional independence with ADLs by performing:    Grooming while standing at sink with Contact Guard Assistance. - Not met  Toileting from toilet with Stand by Assistance for hygiene and clothing management. - Not met  Toilet transfer to toilet with Stand-by Assistance. - Not met    Outcome: Ongoing, Progressing

## 2022-02-14 NOTE — PLAN OF CARE
02/14/22 1546   Post-Acute Status   Post-Acute Authorization Placement;Hospice   Post-Acute Placement Status Pending medical clearance/testing   Hospice Status Referrals Sent  ([facility] for respite)     CHRISTOPHER advised by Ana M with Palliative Care that she spoke with the Pt and also with the MICHELA WHITE regarding this Pt. MICHELA WHITE offered respite at the [place] [facility] and Pt has accepted this as a good dc option. Spoke with Melanie with [facility] and they do offer respite for Pt with an auth from MICHELA needed first. Sent referral and included MICHELA WHITE number so they can start working on auth. Per Melanie, if they get auth, they can likely accept tomorrow.     Melanie Perez, YOELW  Neurocritical Care   [facility]  [phone]

## 2022-02-14 NOTE — PROGRESS NOTES
"Rey Montalvo - Oncology ([facility])  Palliative Medicine  Progress Note    Patient Name: Micheline Raza  MRN: [id number]  Admission Date: 2/8/2022  Hospital Length of Stay: 6 days  Code Status: DNR   Attending Provider: Esteban Lopez DO  Consulting Provider: VIOLETTA Szymanski  Primary Care Physician: [facility]  Principal Problem:Adenocarcinoma of right lung, stage 4    Patient information was obtained from patient and primary team.      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  Patient stateed dyspnea progressively worsening for 2 weeks with intermittent non-productive cough, not on home O2.  Patient also endorses chronic right sided chest and back pain that is usually relieved with home narcotics.  Pt is alert, oriented to person, place, and situation. Pt is a full code.     Reason for consult: Goals of care/advacne care planning:     Today:    Met with pt. She is aware options for hospice are home with hospice and NH with hospice. Pt not sure she wants to let hospice in her home due to not being clean. Marian Persaud LCSW and this JUNIOR let pt know if plan is home it would be unsafe to send her home without hospice or NH care going in . Pt verbalized understanding. PACE CHRISTOPHER to meet with pt at 130 for discharge planning. Made pt aware.     2/10/22  Met with pt along with Marian Persaud LCSW. Per pt, she does not want to do chemo. Pt reports she does not always take her regular meds but does cristi her pain meds. Pt reports she does not want to go back home due to house beinf "filthy" and no help at home.  NH with hospice care discussed. Pt had questions about her disability check and if NH will take it. Per CHRISTOPHER, they take the check for room and board and she woul " have Long-term Medicaid. Per pt, she needs to think about this due to she helps with the bills. Pt reports he will speak to her son.     Code status discussed. DNR status discussed. Pt wants to think about decision and we will re-visit tomorrow.   MPOA: Pt' son, Cedrick is NOK. MPOA education done. Pt reports she may want her sister to be MPOA but will think about.     Symptom management:   Pt reports sharp pain to back area and right side of chest area. Pt reports pain 10/10 and with pain meds goes to 5/10.   Pt reports pain controlled.     Pt on MS Contin 30 mg bid.     Pt on  oxycodone 5 mg q 4 hrs moderate pain.   Pt on oxycodone 10 mg q 4 hrs severe pain.     Recs: Consider MSContin 15 mg bid.       Pt has dilaudid 1 mg IVP q 6 hrs prn -  Rec: Use only if pain not relieved by PO meds.     Anxiety:   Pt reports she was on Ativan prn.       Dyspnea:  Pt on O2 per NC.     Plan:   Pt is DNR.   See above for sym[joseline recs.   Communicated with IM team and Dr. Michele with MICHE[facility].   MICHELA SW to visit with pt at 130 today for discharge planning.           I will follow-up with patient. Please contact us if you have any additional questions.    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  Per chart review, patient stated dyspnea progressively worsening for 2 weeks with intermittent non-productive cough, not on home O2.  Patient also endorses chronic right sided chest and back pain that is usually relieved with home narcotics.  Dyspnea worsens with exertion and is associated with fatigue, appetite change, weight loss, and night sweats.  Patient follows heme/onc outpatient  and was initially started on Keytruda for Tx.  pt reports she is not taking Keytruda. Patient was scheduled for chemotherapy as she was deemed not a candidate for RT however patient refusing chemotherapy and discussing possibility of hospice care.  Of note, patient recently admitted for COVID 2 months ago.     In ED, patient afebrile and hypertensive with SBP of 160-180s.  Initially sats of 95% on RA however increasing dyspnea and placed on 2L NC.  WBC 6.9 with Hgb 13.  K 3.3 and BNP wnl.  EKG showing NSR.  CXR with right sided opacification more prominent at base concerning for PNA along with CA with small right sided pleural effusion.  Patient given pain medication, azithromycin x1 and rocephin x1.      Hospital Course:  No notes on file    Interval History: Pt interested in hospice care home vs NH.     Past Medical History:   Diagnosis Date    Depression     Hypercholesteremia     Pneumonia     Stage 4 lung cancer     Thyroid disease        Past Surgical History:   Procedure Laterality Date     SECTION      ENDOBRONCHIAL ULTRASOUND N/A 2020    Procedure: ENDOBRONCHIAL ULTRASOUND (EBUS);  Surgeon: Karen Mills MD;  Location: [facility] OR [address] [place] [address];  Service: Endoscopy;  Laterality: N/A;    INSERTION OF TUNNELED CENTRAL VENOUS CATHETER (CVC) WITH SUBCUTANEOUS PORT N/A 2021    Procedure: [id number];  Surgeon: Ray Foreman MD;  Location: [facility] OR [address] [place] [address];  Service: Vascular;  Laterality: N/A;       Review of patient's allergies indicates:   Allergen Reactions    Codeine Nausea And Vomiting       Medications:  Continuous Infusions:  Scheduled Meds:   albuterol-ipratropium  3 mL Nebulization Q6H WAKE    aspirin  81 mg Oral Daily    divalproex  250 mg Oral TID    DULoxetine  20 mg Oral Daily    folic acid  1 mg Oral Daily    levothyroxine  112 mcg Oral Daily    morphine  30 mg Oral Q12H    polyethylene glycol  17 g Oral Daily    senna-docusate 8.6-50 mg  2 tablet Oral Daily     venlafaxine  150 mg Oral Daily    vitamin D  1,000 Units Oral Daily     PRN Meds:albuterol, dextrose 10%, dextrose 10%, glucagon (human recombinant), glucose, glucose, HYDROmorphone, hydrOXYzine HCL, LORazepam, melatonin, naloxone, ondansetron, oxyCODONE, oxyCODONE, sodium chloride 0.9%, sodium chloride 0.9%    Family History     Problem Relation (Age of Onset)    Heart attack Mother    Liver cancer Father    Lung cancer Mother        Tobacco Use    Smoking status: Current Every Day Smoker     Packs/day: 0.10     Years: 40.00     Pack years: 4.00     Types: Cigarettes    Smokeless tobacco: Never Used   Substance and Sexual Activity    Alcohol use: Not Currently     Comment: social    Drug use: No    Sexual activity: Not Currently     Partners: Male     Birth control/protection: None       Review of Systems   Constitutional: Positive for appetite change and fatigue.   HENT: Negative for trouble swallowing.    Eyes: Negative.    Respiratory: Positive for cough and shortness of breath.    Cardiovascular: Negative for leg swelling.   Genitourinary: Negative.    Musculoskeletal: Positive for back pain.   Skin: Negative.    Neurological: Positive for weakness.   Psychiatric/Behavioral: Negative.      Objective:     Vital Signs (Most Recent):  Temp: 98 °F (36.7 °C) (02/14/22 0717)  Pulse: 82 (02/14/22 0947)  Resp: 14 (02/14/22 0947)  BP: 121/64 (02/14/22 0717)  SpO2: 95 % (02/14/22 0947) Vital Signs (24h Range):  Temp:  [98 °F (36.7 °C)-98.7 °F (37.1 °C)] 98 °F (36.7 °C)  Pulse:  [79-90] 82  Resp:  [12-22] 14  SpO2:  [91 %-96 %] 95 %  BP: (121-143)/(63-66) 121/64     Weight: 69.5 kg (153 lb 3.5 oz)  Body mass index is 24 kg/m².    Physical Exam  Constitutional:       Interventions: Nasal cannula in place.   HENT:      Head: Normocephalic and atraumatic.   Eyes:      General: Lids are normal.      Conjunctiva/sclera: Conjunctivae normal.   Cardiovascular:      Rate and Rhythm: Normal rate and regular rhythm.    Pulmonary:      Effort: Pulmonary effort is normal. No respiratory distress.   Chest:      Chest wall: Tenderness present.      Comments: Tenderness on right side.   Abdominal:      General: Abdomen is flat.      Tenderness: There is no abdominal tenderness.   Musculoskeletal:      Cervical back: Full passive range of motion without pain and normal range of motion.      Comments: Normal ROM, weakness noted.    Skin:     General: Skin is warm and dry.   Neurological:      Mental Status: She is alert and oriented to person, place, and time.   Psychiatric:         Attention and Perception: Attention normal.         Speech: Speech normal.         Behavior: Behavior is cooperative.         Review of Symptoms    Symptom Assessment (ESAS 0-10 Scale)  Pain:  6  Dyspnea:  0  Anxiety:  3  Nausea:  0  Depression:  0  Anorexia:  6  Fatigue:  0  Insomnia:  0  Restlessness:  0  Agitation:  0         Pain Assessment:  Location(s): back            Quality: sharp        Quantity: 8/10 in intensity        Alleviating Factors: opiates    Performance Status:  60    Living Arrangements:  Lives with family    Psychosocial/Cultural: Pt lives with her, Cedrick. Per pt, her house is filthy and she sleeps on the sofa. Per pt, son works during the day. Pt reports he has a sister who she speaks with often but sister is OOT a lot.     Spiritual:  F - [place] and Belief:  Sikh        Advance Care Planning   Advance Directives:   Do Not Resuscitate Status: No    Medical Power of : No    Agent's Name:  Srinivasan Philip is NOK.     Decision Making:  Patient answered questions         Significant Labs: All pertinent labs within the past 24 hours have been reviewed.  CBC:   Recent Labs   Lab 02/09/22  0237   WBC 5.10   HGB 12.1   HCT 36.5*   MCV 81*        BMP:  No results for input(s): GLU, NA, K, CL, CO2, BUN, CREATININE, CALCIUM, MG in the last 24 hours.  LFT:  Lab Results   Component Value Date    AST 19 02/08/2022    ALKPHOS 53 (L)  02/08/2022    BILITOT 0.3 02/08/2022     Albumin:   Albumin   Date Value Ref Range Status   02/08/2022 3.3 (L) 3.5 - 5.2 g/dL Final     Protein:   Total Protein   Date Value Ref Range Status   02/08/2022 8.2 6.0 - 8.4 g/dL Final     Lactic acid:   Lab Results   Component Value Date    LACTATE 1.6 11/05/2021    LACTATE 1.7 12/05/2019       Significant Imaging: I have reviewed all pertinent imaging results/findings within the past 24 hours.    18 minutes of advance care planning completed    Maria Luz Reid, CNS  Palliative Medicine  [facility] - Oncology ([facility])

## 2022-02-15 VITALS
OXYGEN SATURATION: 96 % | WEIGHT: 153.25 LBS | HEIGHT: 67 IN | SYSTOLIC BLOOD PRESSURE: 144 MMHG | DIASTOLIC BLOOD PRESSURE: 63 MMHG | TEMPERATURE: 98 F | BODY MASS INDEX: 24.05 KG/M2 | HEART RATE: 79 BPM | RESPIRATION RATE: 18 BRPM

## 2022-02-15 LAB — SARS-COV-2 RNA RESP QL NAA+PROBE: NOT DETECTED

## 2022-02-15 PROCEDURE — 94761 N-INVAS EAR/PLS OXIMETRY MLT: CPT

## 2022-02-15 PROCEDURE — 27000221 HC OXYGEN, UP TO 24 HOURS

## 2022-02-15 PROCEDURE — 94640 AIRWAY INHALATION TREATMENT: CPT

## 2022-02-15 PROCEDURE — 25000003 PHARM REV CODE 250

## 2022-02-15 PROCEDURE — 99233 PR SUBSEQUENT HOSPITAL CARE,LEVL III: ICD-10-PCS | Mod: ,,, | Performed by: CLINICAL NURSE SPECIALIST

## 2022-02-15 PROCEDURE — 99900035 HC TECH TIME PER 15 MIN (STAT)

## 2022-02-15 PROCEDURE — 99239 HOSP IP/OBS DSCHRG MGMT >30: CPT | Mod: GC,,, | Performed by: STUDENT IN AN ORGANIZED HEALTH CARE EDUCATION/TRAINING PROGRAM

## 2022-02-15 PROCEDURE — 25000003 PHARM REV CODE 250: Performed by: STUDENT IN AN ORGANIZED HEALTH CARE EDUCATION/TRAINING PROGRAM

## 2022-02-15 PROCEDURE — 99239 PR HOSPITAL DISCHARGE DAY,>30 MIN: ICD-10-PCS | Mod: GC,,, | Performed by: STUDENT IN AN ORGANIZED HEALTH CARE EDUCATION/TRAINING PROGRAM

## 2022-02-15 PROCEDURE — 94760 N-INVAS EAR/PLS OXIMETRY 1: CPT

## 2022-02-15 PROCEDURE — 99233 SBSQ HOSP IP/OBS HIGH 50: CPT | Mod: ,,, | Performed by: CLINICAL NURSE SPECIALIST

## 2022-02-15 PROCEDURE — 25000242 PHARM REV CODE 250 ALT 637 W/ HCPCS

## 2022-02-15 PROCEDURE — 63600175 PHARM REV CODE 636 W HCPCS: Performed by: STUDENT IN AN ORGANIZED HEALTH CARE EDUCATION/TRAINING PROGRAM

## 2022-02-15 PROCEDURE — U0005 INFEC AGEN DETEC AMPLI PROBE: HCPCS | Performed by: STUDENT IN AN ORGANIZED HEALTH CARE EDUCATION/TRAINING PROGRAM

## 2022-02-15 PROCEDURE — U0003 INFECTIOUS AGENT DETECTION BY NUCLEIC ACID (DNA OR RNA); SEVERE ACUTE RESPIRATORY SYNDROME CORONAVIRUS 2 (SARS-COV-2) (CORONAVIRUS DISEASE [COVID-19]), AMPLIFIED PROBE TECHNIQUE, MAKING USE OF HIGH THROUGHPUT TECHNOLOGIES AS DESCRIBED BY CMS-2020-01-R: HCPCS | Performed by: STUDENT IN AN ORGANIZED HEALTH CARE EDUCATION/TRAINING PROGRAM

## 2022-02-15 RX ORDER — ONDANSETRON 8 MG/1
8 TABLET, ORALLY DISINTEGRATING ORAL EVERY 8 HOURS PRN
Qty: 30 TABLET | Refills: 2 | Status: SHIPPED | OUTPATIENT
Start: 2022-02-15 | End: 2023-02-15

## 2022-02-15 RX ORDER — LORAZEPAM 0.5 MG/1
0.5 TABLET ORAL EVERY 6 HOURS PRN
Qty: 120 TABLET | Refills: 0
Start: 2022-02-15 | End: 2022-03-17

## 2022-02-15 RX ORDER — MORPHINE SULFATE 30 MG/1
30 TABLET, FILM COATED, EXTENDED RELEASE ORAL EVERY 12 HOURS
Qty: 14 TABLET | Refills: 0
Start: 2022-02-15 | End: 2022-02-22

## 2022-02-15 RX ORDER — ALBUTEROL SULFATE 90 UG/1
2 AEROSOL, METERED RESPIRATORY (INHALATION) EVERY 6 HOURS PRN
Start: 2022-02-15

## 2022-02-15 RX ADMIN — DOCUSATE SODIUM 50 MG AND SENNOSIDES 8.6 MG 2 TABLET: 8.6; 5 TABLET, FILM COATED ORAL at 09:02

## 2022-02-15 RX ADMIN — DULOXETINE HYDROCHLORIDE 20 MG: 20 CAPSULE, DELAYED RELEASE ORAL at 09:02

## 2022-02-15 RX ADMIN — OXYCODONE HYDROCHLORIDE 10 MG: 10 TABLET ORAL at 01:02

## 2022-02-15 RX ADMIN — OXYCODONE HYDROCHLORIDE 10 MG: 10 TABLET ORAL at 05:02

## 2022-02-15 RX ADMIN — IPRATROPIUM BROMIDE AND ALBUTEROL SULFATE 3 ML: 2.5; .5 SOLUTION RESPIRATORY (INHALATION) at 07:02

## 2022-02-15 RX ADMIN — FOLIC ACID 1 MG: 1 TABLET ORAL at 09:02

## 2022-02-15 RX ADMIN — POLYETHYLENE GLYCOL 3350 17 G: 17 POWDER, FOR SOLUTION ORAL at 09:02

## 2022-02-15 RX ADMIN — HYDROMORPHONE HYDROCHLORIDE 1 MG: 1 INJECTION, SOLUTION INTRAMUSCULAR; INTRAVENOUS; SUBCUTANEOUS at 02:02

## 2022-02-15 RX ADMIN — VENLAFAXINE HYDROCHLORIDE 150 MG: 75 CAPSULE, EXTENDED RELEASE ORAL at 09:02

## 2022-02-15 RX ADMIN — MORPHINE SULFATE 30 MG: 30 TABLET, FILM COATED, EXTENDED RELEASE ORAL at 09:02

## 2022-02-15 RX ADMIN — DIVALPROEX SODIUM 250 MG: 250 TABLET, DELAYED RELEASE ORAL at 09:02

## 2022-02-15 RX ADMIN — ASPIRIN 81 MG: 81 TABLET, COATED ORAL at 09:02

## 2022-02-15 RX ADMIN — LEVOTHYROXINE SODIUM 112 MCG: 112 TABLET ORAL at 06:02

## 2022-02-15 RX ADMIN — IPRATROPIUM BROMIDE AND ALBUTEROL SULFATE 3 ML: 2.5; .5 SOLUTION RESPIRATORY (INHALATION) at 02:02

## 2022-02-15 RX ADMIN — Medication 1000 UNITS: at 09:02

## 2022-02-15 RX ADMIN — DIVALPROEX SODIUM 250 MG: 250 TABLET, DELAYED RELEASE ORAL at 02:02

## 2022-02-15 NOTE — PLAN OF CARE
02/15/22 1158   Post-Acute Status   Post-Acute Authorization Hospice   Hospice Status Pending post acute provider review/more information requested     CHRISTOPHER faxed Hospice Orders to [email or web address] [phone]  For GIP inpatient negative covid within 5 [facility] via [facility] for review.  CHRISTOPHER spoke with CHRISTOPHER Cooley ([phone]) at [facility] and stated they will michelle AUTH to [email or web address] for a period of two weeks of respite care and will contact [facility]  to give them the AUTH.  CHRISTOPHER spoke with Melanie ([phone]) at [facility] and stated they are unable to provide respite care as a discharge from a hospital but will communicate with her administer to providing her with two weeks of routine Home Health, instead. Melanie will follow up with CHRISTOPHER once [facility] approve patient. CHRISTOPHER will continue to follow patient.      Argentina Colin, KIMBERLY  PRN - [facility]  [phone]

## 2022-02-15 NOTE — MEDICAL/APP STUDENT
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  Patient stateed dyspnea progressively worsening for 2 weeks with intermittent non-productive cough, not on home O2.  Patient also endorses chronic right sided chest and back pain that is usually relieved with home narcotics. No acute events overnight. Pt states she is doing good today w/ no major complaints. She still endorses chronic cancer pain. Pt discussed with SW and Palliative care about hospice options. Pt will be going to [facility] for the next 2 weeks.       Review of Systems   Respiratory: Negative for shortness of breath and wheezing.    Cardiovascular: Negative for chest pain.   Gastrointestinal: Negative for abdominal pain.   Musculoskeletal:        Reports R sided chest pain        Physical Exam  Constitutional:       General: She is not in acute distress.     Appearance: Normal appearance.   HENT:      Head: Normocephalic and atraumatic.   Cardiovascular:      Rate and Rhythm: Normal rate and regular rhythm.      Heart sounds: Normal heart sounds. No murmur heard.  No friction rub. No gallop.    Pulmonary:      Effort: Pulmonary effort is normal.      Breath sounds: Normal breath sounds. No wheezing, rhonchi or rales.   Abdominal:      General: Abdomen is flat.      Palpations: Abdomen is soft.      Tenderness: There is no abdominal tenderness.   Musculoskeletal:         General: Tenderness present.      Comments: TTP on R chest wall   Skin:     General: Skin is warm and dry.    Neurological:      Mental Status: She is alert and oriented to person, place, and time.          Assessment/Plan:      Adenocarcinoma of right lung, stage 4     - Pt planned for d/c today, 2/15/2022, and will be transitioned to [facility] for 2 weeks. Waiting on COVID swab to be approved for transport on d/c  - Supplemental O2 for sats 88-92% in setting of CA  - Continue pain management with medication, MS Contin 15mg BID  - Oxy 5/10 q4hr PRN  - Dilaudid PRN for severe breakthrough pain      No notes on file    Active Diagnoses:    Diagnosis Date Noted POA    PRINCIPAL PROBLEM:  Adenocarcinoma of right lung, stage 4 [C34.91] 04/22/2020 Yes    Palliative care encounter [Z51.5] 02/09/2022 Not Applicable    Pain [R52]  Unknown    Goals of care, counseling/discussion [Z71.89]  Not Applicable    Advance care planning [Z71.89]  Not Applicable    Pneumonia involving right lung [J18.9] 02/08/2022 Yes    Cancer related pain [G89.3] 05/05/2020 Yes    Depression [F32.A] 07/24/2018 Yes    HLD (hyperlipidemia) [E78.5] 06/10/2016 Yes    Thyroid disease [E07.9] 06/10/2016 Yes    Hypokalemia [E87.6] 06/10/2016 Yes      Problems Resolved During this Admission:     VTE Risk Mitigation (From admission, onward)         Ordered     Place sequential compression device  Until discontinued         02/08/22 1714     IP VTE HIGH RISK PATIENT  Once         02/08/22 1707     IP VTE HIGH RISK PATIENT  Once         02/08/22 1714                   [facility] - Oncology ([facility])

## 2022-02-15 NOTE — SUBJECTIVE & OBJECTIVE
Interval History: Pt interested in hospice care home vs NH.     Past Medical History:   Diagnosis Date    Depression     Hypercholesteremia     Pneumonia     Stage 4 lung cancer     Thyroid disease        Past Surgical History:   Procedure Laterality Date     SECTION      ENDOBRONCHIAL ULTRASOUND N/A 2020    Procedure: ENDOBRONCHIAL ULTRASOUND (EBUS);  Surgeon: Karen Mills MD;  Location: [facility] OR [address] [place] [address];  Service: Endoscopy;  Laterality: N/A;    INSERTION OF TUNNELED CENTRAL VENOUS CATHETER (CVC) WITH SUBCUTANEOUS PORT N/A 2021    Procedure: [id number]-CATH;  Surgeon: Ray Foreman MD;  Location: [facility] OR [address] [place] [address];  Service: Vascular;  Laterality: N/A;       Review of patient's allergies indicates:   Allergen Reactions    Codeine Nausea And Vomiting       Medications:  Continuous Infusions:  Scheduled Meds:   albuterol-ipratropium  3 mL Nebulization Q6H WAKE    aspirin  81 mg Oral Daily    divalproex  250 mg Oral TID    DULoxetine  20 mg Oral Daily    folic acid  1 mg Oral Daily    levothyroxine  112 mcg Oral Daily    morphine  30 mg Oral Q12H    polyethylene glycol  17 g Oral Daily    senna-docusate 8.6-50 mg  2 tablet Oral Daily    venlafaxine  150 mg Oral Daily    vitamin D  1,000 Units Oral Daily     PRN Meds:albuterol, dextrose 10%, dextrose 10%, glucagon (human recombinant), glucose, glucose, HYDROmorphone, hydrOXYzine HCL, LORazepam, melatonin, naloxone, ondansetron, oxyCODONE, oxyCODONE, sodium chloride 0.9%, sodium chloride 0.9%    Family History     Problem Relation (Age of Onset)    Heart attack Mother    Liver cancer Father    Lung cancer Mother        Tobacco Use    Smoking status: Current Every Day Smoker     Packs/day: 0.10     Years: 40.00     Pack years: 4.00     Types: Cigarettes    Smokeless tobacco: Never Used   Substance and Sexual Activity    Alcohol use: Not Currently     Comment: social    Drug use: No    Sexual activity: Not Currently      Partners: Male     Birth control/protection: None       Review of Systems   Constitutional: Positive for appetite change and fatigue.   HENT: Negative for trouble swallowing.    Eyes: Negative.    Respiratory: Positive for cough and shortness of breath.    Cardiovascular: Negative for leg swelling.   Genitourinary: Negative.    Musculoskeletal: Positive for back pain.   Skin: Negative.    Neurological: Positive for weakness.   Psychiatric/Behavioral: Negative.      Objective:     Vital Signs (Most Recent):  Temp: 98.1 °F (36.7 °C) (02/15/22 0007)  Pulse: 81 (02/15/22 0851)  Resp: 12 (02/15/22 0916)  BP: (!) 173/67 (02/15/22 0007)  SpO2: (!) 93 % (02/15/22 0851) Vital Signs (24h Range):  Temp:  [98.1 °F (36.7 °C)] 98.1 °F (36.7 °C)  Pulse:  [] 81  Resp:  [12-22] 12  SpO2:  [89 %-94 %] 93 %  BP: (173)/(67) 173/67     Weight: 69.5 kg (153 lb 3.5 oz)  Body mass index is 24 kg/m².    Physical Exam  Constitutional:       Interventions: Nasal cannula in place.   HENT:      Head: Normocephalic and atraumatic.   Eyes:      General: Lids are normal.      Conjunctiva/sclera: Conjunctivae normal.   Cardiovascular:      Rate and Rhythm: Normal rate and regular rhythm.   Pulmonary:      Effort: Pulmonary effort is normal. No respiratory distress.   Chest:      Chest wall: Tenderness present.      Comments: Tenderness on right side.   Abdominal:      General: Abdomen is flat.      Tenderness: There is no abdominal tenderness.   Musculoskeletal:      Cervical back: Full passive range of motion without pain and normal range of motion.      Comments: Normal ROM, weakness noted.    Skin:     General: Skin is warm and dry.   Neurological:      Mental Status: She is alert and oriented to person, place, and time.   Psychiatric:         Attention and Perception: Attention normal.         Speech: Speech normal.         Behavior: Behavior is cooperative.         Review of Symptoms    Symptom Assessment (ESAS 0-10 Scale)  Pain:   6  Dyspnea:  0  Anxiety:  3  Nausea:  0  Depression:  0  Anorexia:  6  Fatigue:  0  Insomnia:  0  Restlessness:  0  Agitation:  0         Pain Assessment:  Location(s): back            Quality: sharp        Quantity: 8/10 in intensity        Alleviating Factors: opiates    Performance Status:  60    Living Arrangements:  Lives with family    Psychosocial/Cultural: Pt lives with her, Cedrick. Per pt, her house is filthy and she sleeps on the sofa. Per pt, son works during the day. Pt reports he has a sister who she speaks with often but sister is OOT a lot.     Spiritual:  F - [place] and Belief:  Orthodoxy        Advance Care Planning   Advance Directives:   Do Not Resuscitate Status: No    Medical Power of : No    Agent's Name:  Son Cedrick is NOK.     Decision Making:  Patient answered questions         Significant Labs: All pertinent labs within the past 24 hours have been reviewed.  CBC:   Recent Labs   Lab 02/09/22  0237   WBC 5.10   HGB 12.1   HCT 36.5*   MCV 81*        BMP:  No results for input(s): GLU, NA, K, CL, CO2, BUN, CREATININE, CALCIUM, MG in the last 24 hours.  LFT:  Lab Results   Component Value Date    AST 19 02/08/2022    ALKPHOS 53 (L) 02/08/2022    BILITOT 0.3 02/08/2022     Albumin:   Albumin   Date Value Ref Range Status   02/08/2022 3.3 (L) 3.5 - 5.2 g/dL Final     Protein:   Total Protein   Date Value Ref Range Status   02/08/2022 8.2 6.0 - 8.4 g/dL Final     Lactic acid:   Lab Results   Component Value Date    LACTATE 1.6 11/05/2021    LACTATE 1.7 12/05/2019       Significant Imaging: I have reviewed all pertinent imaging results/findings within the past 24 hours.   details… details… details… details… details… details…

## 2022-02-15 NOTE — SUBJECTIVE & OBJECTIVE
Interval History: No acute events overnight.  Patient with increased confusion today however redirectable.       Review of Systems   Constitutional: Positive for appetite change and fatigue. Negative for chills and fever.   HENT: Positive for trouble swallowing. Negative for congestion and sore throat.    Respiratory: Positive for cough (Non-productive) and shortness of breath.    Cardiovascular: Negative for chest pain and leg swelling.   Gastrointestinal: Negative for abdominal pain, constipation, diarrhea, nausea and vomiting.   Genitourinary: Negative for dysuria and hematuria.   Musculoskeletal: Negative for back pain and myalgias.   Skin: Negative for color change and rash.   Neurological: Positive for weakness. Negative for dizziness, light-headedness and headaches.   Psychiatric/Behavioral: Negative for agitation and confusion.     Objective:     Vital Signs (Most Recent):  Temp: 98.1 °F (36.7 °C) (02/15/22 0007)  Pulse: 84 (02/15/22 1409)  Resp: 16 (02/15/22 1442)  BP: (!) 173/67 (02/15/22 0007)  SpO2: (!) 92 % (02/15/22 1409) Vital Signs (24h Range):  Temp:  [98.1 °F (36.7 °C)] 98.1 °F (36.7 °C)  Pulse:  [78-85] 84  Resp:  [12-22] 16  SpO2:  [89 %-94 %] 92 %  BP: (173)/(67) 173/67     Weight: 69.5 kg (153 lb 3.5 oz)  Body mass index is 24 kg/m².    Intake/Output Summary (Last 24 hours) at 2/15/2022 1452  Last data filed at 2/15/2022 1400  Gross per 24 hour   Intake 660 ml   Output --   Net 660 ml      Physical Exam  Vitals and nursing note reviewed.   Constitutional:       General: She is not in acute distress.     Appearance: She is underweight. She is ill-appearing.   HENT:      Head: Normocephalic and atraumatic.      Mouth/Throat:      Mouth: Mucous membranes are moist.      Pharynx: Oropharynx is clear. No oropharyngeal exudate or posterior oropharyngeal erythema.   Eyes:      General: No scleral icterus.     Extraocular Movements: Extraocular movements intact.      Conjunctiva/sclera: Conjunctivae  normal.      Pupils: Pupils are equal, round, and reactive to light.   Cardiovascular:      Rate and Rhythm: Normal rate and regular rhythm.      Pulses: Normal pulses.   Pulmonary:      Effort: Tachypnea present.      Breath sounds: No rales.      Comments: Decrease breath sounds on right  Abdominal:      General: Abdomen is flat. Bowel sounds are normal. There is no distension.      Palpations: Abdomen is soft.      Tenderness: There is no abdominal tenderness. There is no guarding.   Musculoskeletal:         General: No tenderness. Normal range of motion.      Cervical back: Normal range of motion and neck supple. No tenderness.      Right lower leg: No edema.      Left lower leg: No edema.   Skin:     General: Skin is warm and dry.   Neurological:      General: No focal deficit present.      Mental Status: She is alert and oriented to person, place, and time.      Motor: Weakness present.   Psychiatric:         Mood and Affect: Mood normal.         Behavior: Behavior normal. Behavior is cooperative.         Thought Content: Thought content normal.         Significant Labs: All pertinent labs within the past 24 hours have been reviewed.    Significant Imaging: I have reviewed all pertinent imaging results/findings within the past 24 hours.

## 2022-02-15 NOTE — PROGRESS NOTES
[facility] - Oncology ([facility])  [facility]  Progress Note    Patient Name: Micheline Raza  MRN: [id number]  Patient Class: IP- Inpatient   Admission Date: 2/8/2022  Length of Stay: 7 days  Attending Physician: Esteban Lopez DO  Primary Care Provider: [facility]        Subjective:     Principal Problem:Adenocarcinoma of right lung, stage 4        HPI:  Ms. 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  Patient states dyspnea progressively worsening for 2 weeks with intermittent non-productive cough, not on home O2.  Patient also endorses chronic right sided chest and back pain that is usually relieved with home narcotics.  Dyspnea worsens with exertion and is associated with fatigue, appetite change, weight loss, and night sweats.  Patient follows heme/onc outpatient and was initially started on Keytruda for Tx.  Patient was scheduled for chemotherapy as she was deemed not a candidate for RT however patient refusing chemotherapy and discussing possibility of hospice care.  Of note, patient recently admitted for COVID 2 months ago.    In ED, patient afebrile and hypertensive with SBP of 160-180s.  Initially sats of 95% on RA however increasing dyspnea and placed on 2L NC.  WBC 6.9 with Hgb 13.  K 3.3 and BNP wnl.  EKG showing NSR.  CXR with right sided opacification more prominent at base concerning for PNA along with CA with small right sided pleural effusion.  Patient given pain medication, azithromycin x1 and rocephin x1.      Overview/Hospital Course:  Home pain regimen restarted and patient placed on supplemental O2.  Palliative consulted and following.  Adjusting pain regimen for intractable pain during admission.  Patient discussing options on discharge of home or nursing home.  Abx changed from  CTX/Azithromycin to Levaquin and completed course for CAP coverage.  PACE met with the family and it was decided to go to IP hospice short term before transitioning home for hospice. At this time, attempting to get authorization for inpatient hospice. Patient is medically ready for discharge to hospice.       Interval History: No acute events overnight.  Patient with increased confusion today however redirectable.       Review of Systems   Constitutional: Positive for appetite change and fatigue. Negative for chills and fever.   HENT: Positive for trouble swallowing. Negative for congestion and sore throat.    Respiratory: Positive for cough (Non-productive) and shortness of breath.    Cardiovascular: Negative for chest pain and leg swelling.   Gastrointestinal: Negative for abdominal pain, constipation, diarrhea, nausea and vomiting.   Genitourinary: Negative for dysuria and hematuria.   Musculoskeletal: Negative for back pain and myalgias.   Skin: Negative for color change and rash.   Neurological: Positive for weakness. Negative for dizziness, light-headedness and headaches.   Psychiatric/Behavioral: Negative for agitation and confusion.     Objective:     Vital Signs (Most Recent):  Temp: 98.1 °F (36.7 °C) (02/15/22 0007)  Pulse: 84 (02/15/22 1409)  Resp: 16 (02/15/22 1442)  BP: (!) 173/67 (02/15/22 0007)  SpO2: (!) 92 % (02/15/22 1409) Vital Signs (24h Range):  Temp:  [98.1 °F (36.7 °C)] 98.1 °F (36.7 °C)  Pulse:  [78-85] 84  Resp:  [12-22] 16  SpO2:  [89 %-94 %] 92 %  BP: (173)/(67) 173/67     Weight: 69.5 kg (153 lb 3.5 oz)  Body mass index is 24 kg/m².    Intake/Output Summary (Last 24 hours) at 2/15/2022 1452  Last data filed at 2/15/2022 1400  Gross per 24 hour   Intake 660 ml   Output --   Net 660 ml      Physical Exam  Vitals and nursing note reviewed.   Constitutional:       General: She is not in acute distress.     Appearance: She is underweight. She is ill-appearing.   HENT:      Head: Normocephalic  and atraumatic.      Mouth/Throat:      Mouth: Mucous membranes are moist.      Pharynx: Oropharynx is clear. No oropharyngeal exudate or posterior oropharyngeal erythema.   Eyes:      General: No scleral icterus.     Extraocular Movements: Extraocular movements intact.      Conjunctiva/sclera: Conjunctivae normal.      Pupils: Pupils are equal, round, and reactive to light.   Cardiovascular:      Rate and Rhythm: Normal rate and regular rhythm.      Pulses: Normal pulses.   Pulmonary:      Effort: Tachypnea present.      Breath sounds: No rales.      Comments: Decrease breath sounds on right  Abdominal:      General: Abdomen is flat. Bowel sounds are normal. There is no distension.      Palpations: Abdomen is soft.      Tenderness: There is no abdominal tenderness. There is no guarding.   Musculoskeletal:         General: No tenderness. Normal range of motion.      Cervical back: Normal range of motion and neck supple. No tenderness.      Right lower leg: No edema.      Left lower leg: No edema.   Skin:     General: Skin is warm and dry.   Neurological:      General: No focal deficit present.      Mental Status: She is alert and oriented to person, place, and time.      Motor: Weakness present.   Psychiatric:         Mood and Affect: Mood normal.         Behavior: Behavior normal. Behavior is cooperative.         Thought Content: Thought content normal.         Significant Labs: All pertinent labs within the past 24 hours have been reviewed.    Significant Imaging: I have reviewed all pertinent imaging results/findings within the past 24 hours.      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  Follows outpatient heme/onc on Keytruda.  Refused chemotherapy and deemed not a candidate for RT.  Patient with severe cancer related pain as well as worsening dyspnea associated with fatigue, decreased appetite, weight loss, and night  sweats.  Not on home O2.  CXR showing right base opacification with small right pleural effusion concerning for PNA vs CA.  S/p azithromycin x1 and rocephin x1.  No leukocytosis seen, procal wnl    - Supplemental O2 for sats 88-92% in setting of CA  - MS Contin 30mg BID  - Oxy 5/10 q4hr PRN  - Dilaudid PRN for severe breakthrough pain  - Palliative consulted for GOC/ACP, appreciate recs.  After long discussion, patient requesting to transition focus of care to comfort and symptom control.  Code status discussed and explained with patient's decision to be DNR.  - pending PACE and insurance auth for dispo- either IP hospice or home health as a bridge to IP hospice for IP hospice coverage purposes    Pneumonia involving right lung  See adenocarcinoma of right lung, stage 4      Cancer related pain    See adenocarcinoma of right lung, stage 4    Depression  Continue home anti-depressants      Thyroid disease  Continue home synthroid      HLD (hyperlipidemia)  Holding home atorvastatin as patient transitioning to focus care on comfort measures        VTE Risk Mitigation (From admission, onward)         Ordered     Place sequential compression device  Until discontinued         02/08/22 1714     IP VTE HIGH RISK PATIENT  Once         02/08/22 1707     IP VTE HIGH RISK PATIENT  Once         02/08/22 1714                Discharge Planning   HEAVENLY: 2/17/2022     Code Status: DNR   Is the patient medically ready for discharge?: No    Reason for patient still in hospital (select all that apply): Pending disposition  Discharge Plan A: Hospice/home                  Connor M Gillies, MD  Department of Hospital Medicine   [facility] - Oncology ([facility])

## 2022-02-15 NOTE — ASSESSMENT & PLAN NOTE
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  Follows outpatient heme/onc on Keytruda.  Refused chemotherapy and deemed not a candidate for RT.  Patient with severe cancer related pain as well as worsening dyspnea associated with fatigue, decreased appetite, weight loss, and night sweats.  Not on home O2.  CXR showing right base opacification with small right pleural effusion concerning for PNA vs CA.  S/p azithromycin x1 and rocephin x1.  No leukocytosis seen, procal wnl    - Supplemental O2 for sats 88-92% in setting of CA  - MS Contin 30mg BID  - Oxy 5/10 q4hr PRN  - Dilaudid PRN for severe breakthrough pain  - Palliative consulted for GOC/ACP, appreciate recs.  After long discussion, patient requesting to transition focus of care to comfort and symptom control.  Code status discussed and explained with patient's decision to be DNR.  - pending PACE and insurance auth for dispo- either IP hospice or home health as a bridge to IP hospice for IP hospice coverage purposes

## 2022-02-15 NOTE — PLAN OF CARE
[facility] - Oncology (Hospital)  Discharge Final Note    Primary Care Provider: Jose Steele [facility]    Expected Discharge Date: 2/15/2022    Final Discharge Note (most recent)     Final Note - 02/15/22 1729        Final Note    Assessment Type Final Discharge Note     Anticipated Discharge Disposition Hospice/Medical Facility     What phone number can be called within the next 1-3 days to see how you are doing after discharge? [phone]        Post-Acute Status    Post-Acute Authorization Hospice     Post-Acute Placement Status Set-up Complete/Auth obtained     Hospice Status Set-up Complete/Auth obtained     Discharge Delays None known at this time                 Important Message from Medicare             Contact Info     [facility]   Specialty: General Practice   Relationship: PCP - General    [address] [facility] [address]   Phone: [phone]       Next Steps: Follow up on 2/17/2022    Instructions: Appointment time is 11am. Please arrive 15minutes early for check in. Please bring discharge summary, ID and insurance card.      Patient medically ready for discharge to [facility] . Any necessary transport setup by . This CM scheduled or requested necessary follow-up appointments. Family/patient aware of discharge.    No future appointments.      Argentina Colin LMSW  PRN - [facility]  [id number]

## 2022-02-15 NOTE — PROGRESS NOTES
"[facility] - Oncology ([facility])  Palliative Medicine  Progress Note    Patient Name: Micheline Raza  MRN: [id number]  Admission Date: 2/8/2022  Hospital Length of Stay: 7 days  Code Status: DNR   Attending Provider: Esteban Lopez DO  Consulting Provider: VIOLETTA Szymanski  Primary Care Physician: Michela Rees [facility]  Principal Problem:Adenocarcinoma of right lung, stage 4    Patient information was obtained from patient and primary team.      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  Patient stateed dyspnea progressively worsening for 2 weeks with intermittent non-productive cough, not on home O2.  Patient also endorses chronic right sided chest and back pain that is usually relieved with home narcotics.  Pt is alert, oriented to person, place, and situation. Pt is a full code.     Reason for consult: Goals of care/advacne care planning:     Today: Met with pt who reports she is comfortable on current measures. Plan is respite care at [facility] for two weeks through MICHELA.     2/15/22:    Met with pt. She is aware options for hospice are home with hospice and NH with hospice. Pt not sure she wants to let hospice in her home due to not being clean. Marian Persaud LCSW and this JUNIOR let pt know if plan is home it would be unsafe to send her home without hospice or NH care going in . Pt verbalized understanding. [facility] SW to meet with pt at 130 for discharge planning. Made pt aware.     2/10/22  Met with pt along with Marian Persaud LCSW. Per pt, she does not want to do chemo. Pt reports she does not always take her regular meds but does cristi her pain meds. Pt reports she does not want to go back home due to house beinf "filthy" and no help at home.  [facility] with " hospice care discussed. Pt had questions about her disability check and if NH will take it. Per SW, they take the check for room and board and she woul have Long-term Medicaid. Per pt, she needs to think about this due to she helps with the bills. Pt reports he will speak to her son.     Code status discussed. DNR status discussed. Pt wants to think about decision and we will re-visit tomorrow.   MPOA: Pt' son, Cedrick is NOK. MPOA education done. Pt reports she may want her sister to be MPOA but will think about.     Symptom management:   Pt reports sharp pain to back area and right side of chest area. Pt reports pain 10/10 and with pain meds goes to 3/10.   Pt reports pain controlled.     Pt on MS Contin 30 mg bid.     Pt on  oxycodone 5 mg q 4 hrs moderate pain.   Pt on oxycodone 10 mg q 4 hrs severe pain.     Recs: Consider MSContin 15 mg bid.       Pt has dilaudid 1 mg IVP q 6 hrs prn -  Rec: Use only if pain not relieved by PO meds.     Anxiety:   Pt reports she was on Ativan prn.       Dyspnea:  Pt on O2 per NC.     Plan:   Pt is DNR.   See above for sym[joseline recs.   Pt to d/c to [facility] for respite care through [facility].         I will follow-up with patient. Please contact us if you have any additional questions.    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  Per chart review, patient stated dyspnea progressively worsening for 2 weeks with intermittent non-productive cough, not on home O2.  Patient also endorses chronic right sided chest and back pain that is usually relieved with home narcotics.  Dyspnea worsens with exertion and is associated with  fatigue, appetite change, weight loss, and night sweats.  Patient follows heme/onc outpatient and was initially started on Keytruda for Tx.  pt reports she is not taking Keytruda. Patient was scheduled for chemotherapy as she was deemed not a candidate for RT however patient refusing chemotherapy and discussing possibility of hospice care.  Of note, patient recently admitted for COVID 2 months ago.     In ED, patient afebrile and hypertensive with SBP of 160-180s.  Initially sats of 95% on RA however increasing dyspnea and placed on 2L NC.  WBC 6.9 with Hgb 13.  K 3.3 and BNP wnl.  EKG showing NSR.  CXR with right sided opacification more prominent at base concerning for PNA along with CA with small right sided pleural effusion.  Patient given pain medication, azithromycin x1 and rocephin x1.      Hospital Course:  No notes on file    Interval History: Pt interested in hospice care home vs NH.     Past Medical History:   Diagnosis Date    Depression     Hypercholesteremia     Pneumonia     Stage 4 lung cancer     Thyroid disease        Past Surgical History:   Procedure Laterality Date     SECTION      ENDOBRONCHIAL ULTRASOUND N/A 2020    Procedure: ENDOBRONCHIAL ULTRASOUND (EBUS);  Surgeon: Karen Mills MD;  Location: [facility] OR [address] [place] [address];  Service: Endoscopy;  Laterality: N/A;    INSERTION OF TUNNELED CENTRAL VENOUS CATHETER (CVC) WITH SUBCUTANEOUS PORT N/A 2021    Procedure: KPHWPXPIA-QNDR-Y-CATH;  Surgeon: Ray Foreman MD;  Location: [facility] OR [address] [place] [address];  Service: Vascular;  Laterality: N/A;       Review of patient's allergies indicates:   Allergen Reactions    Codeine Nausea And Vomiting       Medications:  Continuous Infusions:  Scheduled Meds:   albuterol-ipratropium  3 mL Nebulization Q6H WAKE    aspirin  81 mg Oral Daily    divalproex  250 mg Oral TID    DULoxetine  20 mg Oral Daily    folic acid  1 mg Oral Daily    levothyroxine  112 mcg Oral Daily    morphine  30 mg Oral Q12H     polyethylene glycol  17 g Oral Daily    senna-docusate 8.6-50 mg  2 tablet Oral Daily    venlafaxine  150 mg Oral Daily    vitamin D  1,000 Units Oral Daily     PRN Meds:albuterol, dextrose 10%, dextrose 10%, glucagon (human recombinant), glucose, glucose, HYDROmorphone, hydrOXYzine HCL, LORazepam, melatonin, naloxone, ondansetron, oxyCODONE, oxyCODONE, sodium chloride 0.9%, sodium chloride 0.9%    Family History     Problem Relation (Age of Onset)    Heart attack Mother    Liver cancer Father    Lung cancer Mother        Tobacco Use    Smoking status: Current Every Day Smoker     Packs/day: 0.10     Years: 40.00     Pack years: 4.00     Types: Cigarettes    Smokeless tobacco: Never Used   Substance and Sexual Activity    Alcohol use: Not Currently     Comment: social    Drug use: No    Sexual activity: Not Currently     Partners: Male     Birth control/protection: None       Review of Systems   Constitutional: Positive for appetite change and fatigue.   HENT: Negative for trouble swallowing.    Eyes: Negative.    Respiratory: Positive for cough and shortness of breath.    Cardiovascular: Negative for leg swelling.   Genitourinary: Negative.    Musculoskeletal: Positive for back pain.   Skin: Negative.    Neurological: Positive for weakness.   Psychiatric/Behavioral: Negative.      Objective:     Vital Signs (Most Recent):  Temp: 98.1 °F (36.7 °C) (02/15/22 0007)  Pulse: 81 (02/15/22 0851)  Resp: 12 (02/15/22 0916)  BP: (!) 173/67 (02/15/22 0007)  SpO2: (!) 93 % (02/15/22 0851) Vital Signs (24h Range):  Temp:  [98.1 °F (36.7 °C)] 98.1 °F (36.7 °C)  Pulse:  [] 81  Resp:  [12-22] 12  SpO2:  [89 %-94 %] 93 %  BP: (173)/(67) 173/67     Weight: 69.5 kg (153 lb 3.5 oz)  Body mass index is 24 kg/m².    Physical Exam  Constitutional:       Interventions: Nasal cannula in place.   HENT:      Head: Normocephalic and atraumatic.   Eyes:      General: Lids are normal.      Conjunctiva/sclera: Conjunctivae  normal.   Cardiovascular:      Rate and Rhythm: Normal rate and regular rhythm.   Pulmonary:      Effort: Pulmonary effort is normal. No respiratory distress.   Chest:      Chest wall: Tenderness present.      Comments: Tenderness on right side.   Abdominal:      General: Abdomen is flat.      Tenderness: There is no abdominal tenderness.   Musculoskeletal:      Cervical back: Full passive range of motion without pain and normal range of motion.      Comments: Normal ROM, weakness noted.    Skin:     General: Skin is warm and dry.   Neurological:      Mental Status: She is alert and oriented to person, place, and time.   Psychiatric:         Attention and Perception: Attention normal.         Speech: Speech normal.         Behavior: Behavior is cooperative.         Review of Symptoms    Symptom Assessment (ESAS 0-10 Scale)  Pain:  6  Dyspnea:  0  Anxiety:  3  Nausea:  0  Depression:  0  Anorexia:  6  Fatigue:  0  Insomnia:  0  Restlessness:  0  Agitation:  0         Pain Assessment:  Location(s): back            Quality: sharp        Quantity: 8/10 in intensity        Alleviating Factors: opiates    Performance Status:  60    Living Arrangements:  Lives with family    Psychosocial/Cultural: Pt lives with her, Cedrick. Per pt, her house is filthy and she sleeps on the sofa. Per pt, son works during the day. Pt reports he has a sister who she speaks with often but sister is OOT a lot.     Spiritual:  F - Balbian and Belief:  Taoist        Advance Care Planning   Advance Directives:   Do Not Resuscitate Status: No    Medical Power of : No    Agent's Name:  Srinivasan Philip is NOK.     Decision Making:  Patient answered questions         Significant Labs: All pertinent labs within the past 24 hours have been reviewed.  CBC:   Recent Labs   Lab 02/09/22  0237   WBC 5.10   HGB 12.1   HCT 36.5*   MCV 81*        BMP:  No results for input(s): GLU, NA, K, CL, CO2, BUN, CREATININE, CALCIUM, MG in the last 24  hours.  LFT:  Lab Results   Component Value Date    AST 19 02/08/2022    ALKPHOS 53 (L) 02/08/2022    BILITOT 0.3 02/08/2022     Albumin:   Albumin   Date Value Ref Range Status   02/08/2022 3.3 (L) 3.5 - 5.2 g/dL Final     Protein:   Total Protein   Date Value Ref Range Status   02/08/2022 8.2 6.0 - 8.4 g/dL Final     Lactic acid:   Lab Results   Component Value Date    LACTATE 1.6 11/05/2021    LACTATE 1.7 12/05/2019       Significant Imaging: I have reviewed all pertinent imaging results/findings within the past 24 hours.    > 50% of  35  min visit spent in chart review, face to face discussion of goals of care,  symptom assessment, coordination of care and emotional support    Maria Luz Reid, CNS  Palliative Medicine  [facility] - Oncology ([facility])

## 2022-02-15 NOTE — PLAN OF CARE
POC reviewed with patient. A&O x3 upon initial assessment. Pt SBA to BSC. Unsteady gait. Pt remained afebrile overnight. No signs or symptoms of active bleeding. Some complaints of pain overnight, pain meds administered per MAR. Pt resting comfortably in bed. Will continue to monitor.

## 2022-02-15 NOTE — PLAN OF CARE
02/15/22 1706   Post-Acute Status   Post-Acute Authorization Hospice   Post-Acute Placement Status Set-up Complete/Auth obtained   Hospice Status Set-up Complete/Auth obtained   Discharge Delays None known at this time       CHRISTOPHER had MD to complete [facility] and CHRISTOPHER faxed COVID-19 results to [facility] via [employer] and E-mail [facility] to [email or web address]. CHRISTOPHER awaits arrange transportation for today.    5:11 PM  Melanie at [facility] informed CHRISTOPHER to arrange transportation  to [address] [place] [address] and the nurse can call report to [phone]  And will notified her son that we were transferring her tonight.  CHRISTOPHER will continue to follow patient.    5:27 PM  CHRISTOPHER arranged ambulance transport via [facility]. Requested  time is 6:00 PM.  Requested  time does not guarantee arrival time.  If transport does not arrive by 6:45pm please contact assigned SW or on-call for assistance.    Patient's bedside nurse and family notified of the above.      Argentina Colin LMSW  PRN - [facility]  [phone]

## 2022-02-15 NOTE — PLAN OF CARE
[facility]  Department of Hospital Medicine  [address] [place] [address]  [phone] after hours  [phone] fax    HOSPICE  ORDERS    02/15/2022    Admit to Hospice:  Inpatient service    Diagnoses:   Active Hospital Problems    Diagnosis  POA    *Adenocarcinoma of right lung, stage 4 [C34.91]  Yes     Patient and [facility] are aware that patient is scheduled for bronchoscopy with EBUS on 4/27/2020 and will need a covid test performed 4/22/2020 prior to procedure.    Instructions givien    Diagnostic and staging EBUS planned.    I have explained the risks, benefits and alternatives of the procedure in detail.  The patient voices understanding and all questions have been answered.  The patient agrees to proceed as planned.      Palliative care encounter [Z51.5]  Not Applicable    Pain [R52]  Unknown    Goals of care, counseling/discussion [Z71.89]  Not Applicable    Advance care planning [Z71.89]  Not Applicable    Pneumonia involving right lung [J18.9]  Yes    Cancer related pain [G89.3]  Yes    Depression [F32.A]  Yes    HLD (hyperlipidemia) [E78.5]  Yes    Thyroid disease [E07.9]  Yes    Hypokalemia [E87.6]  Yes      Resolved Hospital Problems   No resolved problems to display.       Hospice Qualifying Diagnoses:        Patient has a life expectancy < 6 months due to:  1) Primary Hospice Diagnosis:  Metastatic adenocarcinoma of the lung (stage IV)  2) Comorbid Conditions Contributing to Decline: Pleural metastases of adenocarcinoma of the lung    Vital Signs: Routine per Hospice Protocol.    Code Status: DNR    Allergies:   Review of patient's allergies indicates:   Allergen Reactions    Codeine Nausea And Vomiting       Diet: As tolerated, pleasure feeds    Activities: As tolerated    Nursing: Per Hospice Routine.        Oxygen: Low flow- 3L nasal cannula with humidification if possible      Medications:          Medication List      START taking these  medications    LORazepam 0.5 MG tablet  Commonly known as: ATIVAN  Take 1 tablet (0.5 mg total) by mouth every 6 (six) hours as needed for Anxiety.     morphine 30 MG 12 hr tablet  Commonly known as: MS CONTIN  Take 1 tablet (30 mg total) by mouth every 12 (twelve) hours. for 7 days        CHANGE how you take these medications    levothyroxine 125 MCG tablet  Commonly known as: SYNTHROID  Take 125 mcg by mouth once daily. Take on an empty stomach before other medications  What changed: Another medication with the same name was removed. Continue taking this medication, and follow the directions you see here.     oxyCODONE-acetaminophen 5-325 mg per tablet  Commonly known as: PERCOCET  Take 1 tablet by mouth every 8 (eight) hours as needed for Pain.  What changed: when to take this        CONTINUE taking these medications    albuterol 90 mcg/actuation inhaler  Commonly known as: PROVENTIL/VENTOLIN HFA  Inhale 2 puffs into the lungs every 6 (six) hours as needed for Wheezing. Rescue     divalproex 250 MG EC tablet  Commonly known as: DEPAKOTE  Take 250 mg by mouth 3 (three) times daily.     DULoxetine 60 MG capsule  Commonly known as: CYMBALTA  Take 60 mg by mouth once daily.     melatonin 5 mg  Commonly known as: MELATIN  Take 5 mg by mouth every evening.     ondansetron 8 MG Tbdl  Commonly known as: ZOFRAN-ODT  Dissolve 1 tablet (8 mg total) by mouth every 8 (eight) hours as needed (Nausea).     venlafaxine 150 MG Cp24  Commonly known as: EFFEXOR-XR  Take 150 mg by mouth once daily.        STOP taking these medications    aspirin 81 MG EC tablet  Commonly known as: ECOTRIN     cholecalciferol (vitamin D3) 50 mcg (2,000 unit) Tab  Commonly known as: VITAMIN D3     docusate sodium 100 MG capsule  Commonly known as: COLACE     famotidine 20 MG tablet  Commonly known as: PEPCID     fluticasone propionate 50 mcg/actuation nasal spray  Commonly known as: FLONASE     folic acid 1 MG tablet  Commonly known as: FOLVITE      ipratropium 17 mcg/actuation inhaler  Commonly known as: ATROVENT HFA     levocetirizine 5 MG tablet  Commonly known as: XYZAL     meloxicam 15 MG tablet  Commonly known as: MOBIC     multivitamin capsule     naloxone 4 mg/actuation Spry  Commonly known as: NARCAN     ONE DAILY MULTIVITAMIN per tablet  Generic drug: multivitamin     pravastatin 40 MG tablet  Commonly known as: PRAVACHOL     ramelteon 8 mg tablet  Commonly known as: ROZEREM     REFRESH DIGITAL 0.5-1-0.5 % Drop  Generic drug: [id number]     risperiDONE 0.5 MG Tab  Commonly known as: RISPERDAL     traMADoL 50 mg tablet  Commonly known as: ULTRAM              _________________________________  Connor M Gillies, MD  02/15/2022

## 2022-02-15 NOTE — ASSESSMENT & PLAN NOTE
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  Patient stateed dyspnea progressively worsening for 2 weeks with intermittent non-productive cough, not on home O2.  Patient also endorses chronic right sided chest and back pain that is usually relieved with home narcotics.  Pt is alert, oriented to person, place, and situation. Pt is a full code.     Reason for consult: Goals of care/advacne care planning:     Today: Met with pt who reports she is comfortable on current measures. Plan is respite care at [facility] for two weeks through [facility].     2/15/22:    Met with pt. She is aware options for hospice are home with hospice and NH with hospice. Pt not sure she wants to let hospice in her home due to not being clean. Marian Persaud LCSW and this JUNIOR let pt know if plan is home it would be unsafe to send her home without hospice or NH care going in . Pt verbalized understanding. [facility] SW to meet with pt at 130 for discharge planning. Made pt aware.     2/10/22  Met with pt along with Marian Persaud LCSW. Per pt, she does not want to do chemo. Pt reports she does not always take her regular meds but does cristi her pain meds. Pt reports she does not want to go back home due to house beinf "filthy" and no help at home.  [facility] with hospice care discussed. Pt had questions about her disability check and if NH will take it. Per CHRISTOPHER, they take the check for room and board and she woul have Long-term Medicaid. Per pt, she needs to think about this due to she helps with the bills. Pt reports he will speak to her son.     Code status discussed. DNR status discussed. Pt wants to think about decision and we will re-visit tomorrow.   MPOA: Pt' son, Cedrick is NOK. MPOA education done. Pt reports she may want her sister to be MPOA but will think about. "     Symptom management:   Pt reports sharp pain to back area and right side of chest area. Pt reports pain 10/10 and with pain meds goes to 3/10.   Pt reports pain controlled.     Pt on MS Contin 30 mg bid.     Pt on  oxycodone 5 mg q 4 hrs moderate pain.   Pt on oxycodone 10 mg q 4 hrs severe pain.     Recs: Consider MSContin 15 mg bid.       Pt has dilaudid 1 mg IVP q 6 hrs prn -  Rec: Use only if pain not relieved by PO meds.     Anxiety:   Pt reports she was on Ativan prn.       Dyspnea:  Pt on O2 per NC.     Plan:   Pt is DNR.   See above for sym[joseline recs.   Pt to d/c to [facility] for respite care through [facility].

## 2022-02-16 NOTE — PLAN OF CARE
Pt. with nonskid footwear on with bed in lowest position and locked with bed rails up x2, with bed alarm being utilized and avasys in use.  Pt. instructed to call prior to getting OOB.  Pt. with call light within reach and verbalized understanding.  Pt. with c/o pain with oxycodone and dilaudid being given PRN.  Pt. Not eating meals but is drinking fluids.  Plan for transfer to [facility] hospice tonight.  Will continue to monitor pt.

## 2022-02-16 NOTE — DISCHARGE SUMMARY
[facility] - Oncology ([facility])  [facility] Medicine  Discharge Summary      Patient Name: Micheline Raza  MRN: [id number]  Patient Class: IP- Inpatient  Admission Date: 2/8/2022  Hospital Length of Stay: 7 days  Discharge Date and Time:  02/15/2022 6:05 PM  Attending Physician: Esteban Lopez DO   Discharging Provider: Connor M Gillies, MD  Primary Care Provider: [facility] Medicine Team: [facility] 5 Connor M Gillies, MD    HPI:   Ms. 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  Patient states dyspnea progressively worsening for 2 weeks with intermittent non-productive cough, not on home O2.  Patient also endorses chronic right sided chest and back pain that is usually relieved with home narcotics.  Dyspnea worsens with exertion and is associated with fatigue, appetite change, weight loss, and night sweats.  Patient follows heme/onc outpatient and was initially started on Keytruda for Tx.  Patient was scheduled for chemotherapy as she was deemed not a candidate for RT however patient refusing chemotherapy and discussing possibility of hospice care.  Of note, patient recently admitted for COVID 2 months ago.    In ED, patient afebrile and hypertensive with SBP of 160-180s.  Initially sats of 95% on RA however increasing dyspnea and placed on 2L NC.  WBC 6.9 with Hgb 13.  K 3.3 and BNP wnl.  EKG showing NSR.  CXR with right sided opacification more prominent at base concerning for PNA along with CA with small right sided pleural effusion.  Patient given pain medication, azithromycin x1 and rocephin x1.      * No surgery found *      Hospital Course:   Home pain regimen restarted and patient placed on supplemental O2.  Palliative consulted and following.  Adjusting pain regimen for intractable pain during  admission.  Patient discussing options on discharge of home or nursing home.  Abx changed from CTX/Azithromycin to Levaquin and completed course for CAP coverage.  PACE met with the family today and she is being discharged to inpatient hospice overnight 2/15-2/16. She will have 2 weeks of time there before transferring to home hospice.        Goals of Care Treatment Preferences:  Code Status: DNR    Health care agent: juancarlos Philip is NOK.   Health care agent number: No value filed.                   Consults:   Consults (From admission, onward)        Status Ordering Provider     Inpatient consult to Social Work  Once        Provider:  (Not yet assigned)    Acknowledged JEANNETTE VILLA     Inpatient consult to Palliative Care  Once        Provider:  (Not yet assigned)    Completed ANGIE ALLISON     Inpatient consult to Registered Dietitian/Nutritionist  Once        Provider:  (Not yet assigned)    Completed JEANNETTE VILLA          Vitals:    02/15/22 0916 02/15/22 1358 02/15/22 1409 02/15/22 1442   BP:       BP Location:       Patient Position:       Pulse:   84    Resp: 12 18 18 16   Temp:       TempSrc:       SpO2:   (!) 92%    Weight:       Height:         Physical Exam  Vitals and nursing note reviewed.   Constitutional:       General: She is not in acute distress.     Appearance: She is underweight. She is ill-appearing.   HENT:      Head: Normocephalic and atraumatic.      Mouth/Throat:      Mouth: Mucous membranes are moist.      Pharynx: Oropharynx is clear. No oropharyngeal exudate or posterior oropharyngeal erythema.   Eyes:      General: No scleral icterus.     Extraocular Movements: Extraocular movements intact.      Conjunctiva/sclera: Conjunctivae normal.      Pupils: Pupils are equal, round, and reactive to light.   Cardiovascular:      Rate and Rhythm: Normal rate and regular rhythm.      Pulses: Normal pulses.   Pulmonary:      Effort: Tachypnea present.      Breath sounds: No rales.       Comments: Decrease breath sounds on right  Abdominal:      General: Abdomen is flat. Bowel sounds are normal. There is no distension.      Palpations: Abdomen is soft.      Tenderness: There is no abdominal tenderness. There is no guarding.   Musculoskeletal:         General: No tenderness. Normal range of motion.      Cervical back: Normal range of motion and neck supple. No tenderness.      Right lower leg: No edema.      Left lower leg: No edema.   Skin:     General: Skin is warm and dry.   Neurological:      General: No focal deficit present.      Mental Status: She is alert and oriented to person, place, and time.      Motor: Weakness present.   Psychiatric:         Mood and Affect: Mood normal.         Behavior: Behavior normal. Behavior is cooperative.         Thought Content: Thought content normal.     No new Assessment & Plan notes have been filed under this hospital service since the last note was generated.  Service: Hospital Medicine    Final Active Diagnoses:    Diagnosis Date Noted POA    PRINCIPAL PROBLEM:  Adenocarcinoma of right lung, stage 4 [C34.91] 04/22/2020 Yes    Palliative care encounter [Z51.5] 02/09/2022 Not Applicable    Pain [R52]  Yes    Goals of care, counseling/discussion [Z71.89]  Not Applicable    Advance care planning [Z71.89]  Not Applicable    Pneumonia involving right lung [J18.9] 02/08/2022 Yes    Cancer related pain [G89.3] 05/05/2020 Yes    Depression [F32.A] 07/24/2018 Yes    HLD (hyperlipidemia) [E78.5] 06/10/2016 Yes    Thyroid disease [E07.9] 06/10/2016 Yes      Problems Resolved During this Admission:    Diagnosis Date Noted Date Resolved POA    Hypokalemia [E87.6] 06/10/2016 02/15/2022 Yes       Discharged Condition: poor    Disposition: Hospice/Medical Facility    Follow Up:   Follow-up Information     [facility] On 2/17/2022.    Specialty: General Practice  Why: Appointment time is 11am. Please arrive 15minutes early for check in. Please  bring discharge summary, ID and insurance card.  Contact information:  [address] [facility] [id number]  [phone]                       Patient Instructions:   No discharge procedures on file.    Significant Diagnostic Studies: n/a    Pending Diagnostic Studies:     None         Medications:  Reconciled Home Medications:      Medication List      START taking these medications    LORazepam 0.5 MG tablet  Commonly known as: ATIVAN  Take 1 tablet (0.5 mg total) by mouth every 6 (six) hours as needed for Anxiety.     morphine 30 MG 12 hr tablet  Commonly known as: MS CONTIN  Take 1 tablet (30 mg total) by mouth every 12 (twelve) hours. for 7 days        CHANGE how you take these medications    levothyroxine 125 MCG tablet  Commonly known as: SYNTHROID  Take 125 mcg by mouth once daily. Take on an empty stomach before other medications  What changed: Another medication with the same name was removed. Continue taking this medication, and follow the directions you see here.     oxyCODONE-acetaminophen 5-325 mg per tablet  Commonly known as: PERCOCET  Take 1 tablet by mouth every 8 (eight) hours as needed for Pain.  What changed: when to take this        CONTINUE taking these medications    albuterol 90 mcg/actuation inhaler  Commonly known as: PROVENTIL/VENTOLIN HFA  Inhale 2 puffs into the lungs every 6 (six) hours as needed for Wheezing. Rescue     divalproex 250 MG EC tablet  Commonly known as: DEPAKOTE  Take 250 mg by mouth 3 (three) times daily.     DULoxetine 60 MG capsule  Commonly known as: CYMBALTA  Take 60 mg by mouth once daily.     melatonin 5 mg  Commonly known as: MELATIN  Take 5 mg by mouth every evening.     ondansetron 8 MG Tbdl  Commonly known as: ZOFRAN-ODT  Dissolve 1 tablet (8 mg total) by mouth every 8 (eight) hours as needed (Nausea).     venlafaxine 150 MG Cp24  Commonly known as: EFFEXOR-XR  Take 150 mg by mouth once daily.        STOP taking these medications    aspirin 81 MG EC  tablet  Commonly known as: ECOTRIN     cholecalciferol (vitamin D3) 50 mcg (2,000 unit) Tab  Commonly known as: VITAMIN D3     docusate sodium 100 MG capsule  Commonly known as: COLACE     famotidine 20 MG tablet  Commonly known as: PEPCID     fluticasone propionate 50 mcg/actuation nasal spray  Commonly known as: FLONASE     folic acid 1 MG tablet  Commonly known as: FOLVITE     ipratropium 17 mcg/actuation inhaler  Commonly known as: ATROVENT HFA     levocetirizine 5 MG tablet  Commonly known as: XYZAL     meloxicam 15 MG tablet  Commonly known as: MOBIC     multivitamin capsule     naloxone 4 mg/actuation Spry  Commonly known as: NARCAN     ONE DAILY MULTIVITAMIN per tablet  Generic drug: multivitamin     pravastatin 40 MG tablet  Commonly known as: PRAVACHOL     ramelteon 8 mg tablet  Commonly known as: ROZEREM     REFRESH DIGITAL 0.5-1-0.5 % Drop  Generic drug: [id number]     risperiDONE 0.5 MG Tab  Commonly known as: RISPERDAL     traMADoL 50 mg tablet  Commonly known as: ULTRAM            Indwelling Lines/Drains at time of discharge:   Lines/Drains/Airways     None                 Time spent on the discharge of patient: 10 minutes         Connor M Gillies, MD  Department of Hospital Medicine  [facility] - Oncology ([facility])

## 2022-02-16 NOTE — PT/OT/SLP DISCHARGE
Occupational Therapy Discharge Summary    Micheline Raza  MRN: [id number]   Principal Problem: Adenocarcinoma of right lung, stage 4      Patient Discharged from acute Occupational Therapy on 2/15/22.  Please refer to prior OT note dated 2/14/22 for functional status.    Assessment:      Patient appropriate for care in another setting.    Objective:     GOALS:   Multidisciplinary Problems     Occupational Therapy Goals        Problem: Occupational Therapy Goal    Goal Priority Disciplines Outcome Interventions   Occupational Therapy Goal     OT, PT/OT Ongoing, Progressing    Description: Goals to be met by: 3/13/22     Patient will increase functional independence with ADLs by performing:    Grooming while standing at sink with Contact Guard Assistance.  Toileting from toilet with Stand by Assistance for hygiene and clothing management.   Toilet transfer to toilet with Stand-by Assistance.                     Reasons for Discontinuation of Therapy Services  Transfer to alternate level of care.      Plan:     Patient Discharged to: Palliative Care/Hospice    2/16/2022

## 2022-02-16 NOTE — PROGRESS NOTES
Pt placed on stretcher with paramedics. Paperwork given. (H&P) No DNR papers to give to paramedics. IV still in place. Pt transported to [facility] for hospice services. Belongings and meds with patient.

## 2022-02-22 DIAGNOSIS — D84.9 IMMUNOSUPPRESSED STATUS: ICD-10-CM

## 2022-11-02 NOTE — HPI
"Per MD Note:  Weakness        Pt c/o generalized weakness, vomiting & constipation.  Onset several days.      Patient is 70 yo F presenting to the ED with constipation and diarrhea with N/V from her nursing home. She also has Thyroid disease, hypercholesterolemia, and depression (previously on klonopin). She states that she has been constipated to the point that she is "having diarrhea around a stool ball". Diarrhea is brown in appearance and patient denies blood but is unsure. She has cold sweats when having BMs. She is having lower abdominal cramping feeling "like period pain". Cramping comes and goes and is more noticeable with BMs. Emesis is nonbloody and to the point she is unable to keep any food or pills down. She is not sure the last time she took her pills but she has not taken them for at least a couple days. She has had a 30lb weight loss over the past 2 months. She is also very depressed and states that she "wants to go home to my lord" but does not have a plan and is not having SI. There are no guns in the home to her knowledge. Denies dysuria or any changes in urination.         On My Interview: 12/05/2019    Met with the patient while she was lying in her ED bed. She presented as irritable, dysphoric, and seemed uninterested in the interview process. When she was asked about the reason for ED visit, she replied angrily, "I am tired of being asked the same questions all the time. I am feeling sick. I have no appetite, nausea and constipation. My face is gone. I am losing weight." When she was asked if she was having suicidal thoughts she replied, "yes. I just want to die. There is no hope for me. I want to go home and want go to the lord" and she asked if she has a suicide plan she responded, "whatever it takes, swallow pills or anything else. I can have access to guns." She refused to let us know if she had prior suicide attempts and responded by saying, "no comments." She endorsed feeling depressed " "and hopeless. She reported feeling very tired and refused to answer the rest of the interview questions. Per chart review patient has prior psychiatric history of depression and suicidal thoughts with prior inpatient hospitalization. Patient refused to discuss her substance abuse history and her UDS was positive for cocaine.       Psychiatric Review Of Systems - Is patient experiencing or having changes in:  sleep: yes. No sleep  Appetite:yes, no appetite  weight: yes, weight loss  energy/anergy: decreased   interest/pleasure/anhedonia: yes. decreased  somatic symptoms: no  anxiety/panic: unknown  Guilty/hopelessness: yes "there in no hope for me"  concentration: yes  S.I.B.s/risky behavior: unknown  Irritability: yes  Racing thoughts: yes  Impulsive behaviors: unknown  Paranoia: unknown  AVH:no  Suicidal thoughts/plan/intent: yes    Past Medical/Surgical History  Past Medical History:   Diagnosis Date    Depression     Hypercholesteremia     Pneumonia     Thyroid disease      Past Surgical History:   Procedure Laterality Date     SECTION         Past Psychiatric History:  Previous Medication Trials: yes but she was not able to name them  Previous Psychiatric Hospitalizations: yes   Previous Suicide Attempts: She stated, "no comments"   History of Violence: unknown  Outpatient Psychiatrist: no    Social History:  Marital Status: single  Children: 1   Employment Status/Info: retired  Education: unknown as she refused to answer this question  Special Ed: no  Housing Status:"I don't want to talk anymore"  History of phys/sexual abuse: unknown  Access to gun: yes    Substance Abuse History:  Recreational Drugs: cocaine  Use of Alcohol: unknown as she refused to answer  Tobacco Use: unknown  Rehab History: unknown     Legal History:  Past Charges/Incarcerations: unknown,    Pending charges: unknown     Family Psychiatric History:  Unable to assess as she refused to answer the interview " questions        Mental Status Exam  Appearance: age appropriate, lying in bed  Behavior/Cooperation: reluctant to participate, uncooperative, eye contact minimal  Speech: normal tone, normal rate, normal pitch, normal volume  Mood:  depressed, dysphoric and irritable  Affect: normal, blunted, constricted, dysphoric and irritable  Thought Process: goal-directed, logical  Thought Content: suicidal thoughts: (active-yes) see HPI for more details  Orientation:  grossly intact, person, place, situation, time/date, day of week, month of year  Memory: Unable to assess as she refused to answer the interview questions  Attention/Concentration:  Decreased  Cognition: fund of knowledge She was able to name the current president  Insight: fair per patient understanding of illness  Judgement: poor per patient's current behavior       Double Island Pedicle Flap Text: The defect edges were debeveled with a #15 scalpel blade.  Given the location of the defect, shape of the defect and the proximity to free margins a double island pedicle advancement flap was deemed most appropriate.  Using a sterile surgical marker, an appropriate advancement flap was drawn incorporating the defect, outlining the appropriate donor tissue and placing the expected incisions within the relaxed skin tension lines where possible.    The area thus outlined was incised deep to adipose tissue with a #15 scalpel blade.  The skin margins were undermined to an appropriate distance in all directions around the primary defect and laterally outward around the island pedicle utilizing iris scissors.  There was minimal undermining beneath the pedicle flap.

## 2023-01-01 NOTE — PLAN OF CARE
Patient progressing towards discharge.    Patient had no acute events noted.   Pain medication given throughout the night with relief.  Patient refused to have IV restarted this morning, but stated she would be open to it after she gets up in the morning.  Discussed POC with patient and family with verbalization of understanding.    Will continue to monitor.     7

## 2023-12-01 NOTE — ED TRIAGE NOTES
Pt came in via EMS c/o of suicidal ideations. Pt reported being tired of living because she had to sleep outside lastnight because she ran out of money and cant afford motel any longer. Pt reports having a headache and some aching liss pain.    Left arm;

## 2024-03-26 NOTE — ED NOTES
Pt remains in paper scrubs, resting comfortably in stretcher. No signs of distress noted, visible chest rise and fall noted. Sitter remains at bedside in direct visual contact of patient, documenting q15 min per PEC protocol. No equipment or belongings are in the room with the patient. Pt updated on POC. Will continue to monitor   Writer attempted to speak with patient in regards to pulmonology referral. Left message for patient requesting return phone call.

## 2025-01-21 NOTE — PROGRESS NOTES
ONCOLOGY FOLLOW UP VISIT.     Reason for visit: Toxicity check on Keytruda for stage IV NSCLC    Best Contact Phone Number(s): [phone] (home)      Cancer/Stage/TNM:   Cancer Staging  No matching staging information was found for the patient.     Oncology History   Adenocarcinoma of right lung, stage 4   4/27/2020 Initial Diagnosis    Treated in late 2019 for pneumonia.  Patient presented with constant chest pain on the right side and back.  Loss of appetite and weight loss.  She was found to have a R lung mass, mediastinal LAD, and R pleural effusion.  4/27 had EBUS and was found to have adenocarcinoma from 4L, station 7, 4R and 11R FNAs.     6/4/2020 -  Chemotherapy    Treatment Summary   Plan Name: OP PEMBROLIZUMAB 200MG Q3W  Treatment Goal: Control  Status: Active  Start Date: 6/4/2020  End Date: 9/18/2020 (Planned)  Provider: Gurpreet Estrada MD  Chemotherapy: pembrolizumab (KEYTRUDA) 200 mg in sodium chloride 0.9% 100 mL chemo infusion, 200 mg, Intravenous, Clinic/HOD 1 time, 4 of 6 cycles  Administration: 200 mg (6/4/2020), 200 mg (6/25/2020), 200 mg (7/17/2020), 200 mg (8/8/2020)          Interval History:   Patient returns to clinic prior to cycle 5 of Keytruda. Patient reports lower back pain that is associated with increased physical activity. Has tried Advil with minimal relief and is not prescribed pain medications through [facility] at this time. She does experience some fatigue but able to perform ADLs independently. SOB at baseline. Reports a decrease in anxiety.    She denies any nausea, vomiting, diarrhea, constipation, abdominal pain, weight loss, loss of appetite, chest pain, leg swelling, pain, headaches, dizziness, or mood changes.      ROS:  Review of Systems   Constitutional: Positive for fatigue. Negative for activity change, chills, fever and unexpected weight change.   HENT: Negative for mouth sores, nosebleeds and sore throat.    Respiratory: Positive for shortness of breath. Negative for  cough and wheezing.    Cardiovascular: Negative for chest pain, palpitations and leg swelling.   Gastrointestinal: Negative for abdominal distention, abdominal pain and blood in stool.   Endocrine: Negative for cold intolerance and heat intolerance.   Genitourinary: Negative for dysuria, flank pain and frequency.   Musculoskeletal: Positive for arthralgias and back pain (lower back). Negative for joint swelling and myalgias.   Skin: Negative for color change, rash and wound.   Neurological: Negative for dizziness, light-headedness and headaches.   Hematological: Negative for adenopathy. Does not bruise/bleed easily.   Psychiatric/Behavioral: Negative for agitation. The patient is not nervous/anxious (improved).       A complete 12-point review of systems was reviewed and is negative except as mentioned above.     Past Medical History:   Past Medical History:   Diagnosis Date    Depression     Hypercholesteremia     Pneumonia     Thyroid disease         Allergies:   Review of patient's allergies indicates:   Allergen Reactions    Codeine Nausea And Vomiting        Medications:   Current Outpatient Medications   Medication Sig Dispense Refill    aspirin (ECOTRIN) 81 MG EC tablet Take 81 mg by mouth once daily.      clonazePAM (KLONOPIN) 1 MG tablet Take 1 tablet (1 mg total) by mouth 2 (two) times daily. (Patient not taking: Reported on 7/17/2020) 60 tablet 0    divalproex (DEPAKOTE) 250 MG EC tablet Take 250 mg by mouth 3 (three) times daily.      DULoxetine (CYMBALTA) 20 MG capsule Take 20 mg by mouth once daily.      levocetirizine (XYZAL) 5 MG tablet Take 5 mg by mouth every evening.      levothyroxine (SYNTHROID) 112 MCG tablet Take 1 tablet (112 mcg total) by mouth once daily. 30 tablet 1    morphine (MS CONTIN) 15 MG 12 hr tablet Take 15 mg by mouth 2 (two) times daily.      multivitamin capsule Take 1 capsule by mouth once daily.      potassium chloride SA (K-DUR,KLOR-CON) 20 MEQ tablet Take 1  tablet (20 mEq total) by mouth once daily. (Patient not taking: Reported on 6/25/2020) 30 tablet 1    pravastatin (PRAVACHOL) 40 MG tablet Take 1 tablet (40 mg total) by mouth once daily. 90 tablet 3    risperiDONE (RISPERDAL) 0.5 MG Tab Take by mouth.      sodium,potassium,mag sulfates (SUPREP BOWEL PREP KIT) 17.5-3.13-1.6 gram SolR Take 1 each by mouth as directed. (Patient not taking: Reported on 6/4/2020) 1 Bottle 0    venlafaxine (EFFEXOR-XR) 150 MG Cp24 Take 150 mg by mouth once daily.      vitamin D (VITAMIN D3) 1000 units Tab Take 1 tablet (1,000 Units total) by mouth once daily. 90 tablet 11     No current facility-administered medications for this visit.         Physical Exam:   There were no vitals taken for this visit.     ECOG Performance Status: (foot note - ECOG PS provided by Eastern Co[employer] Oncology Group) 1 - Symptomatic but completely ambulatory    Physical Exam  Constitutional:       Appearance: She is well-developed.   HENT:      Head: Normocephalic and atraumatic.   Eyes:      Conjunctiva/sclera: Conjunctivae normal.      Pupils: Pupils are equal, round, and reactive to light.   Neck:      Musculoskeletal: Normal range of motion and neck supple.   Cardiovascular:      Rate and Rhythm: Normal rate and regular rhythm.      Heart sounds: No murmur. No friction rub.   Pulmonary:      Effort: Pulmonary effort is normal.      Breath sounds: Normal breath sounds.   Abdominal:      General: Bowel sounds are decreased.      Palpations: Abdomen is soft.      Tenderness: There is no abdominal tenderness.   Musculoskeletal: Normal range of motion.   Lymphadenopathy:      Cervical: No cervical adenopathy.   Skin:     General: Skin is warm and dry.   Neurological:      Mental Status: She is alert and oriented to person, place, and time.   Psychiatric:         Behavior: Behavior normal.           Labs:   No results found for this or any previous visit (from the past 48 hour(s)).   Imaging:   CT Chest  Abdomen Pelvis With Contrast  Narrative: EXAMINATION:  CT CHEST ABDOMEN PELVIS WITH CONTRAST (XPD)    CLINICAL HISTORY:  Re-staging stage IV NSCLC on immunotherapy; Malignant neoplasm of unspecified part of right bronchus or lung    TECHNIQUE:  Axial images of the chest, abdomen, and pelvis were acquired  after the use of 75 cc [id number] IV contrast and 30 cc oral contrast.   Coronal and sagittal reconstructions were also obtained    COMPARISON:  PET-CT 05/18/2020.  CT chest 03/09/2020, CT abdomen pelvis 12/05/2019.    FINDINGS:  Thoracic soft tissues: Normal thyroid.    Aorta: Thoracic aorta is normal in caliber and contour with no significant calcific atherosclerosis.    Heart: Normal in size. No pericardial effusion. No significant calcific coronary atherosclerosis.    Myranda/Mediastinum: Previously enlarged subcarinal lymph node is not well visualized this exam.  In the anterior mediastinum there is a soft tissue density lesion (index lesion 3) likely representing lymph node measuring 0.5 cm in short axis (series 2, image 36), previously 2.1 cm on PET-CT 05/18/2020.  There is another anterior mediastinal soft tissue density lesion inferiorly likely lymph node measuring 1.2 cm (index lesion 4) in short axis (series 2 image 49) previously 2.6 cm.  These were hypermetabolic.    Lungs: Trachea and bronchi are patent.  Lungs are satisfactorily expanded.    There is significant decrease in size of previously identified right lower lobe to hilar mass (index lesion 1) measuring 0.9 cm (series 2, image 47), previously 3.3 cm (remeasured).  There is a right pleural nodular mass (index lesion 2) with significant interval decrease in size measuring 1.0 cm (series 2, image 61), previously 4.0 cm (remeasured).  These were hypermetabolic on PET.   Bibasilar bandlike opacities and likely representing scarring or atelectasis.  Resolved right pleural effusion.  Mild emphysematous changes.    Liver: Hepatomegaly.  No focal hepatic  lesion.    Gallbladder: No calcified gallstones.    Bile Ducts: No evidence of dilated ducts.    Pancreas: No mass or peripancreatic fat stranding.    Spleen: Unremarkable.    Stomach and duodenum: Unremarkable.    Adrenals: Unremarkable.    Kidneys/ Ureters: Normal in size and location. Normal enhancement. No hydronephrosis or nephrolithiasis. No ureteral dilatation.    Bladder: Limited evaluation due to collapse.    Reproductive organs: Unremarkable.    Bowel/Mesentery: Small bowel is normal in caliber with no evidence of obstruction.  No evidence of inflammation or wall thickening.  Colon demonstrates no focal wall thickening.  Few scattered diverticula without diverticulitis.    Lymph nodes: No lymphadenapathy.    Abdominal wall:  Small containing umbilical hernia.    Vasculature: No aneurysm. No significant calcific atherosclerosis.    Bones: Grade 1 anterolisthesis of L5 on S1.  No acute fracture. Blastic changes within ribs right 4, 5, and 6 and left rib 6.  There are blastic changes within the sternum.  Impression: This patient history of metastatic right lung cancer , there is interval significant improvement in size of lesions.  No pathological fracture.  Interval resolution of right pleural effusion.    Other findings as above.    RECIST SUMMARY:    Date of prior examination for comparison: PET-CT 05/18/2020    Lesion 1: Right lung mass.  0.9 cm.  Series 2, image 47.  Prior measurement 3.3 cm.    Lesion 2: Right pleural mass.  1.0 cm.  Series 2, image 61.  Prior measurement 4.0 cm.    Lesion 3: Anterior mediastinal node.  0.5 cm. Series 2, image 36.  Prior measurement 2.1 cm.    Lesion 4: Inferior anterior mediastinal node.  1.2 cm.  Series 2, image 49.  Prior measurement 2.6 cm.    Electronically signed by resident: Elijah Turcios  Date:    08/06/2020  Time:    14:34    Electronically signed by: Juan Metzger MD  Date:    08/06/2020  Time:    16:33            Diagnoses:       1. Adenocarcinoma of  right lung, stage 4    2. 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 Acquired hypothyroidism    5. Cancer related pain    6. Anxiety    7. Depression, unspecified depression type          Assessment and Plan:         1,2,3. Stage IV PD-L1 60% Adenocarcinoma of lung:   -Not enough tissue for molecular testing, will send for Guardant 360.  Staging has been completed.  Discussed with patient and son in detail diagnosis, prognosis, and treatment options.  Discussed that if patient did not have targetable mutations on liquid biopsy, best treatment option with her performance status would be immunotherapy alone with Keytruda.  - CT from 8/6 shows interval significant improvement in size of lesions.  No pathological fracture.  Interval resolution of right pleural effusion.  - Labs acceptable to proceed with cycle #5 of Keytruda. Re-staging in November.     RTC 3 weeks with labs (CBC,CMP,TSH,free T4), to see Gretchen and keytruda cycle #6.      4- Patient is now taking 112 mcg through [facility] prescription.  TSH and free T4 now normalized.  Discussed with patient that cocaine can interact with synthroid and cause hyperthyroidism.       5-Patient was referred to palliative care and is followed by Dr. Kearney.  All prescriptions need to be prescribed by patient's [facility] physician.  Patient says her chest wall pain has improved, but now c/o lower back pain at today's visit. Offered patient physical therapy services as this pain may be related to deconditioning but declined. Encouraged patient to speak with [facility] regarding pain management.     6,7-All prescriptions through [facility].    she will return to clinic in 3 weeks, but knows to call in the interim if symptoms change or should a problem arise.    Case discussed with Dr. Estrada. Patient is in agreement with the proposed treatment plan. All questions were answered to the patient's satisfaction. Pt knows to call clinic if anything is needed before the next  clinic visit.     Gretchen Avalos, MSN, APRN, FNP-C  Hematology and Medical Oncology  Nurse Practitioner to Dr. Gurpreet Estrada  Nurse Practitioner, [facility]         I have reviewed the notes, assessments, and/or procedures performed by Gretchen BURLESON, as above.  I have rounded with Gretchen. I concur with her assessment and plan and the documentation of Micheline Raza.    Gurpreet Estrada M.D.  Hematology/Oncology Attending  [facility]  [facility]                 2

## 2025-04-30 NOTE — NURSING
Called in f/u after noticing pt hadn't showed for appointment and chemo infusion on 10/9.  Spoke with pt and stated that she cannot have appointments late in the afternoon (4:00 pm) due to transportation.  Informed pt that appointments had been adjusted to accommodate transportation.  Pt states that it was another reason she didn't come to her appointment.  I reinforced that she is on an immunotherpy treatment plan and should try to make the scheduled appointments.  Pt stated she would like to continue her treatments just with earlier transportation that has to be arranged by her  Rica.  Called and spoke with Rica who states she verifies the appointment dates and patient still turns the transportation service away.   notified that pt is ready to reschedule appointments.  Oncology Navigation   Intake  Date of Diagnosis: 04/28/20  Cancer Type: Thoracic  Internal / External Referral: Internal  Initial Nurse Navigator Contact: 04/28/20  Date Worked: 04/28/20  First Appointment Available: 05/01/20  Appointment Date: 05/04/20  Schedule to Appointment Timeline (days): 6  First Available Date vs. Scheduled Date (days): 3  Multiple appointments: Yes (PET and MRI)     Treatment  Current Status: Active       Medical Oncologist: Dr. Estrada   Immunotherapy: Initiated  Immunotherapy Name: Keytruda 200mg q3w  Start Date: 04/27/20    Support Systems: Children;Case manage /   Barriers of Care: Social needs;Transportation  Social Needs: Transportation needs  Transportation Barriers: Pt dosen't like to relie on evening transportation  Concerns: Patient will not complete care plan for immunotheray     Acuity   Follow Up  No follow-ups on file.     
Abormal VS: Temp > 100F or < 96.8F; SBP < 90 mmHG; HR > 120bpm; Resp > 24/min

## (undated) DEVICE — SEE MEDLINE ITEM 157131

## (undated) DEVICE — SEE MEDLINE ITEM 157117

## (undated) DEVICE — LUBRICANT SURGILUBE 2 OZ

## (undated) DEVICE — SYR 10CC LUER LOCK

## (undated) DEVICE — SEE MEDLINE ITEM 152487

## (undated) DEVICE — SEE MEDLINE ITEM 146313

## (undated) DEVICE — SYS LABEL CORRECT MED

## (undated) DEVICE — NDL VIZISHOT 2 FLEX 19G

## (undated) DEVICE — CATH BRONCHOSCOPE F/BF

## (undated) DEVICE — PENCIL GOLF STERILE

## (undated) DEVICE — CYTOSPIN COLLECTION FLUID BLT

## (undated) DEVICE — NDL ASPIRATING VIZISHOT 20-40M

## (undated) DEVICE — ADAPTER SWIVEL

## (undated) DEVICE — NDL VIZISHOT 2 FLEX 22G

## (undated) DEVICE — GAUZE SPONGE 4X4 12PLY

## (undated) DEVICE — SYR SLIP TIP 20CC

## (undated) DEVICE — ALCOHOL BLEND 95%

## (undated) DEVICE — PACK SET UP CONVERTORS

## (undated) DEVICE — CONTAINER SPECIMEN STRL 4OZ